# Patient Record
Sex: FEMALE | Race: WHITE | HISPANIC OR LATINO
[De-identification: names, ages, dates, MRNs, and addresses within clinical notes are randomized per-mention and may not be internally consistent; named-entity substitution may affect disease eponyms.]

---

## 2018-03-08 ENCOUNTER — APPOINTMENT (OUTPATIENT)
Dept: PULMONOLOGY | Facility: CLINIC | Age: 63
End: 2018-03-08
Payer: MEDICAID

## 2018-03-08 VITALS
SYSTOLIC BLOOD PRESSURE: 112 MMHG | OXYGEN SATURATION: 99 % | RESPIRATION RATE: 16 BRPM | HEART RATE: 68 BPM | DIASTOLIC BLOOD PRESSURE: 70 MMHG

## 2018-03-08 VITALS — HEIGHT: 63.5 IN | BODY MASS INDEX: 29.05 KG/M2 | WEIGHT: 166 LBS

## 2018-03-08 PROCEDURE — 94727 GAS DIL/WSHOT DETER LNG VOL: CPT

## 2018-03-08 PROCEDURE — 99204 OFFICE O/P NEW MOD 45 MIN: CPT | Mod: 25

## 2018-03-08 PROCEDURE — 94010 BREATHING CAPACITY TEST: CPT

## 2018-03-08 PROCEDURE — 85018 HEMOGLOBIN: CPT | Mod: QW

## 2018-03-08 PROCEDURE — 94729 DIFFUSING CAPACITY: CPT

## 2018-03-08 RX ORDER — HYDROCORTISONE 25 MG/G
2.5 CREAM TOPICAL
Qty: 28 | Refills: 0 | Status: DISCONTINUED | COMMUNITY
Start: 2017-11-28 | End: 2018-03-08

## 2018-03-08 RX ORDER — ASPIRIN 81 MG
600-200 TABLET, DELAYED RELEASE (ENTERIC COATED) ORAL
Qty: 90 | Refills: 0 | Status: DISCONTINUED | COMMUNITY
Start: 2017-10-23 | End: 2018-03-08

## 2018-03-08 RX ORDER — FAMOTIDINE 20 MG/1
20 TABLET, FILM COATED ORAL
Qty: 60 | Refills: 0 | Status: DISCONTINUED | COMMUNITY
Start: 2018-01-11 | End: 2018-03-08

## 2018-04-16 ENCOUNTER — APPOINTMENT (OUTPATIENT)
Dept: THORACIC SURGERY | Facility: CLINIC | Age: 63
End: 2018-04-16

## 2018-05-08 ENCOUNTER — APPOINTMENT (OUTPATIENT)
Dept: PULMONOLOGY | Facility: CLINIC | Age: 63
End: 2018-05-08
Payer: MEDICAID

## 2018-05-08 VITALS
HEART RATE: 61 BPM | BODY MASS INDEX: 28.6 KG/M2 | DIASTOLIC BLOOD PRESSURE: 70 MMHG | WEIGHT: 164 LBS | SYSTOLIC BLOOD PRESSURE: 120 MMHG | OXYGEN SATURATION: 97 %

## 2018-05-08 PROCEDURE — 99214 OFFICE O/P EST MOD 30 MIN: CPT

## 2018-07-31 ENCOUNTER — APPOINTMENT (OUTPATIENT)
Dept: ORTHOPEDIC SURGERY | Facility: CLINIC | Age: 63
End: 2018-07-31
Payer: MEDICAID

## 2018-07-31 VITALS
BODY MASS INDEX: 26.2 KG/M2 | WEIGHT: 163 LBS | HEIGHT: 66 IN | HEART RATE: 80 BPM | SYSTOLIC BLOOD PRESSURE: 132 MMHG | DIASTOLIC BLOOD PRESSURE: 80 MMHG

## 2018-07-31 DIAGNOSIS — Z78.9 OTHER SPECIFIED HEALTH STATUS: ICD-10-CM

## 2018-07-31 PROCEDURE — 73564 X-RAY EXAM KNEE 4 OR MORE: CPT | Mod: RT

## 2018-07-31 PROCEDURE — 99203 OFFICE O/P NEW LOW 30 MIN: CPT

## 2018-08-01 PROBLEM — Z78.9 EXERCISES OCCASIONALLY: Status: ACTIVE | Noted: 2018-07-31

## 2018-08-01 PROBLEM — Z78.9 RARELY CONSUMES ALCOHOL: Status: ACTIVE | Noted: 2018-07-31

## 2018-08-01 PROBLEM — Z78.9 DOES NOT USE ILLICIT DRUGS: Status: ACTIVE | Noted: 2018-07-31

## 2018-08-08 ENCOUNTER — APPOINTMENT (OUTPATIENT)
Dept: ORTHOPEDIC SURGERY | Facility: CLINIC | Age: 63
End: 2018-08-08
Payer: MEDICAID

## 2018-08-08 PROCEDURE — 99205 OFFICE O/P NEW HI 60 MIN: CPT

## 2018-08-22 ENCOUNTER — APPOINTMENT (OUTPATIENT)
Dept: PULMONOLOGY | Facility: CLINIC | Age: 63
End: 2018-08-22

## 2018-08-22 RX ORDER — MELOXICAM 15 MG/1
15 TABLET ORAL
Qty: 30 | Refills: 0 | Status: DISCONTINUED | COMMUNITY
Start: 2018-02-13 | End: 2018-08-22

## 2018-08-22 RX ORDER — NAPROXEN 500 MG/1
500 TABLET ORAL
Qty: 28 | Refills: 0 | Status: DISCONTINUED | COMMUNITY
Start: 2017-11-09 | End: 2018-08-22

## 2018-08-27 ENCOUNTER — OUTPATIENT (OUTPATIENT)
Dept: OUTPATIENT SERVICES | Facility: HOSPITAL | Age: 63
LOS: 1 days | End: 2018-08-27
Payer: MEDICAID

## 2018-08-27 VITALS
RESPIRATION RATE: 18 BRPM | SYSTOLIC BLOOD PRESSURE: 122 MMHG | HEART RATE: 73 BPM | OXYGEN SATURATION: 100 % | DIASTOLIC BLOOD PRESSURE: 85 MMHG | WEIGHT: 164.02 LBS | TEMPERATURE: 99 F | HEIGHT: 66 IN

## 2018-08-27 DIAGNOSIS — Z98.891 HISTORY OF UTERINE SCAR FROM PREVIOUS SURGERY: Chronic | ICD-10-CM

## 2018-08-27 DIAGNOSIS — S83.241A OTHER TEAR OF MEDIAL MENISCUS, CURRENT INJURY, RIGHT KNEE, INITIAL ENCOUNTER: ICD-10-CM

## 2018-08-27 DIAGNOSIS — Z90.49 ACQUIRED ABSENCE OF OTHER SPECIFIED PARTS OF DIGESTIVE TRACT: Chronic | ICD-10-CM

## 2018-08-27 DIAGNOSIS — Z98.890 OTHER SPECIFIED POSTPROCEDURAL STATES: Chronic | ICD-10-CM

## 2018-08-27 DIAGNOSIS — Z78.9 OTHER SPECIFIED HEALTH STATUS: ICD-10-CM

## 2018-08-27 DIAGNOSIS — S83.8X1A SPRAIN OF OTHER SPECIFIED PARTS OF RIGHT KNEE, INITIAL ENCOUNTER: ICD-10-CM

## 2018-08-27 DIAGNOSIS — S83.281A OTHER TEAR OF LATERAL MENISCUS, CURRENT INJURY, RIGHT KNEE, INITIAL ENCOUNTER: ICD-10-CM

## 2018-08-27 DIAGNOSIS — Z01.818 ENCOUNTER FOR OTHER PREPROCEDURAL EXAMINATION: ICD-10-CM

## 2018-08-27 DIAGNOSIS — Z98.82 BREAST IMPLANT STATUS: Chronic | ICD-10-CM

## 2018-08-27 LAB
ANION GAP SERPL CALC-SCNC: 14 MMOL/L — SIGNIFICANT CHANGE UP (ref 5–17)
BUN SERPL-MCNC: 16 MG/DL — SIGNIFICANT CHANGE UP (ref 7–23)
CALCIUM SERPL-MCNC: 9.8 MG/DL — SIGNIFICANT CHANGE UP (ref 8.4–10.5)
CHLORIDE SERPL-SCNC: 100 MMOL/L — SIGNIFICANT CHANGE UP (ref 96–108)
CO2 SERPL-SCNC: 26 MMOL/L — SIGNIFICANT CHANGE UP (ref 22–31)
CREAT SERPL-MCNC: 0.76 MG/DL — SIGNIFICANT CHANGE UP (ref 0.5–1.3)
GLUCOSE SERPL-MCNC: 115 MG/DL — HIGH (ref 70–99)
HCT VFR BLD CALC: 39.8 % — SIGNIFICANT CHANGE UP (ref 34.5–45)
HGB BLD-MCNC: 12.7 G/DL — SIGNIFICANT CHANGE UP (ref 11.5–15.5)
MCHC RBC-ENTMCNC: 29.5 PG — SIGNIFICANT CHANGE UP (ref 27–34)
MCHC RBC-ENTMCNC: 31.9 GM/DL — LOW (ref 32–36)
MCV RBC AUTO: 92.3 FL — SIGNIFICANT CHANGE UP (ref 80–100)
PLATELET # BLD AUTO: 351 K/UL — SIGNIFICANT CHANGE UP (ref 150–400)
POTASSIUM SERPL-MCNC: 3.9 MMOL/L — SIGNIFICANT CHANGE UP (ref 3.5–5.3)
POTASSIUM SERPL-SCNC: 3.9 MMOL/L — SIGNIFICANT CHANGE UP (ref 3.5–5.3)
RBC # BLD: 4.31 M/UL — SIGNIFICANT CHANGE UP (ref 3.8–5.2)
RBC # FLD: 13.8 % — SIGNIFICANT CHANGE UP (ref 10.3–14.5)
SODIUM SERPL-SCNC: 140 MMOL/L — SIGNIFICANT CHANGE UP (ref 135–145)
WBC # BLD: 4.79 K/UL — SIGNIFICANT CHANGE UP (ref 3.8–10.5)
WBC # FLD AUTO: 4.79 K/UL — SIGNIFICANT CHANGE UP (ref 3.8–10.5)

## 2018-08-27 PROCEDURE — 80048 BASIC METABOLIC PNL TOTAL CA: CPT

## 2018-08-27 PROCEDURE — 85027 COMPLETE CBC AUTOMATED: CPT

## 2018-08-27 PROCEDURE — G0463: CPT

## 2018-08-27 RX ORDER — LIDOCAINE HCL 20 MG/ML
0.2 VIAL (ML) INJECTION ONCE
Qty: 0 | Refills: 0 | Status: DISCONTINUED | OUTPATIENT
Start: 2018-09-11 | End: 2018-09-26

## 2018-08-27 RX ORDER — CEFAZOLIN SODIUM 1 G
2000 VIAL (EA) INJECTION ONCE
Qty: 0 | Refills: 0 | Status: DISCONTINUED | OUTPATIENT
Start: 2018-09-11 | End: 2018-09-26

## 2018-08-27 NOTE — H&P PST ADULT - HISTORY OF PRESENT ILLNESS
62 y/o female Bhutanese speaking accompanied by son ( refused  service ) .  Pt has been experiencing right knee pain for few weeks also reports that while up and down stairs she twisted and lead to symptoms . Pt was seen and evaluated by ortho s/p[ MRI revealed  medial and lateral meniscus tear.  Presents to  PST for scheduled Right Knee Arthroscopy ,  Partial Meniscectomy on  9/11/2018.

## 2018-08-27 NOTE — H&P PST ADULT - PMH
Acute lateral meniscal injury of right knee, initial encounter    Acute medial meniscus tear of right knee, initial encounter    Patient is [employer]    PPD positive  2000 -  treated with INH  Pulmonary mass  monitoring -no surgical intervention  Small bowel mass  incidental finding while appendectomy ( Benign s/p colon mass resection - 3/2018)

## 2018-08-27 NOTE — H&P PST ADULT - NSANTHOSAYNRD_GEN_A_CORE
No. DARWIN screening performed.  STOP BANG Legend: 0-2 = LOW Risk; 3-4 = INTERMEDIATE Risk; 5-8 = HIGH Risk

## 2018-08-27 NOTE — H&P PST ADULT - PROBLEM SELECTOR PLAN 1
Right Knee Arthroscopy ,  Partial Meniscectomy on  9/11/2018.    -Pre- Op instructions discussed  -labs sent  - pt will continue current pain  management

## 2018-08-27 NOTE — H&P PST ADULT - PSH
H/O cosmetic plastic surgery  liposuction - 30 years ago  History of breast implant  15 years ago  History of colon surgery  3/2018 ( small colon mass resection - benign )  S/P appendectomy  2018  S/P  section  long time ago

## 2018-08-28 PROBLEM — K63.89 OTHER SPECIFIED DISEASES OF INTESTINE: Chronic | Status: ACTIVE | Noted: 2018-08-27

## 2018-08-30 ENCOUNTER — CHART COPY (OUTPATIENT)
Age: 63
End: 2018-08-30

## 2018-09-06 ENCOUNTER — APPOINTMENT (OUTPATIENT)
Dept: PULMONOLOGY | Facility: CLINIC | Age: 63
End: 2018-09-06

## 2018-09-11 ENCOUNTER — OUTPATIENT (OUTPATIENT)
Dept: OUTPATIENT SERVICES | Facility: HOSPITAL | Age: 63
LOS: 1 days | End: 2018-09-11
Payer: MEDICAID

## 2018-09-11 ENCOUNTER — APPOINTMENT (OUTPATIENT)
Dept: ORTHOPEDIC SURGERY | Facility: HOSPITAL | Age: 63
End: 2018-09-11

## 2018-09-11 VITALS
HEART RATE: 68 BPM | HEIGHT: 66 IN | OXYGEN SATURATION: 100 % | WEIGHT: 164.02 LBS | RESPIRATION RATE: 16 BRPM | DIASTOLIC BLOOD PRESSURE: 81 MMHG | SYSTOLIC BLOOD PRESSURE: 121 MMHG | TEMPERATURE: 98 F

## 2018-09-11 VITALS
OXYGEN SATURATION: 96 % | DIASTOLIC BLOOD PRESSURE: 74 MMHG | SYSTOLIC BLOOD PRESSURE: 115 MMHG | RESPIRATION RATE: 16 BRPM | TEMPERATURE: 98 F | HEART RATE: 68 BPM

## 2018-09-11 DIAGNOSIS — S83.241A OTHER TEAR OF MEDIAL MENISCUS, CURRENT INJURY, RIGHT KNEE, INITIAL ENCOUNTER: ICD-10-CM

## 2018-09-11 DIAGNOSIS — Z98.890 OTHER SPECIFIED POSTPROCEDURAL STATES: Chronic | ICD-10-CM

## 2018-09-11 DIAGNOSIS — Z98.891 HISTORY OF UTERINE SCAR FROM PREVIOUS SURGERY: Chronic | ICD-10-CM

## 2018-09-11 DIAGNOSIS — S83.281A OTHER TEAR OF LATERAL MENISCUS, CURRENT INJURY, RIGHT KNEE, INITIAL ENCOUNTER: ICD-10-CM

## 2018-09-11 DIAGNOSIS — Z90.49 ACQUIRED ABSENCE OF OTHER SPECIFIED PARTS OF DIGESTIVE TRACT: Chronic | ICD-10-CM

## 2018-09-11 DIAGNOSIS — Z98.82 BREAST IMPLANT STATUS: Chronic | ICD-10-CM

## 2018-09-11 PROCEDURE — 29881 ARTHRS KNE SRG MNISECTMY M/L: CPT | Mod: RT

## 2018-09-11 RX ORDER — ASPIRIN/CALCIUM CARB/MAGNESIUM 324 MG
1 TABLET ORAL
Qty: 30 | Refills: 0
Start: 2018-09-11 | End: 2018-10-10

## 2018-09-11 RX ORDER — HYDROMORPHONE HYDROCHLORIDE 2 MG/ML
0.5 INJECTION INTRAMUSCULAR; INTRAVENOUS; SUBCUTANEOUS
Qty: 0 | Refills: 0 | Status: DISCONTINUED | OUTPATIENT
Start: 2018-09-11 | End: 2018-09-11

## 2018-09-11 RX ORDER — OXYCODONE HYDROCHLORIDE 5 MG/1
10 TABLET ORAL EVERY 4 HOURS
Qty: 0 | Refills: 0 | Status: DISCONTINUED | OUTPATIENT
Start: 2018-09-11 | End: 2018-09-11

## 2018-09-11 RX ORDER — SODIUM CHLORIDE 9 MG/ML
3 INJECTION INTRAMUSCULAR; INTRAVENOUS; SUBCUTANEOUS EVERY 8 HOURS
Qty: 0 | Refills: 0 | Status: DISCONTINUED | OUTPATIENT
Start: 2018-09-11 | End: 2018-09-11

## 2018-09-11 RX ORDER — OXYCODONE HYDROCHLORIDE 5 MG/1
5 TABLET ORAL EVERY 4 HOURS
Qty: 0 | Refills: 0 | Status: DISCONTINUED | OUTPATIENT
Start: 2018-09-11 | End: 2018-09-11

## 2018-09-11 RX ORDER — OXYCODONE HYDROCHLORIDE 5 MG/1
1 TABLET ORAL
Qty: 18 | Refills: 0
Start: 2018-09-11 | End: 2018-09-13

## 2018-09-11 RX ADMIN — HYDROMORPHONE HYDROCHLORIDE 0.5 MILLIGRAM(S): 2 INJECTION INTRAMUSCULAR; INTRAVENOUS; SUBCUTANEOUS at 13:15

## 2018-09-11 RX ADMIN — HYDROMORPHONE HYDROCHLORIDE 0.5 MILLIGRAM(S): 2 INJECTION INTRAMUSCULAR; INTRAVENOUS; SUBCUTANEOUS at 13:00

## 2018-09-11 RX ADMIN — HYDROMORPHONE HYDROCHLORIDE 0.5 MILLIGRAM(S): 2 INJECTION INTRAMUSCULAR; INTRAVENOUS; SUBCUTANEOUS at 14:02

## 2018-09-11 RX ADMIN — OXYCODONE HYDROCHLORIDE 5 MILLIGRAM(S): 5 TABLET ORAL at 14:00

## 2018-09-11 RX ADMIN — HYDROMORPHONE HYDROCHLORIDE 0.5 MILLIGRAM(S): 2 INJECTION INTRAMUSCULAR; INTRAVENOUS; SUBCUTANEOUS at 12:30

## 2018-09-11 NOTE — ASU DISCHARGE PLAN (ADULT/PEDIATRIC). - INSTRUCTIONS
You may resume a regular diet and regular activities. Please do not participate in heavy lifting or strenuous exercise. Do not drive while taking pain medication.    Please go to the emergency department if you experience pain not controlled by pain medication, bleeding that will not stop, fevers or chills.

## 2018-09-11 NOTE — ASU DISCHARGE PLAN (ADULT/PEDIATRIC). - FOLLOWUP APPOINTMENT CLINIC/PHYSICIAN
Please follow up with Dr. Mary within 1-2 weeks. You may call [phone] to schedule an appointment.

## 2018-09-11 NOTE — ASU DISCHARGE PLAN (ADULT/PEDIATRIC). - MEDICATION SUMMARY - MEDICATIONS TO TAKE
I will START or STAY ON the medications listed below when I get home from the hospital:    oxyCODONE 5 mg oral tablet  -- 1 tab(s) by mouth every 4 to 6 hours MDD:6 per day  -- Caution federal law prohibits the transfer of this drug to any person other  than the person for whom it was prescribed.  It is very important that you take or use this exactly as directed.  Do not skip doses or discontinue unless directed by your doctor.  May cause drowsiness.  Alcohol may intensify this effect.  Use care when operating dangerous machinery.  This prescription cannot be refilled.  Using more of this medication than prescribed may cause serious breathing problems.    -- Indication: For Pain    aspirin 325 mg oral tablet  -- 1 tab(s) by mouth once a day   -- Take with food or milk.    -- Indication: For DVT prophylaxis    Tylenol Arthritis Caplet 650 mg oral tablet, extended release  -- 2 tab(s) by mouth every 8 hours, As Needed  -- Indication: For Pain    Calcium 600+D oral tablet  -- 1 tab(s) by mouth once a day  -- Indication: For Supplement

## 2018-09-11 NOTE — ASU DISCHARGE PLAN (ADULT/PEDIATRIC). - ITEMS TO FOLLOWUP WITH YOUR PHYSICIAN'S
Please follow up with Dr. Mary within 1-2 weeks. You may call [phone] to schedule an appointment.    You may resume a regular diet and regular activities. Please do not participate in heavy lifting or strenuous exercise. Do not drive while taking pain medication.    Please go to the emergency department if you experience pain not controlled by pain medication, bleeding that will not stop, fevers or chills.

## 2018-09-12 PROBLEM — S83.8X1A SPRAIN OF OTHER SPECIFIED PARTS OF RIGHT KNEE, INITIAL ENCOUNTER: Chronic | Status: ACTIVE | Noted: 2018-08-27

## 2018-09-12 PROBLEM — S83.241A OTHER TEAR OF MEDIAL MENISCUS, CURRENT INJURY, RIGHT KNEE, INITIAL ENCOUNTER: Chronic | Status: ACTIVE | Noted: 2018-08-27

## 2018-09-12 PROBLEM — Z78.9 OTHER SPECIFIED HEALTH STATUS: Chronic | Status: ACTIVE | Noted: 2018-08-27

## 2018-09-12 PROBLEM — R76.11 NONSPECIFIC REACTION TO TUBERCULIN SKIN TEST WITHOUT ACTIVE TUBERCULOSIS: Chronic | Status: ACTIVE | Noted: 2018-08-27

## 2018-09-12 PROBLEM — R91.8 OTHER NONSPECIFIC ABNORMAL FINDING OF LUNG FIELD: Chronic | Status: ACTIVE | Noted: 2018-08-27

## 2018-09-20 ENCOUNTER — APPOINTMENT (OUTPATIENT)
Dept: ORTHOPEDIC SURGERY | Facility: CLINIC | Age: 63
End: 2018-09-20
Payer: MEDICAID

## 2018-09-20 PROCEDURE — 99024 POSTOP FOLLOW-UP VISIT: CPT

## 2018-10-02 ENCOUNTER — APPOINTMENT (OUTPATIENT)
Dept: ORTHOPEDIC SURGERY | Facility: CLINIC | Age: 63
End: 2018-10-02

## 2018-11-08 ENCOUNTER — APPOINTMENT (OUTPATIENT)
Dept: ORTHOPEDIC SURGERY | Facility: CLINIC | Age: 63
End: 2018-11-08
Payer: MEDICAID

## 2018-11-08 VITALS — WEIGHT: 163 LBS | BODY MASS INDEX: 26.2 KG/M2 | HEIGHT: 66 IN

## 2018-11-08 PROCEDURE — 99024 POSTOP FOLLOW-UP VISIT: CPT

## 2018-11-12 ENCOUNTER — OTHER (OUTPATIENT)
Age: 63
End: 2018-11-12

## 2018-12-31 ENCOUNTER — APPOINTMENT (OUTPATIENT)
Dept: ORTHOPEDIC SURGERY | Facility: CLINIC | Age: 63
End: 2018-12-31
Payer: MEDICAID

## 2018-12-31 DIAGNOSIS — S83.241A OTHER TEAR OF MEDIAL MENISCUS, CURRENT INJURY, RIGHT KNEE, INITIAL ENCOUNTER: ICD-10-CM

## 2018-12-31 PROCEDURE — 73562 X-RAY EXAM OF KNEE 3: CPT | Mod: RT

## 2018-12-31 PROCEDURE — 99214 OFFICE O/P EST MOD 30 MIN: CPT

## 2019-01-11 ENCOUNTER — OTHER (OUTPATIENT)
Age: 64
End: 2019-01-11

## 2019-01-25 ENCOUNTER — APPOINTMENT (OUTPATIENT)
Dept: ORTHOPEDIC SURGERY | Facility: CLINIC | Age: 64
End: 2019-01-25
Payer: MEDICAID

## 2019-01-25 VITALS — HEIGHT: 66 IN | BODY MASS INDEX: 26.2 KG/M2 | WEIGHT: 163 LBS

## 2019-01-25 DIAGNOSIS — M17.11 UNILATERAL PRIMARY OSTEOARTHRITIS, RIGHT KNEE: ICD-10-CM

## 2019-01-25 DIAGNOSIS — Z98.890 OTHER SPECIFIED POSTPROCEDURAL STATES: ICD-10-CM

## 2019-01-25 PROCEDURE — 99214 OFFICE O/P EST MOD 30 MIN: CPT

## 2019-01-28 PROBLEM — Z98.890 S/P ARTHROSCOPY OF KNEE: Status: ACTIVE | Noted: 2018-09-20

## 2019-01-28 PROBLEM — M17.11 PRIMARY LOCALIZED OSTEOARTHRITIS OF RIGHT KNEE: Status: ACTIVE | Noted: 2018-08-01

## 2019-02-01 ENCOUNTER — OTHER (OUTPATIENT)
Age: 64
End: 2019-02-01

## 2019-06-07 ENCOUNTER — OTHER (OUTPATIENT)
Age: 64
End: 2019-06-07

## 2019-06-19 ENCOUNTER — OTHER (OUTPATIENT)
Age: 64
End: 2019-06-19

## 2019-08-12 NOTE — H&P PST ADULT - MALLAMPATI CLASS
Spirometry without bronchodilator completed.  
14 inch neck/Class II - visualization of the soft palate, fauces, and uvula

## 2020-09-04 ENCOUNTER — APPOINTMENT (OUTPATIENT)
Dept: PULMONOLOGY | Facility: CLINIC | Age: 65
End: 2020-09-04
Payer: MEDICARE

## 2020-09-04 VITALS
OXYGEN SATURATION: 98 % | BODY MASS INDEX: 27.32 KG/M2 | WEIGHT: 170 LBS | HEART RATE: 76 BPM | HEIGHT: 66 IN | SYSTOLIC BLOOD PRESSURE: 130 MMHG | DIASTOLIC BLOOD PRESSURE: 70 MMHG

## 2020-09-04 PROCEDURE — 99214 OFFICE O/P EST MOD 30 MIN: CPT

## 2020-09-04 NOTE — PHYSICAL EXAM
[General Appearance - Well Developed] : well developed [Normal Conjunctiva] : the conjunctiva exhibited no abnormalities [Normal Oropharynx] : normal oropharynx [Neck Appearance] : the appearance of the neck was normal [Neck Cervical Mass (___cm)] : no neck mass was observed [Thyroid Diffuse Enlargement] : the thyroid was not enlarged [Heart Sounds] : normal S1 and S2 [Arterial Pulses Normal] : the arterial pulses were normal [Edema] : no peripheral edema present [Respiration, Rhythm And Depth] : normal respiratory rhythm and effort [Auscultation Breath Sounds / Voice Sounds] : lungs were clear to auscultation bilaterally [Lungs Percussion] : the lungs were normal to percussion [Abdomen Soft] : soft [Bowel Sounds] : normal bowel sounds [Abdomen Tenderness] : non-tender [Nail Clubbing] : no clubbing of the fingernails [Abnormal Walk] : normal gait [No Focal Deficits] : no focal deficits [Cyanosis, Localized] : no localized cyanosis [Memory Recent] : recent memory was not impaired [Oriented To Time, Place, And Person] : oriented to person, place, and time [Impaired Insight] : insight and judgment were intact [Affect] : the affect was normal [] : no rash [Skin Turgor] : normal skin turgor

## 2020-09-04 NOTE — CONSULT LETTER
[Dear  ___] : Dear  [unfilled], [Please see my note below.] : Please see my note below. [Consult Closing:] : Thank you very much for allowing me to participate in the care of this patient.  If you have any questions, please do not hesitate to contact me. [Consult Letter:] : I had the pleasure of evaluating your patient, [unfilled]. [Daljit Barragan MD] : Daljit Barragan MD [Sincerely,] : Sincerely,

## 2020-09-18 ENCOUNTER — OUTPATIENT (OUTPATIENT)
Dept: OUTPATIENT SERVICES | Facility: HOSPITAL | Age: 65
LOS: 1 days | End: 2020-09-18
Payer: COMMERCIAL

## 2020-09-18 ENCOUNTER — APPOINTMENT (OUTPATIENT)
Dept: CT IMAGING | Facility: CLINIC | Age: 65
End: 2020-09-18
Payer: MEDICARE

## 2020-09-18 DIAGNOSIS — Z90.49 ACQUIRED ABSENCE OF OTHER SPECIFIED PARTS OF DIGESTIVE TRACT: Chronic | ICD-10-CM

## 2020-09-18 DIAGNOSIS — R91.8 OTHER NONSPECIFIC ABNORMAL FINDING OF LUNG FIELD: ICD-10-CM

## 2020-09-18 DIAGNOSIS — Z98.82 BREAST IMPLANT STATUS: Chronic | ICD-10-CM

## 2020-09-18 DIAGNOSIS — Z98.891 HISTORY OF UTERINE SCAR FROM PREVIOUS SURGERY: Chronic | ICD-10-CM

## 2020-09-18 DIAGNOSIS — Z98.890 OTHER SPECIFIED POSTPROCEDURAL STATES: Chronic | ICD-10-CM

## 2020-09-18 PROCEDURE — 71250 CT THORAX DX C-: CPT

## 2020-09-18 PROCEDURE — 71250 CT THORAX DX C-: CPT | Mod: 26

## 2020-09-20 ENCOUNTER — TRANSCRIPTION ENCOUNTER (OUTPATIENT)
Age: 65
End: 2020-09-20

## 2020-09-21 ENCOUNTER — APPOINTMENT (OUTPATIENT)
Dept: THORACIC SURGERY | Facility: CLINIC | Age: 65
End: 2020-09-21
Payer: MEDICARE

## 2020-09-21 VITALS
SYSTOLIC BLOOD PRESSURE: 123 MMHG | BODY MASS INDEX: 26.03 KG/M2 | OXYGEN SATURATION: 98 % | HEART RATE: 83 BPM | DIASTOLIC BLOOD PRESSURE: 78 MMHG | HEIGHT: 66 IN | WEIGHT: 162 LBS | RESPIRATION RATE: 18 BRPM

## 2020-09-21 PROCEDURE — 99203 OFFICE O/P NEW LOW 30 MIN: CPT

## 2020-09-21 RX ORDER — MELOXICAM 7.5 MG/1
7.5 TABLET ORAL
Qty: 14 | Refills: 0 | Status: COMPLETED | COMMUNITY
Start: 2018-08-08 | End: 2020-09-21

## 2020-09-21 NOTE — HISTORY OF PRESENT ILLNESS
[FreeTextEntry1] : \miladys Light was in the office today for an initial visit. She has a history of a pulmonary cyst that by her son's report was biopsied nearly 15 years ago and demonstrated no evidence of malignancy. Recent imaging has shown growth and development of a solid structure. The etiology remains unclear, but malignancy has been considered. She feels well has no complaints. She denies fever, chills, night sweats, weight loss, or weight gain.

## 2020-09-21 NOTE — ASSESSMENT
[FreeTextEntry1] : Nadege is a 65-year-old female with a left upper lobe opacity of unclear etiology. I have requested a PET scan, which will be arranged through my office in the future. I suspect surgical removal be necessary at some point and this was discussed with the patient and her son today.\par \[facility] Thank you for allowing me to participate in the care of your patient.\par \par Steven Choudhury MD\[facility] Department of Cardiovascular and Thoracic Surgery\[facility] \[facility] Jj and Adriana James\[facility] at [facility]

## 2020-09-22 NOTE — HISTORY OF PRESENT ILLNESS
[FreeTextEntry1] : 62 yo female non-smoker seen prior to planned resection of a small bowel mass noted at the time of Appendectomy in January of 2018.  Surgery is scheduled for March 12,2018 at [facility].\par Multiple prior surgeries without adverse consequences. \par \par Followed by Dr Galeana in the past\par PPD+ in 2000 and received 6 months of INH\par PET positive RENAN posterior cavitary density in 2010\par TTNA and Bronch non-diagnostic.  Cultures negative for fungus and TB\par CT at [facility] in 2010 and 2012 stable - felt to be benign.  Lost to FU\par \par 5/8/18\par Afebrile.  No cough, sputum, sweats, change in weight or dyspnea. \par Abdominal mass was resected and benign\par \par 9/4/20\par No cough, sputum, sweats, weight loss or dyspnea\par Mass continues to slowly enlarge radiographically \par

## 2020-09-22 NOTE — ASSESSMENT
[FreeTextEntry1] : Large RENAN mass continuing to steadily enlarge\par A low grade malignancy is likely. \par Odd, low grade inflammatory process is possible but unlikely.\par

## 2020-10-08 ENCOUNTER — APPOINTMENT (OUTPATIENT)
Dept: PULMONOLOGY | Facility: CLINIC | Age: 65
End: 2020-10-08

## 2020-10-12 ENCOUNTER — RESULT REVIEW (OUTPATIENT)
Age: 65
End: 2020-10-12

## 2020-10-12 ENCOUNTER — APPOINTMENT (OUTPATIENT)
Dept: NUCLEAR MEDICINE | Facility: CLINIC | Age: 65
End: 2020-10-12
Payer: MEDICARE

## 2020-10-12 ENCOUNTER — OUTPATIENT (OUTPATIENT)
Dept: OUTPATIENT SERVICES | Facility: HOSPITAL | Age: 65
LOS: 1 days | End: 2020-10-12

## 2020-10-12 DIAGNOSIS — Z98.890 OTHER SPECIFIED POSTPROCEDURAL STATES: Chronic | ICD-10-CM

## 2020-10-12 DIAGNOSIS — Z00.00 ENCOUNTER FOR GENERAL ADULT MEDICAL EXAMINATION WITHOUT ABNORMAL FINDINGS: ICD-10-CM

## 2020-10-12 DIAGNOSIS — Z90.49 ACQUIRED ABSENCE OF OTHER SPECIFIED PARTS OF DIGESTIVE TRACT: Chronic | ICD-10-CM

## 2020-10-12 DIAGNOSIS — Z98.891 HISTORY OF UTERINE SCAR FROM PREVIOUS SURGERY: Chronic | ICD-10-CM

## 2020-10-12 DIAGNOSIS — Z98.82 BREAST IMPLANT STATUS: Chronic | ICD-10-CM

## 2020-10-12 PROCEDURE — 78815 PET IMAGE W/CT SKULL-THIGH: CPT | Mod: 26,PI

## 2020-10-13 ENCOUNTER — APPOINTMENT (OUTPATIENT)
Dept: THORACIC SURGERY | Facility: CLINIC | Age: 65
End: 2020-10-13
Payer: MEDICARE

## 2020-10-13 VITALS
DIASTOLIC BLOOD PRESSURE: 77 MMHG | WEIGHT: 167.06 LBS | HEIGHT: 66 IN | BODY MASS INDEX: 26.85 KG/M2 | OXYGEN SATURATION: 95 % | HEART RATE: 65 BPM | TEMPERATURE: 97.2 F | SYSTOLIC BLOOD PRESSURE: 119 MMHG

## 2020-10-13 PROCEDURE — 99215 OFFICE O/P EST HI 40 MIN: CPT

## 2020-10-15 NOTE — PHYSICAL EXAM
[Jugular Venous Distention Increased] : there was no jugular-venous distention [Neck Appearance] : the appearance of the neck was normal [Neck Cervical Mass (___cm)] : no neck mass was observed [Thyroid Diffuse Enlargement] : the thyroid was not enlarged [Thyroid Nodule] : there were no palpable thyroid nodules [Auscultation Breath Sounds / Voice Sounds] : lungs were clear to auscultation bilaterally [] : no respiratory distress [Heart Sounds Gallop] : no gallops [Heart Sounds] : normal S1 and S2 [Heart Rate And Rhythm] : heart rate was normal and rhythm regular [Murmurs] : no murmurs [Heart Sounds Pericardial Friction Rub] : no pericardial rub [Chest Visual Inspection Thoracic Asymmetry] : no chest asymmetry [Examination Of The Chest] : the chest was normal in appearance [Diminished Respiratory Excursion] : normal chest expansion [No Spinal Tenderness] : no spinal tenderness [No Focal Deficits] : no focal deficits [Oriented To Time, Place, And Person] : oriented to person, place, and time [Affect] : the affect was normal [Impaired Insight] : insight and judgment were intact

## 2020-10-15 NOTE — HISTORY OF PRESENT ILLNESS
[FreeTextEntry1] : \miladys Light was in the office today for a followup visit. She recently underwent a PET scan at my request that demonstrated activity in the left upper lobe nodule and hilar regions suspicious for malignancy. She has no new complaints.

## 2020-10-15 NOTE — ASSESSMENT
[FreeTextEntry1] : Nadege is a 65-year-old female with a left upper lobe nodule that is active by PET scan. In addition to a left hilar lymph node. I do believe the mediastinum needs to be staged have recommended she undergo endobronchial ultrasound. If this is negative surgical removal likely be recommended in the future.\par \par Thank you for allowing me to participate in the care of your patient.\par \par Steven Choudhury MD\[facility] Department of Cardiovascular and Thoracic Surgery\par \[facility] Jj and Adriana James\[facility] at [facility]\par

## 2020-11-09 ENCOUNTER — APPOINTMENT (OUTPATIENT)
Dept: THORACIC SURGERY | Facility: CLINIC | Age: 65
End: 2020-11-09
Payer: MEDICARE

## 2020-11-09 VITALS
BODY MASS INDEX: 26.03 KG/M2 | DIASTOLIC BLOOD PRESSURE: 79 MMHG | HEART RATE: 64 BPM | HEIGHT: 66 IN | TEMPERATURE: 98 F | SYSTOLIC BLOOD PRESSURE: 125 MMHG | OXYGEN SATURATION: 100 % | RESPIRATION RATE: 15 BRPM | WEIGHT: 162 LBS

## 2020-11-09 PROCEDURE — 99072 ADDL SUPL MATRL&STAF TM PHE: CPT

## 2020-11-09 PROCEDURE — 99214 OFFICE O/P EST MOD 30 MIN: CPT

## 2020-11-09 RX ORDER — OMEPRAZOLE 40 MG/1
40 CAPSULE, DELAYED RELEASE ORAL
Qty: 30 | Refills: 0 | Status: COMPLETED | COMMUNITY
Start: 2017-09-12 | End: 2020-11-09

## 2020-11-09 RX ORDER — IBUPROFEN 800 MG/1
800 TABLET, FILM COATED ORAL
Qty: 30 | Refills: 0 | Status: COMPLETED | COMMUNITY
Start: 2017-11-28 | End: 2020-11-09

## 2020-11-09 NOTE — PHYSICAL EXAM
[Neck Appearance] : the appearance of the neck was normal [Neck Cervical Mass (___cm)] : no neck mass was observed [Jugular Venous Distention Increased] : there was no jugular-venous distention [Thyroid Diffuse Enlargement] : the thyroid was not enlarged [Thyroid Nodule] : there were no palpable thyroid nodules [] : no respiratory distress [Auscultation Breath Sounds / Voice Sounds] : lungs were clear to auscultation bilaterally [Heart Rate And Rhythm] : heart rate was normal and rhythm regular [Heart Sounds] : normal S1 and S2 [Heart Sounds Gallop] : no gallops [Murmurs] : no murmurs [Heart Sounds Pericardial Friction Rub] : no pericardial rub [Examination Of The Chest] : the chest was normal in appearance [Chest Visual Inspection Thoracic Asymmetry] : no chest asymmetry [Diminished Respiratory Excursion] : normal chest expansion [No Spinal Tenderness] : no spinal tenderness [No Focal Deficits] : no focal deficits [Oriented To Time, Place, And Person] : oriented to person, place, and time [Impaired Insight] : insight and judgment were intact [Affect] : the affect was normal

## 2020-11-09 NOTE — ASSESSMENT
[FreeTextEntry1] : Nadege is a 65-year-old female with a left upper lobe nodule that is active by PET scan. In addition to a left hilar lymph node.  I will be arranging a transthoracic needle biopsy in the near future to obtain tissue.\par \par Thank you for allowing me to participate in the care of your patient.\par \par Steven Choudhury MD\par Department of Cardiovascular and Thoracic Surgery\par \par Jj and Adriana James\[facility] at [facility]\par

## 2020-11-17 ENCOUNTER — OUTPATIENT (OUTPATIENT)
Dept: OUTPATIENT SERVICES | Facility: HOSPITAL | Age: 65
LOS: 1 days | End: 2020-11-17
Payer: COMMERCIAL

## 2020-11-17 VITALS
RESPIRATION RATE: 16 BRPM | DIASTOLIC BLOOD PRESSURE: 70 MMHG | HEIGHT: 65 IN | SYSTOLIC BLOOD PRESSURE: 110 MMHG | TEMPERATURE: 98 F | OXYGEN SATURATION: 98 % | HEART RATE: 68 BPM | WEIGHT: 167.77 LBS

## 2020-11-17 DIAGNOSIS — R07.89 OTHER CHEST PAIN: ICD-10-CM

## 2020-11-17 DIAGNOSIS — Z29.9 ENCOUNTER FOR PROPHYLACTIC MEASURES, UNSPECIFIED: ICD-10-CM

## 2020-11-17 DIAGNOSIS — Z98.890 OTHER SPECIFIED POSTPROCEDURAL STATES: Chronic | ICD-10-CM

## 2020-11-17 DIAGNOSIS — Z90.49 ACQUIRED ABSENCE OF OTHER SPECIFIED PARTS OF DIGESTIVE TRACT: Chronic | ICD-10-CM

## 2020-11-17 DIAGNOSIS — Z98.891 HISTORY OF UTERINE SCAR FROM PREVIOUS SURGERY: Chronic | ICD-10-CM

## 2020-11-17 DIAGNOSIS — Z01.818 ENCOUNTER FOR OTHER PREPROCEDURAL EXAMINATION: ICD-10-CM

## 2020-11-17 DIAGNOSIS — F32.9 MAJOR DEPRESSIVE DISORDER, SINGLE EPISODE, UNSPECIFIED: ICD-10-CM

## 2020-11-17 DIAGNOSIS — Z98.82 BREAST IMPLANT STATUS: Chronic | ICD-10-CM

## 2020-11-17 DIAGNOSIS — Z13.89 ENCOUNTER FOR SCREENING FOR OTHER DISORDER: ICD-10-CM

## 2020-11-17 LAB
ALBUMIN SERPL ELPH-MCNC: 4.3 G/DL — SIGNIFICANT CHANGE UP (ref 3.3–5.2)
ALP SERPL-CCNC: 84 U/L — SIGNIFICANT CHANGE UP (ref 40–120)
ALT FLD-CCNC: 27 U/L — SIGNIFICANT CHANGE UP
ANION GAP SERPL CALC-SCNC: 9 MMOL/L — SIGNIFICANT CHANGE UP (ref 5–17)
APTT BLD: 34.7 SEC — SIGNIFICANT CHANGE UP (ref 27.5–35.5)
AST SERPL-CCNC: 23 U/L — SIGNIFICANT CHANGE UP
BASOPHILS # BLD AUTO: 0.04 K/UL — SIGNIFICANT CHANGE UP (ref 0–0.2)
BASOPHILS NFR BLD AUTO: 0.9 % — SIGNIFICANT CHANGE UP (ref 0–2)
BILIRUB SERPL-MCNC: 0.3 MG/DL — LOW (ref 0.4–2)
BUN SERPL-MCNC: 14 MG/DL — SIGNIFICANT CHANGE UP (ref 8–20)
CALCIUM SERPL-MCNC: 9.6 MG/DL — SIGNIFICANT CHANGE UP (ref 8.6–10.2)
CHLORIDE SERPL-SCNC: 99 MMOL/L — SIGNIFICANT CHANGE UP (ref 98–107)
CO2 SERPL-SCNC: 30 MMOL/L — HIGH (ref 22–29)
CREAT SERPL-MCNC: 0.69 MG/DL — SIGNIFICANT CHANGE UP (ref 0.5–1.3)
EOSINOPHIL # BLD AUTO: 0.08 K/UL — SIGNIFICANT CHANGE UP (ref 0–0.5)
EOSINOPHIL NFR BLD AUTO: 1.7 % — SIGNIFICANT CHANGE UP (ref 0–6)
GLUCOSE SERPL-MCNC: 118 MG/DL — HIGH (ref 70–99)
HCT VFR BLD CALC: 39.9 % — SIGNIFICANT CHANGE UP (ref 34.5–45)
HGB BLD-MCNC: 12.7 G/DL — SIGNIFICANT CHANGE UP (ref 11.5–15.5)
IMM GRANULOCYTES NFR BLD AUTO: 0.4 % — SIGNIFICANT CHANGE UP (ref 0–1.5)
INR BLD: 1.02 RATIO — SIGNIFICANT CHANGE UP (ref 0.88–1.16)
LYMPHOCYTES # BLD AUTO: 1.4 K/UL — SIGNIFICANT CHANGE UP (ref 1–3.3)
LYMPHOCYTES # BLD AUTO: 29.9 % — SIGNIFICANT CHANGE UP (ref 13–44)
MCHC RBC-ENTMCNC: 30 PG — SIGNIFICANT CHANGE UP (ref 27–34)
MCHC RBC-ENTMCNC: 31.8 GM/DL — LOW (ref 32–36)
MCV RBC AUTO: 94.3 FL — SIGNIFICANT CHANGE UP (ref 80–100)
MONOCYTES # BLD AUTO: 0.27 K/UL — SIGNIFICANT CHANGE UP (ref 0–0.9)
MONOCYTES NFR BLD AUTO: 5.8 % — SIGNIFICANT CHANGE UP (ref 2–14)
NEUTROPHILS # BLD AUTO: 2.88 K/UL — SIGNIFICANT CHANGE UP (ref 1.8–7.4)
NEUTROPHILS NFR BLD AUTO: 61.3 % — SIGNIFICANT CHANGE UP (ref 43–77)
PLATELET # BLD AUTO: 315 K/UL — SIGNIFICANT CHANGE UP (ref 150–400)
POTASSIUM SERPL-MCNC: 4.9 MMOL/L — SIGNIFICANT CHANGE UP (ref 3.5–5.3)
POTASSIUM SERPL-SCNC: 4.9 MMOL/L — SIGNIFICANT CHANGE UP (ref 3.5–5.3)
PROT SERPL-MCNC: 7.4 G/DL — SIGNIFICANT CHANGE UP (ref 6.6–8.7)
PROTHROM AB SERPL-ACNC: 11.8 SEC — SIGNIFICANT CHANGE UP (ref 10.6–13.6)
RBC # BLD: 4.23 M/UL — SIGNIFICANT CHANGE UP (ref 3.8–5.2)
RBC # FLD: 13.2 % — SIGNIFICANT CHANGE UP (ref 10.3–14.5)
SODIUM SERPL-SCNC: 138 MMOL/L — SIGNIFICANT CHANGE UP (ref 135–145)
WBC # BLD: 4.69 K/UL — SIGNIFICANT CHANGE UP (ref 3.8–10.5)
WBC # FLD AUTO: 4.69 K/UL — SIGNIFICANT CHANGE UP (ref 3.8–10.5)

## 2020-11-17 PROCEDURE — 85025 COMPLETE CBC W/AUTO DIFF WBC: CPT

## 2020-11-17 PROCEDURE — 80053 COMPREHEN METABOLIC PANEL: CPT

## 2020-11-17 PROCEDURE — 93005 ELECTROCARDIOGRAM TRACING: CPT

## 2020-11-17 PROCEDURE — 85610 PROTHROMBIN TIME: CPT

## 2020-11-17 PROCEDURE — 93010 ELECTROCARDIOGRAM REPORT: CPT

## 2020-11-17 PROCEDURE — 36415 COLL VENOUS BLD VENIPUNCTURE: CPT

## 2020-11-17 PROCEDURE — 85730 THROMBOPLASTIN TIME PARTIAL: CPT

## 2020-11-17 PROCEDURE — G0463: CPT

## 2020-11-17 NOTE — ASU PATIENT PROFILE, ADULT - LEARNING ASSESSMENT (PATIENT) ADDITIONAL COMMENTS
NP, instructed pt on pre-op instructions/teaching, tips for safer surgery, pain management scale, pre-surgical infection prevention instructions, covid swab appt info (11/20), pt verbalized understanding of all instructions given.

## 2020-11-17 NOTE — H&P PST ADULT - NSICDXPASTMEDICALHX_GEN_ALL_CORE_FT
PAST MEDICAL HISTORY:  Acute lateral meniscal injury of right knee, initial encounter     Acute medial meniscus tear of right knee, initial encounter     Fatty liver     Patient is Zoroastrian     PPD positive 2000 -  treated with INH    Pulmonary mass monitoring -no surgical intervention    Small bowel mass incidental finding while appendectomy ( Benign s/p colon mass resection - 3/2018)     PAST MEDICAL HISTORY:  Acute lateral meniscal injury of right knee, initial encounter     Acute medial meniscus tear of right knee, initial encounter     Depression     Fatty liver     Osteoarthritis right    Patient is Roman Catholic     PPD positive 2000 -  treated with INH    Pulmonary mass monitoring -no surgical intervention    Small bowel mass incidental finding while appendectomy ( Benign s/p colon mass resection - 3/2018)

## 2020-11-17 NOTE — ASU PATIENT PROFILE, ADULT - PAIN RATING AT REST
Spoke with pt . All results given . Yes she is taking her Atorvastatin  80mg 1 po QD as directed. Labs scheduled in  2weeks Oct 8th at 12:30pm .Thx Griselda  
4

## 2020-11-17 NOTE — H&P PST ADULT - NEGATIVE ENMT SYMPTOMS
no ear pain/no nasal congestion/no nose bleeds/no hearing difficulty/no throat pain/no dysphagia/no tinnitus

## 2020-11-17 NOTE — ASU PATIENT PROFILE, ADULT - MUTUALITY COMMENT, PROFILE
Pt given bloodless form and verbalized understanding the importance of reading, discussing with their surgeon (and elder),signing and returning the information. Radiology Dept called, s/w monique, notified of same.

## 2020-11-17 NOTE — H&P PST ADULT - NSICDXPASTSURGICALHX_GEN_ALL_CORE_FT
PAST SURGICAL HISTORY:  H/O cosmetic plastic surgery liposuction - 30 years ago    History of breast implant 15 years ago    History of colon surgery 3/2018 ( small colon mass resection - benign )    S/P appendectomy 2018    S/P  section long time ago

## 2020-11-17 NOTE — H&P PST ADULT - ASSESSMENT
This is a pleasant A&Ox3 65 year old German speaking female in NAD with PMH fatty liver, pulmonary mass, OA, depression present today for PST. Patient states history of pulmonary mass for the last 15 years, which has been monitored yearly. Reports mass previously biopsied and was negative for malignancy.  States recent x-rays demonstrated growth of the mass. Recent PET CT on 10/12/20 IMPRESSION: 1. FDG avid spiculated left upper lobe lung mass, suspicious for malignancy. Subcentimeter left apical opacity, too small to characterize. 2. Discrete FDG avid left hilar/infrahilar focus, probably a small lymph node. 3. Non-FDG avid right upper lobe nodule. She denies SOB, cough, wheezing, sputum production or TOLBERT. She is now scheduled for CT guided left lung mass biopsy with anesthesia on  in IR pending medical clearance from PCP.    PCP- Dr. Christen Evans [phone]    Preadmission  instructions and surgical scrub education provided with use of [place]  Christopher Id# [id number], patient verbalized understanding.  Avoidance of aspirin, aspirin type products, ibuprofen and herbal supplements discussed, using [place]  Christopher Id# [id number], patient verbalized understanding.    CAPRINI SCORE [CLOT]    AGE RELATED RISK FACTORS                                                       MOBILITY RELATED FACTORS  [ ] Age 41-60 years                                            (1 Point)                  [ ] Bed rest                                                        (1 Point)  [X ] Age: 61-74 years                                           (2 Points)                 [ ] Plaster cast                                                   (2 Points)  [ ] Age= 75 years                                              (3 Points)                 [ ] Bed bound for more than 72 hours                 (2 Points)    DISEASE RELATED RISK FACTORS                                               GENDER SPECIFIC FACTORS  [ ] Edema in the lower extremities                       (1 Point)                  [ ] Pregnancy                                                     (1 Point)  [ ] Varicose veins                                               (1 Point)                  [ ] Post-partum < 6 weeks                                   (1 Point)             [X ] BMI > 25 Kg/m2                                            (1 Point)                  [ ] Hormonal therapy  or oral contraception          (1 Point)                 [ ] Sepsis (in the previous month)                        (1 Point)                  [ ] History of pregnancy complications                 (1 point)  [ ] Pneumonia or serious lung disease                                               [ ] Unexplained or recurrent                     (1 Point)           (in the previous month)                               (1 Point)  [ ] Abnormal pulmonary function test                     (1 Point)                 SURGERY RELATED RISK FACTORS  [ ] Acute myocardial infarction                              (1 Point)                 [ ]  Section                                             (1 Point)  [ ] Congestive heart failure (in the previous month)  (1 Point)               [ ] Minor surgery                                                  (1 Point)   [ ] Inflammatory bowel disease                             (1 Point)                 [ ] Arthroscopic surgery                                        (2 Points)  [ ] Central venous access                                      (2 Points)                [X ] General surgery lasting more than 45 minutes   (2 Points)       [ ] Stroke (in the previous month)                          (5 Points)               [ ] Elective arthroplasty                                         (5 Points)                                                                                                                                               HEMATOLOGY RELATED FACTORS                                                 TRAUMA RELATED RISK FACTORS  [ ] Prior episodes of VTE                                     (3 Points)                [ ] Fracture of the hip, pelvis, or leg                       (5 Points)  [ ] Positive family history for VTE                         (3 Points)                 [ ] Acute spinal cord injury (in the previous month)  (5 Points)  [ ] Prothrombin 52642 A                                     (3 Points)                 [ ] Paralysis  (less than 1 month)                             (5 Points)  [ ] Factor V Leiden                                             (3 Points)                  [ ] Multiple Trauma within 1 month                        (5 Points)  [ ] Lupus anticoagulants                                     (3 Points)                                                           [ ] Anticardiolipin antibodies                               (3 Points)                                                       [ ] High homocysteine in the blood                      (3 Points)                                             [ ] Other congenital or acquired thrombophilia      (3 Points)                                                [ ] Heparin induced thrombocytopenia                  (3 Points)                                          Total Score [       5   ]    Caprini Score 0 - 2:  Low Risk, No VTE Prophylaxis required for most patients, encourage ambulation  Caprini Score 3 - 6:  At Risk, pharmacologic VTE prophylaxis is indicated for most patients (in the absence of a contraindication)  Caprini Score Greater than or = 7:  High Risk, pharmacologic VTE prophylaxis is indicated for most patients (in the absence of a contraindication)    OPIOID RISK TOOL    MADHURI EACH BOX THAT APPLIES AND ADD TOTALS AT THE END    FAMILY HISTORY OF SUBSTANCE ABUSE                 FEMALE         MALE                                                Alcohol                             [  ]1 pt          [  ]3pts                                               Illegal Durgs                     [  ]2 pts        [  ]3pts                                               Rx Drugs                           [  ]4 pts        [  ]4 pts    PERSONAL HISTORY OF SUBSTANCE ABUSE                                                                                          Alcohol                             [  ]3 pts       [  ]3 pts                                               Illegal Drugs                     [  ]4 pts        [  ]4 pts                                               Rx Drugs                           [  ]5 pts        [  ]5 pts    AGE BETWEEN 16-45 YEARS                                      [  ]1 pt         [  ]1 pt    HISTORY OF PREADOLESCENT   SEXUAL ABUSE                                                             [  ]3 pts        [  ]0pts    PSYCHOLOGICAL DISEASE                     ADD, OCD, Bipolar, Schizophrenia        [  ]2 pts         [  ]2 pts                      Depression                                               [  X]1 pt           [  ]1 pt           SCORING TOTAL   (add numbers and type here)              (**1*)                                     A score of 3 or lower indicated LOW risk for future opioid abuse  A score of 4 to 7 indicated moderate risk for future opioid abuse  A score of 8 or higher indicates a high risk for opioid abuse

## 2020-11-17 NOTE — H&P PST ADULT - HISTORY OF PRESENT ILLNESS
65 year old Fijian speaking female   biopsy of left lung I have a mass  15 years ago i had an xray mass was discovered it was small, they have been monitoring it yearly  previous biopsy    over the last few years it has grown   denies sob, chest pain, palpitations  s/p FALL in [employer] on  9/15 something slippery was on the floor 65 year old Swedish speaking female with PMH fatty liver, pulmonary mass, OA, depression present today for PST. Patient states history of pulmonary mass for the last 15 years, which has been monitored yearly. Reports mass previously biopsied and was negative for malignancy.  States recent x-rays demonstrated growth of the mass. Recent PET CT on 10/12/20 IMPRESSION: 1. FDG avid spiculated left upper lobe lung mass, suspicious for malignancy. Subcentimeter left apical opacity, too small to characterize. 2. Discrete FDG avid left hilar/infrahilar focus, probably a small lymph node. 3. Non-FDG avid right upper lobe nodule. She denies SOB, cough, wheezing, sputum production or TOLBERT. She is now scheduled for CT guided left lung mass biopsy with anesthesia on 11/23 in IR pending medical clearance.

## 2020-11-17 NOTE — ASU PATIENT PROFILE, ADULT - PMH
Acute lateral meniscal injury of right knee, initial encounter    Acute medial meniscus tear of right knee, initial encounter    Patient is [place]    PPD positive  2000 -  treated with INH  Pulmonary mass  monitoring -no surgical intervention  Small bowel mass  incidental finding while appendectomy ( Benign s/p colon mass resection - 3/2018)

## 2020-11-17 NOTE — H&P PST ADULT - NEGATIVE BREAST SYMPTOMS
no breast lump L/no breast lump R/no nipple discharge R/no breast tenderness L/no breast tenderness R

## 2020-11-17 NOTE — H&P PST ADULT - NSICDXPROBLEM_GEN_ALL_CORE_FT
PROBLEM DIAGNOSES  Problem: Depression  Assessment and Plan: Continue medication as prescribed.  Medical clearance pending with PCP    Problem: Screening for substance abuse  Assessment and Plan: ORT score 1 patient low risk    Problem: Need for prophylactic measure  Assessment and Plan: CAP score 5 patient at moderate risk.  Bilateral pneumatic compression device ordered for day of procedure.  Surgical team to evaluate need for prophylactic measures.    Problem: Other chest pain  Assessment and Plan: Scheduled for CT guided left lung mass biopsy with anesthesia on 11/23 in IR pending medicla clearance.

## 2020-11-20 ENCOUNTER — APPOINTMENT (OUTPATIENT)
Dept: DISASTER EMERGENCY | Facility: CLINIC | Age: 65
End: 2020-11-20

## 2020-11-21 LAB — SARS-COV-2 N GENE NPH QL NAA+PROBE: NOT DETECTED

## 2020-11-23 ENCOUNTER — RESULT REVIEW (OUTPATIENT)
Age: 65
End: 2020-11-23

## 2020-11-23 ENCOUNTER — OUTPATIENT (OUTPATIENT)
Dept: OUTPATIENT SERVICES | Facility: HOSPITAL | Age: 65
LOS: 1 days | End: 2020-11-23
Payer: COMMERCIAL

## 2020-11-23 VITALS
OXYGEN SATURATION: 98 % | SYSTOLIC BLOOD PRESSURE: 138 MMHG | RESPIRATION RATE: 16 BRPM | HEART RATE: 66 BPM | DIASTOLIC BLOOD PRESSURE: 80 MMHG

## 2020-11-23 VITALS
WEIGHT: 162.92 LBS | SYSTOLIC BLOOD PRESSURE: 141 MMHG | HEART RATE: 69 BPM | HEIGHT: 65 IN | TEMPERATURE: 99 F | RESPIRATION RATE: 18 BRPM | OXYGEN SATURATION: 97 % | DIASTOLIC BLOOD PRESSURE: 78 MMHG

## 2020-11-23 DIAGNOSIS — Z98.891 HISTORY OF UTERINE SCAR FROM PREVIOUS SURGERY: Chronic | ICD-10-CM

## 2020-11-23 DIAGNOSIS — Z90.49 ACQUIRED ABSENCE OF OTHER SPECIFIED PARTS OF DIGESTIVE TRACT: Chronic | ICD-10-CM

## 2020-11-23 DIAGNOSIS — Z98.890 OTHER SPECIFIED POSTPROCEDURAL STATES: Chronic | ICD-10-CM

## 2020-11-23 DIAGNOSIS — Z98.82 BREAST IMPLANT STATUS: Chronic | ICD-10-CM

## 2020-11-23 DIAGNOSIS — R07.89 OTHER CHEST PAIN: ICD-10-CM

## 2020-11-23 PROCEDURE — 71045 X-RAY EXAM CHEST 1 VIEW: CPT | Mod: 26

## 2020-11-23 PROCEDURE — 88305 TISSUE EXAM BY PATHOLOGIST: CPT | Mod: 26

## 2020-11-23 PROCEDURE — 71045 X-RAY EXAM CHEST 1 VIEW: CPT | Mod: 26,77,76

## 2020-11-23 PROCEDURE — 77012 CT SCAN FOR NEEDLE BIOPSY: CPT | Mod: 26

## 2020-11-23 PROCEDURE — 32405: CPT | Mod: LT

## 2020-11-23 PROCEDURE — 88341 IMHCHEM/IMCYTCHM EA ADD ANTB: CPT | Mod: 26

## 2020-11-23 PROCEDURE — 88342 IMHCHEM/IMCYTCHM 1ST ANTB: CPT | Mod: 26

## 2020-11-23 RX ORDER — ACETAMINOPHEN 500 MG
650 TABLET ORAL ONCE
Refills: 0 | Status: DISCONTINUED | OUTPATIENT
Start: 2020-11-23 | End: 2020-11-23

## 2020-11-23 RX ORDER — ACETAMINOPHEN 500 MG
650 TABLET ORAL EVERY 6 HOURS
Refills: 0 | Status: DISCONTINUED | OUTPATIENT
Start: 2020-11-23 | End: 2020-12-07

## 2020-11-23 RX ORDER — MORPHINE SULFATE 50 MG/1
4 CAPSULE, EXTENDED RELEASE ORAL EVERY 4 HOURS
Refills: 0 | Status: DISCONTINUED | OUTPATIENT
Start: 2020-11-23 | End: 2020-11-23

## 2020-11-23 RX ORDER — MORPHINE SULFATE 50 MG/1
2 CAPSULE, EXTENDED RELEASE ORAL EVERY 4 HOURS
Refills: 0 | Status: DISCONTINUED | OUTPATIENT
Start: 2020-11-23 | End: 2020-11-23

## 2020-11-23 RX ADMIN — Medication 650 MILLIGRAM(S): at 12:16

## 2020-11-23 NOTE — CHART NOTE - NSCHARTNOTEFT_GEN_A_CORE
Vascular & Interventional Radiology Pre-Procedure Note    Procedure Name: ___CT guided left lung biopsy____    HPI: 65y Female with __left lung mass_____    Allergies:   Medications (Abx/Cardiac/Anticoagulation/Blood Products)      Data:  165.1  73.9  T(C): 37.1  HR: 69  BP: 141/78  RR: 18  SpO2: 97%    Exam  General: ___wnl____  Chest: ____wnl___  Abdomen: __wnl_____  Extremities: ___wnl____      plts: 315  INR: 1.02    Imaging: ___PET+ left lung mass____    Plan:   -65y Female presents for ___CT guided left lung mass biopsy____  -Risks/Benefits/alternatives explained with the patient via a  and witnessed informed consent obtained.

## 2020-11-23 NOTE — ASU DISCHARGE PLAN (ADULT/PEDIATRIC) - CALL YOUR DOCTOR IF YOU HAVE ANY OF THE FOLLOWING:
worsening shortness of breath or severe chest pain/Pain not relieved by Medications/Swelling that gets worse/Fever greater than (need to indicate Fahrenheit or Celsius)/Wound/Surgical Site with redness, or foul smelling discharge or pus/Bleeding that does not stop

## 2020-11-29 PROCEDURE — 77012 CT SCAN FOR NEEDLE BIOPSY: CPT

## 2020-11-29 PROCEDURE — 88341 IMHCHEM/IMCYTCHM EA ADD ANTB: CPT

## 2020-11-29 PROCEDURE — 88305 TISSUE EXAM BY PATHOLOGIST: CPT

## 2020-11-29 PROCEDURE — 71045 X-RAY EXAM CHEST 1 VIEW: CPT

## 2020-11-29 PROCEDURE — 32405: CPT

## 2020-11-29 PROCEDURE — 88342 IMHCHEM/IMCYTCHM 1ST ANTB: CPT

## 2020-11-30 PROBLEM — K76.0 FATTY (CHANGE OF) LIVER, NOT ELSEWHERE CLASSIFIED: Chronic | Status: ACTIVE | Noted: 2020-11-17

## 2020-11-30 PROBLEM — F32.9 MAJOR DEPRESSIVE DISORDER, SINGLE EPISODE, UNSPECIFIED: Chronic | Status: ACTIVE | Noted: 2020-11-17

## 2020-11-30 PROBLEM — M19.90 UNSPECIFIED OSTEOARTHRITIS, UNSPECIFIED SITE: Chronic | Status: ACTIVE | Noted: 2020-11-17

## 2020-12-07 ENCOUNTER — APPOINTMENT (OUTPATIENT)
Dept: THORACIC SURGERY | Facility: CLINIC | Age: 65
End: 2020-12-07
Payer: COMMERCIAL

## 2020-12-07 VITALS
TEMPERATURE: 97.6 F | BODY MASS INDEX: 26.03 KG/M2 | OXYGEN SATURATION: 95 % | WEIGHT: 162 LBS | DIASTOLIC BLOOD PRESSURE: 80 MMHG | RESPIRATION RATE: 15 BRPM | HEART RATE: 67 BPM | HEIGHT: 66 IN | SYSTOLIC BLOOD PRESSURE: 129 MMHG

## 2020-12-07 PROCEDURE — 99214 OFFICE O/P EST MOD 30 MIN: CPT

## 2020-12-07 PROCEDURE — 99072 ADDL SUPL MATRL&STAF TM PHE: CPT

## 2020-12-07 NOTE — PHYSICAL EXAM
[] : no respiratory distress [Auscultation Breath Sounds / Voice Sounds] : lungs were clear to auscultation bilaterally [Heart Rate And Rhythm] : heart rate was normal and rhythm regular [Heart Sounds] : normal S1 and S2 [Heart Sounds Gallop] : no gallops [Murmurs] : no murmurs [Heart Sounds Pericardial Friction Rub] : no pericardial rub [Examination Of The Chest] : the chest was normal in appearance [Chest Visual Inspection Thoracic Asymmetry] : no chest asymmetry [Diminished Respiratory Excursion] : normal chest expansion [No Spinal Tenderness] : no spinal tenderness [No Focal Deficits] : no focal deficits [Oriented To Time, Place, And Person] : oriented to person, place, and time [Impaired Insight] : insight and judgment were intact [Affect] : the affect was normal

## 2020-12-07 NOTE — ASSESSMENT
[FreeTextEntry1] : Nadege is a 65-year-old female with a recent diagnosis of lung cancer in the left upper lobe mass. She does have at least hilar involvement of her lymph nodes based on PET scan. At this point I have asked her to obtain an oncology evaluation for discussion regarding treatment options. She would likely require a pneumonectomy for complete removal of disease at this point.\par \par Thank you for allowing me to participate in the care of your patient.\par \par Steven Choudhury MD\[facility] Department of Cardiovascular and Thoracic Surgery\par \par Jj and Adriana James\[facility] [employer] [facility]\par

## 2020-12-07 NOTE — HISTORY OF PRESENT ILLNESS
[FreeTextEntry1] : \miladys Light was in the office today for a followup visit. She recently underwent a transthoracic needle biopsy that demonstrated adenocarcinoma. The left upper lobe mass. Her prior PET scan also demonstrated a hilar lymph node to be active and suspicious for malignancy. She has no new complaints and otherwise feels well.

## 2020-12-14 ENCOUNTER — OUTPATIENT (OUTPATIENT)
Dept: OUTPATIENT SERVICES | Facility: HOSPITAL | Age: 65
LOS: 1 days | Discharge: HOME | End: 2020-12-14

## 2020-12-14 DIAGNOSIS — Z98.890 OTHER SPECIFIED POSTPROCEDURAL STATES: Chronic | ICD-10-CM

## 2020-12-14 DIAGNOSIS — Z98.891 HISTORY OF UTERINE SCAR FROM PREVIOUS SURGERY: Chronic | ICD-10-CM

## 2020-12-14 DIAGNOSIS — C34.90 MALIGNANT NEOPLASM OF UNSPECIFIED PART OF UNSPECIFIED BRONCHUS OR LUNG: ICD-10-CM

## 2020-12-14 DIAGNOSIS — Z98.82 BREAST IMPLANT STATUS: Chronic | ICD-10-CM

## 2020-12-14 DIAGNOSIS — Z90.49 ACQUIRED ABSENCE OF OTHER SPECIFIED PARTS OF DIGESTIVE TRACT: Chronic | ICD-10-CM

## 2020-12-16 LAB — SURGICAL PATHOLOGY STUDY: SIGNIFICANT CHANGE UP

## 2020-12-17 ENCOUNTER — APPOINTMENT (OUTPATIENT)
Dept: HEMATOLOGY ONCOLOGY | Facility: CLINIC | Age: 65
End: 2020-12-17
Payer: MEDICARE

## 2020-12-17 ENCOUNTER — APPOINTMENT (OUTPATIENT)
Dept: HEMATOLOGY ONCOLOGY | Facility: CLINIC | Age: 65
End: 2020-12-17

## 2020-12-17 PROCEDURE — 99204 OFFICE O/P NEW MOD 45 MIN: CPT | Mod: 95

## 2020-12-21 NOTE — HISTORY OF PRESENT ILLNESS
[de-identified] : This encounter was done via Telehealth at home \par This visit was provided via telehealth using real-time 2-way audio visual technology. \par The patient SHANON JOVELMARIOMARISSA was located at home at the time of the visit. \par Verbal consent for teleservices given on Dec 17, 2020  by  SHANON BEE\par \par Daughter and son were present for appointment \par She feels relatively well \par No cough \par Weight appetite good \par \par  [de-identified] : he has a history of a pulmonary cyst that by her son's report was biopsied nearly 15 years ago and demonstrated no evidence of malignancy. Recent imaging has shown growth and development of a solid structure. The etiology remains unclear, but malignancy has been considered. She feels well has no complaints. She denies fever, chills, night sweats, weight loss, or weight gain. \par

## 2020-12-21 NOTE — RESULTS/DATA
[FreeTextEntry1] : \par 11/23/20\par Left lung mass, core biopsy:\par -Adenocarcinoma c/w pulmonary origin (see comment)\par CK7, TTF-1 : Positive\par CK20, PAX-8 : Negative\par \par PET SCAN\par \par  Discrete FDG avid focus in the left hilar/infrahilar region, SUV 5.2 (image 77), may correspond to a small lymph node not delineated in the noncontrast CT portion of this exam. Physiologic FDG activity in the myocardium and blood pool.\par \par LUNGS: FDG-avid spiculated left upper lobe lung mass, 4.5 x 3.0 cm (image 78), SUV 11.1. Adjacent tree-in-bud opacities, as noted on diagnostic CT, impacted airways versus endobronchial disease. Subcentimeter ill-defined left apical nodular opacity, 0.5 cm, too small to characterize. Linear atelectasis in the lingula.\par \par Right upper lobe nodular opacity, 1.2 x 1.0 cm (image 69), is not FDG avid.\par \par IMP: 1. FDG avid spiculated left upper lobe lung mass, suspicious for malignancy. Subcentimeter left apical opacity, too small to characterize.\par \par 2. Discrete FDG avid left hilar/infrahilar focus, probably a small lymph node.\par \par 3. Non-FDG avid right upper lobe nodule.\par

## 2020-12-29 ENCOUNTER — APPOINTMENT (OUTPATIENT)
Dept: MRI IMAGING | Facility: CLINIC | Age: 65
End: 2020-12-29
Payer: MEDICARE

## 2020-12-29 ENCOUNTER — RESULT REVIEW (OUTPATIENT)
Age: 65
End: 2020-12-29

## 2020-12-29 ENCOUNTER — OUTPATIENT (OUTPATIENT)
Dept: OUTPATIENT SERVICES | Facility: HOSPITAL | Age: 65
LOS: 1 days | End: 2020-12-29

## 2020-12-29 ENCOUNTER — APPOINTMENT (OUTPATIENT)
Dept: CT IMAGING | Facility: CLINIC | Age: 65
End: 2020-12-29
Payer: MEDICARE

## 2020-12-29 DIAGNOSIS — R91.8 OTHER NONSPECIFIC ABNORMAL FINDING OF LUNG FIELD: ICD-10-CM

## 2020-12-29 DIAGNOSIS — Z98.82 BREAST IMPLANT STATUS: Chronic | ICD-10-CM

## 2020-12-29 DIAGNOSIS — Z98.890 OTHER SPECIFIED POSTPROCEDURAL STATES: Chronic | ICD-10-CM

## 2020-12-29 DIAGNOSIS — Z98.891 HISTORY OF UTERINE SCAR FROM PREVIOUS SURGERY: Chronic | ICD-10-CM

## 2020-12-29 DIAGNOSIS — Z90.49 ACQUIRED ABSENCE OF OTHER SPECIFIED PARTS OF DIGESTIVE TRACT: Chronic | ICD-10-CM

## 2020-12-29 PROCEDURE — 71260 CT THORAX DX C+: CPT | Mod: 26

## 2020-12-29 PROCEDURE — 74177 CT ABD & PELVIS W/CONTRAST: CPT | Mod: 26

## 2020-12-29 PROCEDURE — 70553 MRI BRAIN STEM W/O & W/DYE: CPT | Mod: 26

## 2021-01-07 ENCOUNTER — APPOINTMENT (OUTPATIENT)
Dept: HEMATOLOGY ONCOLOGY | Facility: CLINIC | Age: 66
End: 2021-01-07
Payer: MEDICARE

## 2021-01-07 PROCEDURE — 99214 OFFICE O/P EST MOD 30 MIN: CPT | Mod: 95

## 2021-01-07 NOTE — HISTORY OF PRESENT ILLNESS
[de-identified] :  SHANON BEE is a 65 year old F with no significant past medical history. 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 and was treated with 6 months of INH \par She is a never smoker.\par \par Patient had a Pulm lesion in the past and had an EBUS \par \par She has a history of a pulmonary cyst that by her son's report was biopsied nearly 15 years ago and demonstrated no evidence of malignancy. Recent imaging has shown growth and development of a solid structure. The etiology remains unclear, but malignancy has been considered. She feels well has no complaints. She denies fever, chills, night sweats, weight loss, or weight gain. \par \par Followed by Dr Galeana in the past\par PPD+ in 2000 and received 6 months of INH\par PET positive RENAN posterior cavitary density in 2010\par TTNA and Bronch non-diagnostic. Cultures negative for fungus and TB\par CT at [facility] in 2010 and 2012 stable - felt to be benign. Lost to FU\par \par Patient had a PEt scan in Oct 2020 with a Left upper lobe spiculated lung mass 4.5 x 3cm with an SUV 11.1 with adjacent tree in bud opacity \par In addition Rt upper lobe opacity 1.2 x1 cm which is not FDG avid \par Discrete Left hilar/ infrahilar region  lesion with an SUV 5.2 which is possibly corresponding to a LN \par \par \par Patient s/p Left lung mass, core biopsy with pathology c/w Adenocarcinoma c/w pulmonary origin (see comment)\par CK7, TTF-1 : Positive CK20, PAX-8 : Negative\par  [de-identified] : In light of the COVid 19 crisis, this encounter was done via Telehealth at home \par This visit was provided via telehealth using real-time 2-way audio visual technology. \par The patient SHANON BEE was located at home at the time of the visit. \par Verbal consent for teleservices given on Jan 07, 2021  by  SHANON JOVELMARIOMARISSA\par Daughter and son were present for appointment \par \par She presents for Follow up TEB appointment\par She feels relatively well No cough  Weight appetite good \par CT CAP/ done on 12/29/20 Stable \par Brain MRi: no metastatic disease\par \par

## 2021-01-07 NOTE — RESULTS/DATA
[FreeTextEntry1] : 12/29/20: CT Chest/ Abd/ Pelvis with stable Lt UPPER LOBE primary leison, ( 4.7 x 2.3cm), 1.2 cm left lung apex lesion along with Rt upper lobe stable lesion of 1.3 cm  \par 12/29/20:  MRI Brain: no evidence of metastatic lesion: \par \par 11/23/20\par Left lung mass, core biopsy:\par -Adenocarcinoma c/w pulmonary origin (see comment)\par CK7, TTF-1 : Positive\par CK20, PAX-8 : Negative\par \par PET SCAN 10/12/20\par  Discrete FDG avid focus in the left hilar/infrahilar region, SUV 5.2 (image 77), may correspond to a small lymph node not delineated in the noncontrast CT portion of this exam. Physiologic FDG activity in the myocardium and blood pool.\par \par LUNGS: FDG-avid spiculated left upper lobe lung mass, 4.5 x 3.0 cm (image 78), SUV 11.1. Adjacent tree-in-bud opacities, as noted on diagnostic CT, impacted airways versus endobronchial disease. Subcentimeter ill-defined left apical nodular opacity, 0.5 cm, too small to characterize. Linear atelectasis in the lingula.\par \par Right upper lobe nodular opacity, 1.2 x 1.0 cm (image 69), is not FDG avid.\par \par IMP: 1. FDG avid spiculated left upper lobe lung mass, suspicious for malignancy. Subcentimeter left apical opacity, too small to characterize.\par \par 2. Discrete FDG avid left hilar/infrahilar focus, probably a small lymph node.\par \par 3. Non-FDG avid right upper lobe nodule.\par \par 3/7/2018: PET \par Multilobulated mass like lesion in the left upper lobe in the location of the previous 2.3x2.1 cm thick wall cavitary mass noted on  5/29/12 \par

## 2021-01-12 ENCOUNTER — OUTPATIENT (OUTPATIENT)
Dept: OUTPATIENT SERVICES | Facility: HOSPITAL | Age: 66
LOS: 1 days | Discharge: HOME | End: 2021-01-12

## 2021-01-12 DIAGNOSIS — Z98.82 BREAST IMPLANT STATUS: Chronic | ICD-10-CM

## 2021-01-12 DIAGNOSIS — Z98.890 OTHER SPECIFIED POSTPROCEDURAL STATES: Chronic | ICD-10-CM

## 2021-01-12 DIAGNOSIS — Z90.49 ACQUIRED ABSENCE OF OTHER SPECIFIED PARTS OF DIGESTIVE TRACT: Chronic | ICD-10-CM

## 2021-01-12 DIAGNOSIS — Z98.891 HISTORY OF UTERINE SCAR FROM PREVIOUS SURGERY: Chronic | ICD-10-CM

## 2021-01-12 DIAGNOSIS — C34.90 MALIGNANT NEOPLASM OF UNSPECIFIED PART OF UNSPECIFIED BRONCHUS OR LUNG: ICD-10-CM

## 2021-01-14 ENCOUNTER — APPOINTMENT (OUTPATIENT)
Dept: HEMATOLOGY ONCOLOGY | Facility: CLINIC | Age: 66
End: 2021-01-14
Payer: MEDICARE

## 2021-01-14 PROCEDURE — 99212 OFFICE O/P EST SF 10 MIN: CPT | Mod: 95

## 2021-01-14 NOTE — HISTORY OF PRESENT ILLNESS
[de-identified] :  SHANON BEE is a 65 year old F with no significant past medical history. 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 Recent imaging has shown growth and development of a solid structure. The etiology remains unclear, but malignancy has been considered. She feels well has no complaints. She denies fever, chills, night sweats, weight loss, or weight gain. \par \par Followed by Dr Galeana in the past\par PPD+ in 2000 and received 6 months of INH\par PET positive RENAN posterior cavitary density in 2010\par TTNA and Bronch non-diagnostic. Cultures negative for fungus and TB\par CT at [facility] in 2010 and 2012 stable - felt to be benign. Lost to FU\par \par Patient had a PEt scan in Oct 2020 with a Left upper lobe spiculated lung mass 4.5 x 3cm with an SUV 11.1 with adjacent tree in bud opacity \par In addition Rt upper lobe opacity 1.2 x1 cm which is not FDG avid \par Discrete Left hilar/ infrahilar region  lesion with an SUV 5.2 which is possibly corresponding to a LN \par \par \par Patient s/p Left lung mass, core biopsy with pathology c/w Adenocarcinoma c/w pulmonary origin (see comment)\par CK7, TTF-1 : Positive CK20, PAX-8 : Negative\par  [de-identified] : this encounter was done via Telehealth at home \par This visit was provided via telehealth using real-time 2-way audio visual technology. \par The patient SHANON BEE was located at home at the time of the visit. \par Verbal consent for teleservices given on Jan 14, 2021  by  SHANON BEE \par \par She presents for Follow up TEB appointment\par Continues to feel well \par Discussed at Tumor board yesteday given the RT upper lobe lesion with slight growth, pursue IR biopsy of Lesion \par If negative for malignancy possibility of surgical resection ( st \par \par CT CAP/ done on 12/29/20 Stable \par Brain MRi: no metastatic disease\par \par

## 2021-01-15 ENCOUNTER — RESULT REVIEW (OUTPATIENT)
Age: 66
End: 2021-01-15

## 2021-01-15 ENCOUNTER — APPOINTMENT (OUTPATIENT)
Dept: HEMATOLOGY ONCOLOGY | Facility: CLINIC | Age: 66
End: 2021-01-15

## 2021-01-15 LAB
BASOPHILS # BLD AUTO: 0 K/UL — SIGNIFICANT CHANGE UP (ref 0–0.2)
BASOPHILS NFR BLD AUTO: 0.9 % — SIGNIFICANT CHANGE UP (ref 0–2)
EOSINOPHIL # BLD AUTO: 0.1 K/UL — SIGNIFICANT CHANGE UP (ref 0–0.5)
EOSINOPHIL NFR BLD AUTO: 1.5 % — SIGNIFICANT CHANGE UP (ref 0–6)
HCT VFR BLD CALC: 39.2 % — SIGNIFICANT CHANGE UP (ref 34.5–45)
HGB BLD-MCNC: 12.8 G/DL — SIGNIFICANT CHANGE UP (ref 11.5–15.5)
LYMPHOCYTES # BLD AUTO: 1.5 K/UL — SIGNIFICANT CHANGE UP (ref 1–3.3)
LYMPHOCYTES # BLD AUTO: 32.2 % — SIGNIFICANT CHANGE UP (ref 13–44)
MCHC RBC-ENTMCNC: 30.5 PG — SIGNIFICANT CHANGE UP (ref 27–34)
MCHC RBC-ENTMCNC: 32.7 G/DL — SIGNIFICANT CHANGE UP (ref 32–36)
MCV RBC AUTO: 93.1 FL — SIGNIFICANT CHANGE UP (ref 80–100)
MONOCYTES # BLD AUTO: 0.3 K/UL — SIGNIFICANT CHANGE UP (ref 0–0.9)
MONOCYTES NFR BLD AUTO: 7.4 % — SIGNIFICANT CHANGE UP (ref 2–14)
NEUTROPHILS # BLD AUTO: 2.7 K/UL — SIGNIFICANT CHANGE UP (ref 1.8–7.4)
NEUTROPHILS NFR BLD AUTO: 58 % — SIGNIFICANT CHANGE UP (ref 43–77)
PLATELET # BLD AUTO: 253 K/UL — SIGNIFICANT CHANGE UP (ref 150–400)
RBC # BLD: 4.2 M/UL — SIGNIFICANT CHANGE UP (ref 3.8–5.2)
RBC # FLD: 12.7 % — SIGNIFICANT CHANGE UP (ref 10.3–14.5)
WBC # BLD: 4.6 K/UL — SIGNIFICANT CHANGE UP (ref 3.8–10.5)
WBC # FLD AUTO: 4.6 K/UL — SIGNIFICANT CHANGE UP (ref 3.8–10.5)

## 2021-01-20 ENCOUNTER — OUTPATIENT (OUTPATIENT)
Dept: OUTPATIENT SERVICES | Facility: HOSPITAL | Age: 66
LOS: 1 days | End: 2021-01-20
Payer: COMMERCIAL

## 2021-01-20 VITALS
RESPIRATION RATE: 20 BRPM | TEMPERATURE: 97 F | HEART RATE: 62 BPM | DIASTOLIC BLOOD PRESSURE: 74 MMHG | SYSTOLIC BLOOD PRESSURE: 117 MMHG | WEIGHT: 166.45 LBS | HEIGHT: 66 IN

## 2021-01-20 DIAGNOSIS — Z98.890 OTHER SPECIFIED POSTPROCEDURAL STATES: Chronic | ICD-10-CM

## 2021-01-20 DIAGNOSIS — Z98.891 HISTORY OF UTERINE SCAR FROM PREVIOUS SURGERY: Chronic | ICD-10-CM

## 2021-01-20 DIAGNOSIS — Z98.82 BREAST IMPLANT STATUS: Chronic | ICD-10-CM

## 2021-01-20 DIAGNOSIS — Z13.89 ENCOUNTER FOR SCREENING FOR OTHER DISORDER: ICD-10-CM

## 2021-01-20 DIAGNOSIS — Z90.49 ACQUIRED ABSENCE OF OTHER SPECIFIED PARTS OF DIGESTIVE TRACT: Chronic | ICD-10-CM

## 2021-01-20 DIAGNOSIS — Z29.9 ENCOUNTER FOR PROPHYLACTIC MEASURES, UNSPECIFIED: ICD-10-CM

## 2021-01-20 DIAGNOSIS — E78.5 HYPERLIPIDEMIA, UNSPECIFIED: ICD-10-CM

## 2021-01-20 DIAGNOSIS — Z01.818 ENCOUNTER FOR OTHER PREPROCEDURAL EXAMINATION: ICD-10-CM

## 2021-01-20 DIAGNOSIS — C80.1 MALIGNANT (PRIMARY) NEOPLASM, UNSPECIFIED: ICD-10-CM

## 2021-01-20 LAB
ALBUMIN SERPL ELPH-MCNC: 4.3 G/DL — SIGNIFICANT CHANGE UP (ref 3.3–5.2)
ALP SERPL-CCNC: 96 U/L — SIGNIFICANT CHANGE UP (ref 40–120)
ALT FLD-CCNC: 28 U/L — SIGNIFICANT CHANGE UP
ANION GAP SERPL CALC-SCNC: 10 MMOL/L — SIGNIFICANT CHANGE UP (ref 5–17)
AST SERPL-CCNC: 26 U/L — SIGNIFICANT CHANGE UP
BASOPHILS # BLD AUTO: 0.03 K/UL — SIGNIFICANT CHANGE UP (ref 0–0.2)
BASOPHILS NFR BLD AUTO: 0.6 % — SIGNIFICANT CHANGE UP (ref 0–2)
BILIRUB SERPL-MCNC: 0.3 MG/DL — LOW (ref 0.4–2)
BLD GP AB SCN SERPL QL: SIGNIFICANT CHANGE UP
BUN SERPL-MCNC: 15 MG/DL — SIGNIFICANT CHANGE UP (ref 8–20)
CALCIUM SERPL-MCNC: 9.6 MG/DL — SIGNIFICANT CHANGE UP (ref 8.6–10.2)
CHLORIDE SERPL-SCNC: 104 MMOL/L — SIGNIFICANT CHANGE UP (ref 98–107)
CO2 SERPL-SCNC: 27 MMOL/L — SIGNIFICANT CHANGE UP (ref 22–29)
CREAT SERPL-MCNC: 0.68 MG/DL — SIGNIFICANT CHANGE UP (ref 0.5–1.3)
EOSINOPHIL # BLD AUTO: 0.08 K/UL — SIGNIFICANT CHANGE UP (ref 0–0.5)
EOSINOPHIL NFR BLD AUTO: 1.7 % — SIGNIFICANT CHANGE UP (ref 0–6)
GLUCOSE SERPL-MCNC: 86 MG/DL — SIGNIFICANT CHANGE UP (ref 70–99)
HCT VFR BLD CALC: 38.6 % — SIGNIFICANT CHANGE UP (ref 34.5–45)
HGB BLD-MCNC: 12.5 G/DL — SIGNIFICANT CHANGE UP (ref 11.5–15.5)
IMM GRANULOCYTES NFR BLD AUTO: 0.2 % — SIGNIFICANT CHANGE UP (ref 0–1.5)
LYMPHOCYTES # BLD AUTO: 1.35 K/UL — SIGNIFICANT CHANGE UP (ref 1–3.3)
LYMPHOCYTES # BLD AUTO: 29 % — SIGNIFICANT CHANGE UP (ref 13–44)
MCHC RBC-ENTMCNC: 30.7 PG — SIGNIFICANT CHANGE UP (ref 27–34)
MCHC RBC-ENTMCNC: 32.4 GM/DL — SIGNIFICANT CHANGE UP (ref 32–36)
MCV RBC AUTO: 94.8 FL — SIGNIFICANT CHANGE UP (ref 80–100)
MONOCYTES # BLD AUTO: 0.35 K/UL — SIGNIFICANT CHANGE UP (ref 0–0.9)
MONOCYTES NFR BLD AUTO: 7.5 % — SIGNIFICANT CHANGE UP (ref 2–14)
NEUTROPHILS # BLD AUTO: 2.84 K/UL — SIGNIFICANT CHANGE UP (ref 1.8–7.4)
NEUTROPHILS NFR BLD AUTO: 61 % — SIGNIFICANT CHANGE UP (ref 43–77)
PLATELET # BLD AUTO: 279 K/UL — SIGNIFICANT CHANGE UP (ref 150–400)
POTASSIUM SERPL-MCNC: 4.6 MMOL/L — SIGNIFICANT CHANGE UP (ref 3.5–5.3)
POTASSIUM SERPL-SCNC: 4.6 MMOL/L — SIGNIFICANT CHANGE UP (ref 3.5–5.3)
PROT SERPL-MCNC: 7.2 G/DL — SIGNIFICANT CHANGE UP (ref 6.6–8.7)
RBC # BLD: 4.07 M/UL — SIGNIFICANT CHANGE UP (ref 3.8–5.2)
RBC # FLD: 13.1 % — SIGNIFICANT CHANGE UP (ref 10.3–14.5)
SODIUM SERPL-SCNC: 141 MMOL/L — SIGNIFICANT CHANGE UP (ref 135–145)
WBC # BLD: 4.66 K/UL — SIGNIFICANT CHANGE UP (ref 3.8–10.5)
WBC # FLD AUTO: 4.66 K/UL — SIGNIFICANT CHANGE UP (ref 3.8–10.5)

## 2021-01-20 PROCEDURE — 93005 ELECTROCARDIOGRAM TRACING: CPT

## 2021-01-20 PROCEDURE — 93010 ELECTROCARDIOGRAM REPORT: CPT

## 2021-01-20 PROCEDURE — G0463: CPT

## 2021-01-20 RX ORDER — MELOXICAM 15 MG/1
7.5 TABLET ORAL
Qty: 0 | Refills: 0 | DISCHARGE

## 2021-01-20 RX ORDER — ATORVASTATIN CALCIUM 80 MG/1
1 TABLET, FILM COATED ORAL
Qty: 0 | Refills: 0 | DISCHARGE

## 2021-01-20 RX ORDER — ACETAMINOPHEN 500 MG
2 TABLET ORAL
Qty: 0 | Refills: 0 | DISCHARGE

## 2021-01-20 RX ORDER — RAMELTEON 8 MG
1 TABLET ORAL
Qty: 0 | Refills: 0 | DISCHARGE

## 2021-01-20 RX ORDER — SODIUM CHLORIDE 9 MG/ML
3 INJECTION INTRAMUSCULAR; INTRAVENOUS; SUBCUTANEOUS ONCE
Refills: 0 | Status: DISCONTINUED | OUTPATIENT
Start: 2021-01-26 | End: 2021-01-27

## 2021-01-20 NOTE — H&P PST ADULT - TRANSFUSION HX COMMENT, PROFILE
[employer] Witness- No blood. Patient was given the blood and non blood product preferences document and will bring a copy the day of procedure.

## 2021-01-20 NOTE — ASU PATIENT PROFILE, ADULT - LEARNING ASSESSMENT (PATIENT) ADDITIONAL COMMENTS
pre op teaching surgical scrub pain management instructions given to pt Covid swab to be done Jan 23

## 2021-01-20 NOTE — H&P PST ADULT - HISTORY OF PRESENT ILLNESS
66 y/o female presents to PST today. Patient reports a hx of  66 y/o female presents to PST today. Patient reports a hx of lung nodule for about 15 years and was following with pulmonologist . Noduled grew about 2 years ago. patient reports that she was told 2020 that the nodules was cancerous and has an additional 2 nodules in the lung. Denies any chest pain, fever, chills, SOB.  Scheduled for CT guided right upper lobe lung biopsy with anesthesia 1/26/2021 64 y/o female presents to PST today. Patient reports a hx of lung nodule for about 15 years and was following with pulmonologist . Noduled grew about 2 years ago. Patient reports that she was told in 2020 that the nodules was cancerous and has an additional 2 nodules in the right lung. Denies any chest pain, fever, chills, SOB.  Scheduled for CT guided right upper lobe lung biopsy with anesthesia 1/26/2021

## 2021-01-20 NOTE — H&P PST ADULT - NSICDXPROBLEM_GEN_ALL_CORE_FT
PROBLEM DIAGNOSES  Problem: Malignant (primary) neoplasm, unspecified  Assessment and Plan: Scheduled for CT guided right upper lobe lung biopsy with anesthesia 1/26/2021  Medical ckearance     Problem: Need for prophylactic measure  Assessment and Plan: Caprini 4- Moderate risk   Primary team please assess need for DVT prophylaxis    Problem: Screening for substance abuse  Assessment and Plan: ORT 0    Problem: Hyperlipidemia  Assessment and Plan: Follows with PCP

## 2021-01-20 NOTE — ASU PATIENT PROFILE, ADULT - PMH
Acute lateral meniscal injury of right knee, initial encounter    Acute medial meniscus tear of right knee, initial encounter    Patient is [employer]    PPD positive  2000 -  treated with INH  Pulmonary mass  monitoring -no surgical intervention  Small bowel mass  incidental finding while appendectomy ( Benign s/p colon mass resection - 3/2018)

## 2021-01-20 NOTE — H&P PST ADULT - ASSESSMENT
66 y/o female scheduled for CT guided right upper lung biopsy with anesthesia 1/26/2021 64 y/o female scheduled for CT guided right upper lung biopsy with anesthesia 2021    OPIOID RISK TOOL    MADHURI EACH BOX THAT APPLIES AND ADD TOTALS AT THE END    FAMILY HISTORY OF SUBSTANCE ABUSE                 FEMALE         MALE                                                Alcohol                             [  ]1 pt          [  ]3pts                                               Illegal Durgs                     [  ]2 pts        [  ]3pts                                               Rx Drugs                           [  ]4 pts        [  ]4 pts    PERSONAL HISTORY OF SUBSTANCE ABUSE                                                                                          Alcohol                             [  ]3 pts       [  ]3 pts                                               Illegal Drugs                     [  ]4 pts        [  ]4 pts                                               Rx Drugs                           [  ]5 pts        [  ]5 pts    AGE BETWEEN 16-45 YEARS                                      [  ]1 pt         [  ]1 pt    HISTORY OF PREADOLESCENT   SEXUAL ABUSE                                                             [  ]3 pts        [  ]0pts    PSYCHOLOGICAL DISEASE                     ADD, OCD, Bipolar, Schizophrenia        [  ]2 pts         [  ]2 pts                      Depression                                               [ x ]1 pt           [  ]1 pt           SCORING TOTAL   (add numbers and type here)              (1)                                   CAPRINI SCORE [CLOT]    AGE RELATED RISK FACTORS                                                       MOBILITY RELATED FACTORS  [ ] Age 41-60 years                                            (1 Point)                  [ ] Bed rest                                                        (1 Point)  [x ] Age: 61-74 years                                           (2 Points)                 [ ] Plaster cast                                                   (2 Points)  [ ] Age= 75 years                                              (3 Points)                 [ ] Bed bound for more than 72 hours                 (2 Points)    DISEASE RELATED RISK FACTORS                                               GENDER SPECIFIC FACTORS  [ ] Edema in the lower extremities                       (1 Point)                  [ ] Pregnancy                                                     (1 Point)  [ ] Varicose veins                                               (1 Point)                  [ ] Post-partum < 6 weeks                                   (1 Point)             [ ] BMI > 25 Kg/m2                                            (1 Point)                  [ ] Hormonal therapy  or oral contraception          (1 Point)                 [ ] Sepsis (in the previous month)                        (1 Point)                  [ ] History of pregnancy complications                 (1 point)  [ ] Pneumonia or serious lung disease                                               [ ] Unexplained or recurrent                     (1 Point)           (in the previous month)                               (1 Point)  [ ] Abnormal pulmonary function test                     (1 Point)                 SURGERY RELATED RISK FACTORS  [ ] Acute myocardial infarction                              (1 Point)                 [ ]  Section                                             (1 Point)  [ ] Congestive heart failure (in the previous month)  (1 Point)               [ ] Minor surgery                                                  (1 Point)   [ ] Inflammatory bowel disease                             (1 Point)                 [ ] Arthroscopic surgery                                        (2 Points)  [ ] Central venous access                                      (2 Points)                [x ] General surgery lasting more than 45 minutes   (2 Points)       [ ] Stroke (in the previous month)                          (5 Points)               [ ] Elective arthroplasty                                         (5 Points)                                                                                                                                               HEMATOLOGY RELATED FACTORS                                                 TRAUMA RELATED RISK FACTORS  [ ] Prior episodes of VTE                                     (3 Points)                [ ] Fracture of the hip, pelvis, or leg                       (5 Points)  [ ] Positive family history for VTE                         (3 Points)                 [ ] Acute spinal cord injury (in the previous month)  (5 Points)  [ ] Prothrombin 22334 A                                     (3 Points)                 [ ] Paralysis  (less than 1 month)                             (5 Points)  [ ] Factor V Leiden                                             (3 Points)                  [ ] Multiple Trauma within 1 month                        (5 Points)  [ ] Lupus anticoagulants                                     (3 Points)                                                           [ ] Anticardiolipin antibodies                               (3 Points)                                                       [ ] High homocysteine in the blood                      (3 Points)                                             [ ] Other congenital or acquired thrombophilia      (3 Points)                                                [ ] Heparin induced thrombocytopenia                  (3 Points)                                          Total Score [     4     ]    Caprini Score 0 - 2:  Low Risk, No VTE Prophylaxis required for most patients, encourage ambulation  Caprini Score 3 - 6:  At Risk, pharmacologic VTE prophylaxis is indicated for most patients (in the absence of a contraindication)  Caprini Score Greater than or = 7:  High Risk, pharmacologic VTE prophylaxis is indicated for most patients (in the absence of a contraindication)  A score of 3 or lower indicated LOW risk for future opioid abuse  A score of 4 to 7 indicated moderate risk for future opioid abuse  A score of 8 or higher indicates a high risk for opioid abuse

## 2021-01-20 NOTE — ASU PATIENT PROFILE, ADULT - LANGUAGE ASSISTANCE NEEDED
[place]  services Language Line Video/Yes-Patient/Caregiver accepts free interpretation services...

## 2021-01-21 PROBLEM — E78.5 HYPERLIPIDEMIA, UNSPECIFIED: Chronic | Status: ACTIVE | Noted: 2021-01-20

## 2021-01-21 PROBLEM — Z91.89 OTHER SPECIFIED PERSONAL RISK FACTORS, NOT ELSEWHERE CLASSIFIED: Chronic | Status: ACTIVE | Noted: 2021-01-20

## 2021-01-21 LAB
ALBUMIN SERPL ELPH-MCNC: 4.7 G/DL
ALP BLD-CCNC: 93 U/L
ALT SERPL-CCNC: 31 U/L
ANION GAP SERPL CALC-SCNC: 14 MMOL/L
APTT BLD: 36.6 SEC
AST SERPL-CCNC: 23 U/L
BILIRUB SERPL-MCNC: 0.3 MG/DL
BUN SERPL-MCNC: 11 MG/DL
CALCIUM SERPL-MCNC: 9.7 MG/DL
CEA SERPL-MCNC: 10.8 NG/ML
CHLORIDE SERPL-SCNC: 104 MMOL/L
CO2 SERPL-SCNC: 25 MMOL/L
CREAT SERPL-MCNC: 0.89 MG/DL
GLUCOSE SERPL-MCNC: 117 MG/DL
HAV IGM SER QL: NONREACTIVE
HBV CORE IGM SER QL: NONREACTIVE
HBV SURFACE AG SER QL: NONREACTIVE
HCV AB SER QL: NONREACTIVE
HCV S/CO RATIO: 0.05 S/CO
INR PPP: 0.95 RATIO
POTASSIUM SERPL-SCNC: 4.2 MMOL/L
PROT SERPL-MCNC: 7.3 G/DL
PT BLD: 11.2 SEC
SODIUM SERPL-SCNC: 143 MMOL/L

## 2021-01-23 ENCOUNTER — APPOINTMENT (OUTPATIENT)
Dept: DISASTER EMERGENCY | Facility: CLINIC | Age: 66
End: 2021-01-23

## 2021-01-25 LAB — SARS-COV-2 N GENE NPH QL NAA+PROBE: NOT DETECTED

## 2021-01-25 NOTE — H&P PST ADULT - LAB RESULTS AND INTERPRETATION
acute rehabilitation/rehabilitation facility pending Labs reviewed. Lab results faxed to PCP for review, patient is scheduled for medical clearance.

## 2021-01-26 ENCOUNTER — INPATIENT (INPATIENT)
Facility: HOSPITAL | Age: 66
LOS: 0 days | Discharge: HOME | DRG: 206 | End: 2021-01-27
Attending: HOSPITALIST | Admitting: STUDENT IN AN ORGANIZED HEALTH CARE EDUCATION/TRAINING PROGRAM
Payer: COMMERCIAL

## 2021-01-26 ENCOUNTER — RESULT REVIEW (OUTPATIENT)
Age: 66
End: 2021-01-26

## 2021-01-26 VITALS
WEIGHT: 160.06 LBS | HEART RATE: 64 BPM | RESPIRATION RATE: 18 BRPM | OXYGEN SATURATION: 98 % | DIASTOLIC BLOOD PRESSURE: 67 MMHG | SYSTOLIC BLOOD PRESSURE: 137 MMHG | TEMPERATURE: 98 F | HEIGHT: 65 IN

## 2021-01-26 DIAGNOSIS — C80.1 MALIGNANT (PRIMARY) NEOPLASM, UNSPECIFIED: ICD-10-CM

## 2021-01-26 DIAGNOSIS — Z98.891 HISTORY OF UTERINE SCAR FROM PREVIOUS SURGERY: Chronic | ICD-10-CM

## 2021-01-26 DIAGNOSIS — Z90.49 ACQUIRED ABSENCE OF OTHER SPECIFIED PARTS OF DIGESTIVE TRACT: Chronic | ICD-10-CM

## 2021-01-26 DIAGNOSIS — Z98.890 OTHER SPECIFIED POSTPROCEDURAL STATES: Chronic | ICD-10-CM

## 2021-01-26 DIAGNOSIS — Z98.82 BREAST IMPLANT STATUS: Chronic | ICD-10-CM

## 2021-01-26 DIAGNOSIS — J95.811 POSTPROCEDURAL PNEUMOTHORAX: ICD-10-CM

## 2021-01-26 PROCEDURE — 99223 1ST HOSP IP/OBS HIGH 75: CPT

## 2021-01-26 PROCEDURE — 88333 PATH CONSLTJ SURG CYTO XM 1: CPT | Mod: 26

## 2021-01-26 PROCEDURE — 71045 X-RAY EXAM CHEST 1 VIEW: CPT | Mod: 26

## 2021-01-26 PROCEDURE — 88305 TISSUE EXAM BY PATHOLOGIST: CPT | Mod: 26

## 2021-01-26 PROCEDURE — 71045 X-RAY EXAM CHEST 1 VIEW: CPT | Mod: 26,77

## 2021-01-26 RX ORDER — ACETAMINOPHEN 500 MG
650 TABLET ORAL ONCE
Refills: 0 | Status: DISCONTINUED | OUTPATIENT
Start: 2021-01-26 | End: 2021-01-26

## 2021-01-26 RX ORDER — MORPHINE SULFATE 50 MG/1
2 CAPSULE, EXTENDED RELEASE ORAL EVERY 6 HOURS
Refills: 0 | Status: DISCONTINUED | OUTPATIENT
Start: 2021-01-26 | End: 2021-01-27

## 2021-01-26 RX ORDER — MULTIVIT WITH MIN/MFOLATE/K2 340-15/3 G
250 POWDER (GRAM) ORAL
Qty: 0 | Refills: 0 | DISCHARGE

## 2021-01-26 RX ORDER — FLUOXETINE HCL 10 MG
40 CAPSULE ORAL DAILY
Refills: 0 | Status: DISCONTINUED | OUTPATIENT
Start: 2021-01-26 | End: 2021-01-27

## 2021-01-26 RX ORDER — ALPRAZOLAM 0.25 MG
1 TABLET ORAL
Qty: 0 | Refills: 0 | DISCHARGE

## 2021-01-26 RX ORDER — ALPRAZOLAM 0.25 MG
0.25 TABLET ORAL
Refills: 0 | Status: DISCONTINUED | OUTPATIENT
Start: 2021-01-26 | End: 2021-01-27

## 2021-01-26 RX ORDER — TRAMADOL HYDROCHLORIDE 50 MG/1
25 TABLET ORAL
Refills: 0 | Status: DISCONTINUED | OUTPATIENT
Start: 2021-01-26 | End: 2021-01-27

## 2021-01-26 RX ORDER — ACETAMINOPHEN 500 MG
650 TABLET ORAL EVERY 6 HOURS
Refills: 0 | Status: DISCONTINUED | OUTPATIENT
Start: 2021-01-26 | End: 2021-01-27

## 2021-01-26 RX ADMIN — Medication 650 MILLIGRAM(S): at 22:38

## 2021-01-26 RX ADMIN — Medication 40 MILLIGRAM(S): at 23:01

## 2021-01-26 NOTE — H&P ADULT - ASSESSMENT
65y F w/ hx anxiety and depression, lung nodules presented for elective IR biopsy of right lung mass. Procedure complicated by pneumothorax post right chest tube placement by IR.    pneumothorax: maintain chest tube    cxr in am    ct surgery consulted for management    lung nodule post biopsy: outpatient f/u with Dr Simpson for results    anxiety/depression: xanax, fluoxetine    ISTOP reviewed and confirmed xanax dosing.    vte ppx: low risk.    dispo once chest tube removed.

## 2021-01-26 NOTE — CONSULT NOTE ADULT - ASSESSMENT
65y Female with PMH HLD, osteoarthritis, and depression.  Presented today for an elective IR biopsy of right lung mass.  Post procedure she was note to have a tiny right pneumothorax  On repeat xray it was read as a 20-30% right pneumothorax, and an 8 Ethiopian pigtail was placed by IR.  Pneumothorax is resolved on post insertion xray.  Thoracic surgery called to manage tube.

## 2021-01-26 NOTE — CONSULT NOTE ADULT - SUBJECTIVE AND OBJECTIVE BOX
Surgeon:    Consult requesting by:    HISTORY OF PRESENT ILLNESS:  65y Female with PMH HLD, osteoarthritis, and depression.  Presented today for an elective IR biopsy of right lung mass.  Post procedure she was note to have a tiny right pneumothorax  On repeat xray it was read as a 20-30% right pneumothorax, and an 8 [place] pigtail was placed by IR.  Pneumothorax is resolved on post insertion xray.  Thoracic surgery called to manage tube.         PAST MEDICAL & SURGICAL HISTORY:  At risk for sleep apnea  Bang score 3    Hyperlipidemia    Depression    Osteoarthritis  right    Fatty liver    Patient is Jehovah&#x27;s Witness    Acute lateral meniscal injury of right knee, initial encounter    Acute medial meniscus tear of right knee, initial encounter    Small bowel mass  incidental finding while appendectomy ( Benign s/p colon mass resection - 3/2018)    PPD positive   -  treated with INH    Pulmonary mass  monitoring -no surgical intervention    History of colon surgery  3/2018 ( small colon mass resection - benign )    H/O cosmetic plastic surgery  liposuction - 30 years ago    History of breast implant  15 years ago    S/P  section  long time ago    S/P appendectomy  2018        MEDICATIONS  (STANDING):  sodium chloride 0.9% lock flush 3 milliLiter(s) IV Push Once    MEDICATIONS  (PRN):  acetaminophen   Tablet .. 650 milliGRAM(s) Oral every 6 hours PRN Temp greater or equal to 38C (100.4F), Mild Pain (1 - 3), Moderate Pain (4 - 6)  morphine  - Injectable 2 milliGRAM(s) IV Push every 6 hours PRN breakthrough pain  traMADol 25 milliGRAM(s) Oral four times a day PRN Severe Pain (7 - 10)    Antiplatelet therapy:                           Last dose/amt:    Allergies    No Known Allergies    Intolerances        SOCIAL HISTORY:  Smoker: [ ] Yes  [ ] No        PACK YEARS:                         WHEN QUIT?  ETOH use: [ ] Yes  [ ] No              FREQUENCY / QUANTITY:  Ilicit Drug use:  [ ] Yes  [ ] No  Occupation:  Live with:  Assisted device use:    FAMILY HISTORY:  FH: thyroid disease  daughter    Family history of hyperlipidemia  mother        Review of Systems  CONSTITUTIONAL:  Fevers[ ] chills[ ] sweats[ ] fatigue[ ] weight loss[ ] weight gain [ ]                                     NEGATIVE [ ]   NEURO:  parathesias[ ] seizures [ ]  syncope [ ]  confusion [ ]                                                                                NEGATIVE[ ]   EYES: glasses[ ]  blurry vision[ ]  discharge[ ] pain[ ] glaucoma [ ]                                                                          NEGATIVE[ ]   ENMT:  difficulty hearing [ ]  vertigo[ ]  dysphagia[ ] epistaxis[ ] recent dental work [ ]                                    NEGATIVE[ ]   CV:  chest pain[ ] palpitations[ ] TOLBERT [ ] diaphoresis [ ]                                                                                           NEGATIVE[ ]   RESPIRATORY:  wheezing[ ] SOB[ ] cough [ ] sputum[ ] hemoptysis[ ]                                                                  NEGATIVE[ ]   GI:  nausea[ ]  vommiting [ ]  diarrhea[ ] constipation [ ] melena [ ]                                                                      NEGATIVE[ ]   : hematuria[ ]  dysuria[ ] urgency[ ] incontinence[ ]                                                                                            NEGATIVE[ ]   MUSKULOSKELETAL:  arthritis[ ]  joint swelling [ ] muscle weakness [ ]                                                                NEGATIVE[ ]   SKIN/BREAST:  rash[ ] itching [ ]  hair loss[ ] masses[ ]                                                                                              NEGATIVE[ ]   PSYCH:  dementia [ ] depresion [ ] anxiety[ ]                                                                                                               NEGATIVE[ ]   HEME/LYMPH:  bruises easily[ ] enlarged lymph nodes[ ] tender lymph nodes[ ]                                               NEGATIVE[ ]   ENDOCRINE:  cold intolerance[ ] heat intolerance[ ] polydipsia[ ]                                                                          NEGATIVE[ ]     PHYSICAL EXAM  Vital Signs Last 24 Hrs  T(C): 36.6 (2021 13:33), Max: 36.7 (2021 10:49)  T(F): 97.8 (2021 13:33), Max: 98 (2021 10:49)  HR: 67 (2021 15:09) (64 - 67)  BP: 110/62 (2021 15:09) (82/50 - 137/67)  BP(mean): --  RR: 13 (2021 15:09) (13 - 18)  SpO2: 99% (2021 15:09) (97% - 100%)    CONSTITUTIONAL:                                                                          WNL[ ]   Neuro: WNL[ ] Normal exam oriented to person/place & time with no focal motor or sensory  deficits. Other                     Eyes: WNL[ ]   Normal exam of conjunctiva & lids, pupils equally reactive. Other     ENT: WNL[ ]    Normal exam of nasal/oral mucosa with absence of cyanosis. Other  Neck: WNL[ ]  Normal exam of jugular veins, trachea & thyroid. Other  Chest: WNL[ ] Normal lung exam with good air movement absence of wheezes, rales, or rhonchi: Other                                                                                CV:  Auscultation: normal [ ] S3[ ] S4[ ] Irregular [ ] Rub[ ] Clicks[ ]    Murmurs none:[ ]systolic [ ]  diastolic [ ] holosystolic [ ]  Carotids: No Bruits[ ] Other                 Abdominal Aorta: normal [ ] nonpalpable[ ]Other                                                                                      GI:           WNL[ ] Normal exam of abdomen, liver & spleen with no noted masses or tenderness. Other                                                                                                        Extremities: WNL[ ] Normal no evidence of cyanosis or deformity Edema: none[ ]trace[ ]1+[ ]2+[ ]3+[ ]4+[ ]  Lower Extremity Pulses: Right[ ] Left[ ]Varicosities[ ]  SKIN :WNL[ ] Normal exam to inspection & palation. Other:                                                          < from: Xray Chest 1 View- PORTABLE-Routine (Xray Chest 1 View- PORTABLE-Routine .) (21 @ 13:16) >     EXAM:  XR CHEST PORTABLE ROUTINE 1V                          PROCEDURE DATE:  2021      INTERPRETATION:  Portable chest radiograph    CLINICAL INFORMATION: RIGHT lung biopsy.    TECHNIQUE:  Portable  AP view of the chest was obtained.    COMPARISON: 2020 chest radiograph available for review.    FINDINGS: RIGHT lung are clear of following RIGHT lung biopsy.  There is a 5% RIGHT lung apical/upper zone pneumothorax. Pneumothorax documented on postbiopsy CT scan 2021 at 12:41PM..  LEFT lung parenchyma shows a LEFT a perihilar mass measuring 5.3 x 3 cm.    The heart and mediastinum are within normal limits.    Visualized osseous structures are intact.    IMPRESSION:   5% RIGHT lung pneumothorax. LEFT perihilar airspace consolidation/mass..  Referring radiology IR Physician aware of findings.    TONY QUACH MD; Attending Radiologist  This document has been electronically signed. 2021  2:50PM    < end of copied text >            < from: Xray Chest 1 View- PORTABLE-Routine (Xray Chest 1 View- PORTABLE-Routine .) (21 @ 15:04) >     EXAM:  XR CHEST PORTABLE ROUTINE 1V                          PROCEDURE DATE:  2021      INTERPRETATION:  Portable chest radiograph    CLINICAL INFORMATION: Status post RIGHT lung biopsy    TECHNIQUE:  Portable  AP view of the chest was obtained.    COMPARISON: 2021 chest available for review.    FINDINGS: There is a 20-30% of RIGHT apical zone pneumothorax. No shift midline structures.  A LEFT perihilar and lung mass persists.    The heart and mediastinum are within normal limits.    Visualized osseous structures are intact.    IMPRESSION:   20-30% RIGHT apical pneumothorax.    TONY QUACH MD; Attending Radiologist  This document has been electronically signed. 2021  3:36PM    < end of copied text >                                                   Surgeon:    Consult requesting by:    HISTORY OF PRESENT ILLNESS:  65y Female with PMH HLD, osteoarthritis, and depression.  Presented today for an elective IR biopsy of right lung mass.  Post procedure she was note to have a tiny right pneumothorax  On repeat xray it was read as a 20-30% right pneumothorax, and an 8 [place] pigtail was placed by IR.  Pneumothorax is resolved on post insertion xray.  Thoracic surgery called to manage tube.    Pt lying on stretcher in PACU.  Denies CP or SOB.  NAD noted.         PAST MEDICAL & SURGICAL HISTORY:  At risk for sleep apnea  Bang score 3    Hyperlipidemia    Depression    Osteoarthritis  right    Fatty liver    Patient is Drake&#x27;s Witness    Acute lateral meniscal injury of right knee, initial encounter    Acute medial meniscus tear of right knee, initial encounter    Small bowel mass  incidental finding while appendectomy ( Benign s/p colon mass resection - 3/2018)    PPD positive   -  treated with INH    Pulmonary mass  monitoring -no surgical intervention    History of colon surgery  3/2018 ( small colon mass resection - benign )    H/O cosmetic plastic surgery  liposuction - 30 years ago    History of breast implant  15 years ago    S/P  section  long time ago    S/P appendectomy  2018        MEDICATIONS  (STANDING):  sodium chloride 0.9% lock flush 3 milliLiter(s) IV Push Once    MEDICATIONS  (PRN):  acetaminophen   Tablet .. 650 milliGRAM(s) Oral every 6 hours PRN Temp greater or equal to 38C (100.4F), Mild Pain (1 - 3), Moderate Pain (4 - 6)  morphine  - Injectable 2 milliGRAM(s) IV Push every 6 hours PRN breakthrough pain  traMADol 25 milliGRAM(s) Oral four times a day PRN Severe Pain (7 - 10)    Antiplatelet therapy:                           Last dose/amt:    Allergies    No Known Allergies    Intolerances        SOCIAL HISTORY:  Smoker: [ ] Yes  [x ] No        PACK YEARS:                         WHEN QUIT?  ETOH use: [ ] Yes  [ x] No              FREQUENCY / QUANTITY:  Ilicit Drug use:  [ ] Yes  [x ] No  Live with: family   Assisted device use: none    FAMILY HISTORY:  FH: thyroid disease  daughter    Family history of hyperlipidemia  mother    Review of Systems  CONSTITUTIONAL:  Fevers[ ] chills[ ] sweats[ ] fatigue[ ] weight loss[ ] weight gain [ ]                                     NEGATIVE [ x]   NEURO:  parathesias[ ] seizures [ ]  syncope [ ]  confusion [ ]                                                                                NEGATIVE[x ]   EYES: glasses[ ]  blurry vision[ ]  discharge[ ] pain[ ] glaucoma [ ]                                                                          NEGATIVE[x ]   ENMT:  difficulty hearing [ ]  vertigo[ ]  dysphagia[ ] epistaxis[ ] recent dental work [ ]                                    NEGATIVE[x ]   CV:  chest pain[ ] palpitations[ ] TOLBERT [ ] diaphoresis [ ]                                                                                           NEGATIVE[x ]   RESPIRATORY:  wheezing[ ] SOB[ ] cough [ ] sputum[ ] hemoptysis[ ]                                                                  NEGATIVE[x ]   GI:  nausea[ ]  vommiting [ ]  diarrhea[ ] constipation [ ] melena [ ]                                                                      NEGATIVE[x ]   : hematuria[ ]  dysuria[ ] urgency[ ] incontinence[ ]                                                                                            NEGATIVE[x ]   MUSKULOSKELETAL:  arthritis[ ]  joint swelling [ ] muscle weakness [ ]                                                                NEGATIVE[ x]   SKIN/BREAST:  rash[ ] itching [ ]  hair loss[ ] masses[ ]                                                                                              NEGATIVE[x ]   PSYCH:  dementia [ ] depresion [ ] anxiety[ ]                                                                                                               NEGATIVE[x ]   HEME/LYMPH:  bruises easily[ ] enlarged lymph nodes[ ] tender lymph nodes[ ]                                               NEGATIVE[x ]   ENDOCRINE:  cold intolerance[ ] heat intolerance[ ] polydipsia[ ]                                                                          NEGATIVE[x ]     PHYSICAL EXAM  Vital Signs Last 24 Hrs  T(C): 36.6 (2021 13:33), Max: 36.7 (2021 10:49)  T(F): 97.8 (2021 13:33), Max: 98 (2021 10:49)  HR: 67 (2021 15:09) (64 - 67)  BP: 110/62 (2021 15:09) (82/50 - 137/67)  BP(mean): --  RR: 13 (2021 15:09) (13 - 18)  SpO2: 99% (2021 15:09) (97% - 100%)    CONSTITUTIONAL:                                                                           Neuro: WNL[x ] Normal exam oriented to person/place & time with no focal motor or sensory  deficits. Other                     Eyes: WNL[x ]   Normal exam of conjunctiva & lids, pupils equally reactive. Other     ENT: WNL[x ]    Normal exam of nasal/oral mucosa with absence of cyanosis. Other  Neck: WNL[x ]  Normal exam of jugular veins, trachea & thyroid. Other  Chest: WNL[x ] Normal lung exam with good air movement absence of wheezes, rales, or rhonchi: Other  Right IR pigtail catheter to suction.  No airleak and no crepitus.                                                                              CV:  Auscultation: normal [x ] S3[ ] S4[ ] Irregular [ ] Rub[ ] Clicks[ ]    Murmurs none:[x ]systolic [ ]  diastolic [ ] holosystolic [ ]  Carotids: No Bruits[x ] Other                 Abdominal Aorta: normal [ ] nonpalpable[ ]Other                                                                                      GI:           WNL[x] Normal exam of abdomen, liver & spleen with no noted masses or tenderness. Other                                                                                                        Extremities: WNL[x ] Normal no evidence of cyanosis or deformity Edema: none[ ]trace[ ]1+[ ]2+[ ]3+[ ]4+[ ]  Lower Extremity Pulses: Right[pp ] Left[pp ]  SKIN :WNL[ x] Normal exam to inspection & palation. Other:                                                          < from: Xray Chest 1 View- PORTABLE-Routine (Xray Chest 1 View- PORTABLE-Routine .) (21 @ 13:16) >     EXAM:  XR CHEST PORTABLE ROUTINE 1V                          PROCEDURE DATE:  2021      INTERPRETATION:  Portable chest radiograph    CLINICAL INFORMATION: RIGHT lung biopsy.    TECHNIQUE:  Portable  AP view of the chest was obtained.    COMPARISON: 2020 chest radiograph available for review.    FINDINGS: RIGHT lung are clear of following RIGHT lung biopsy.  There is a 5% RIGHT lung apical/upper zone pneumothorax. Pneumothorax documented on postbiopsy CT scan 2021 at 12:41PM..  LEFT lung parenchyma shows a LEFT a perihilar mass measuring 5.3 x 3 cm.    The heart and mediastinum are within normal limits.    Visualized osseous structures are intact.    IMPRESSION:   5% RIGHT lung pneumothorax. LEFT perihilar airspace consolidation/mass..  Referring radiology IR Physician aware of findings.    TONY QUACH MD; Attending Radiologist  This document has been electronically signed. 2021  2:50PM    < end of copied text >            < from: Xray Chest 1 View- PORTABLE-Routine (Xray Chest 1 View- PORTABLE-Routine .) (21 @ 15:04) >     EXAM:  XR CHEST PORTABLE ROUTINE 1V                          PROCEDURE DATE:  2021      INTERPRETATION:  Portable chest radiograph    CLINICAL INFORMATION: Status post RIGHT lung biopsy    TECHNIQUE:  Portable  AP view of the chest was obtained.    COMPARISON: 2021 chest available for review.    FINDINGS: There is a 20-30% of RIGHT apical zone pneumothorax. No shift midline structures.  A LEFT perihilar and lung mass persists.    The heart and mediastinum are within normal limits.    Visualized osseous structures are intact.    IMPRESSION:   20-30% RIGHT apical pneumothorax.    TONY QUACH MD; Attending Radiologist  This document has been electronically signed. 2021  3:36PM    < end of copied text >

## 2021-01-26 NOTE — BRIEF OPERATIVE NOTE - OPERATION/FINDINGS
19 g coaxial needle in right upper lobe. Positioning of the needle was very difficult due to small size of nodule as well as the location of the nodule and the needle needed to be repositioned multiple times.  20 g temno needle used with 5 core bx's taken.  Small post procedure ptx.  Increased in size on delay cxr's, therefore right 8fr chest tube was inserted with CT guidance.

## 2021-01-26 NOTE — H&P ADULT - NSICDXPASTMEDICALHX_GEN_ALL_CORE_FT
PAST MEDICAL HISTORY:  Acute lateral meniscal injury of right knee, initial encounter     Acute medial meniscus tear of right knee, initial encounter     At risk for sleep apnea Bang score 3    Depression     Fatty liver     Hyperlipidemia     Osteoarthritis right    Patient is Judaism     PPD positive 2000 -  treated with INH    Pulmonary mass monitoring -no surgical intervention    Small bowel mass incidental finding while appendectomy ( Benign s/p colon mass resection - 3/2018)

## 2021-01-26 NOTE — H&P ADULT - HISTORY OF PRESENT ILLNESS
65y F w/ hx anxiety and depression, lung nodules presented for elective IR biopsy of right lung mass. Procedure complicated by pneumothorax post right chest tube placement by IR.  bedside --patient denies shortness of breath, chest pain. +chest tube site pain.

## 2021-01-26 NOTE — CONSULT NOTE ADULT - PROBLEM SELECTOR RECOMMENDATION 9
Right 8 German pigtail placed by IR.  Called to manage tube.  pneumothorax resolved on follow up film.  Awaiting official read.  Maintain to suction for now.  AM cxr.

## 2021-01-26 NOTE — ASU DISCHARGE PLAN (ADULT/PEDIATRIC) - FOLLOW UP APPOINTMENTS
call 911 if you develop shortness of breath or worsening severe chest pain/911 or go to the nearest Emergency Room

## 2021-01-27 ENCOUNTER — TRANSCRIPTION ENCOUNTER (OUTPATIENT)
Age: 66
End: 2021-01-27

## 2021-01-27 VITALS
RESPIRATION RATE: 19 BRPM | TEMPERATURE: 99 F | OXYGEN SATURATION: 95 % | HEART RATE: 66 BPM | SYSTOLIC BLOOD PRESSURE: 114 MMHG | DIASTOLIC BLOOD PRESSURE: 71 MMHG

## 2021-01-27 LAB
ANION GAP SERPL CALC-SCNC: 10 MMOL/L — SIGNIFICANT CHANGE UP (ref 5–17)
BUN SERPL-MCNC: 8 MG/DL — SIGNIFICANT CHANGE UP (ref 8–20)
CALCIUM SERPL-MCNC: 8.5 MG/DL — LOW (ref 8.6–10.2)
CHLORIDE SERPL-SCNC: 105 MMOL/L — SIGNIFICANT CHANGE UP (ref 98–107)
CO2 SERPL-SCNC: 26 MMOL/L — SIGNIFICANT CHANGE UP (ref 22–29)
CREAT SERPL-MCNC: 0.62 MG/DL — SIGNIFICANT CHANGE UP (ref 0.5–1.3)
GLUCOSE SERPL-MCNC: 90 MG/DL — SIGNIFICANT CHANGE UP (ref 70–99)
HCT VFR BLD CALC: 36.6 % — SIGNIFICANT CHANGE UP (ref 34.5–45)
HCV AB S/CO SERPL IA: 0.04 S/CO — SIGNIFICANT CHANGE UP (ref 0–0.99)
HCV AB SERPL-IMP: SIGNIFICANT CHANGE UP
HGB BLD-MCNC: 11.7 G/DL — SIGNIFICANT CHANGE UP (ref 11.5–15.5)
MCHC RBC-ENTMCNC: 30.5 PG — SIGNIFICANT CHANGE UP (ref 27–34)
MCHC RBC-ENTMCNC: 32 GM/DL — SIGNIFICANT CHANGE UP (ref 32–36)
MCV RBC AUTO: 95.3 FL — SIGNIFICANT CHANGE UP (ref 80–100)
PLATELET # BLD AUTO: 266 K/UL — SIGNIFICANT CHANGE UP (ref 150–400)
POTASSIUM SERPL-MCNC: 3.8 MMOL/L — SIGNIFICANT CHANGE UP (ref 3.5–5.3)
POTASSIUM SERPL-SCNC: 3.8 MMOL/L — SIGNIFICANT CHANGE UP (ref 3.5–5.3)
RBC # BLD: 3.84 M/UL — SIGNIFICANT CHANGE UP (ref 3.8–5.2)
RBC # FLD: 13 % — SIGNIFICANT CHANGE UP (ref 10.3–14.5)
SARS-COV-2 IGG SERPL QL IA: NEGATIVE — SIGNIFICANT CHANGE UP
SARS-COV-2 IGM SERPL IA-ACNC: 0.07 INDEX — SIGNIFICANT CHANGE UP
SODIUM SERPL-SCNC: 141 MMOL/L — SIGNIFICANT CHANGE UP (ref 135–145)
SURGICAL PATHOLOGY STUDY: SIGNIFICANT CHANGE UP
WBC # BLD: 6.42 K/UL — SIGNIFICANT CHANGE UP (ref 3.8–10.5)
WBC # FLD AUTO: 6.42 K/UL — SIGNIFICANT CHANGE UP (ref 3.8–10.5)

## 2021-01-27 PROCEDURE — 71045 X-RAY EXAM CHEST 1 VIEW: CPT

## 2021-01-27 PROCEDURE — 99231 SBSQ HOSP IP/OBS SF/LOW 25: CPT

## 2021-01-27 PROCEDURE — 88305 TISSUE EXAM BY PATHOLOGIST: CPT

## 2021-01-27 PROCEDURE — C1769: CPT

## 2021-01-27 PROCEDURE — 86769 SARS-COV-2 COVID-19 ANTIBODY: CPT

## 2021-01-27 PROCEDURE — 99239 HOSP IP/OBS DSCHRG MGMT >30: CPT

## 2021-01-27 PROCEDURE — 77012 CT SCAN FOR NEEDLE BIOPSY: CPT

## 2021-01-27 PROCEDURE — 75989 ABSCESS DRAINAGE UNDER X-RAY: CPT

## 2021-01-27 PROCEDURE — 88333 PATH CONSLTJ SURG CYTO XM 1: CPT

## 2021-01-27 PROCEDURE — 85027 COMPLETE CBC AUTOMATED: CPT

## 2021-01-27 PROCEDURE — 80048 BASIC METABOLIC PNL TOTAL CA: CPT

## 2021-01-27 PROCEDURE — C1729: CPT

## 2021-01-27 PROCEDURE — 86803 HEPATITIS C AB TEST: CPT

## 2021-01-27 PROCEDURE — 71045 X-RAY EXAM CHEST 1 VIEW: CPT | Mod: 26

## 2021-01-27 RX ORDER — ALPRAZOLAM 0.25 MG
1 TABLET ORAL
Qty: 0 | Refills: 0 | DISCHARGE

## 2021-01-27 NOTE — PROGRESS NOTE ADULT - ASSESSMENT
65y Female with PMH HLD, osteoarthritis, and depression.  Presented today for an elective IR biopsy of right lung mass.  Post procedure she was note to have a tiny right pneumothorax  On repeat xray it was read as a 20-30% right pneumothorax, and an 8 [place] pigtail was placed by IR.  Pneumothorax is resolved on post insertion xray.  Thoracic surgery called to manage tube.

## 2021-01-27 NOTE — DISCHARGE NOTE PROVIDER - NSDCMRMEDTOKEN_GEN_ALL_CORE_FT
alendronate 10 mg oral tablet: 10 milligram(s) orally once a week  FLUoxetine 40 mg oral capsule: 1 cap(s) orally once a day

## 2021-01-27 NOTE — PROGRESS NOTE ADULT - PROBLEM SELECTOR PLAN 1
Right 8 Bulgarian pigtail placed by IR.  Called to manage tube.  pneumothorax resolved on follow up film.    Chest tube to water seal. Follow-up 12PM repeat CXR.  If 12pm CXR stable, plan to remove chest tube.  Discharge planning per primary team.

## 2021-01-27 NOTE — DISCHARGE NOTE NURSING/CASE MANAGEMENT/SOCIAL WORK - PATIENT PORTAL LINK FT
You can access the FollowMyHealth Patient Portal offered by [facility] by registering at the following website: [email or web address]. By joining [employer]’s FollowMyHealth portal, you will also be able to view your health information using other applications (apps) compatible with our system.

## 2021-01-27 NOTE — PROGRESS NOTE ADULT - WEIGHT IN KG
Procedure: Cystoscopy and transurethral resection of bladder tumor   Dx: bladder tumor bladder cancer   Date of procedure: 11/13/2020   Location: [facility]   Dr. Rucker   Type of Anesthesia: General     MCL, EKG, BMP, CBC w/diff ,  Fax to: [phone]   
72.6

## 2021-01-27 NOTE — PROGRESS NOTE ADULT - SUBJECTIVE AND OBJECTIVE BOX
Pt seen at bedside.  She did well overnight.  Feels good with only mild right chest pain.  CXR this AM shows no PTX. Discussed with CT Surgery PA and agreed to place tube to water seal then check CXR in 3-4 hours and if no PTX, will pull Chest tube.  
Subjective - patient seen and evaluated bedside. Sitting comfortably in bed. Denies CP, SOB, HA, dizziness, n/v/d    Review of Systems: negative x 10 systems except as noted above    Brief summary:  65yFemale POD# 1 RT lung biopsy with iatrogenic pneumothorax s/p pigtail catheter placement.    Overnight events: nonw      PAST MEDICAL & SURGICAL HISTORY:  At risk for sleep apnea  Bang score 3    Hyperlipidemia    Depression    Osteoarthritis  right    Fatty liver    Patient is Jehovah&#x27;s Witness    Acute lateral meniscal injury of right knee, initial encounter    Acute medial meniscus tear of right knee, initial encounter    Small bowel mass  incidental finding while appendectomy ( Benign s/p colon mass resection - 3/2018)    PPD positive   -  treated with INH    Pulmonary mass  monitoring -no surgical intervention    History of colon surgery  3/2018 ( small colon mass resection - benign )    H/O cosmetic plastic surgery  liposuction - 30 years ago    History of breast implant  15 years ago    S/P  section  long time ago    S/P appendectomy  2018          acetaminophen   Tablet .. 650 milliGRAM(s) Oral every 6 hours PRN  ALPRAZolam 0.25 milliGRAM(s) Oral two times a day PRN  FLUoxetine 40 milliGRAM(s) Oral daily  morphine  - Injectable 2 milliGRAM(s) IV Push every 6 hours PRN  sodium chloride 0.9% lock flush 3 milliLiter(s) IV Push Once  traMADol 25 milliGRAM(s) Oral four times a day PRN  MEDICATIONS  (PRN):  acetaminophen   Tablet .. 650 milliGRAM(s) Oral every 6 hours PRN Temp greater or equal to 38C (100.4F), Mild Pain (1 - 3), Moderate Pain (4 - 6)  ALPRAZolam 0.25 milliGRAM(s) Oral two times a day PRN anxiety  morphine  - Injectable 2 milliGRAM(s) IV Push every 6 hours PRN breakthrough pain  traMADol 25 milliGRAM(s) Oral four times a day PRN Severe Pain (7 - 10)    Height (cm): 165.1 ( @ 10:49)  Weight (kg): 72.6 ( @ 10:49)  BMI (kg/m2): 26.6 ( @ 10:49)  BSA (m2): 1.8 ( @ 10:49)  Daily Height in cm: 165.1 (2021 10:49)    Daily Weight in k.2 (2021 06:11)                              11.7   6.42  )-----------( 266      ( 2021 08:01 )             36.6       141  |  105  |  8.0  ----------------------------<  90  3.8   |  26.0  |  0.62    Ca    8.5<L>      2021 08:01              Objective:  T(C): 36.7 (21 @ 07:10), Max: 37 (21 @ 18:34)  HR: 60 (21 @ 07:10) (60 - 83)  BP: 120/75 (21 @ 07:10) (82/50 - 137/67)  RR: 18 (21 @ 07:10) (13 - 19)  SpO2: 98% (21 @ 07:10) (97% - 100%)  Wt(kg): --CAPILLARY BLOOD GLUCOSE      I&O's Summary    2021 07:01  -  2021 07:00  --------------------------------------------------------  IN: 0 mL / OUT: 0 mL / NET: 0 mL        Physical Exam  Neuro: A+O x 3, non-focal, speech clear and intact  Pulm: CTA, equal bilaterally, no wheezes, rales  CV: RRR,+S1S2, no m/r/g  Abd: soft, NT, ND, +BS  Ext: +DP Pulses b/l, no edema  Chest tubes: RT pleural pigtail catheter to WS. No air leak. Dressing c/d/i.     Imaging:  CXR: < from: Xray Chest 1 View- PORTABLE-Routine (Xray Chest 1 View- PORTABLE-Routine in AM.) (21 @ 07:05) >  History: pneumothorax    Comment:  Frontal view of the chest was obtained and compared to the prior study dated 2021 at 4:49 PM. There is once again a right lower chest pigtail chest tube in place. The cardiac silhouette is normal.  There is once again a left midlung mass, unchanged. The lungs are otherwise clear. There are no effusions or pneumothorax. The osseous structures are unremarkable. Incidental note is made of bilateral breast implants.      Impression:  1. Right pigtail chest tube in place  2. No pneumothorax  3. Left mid lung mass    < end of copied text >

## 2021-01-27 NOTE — DISCHARGE NOTE PROVIDER - HOSPITAL COURSE
65y F w/ hx anxiety and depression, lung nodules presented for elective IR biopsy of right lung mass. Procedure complicated by pneumothorax post right chest tube placement by IR.  bedside --patient denies shortness of breath, chest pain. +chest tube site pain.    patient seen by ct surgery and monitored overnight.       time spent on dc 34 minutes    pe  gen: nad,   heent: perrl  cvs: s1s2+  lungs: clear  abd: soft  neuro aao x4

## 2021-01-27 NOTE — CHART NOTE - NSCHARTNOTEFT_GEN_A_CORE
Vascular & Interventional Radiology Pre-Procedure Note    Procedure Name: ___CT guided right lung biopsy____    HPI: 65y Female with ___right lung nodule____    Patient reports a hx of lung nodule for about 15 years and was following with pulmonologist . Noduled grew about 2 years ago. Patient reports that she was told in  that the nodules was cancerous and has an additional 2 nodules in the right lung. Denies any chest pain, fever, chills, SOB.   Pt had bx of left lung mass on 2020 which demonstrated adenocarcinoma.  She is now referred for a right lung nodule bx to determine if it is benign, a metastasis, or a second primary malignancy    Past Medical, Past Surgical, and Family History:  PAST MEDICAL HISTORY:  Acute lateral meniscal injury of right knee, initial encounter     Acute medial meniscus tear of right knee, initial encounter     At risk for sleep apnea Bang score 3    Depression     Fatty liver     Hyperlipidemia     Osteoarthritis right    Patient is Taoism     PPD positive  -  treated with INH    Pulmonary mass monitoring -no surgical intervention    Small bowel mass incidental finding while appendectomy ( Benign s/p colon mass resection - 3/2018).     PAST SURGICAL HISTORY:  H/O cosmetic plastic surgery liposuction - 30 years ago    History of breast implant 15 years ago    History of colon surgery 3/2018 ( small colon mass resection - benign )    S/P appendectomy 2018    S/P  section long time ago.       Allergies: NKDA  Medications (Abx/Cardiac/Anticoagulation/Blood Products)  · 	FLUoxetine 40 mg oral capsule: Last Dose Taken:  , 1 cap(s) orally once a day  · 	alendronate 10 mg oral tablet: Last Dose Taken:  , 10 milligram(s) orally once a week  · 	Xanax 0.25 mg oral tablet: Last Dose Taken:  , 1 tab(s) orally prn  · 	magnesium citrate: Last Dose Taken:  , 250 milligram(s) orally once a day, As Needed      Data:  165.1  72.6  T(C): 36.7  HR: 64  BP: 137/67  RR: 18  SpO2: 98%    Exam  General: ___wnl____  Chest: ____wnl___  Abdomen: ___wnl____  Extremities: __wnl_____    plts: 279  PT: 11.2  INR: 0.92  PTT: 36.6      Imaging: ___LUL mass, RUL nodule___    Plan:   -65y Female presents for _right lung nodule biopsy_____  -Risks/Benefits/alternatives explained with the patient via a  and witnessed informed consent obtained.
S/p removal of RT pigtail catheter. CXR appears stable. OK to discharge home from thoracic surgery perspective. Patient will need follow-up CXR as outpatient in 1-2 weeks.

## 2021-01-29 ENCOUNTER — APPOINTMENT (OUTPATIENT)
Dept: NEUROLOGY | Facility: CLINIC | Age: 66
End: 2021-01-29
Payer: MEDICARE

## 2021-01-29 VITALS
HEIGHT: 66 IN | SYSTOLIC BLOOD PRESSURE: 130 MMHG | TEMPERATURE: 98.3 F | WEIGHT: 162 LBS | BODY MASS INDEX: 26.03 KG/M2 | DIASTOLIC BLOOD PRESSURE: 80 MMHG

## 2021-01-29 PROCEDURE — 99205 OFFICE O/P NEW HI 60 MIN: CPT

## 2021-01-29 PROCEDURE — 99072 ADDL SUPL MATRL&STAF TM PHE: CPT

## 2021-01-30 LAB
FOLATE SERPL-MCNC: >20 NG/ML
T3 SERPL-MCNC: 110 NG/DL
TSH SERPL-ACNC: 1.29 UIU/ML
VIT B12 SERPL-MCNC: 756 PG/ML

## 2021-02-04 ENCOUNTER — APPOINTMENT (OUTPATIENT)
Dept: HEMATOLOGY ONCOLOGY | Facility: CLINIC | Age: 66
End: 2021-02-04
Payer: MEDICARE

## 2021-02-04 VITALS — SYSTOLIC BLOOD PRESSURE: 125 MMHG | HEART RATE: 72 BPM | DIASTOLIC BLOOD PRESSURE: 74 MMHG

## 2021-02-04 PROCEDURE — 99213 OFFICE O/P EST LOW 20 MIN: CPT

## 2021-02-04 PROCEDURE — 99072 ADDL SUPL MATRL&STAF TM PHE: CPT

## 2021-02-04 NOTE — HISTORY OF PRESENT ILLNESS
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 Recent imaging has shown growth and development of a solid structure. The etiology remains unclear, but malignancy has been considered. She feels well has no complaints. She denies fever, chills, night sweats, weight loss, or weight gain. \par \par Followed by Dr Galeana in the past\par PPD+ in 2000 and received 6 months of INH\par PET positive RENAN posterior cavitary density in 2010\par TTNA and Bronch non-diagnostic. Cultures negative for fungus and TB\par CT at [facility] in 2010 and 2012 stable - felt to be benign. Lost to FU\par \par Patient had a PEt scan in Oct 2020 with a Left upper lobe spiculated lung mass 4.5 x 3cm with an SUV 11.1 with adjacent tree in bud opacity \par In addition Rt upper lobe opacity 1.2 x1 cm which is not FDG avid \par Discrete Left hilar/ infrahilar region  lesion with an SUV 5.2 which is possibly corresponding to a LN \par \par \par Patient s/p Left lung mass, core biopsy with pathology c/w Adenocarcinoma c/w pulmonary origin (see comment)\par CK7, TTF-1 : Positive CK20, PAX-8 : Negative\par  [de-identified] : Discussed at Tumor board at [facility], decided to biopsy RT  upper lobe lesion due to slight growth,\par S/p Biopsy of rt upper lobe lung lesion on 1/26/21 c/w reactive bronchial cells and negative for tumor \par \par CT CAP/ done on 12/29/20 Stable \par Brain MRi: no metastatic disease\par \par patient is here for visit with her daughter Son on speaker\par She feels well \par Some pain in left side of chest appears to be Musculoskeletal. Improved today \par NO SOB/ No Cough\par

## 2021-02-05 ENCOUNTER — APPOINTMENT (OUTPATIENT)
Dept: NEUROLOGY | Facility: CLINIC | Age: 66
End: 2021-02-05
Payer: MEDICARE

## 2021-02-05 PROCEDURE — 95819 EEG AWAKE AND ASLEEP: CPT

## 2021-02-05 PROCEDURE — 99072 ADDL SUPL MATRL&STAF TM PHE: CPT

## 2021-02-05 PROCEDURE — 93040 RHYTHM ECG WITH REPORT: CPT

## 2021-02-09 ENCOUNTER — APPOINTMENT (OUTPATIENT)
Dept: RADIATION ONCOLOGY | Facility: CLINIC | Age: 66
End: 2021-02-09
Payer: MEDICARE

## 2021-02-09 ENCOUNTER — NON-APPOINTMENT (OUTPATIENT)
Age: 66
End: 2021-02-09

## 2021-02-09 PROCEDURE — 99213 OFFICE O/P EST LOW 20 MIN: CPT | Mod: 95

## 2021-02-12 ENCOUNTER — APPOINTMENT (OUTPATIENT)
Dept: RADIOLOGY | Facility: CLINIC | Age: 66
End: 2021-02-12

## 2021-02-17 ENCOUNTER — OUTPATIENT (OUTPATIENT)
Dept: OUTPATIENT SERVICES | Facility: HOSPITAL | Age: 66
LOS: 1 days | End: 2021-02-17
Payer: COMMERCIAL

## 2021-02-17 ENCOUNTER — APPOINTMENT (OUTPATIENT)
Dept: RADIOLOGY | Facility: CLINIC | Age: 66
End: 2021-02-17
Payer: MEDICARE

## 2021-02-17 DIAGNOSIS — Z98.82 BREAST IMPLANT STATUS: Chronic | ICD-10-CM

## 2021-02-17 DIAGNOSIS — Z98.890 OTHER SPECIFIED POSTPROCEDURAL STATES: Chronic | ICD-10-CM

## 2021-02-17 DIAGNOSIS — Z98.891 HISTORY OF UTERINE SCAR FROM PREVIOUS SURGERY: Chronic | ICD-10-CM

## 2021-02-17 DIAGNOSIS — Z90.49 ACQUIRED ABSENCE OF OTHER SPECIFIED PARTS OF DIGESTIVE TRACT: Chronic | ICD-10-CM

## 2021-02-17 DIAGNOSIS — R91.8 OTHER NONSPECIFIC ABNORMAL FINDING OF LUNG FIELD: ICD-10-CM

## 2021-02-17 PROCEDURE — 71046 X-RAY EXAM CHEST 2 VIEWS: CPT | Mod: 26

## 2021-02-17 PROCEDURE — 71046 X-RAY EXAM CHEST 2 VIEWS: CPT

## 2021-02-23 ENCOUNTER — OUTPATIENT (OUTPATIENT)
Dept: OUTPATIENT SERVICES | Facility: HOSPITAL | Age: 66
LOS: 1 days | Discharge: HOME | End: 2021-02-23

## 2021-02-23 DIAGNOSIS — Z90.49 ACQUIRED ABSENCE OF OTHER SPECIFIED PARTS OF DIGESTIVE TRACT: Chronic | ICD-10-CM

## 2021-02-23 DIAGNOSIS — Z98.890 OTHER SPECIFIED POSTPROCEDURAL STATES: Chronic | ICD-10-CM

## 2021-02-23 DIAGNOSIS — C34.90 MALIGNANT NEOPLASM OF UNSPECIFIED PART OF UNSPECIFIED BRONCHUS OR LUNG: ICD-10-CM

## 2021-02-23 DIAGNOSIS — Z98.82 BREAST IMPLANT STATUS: Chronic | ICD-10-CM

## 2021-02-23 DIAGNOSIS — Z98.891 HISTORY OF UTERINE SCAR FROM PREVIOUS SURGERY: Chronic | ICD-10-CM

## 2021-03-01 ENCOUNTER — APPOINTMENT (OUTPATIENT)
Dept: THORACIC SURGERY | Facility: CLINIC | Age: 66
End: 2021-03-01
Payer: MEDICARE

## 2021-03-01 VITALS
HEIGHT: 66 IN | RESPIRATION RATE: 18 BRPM | DIASTOLIC BLOOD PRESSURE: 73 MMHG | HEART RATE: 65 BPM | SYSTOLIC BLOOD PRESSURE: 117 MMHG | OXYGEN SATURATION: 98 % | WEIGHT: 160 LBS | BODY MASS INDEX: 25.71 KG/M2

## 2021-03-01 DIAGNOSIS — Z86.39 PERSONAL HISTORY OF OTHER ENDOCRINE, NUTRITIONAL AND METABOLIC DISEASE: ICD-10-CM

## 2021-03-01 PROCEDURE — 99072 ADDL SUPL MATRL&STAF TM PHE: CPT

## 2021-03-01 PROCEDURE — 99214 OFFICE O/P EST MOD 30 MIN: CPT

## 2021-03-01 NOTE — ASSESSMENT
[FreeTextEntry1] : Nadege is a 66-year-old female with a left upper lobe mass that is biopsy proven lung cancer and a hilar lymph node suspected to be metastatic. I'll be arranging an endobronchial ultrasound in the near future and likely a thoracoscopy to follow shortly thereafter.\par \par Thank you for allowing me to participate in the care of your patient.\par \par Steven Choudhury MD\[facility] Department of Cardiovascular and Thoracic Surgery\par \par Jj and Adriana James\[facility] [employer] [facility]

## 2021-03-01 NOTE — HISTORY OF PRESENT ILLNESS
[FreeTextEntry1] :  Nadege is a 65-year-old female with a recent diagnosis of  a left upper lobe mass. She does have at least hilar involvement of her lymph nodes based on prior  PET scan.  Last visited I requested for her to obtain an oncology evaluation for discussion regarding treatment options. She may require a pneumonectomy for complete removal of disease at this point. She denies dizziness, shortness of breath with climbing up/down stairs or with exertion, unintentional weight loss, cough, fever or chills. \par \par \par

## 2021-03-01 NOTE — DATA REVIEWED
[FreeTextEntry1] : 2.24.21 Chest Xray \par -Persistent left lung mass.\par \par \par 2.25.2021 Clarisa Peseri \par -Intensely FDG left upper lobe pulmonary mass which appears malignant in nature\par -There is an FDG avid left hilar lymph nodes suspicious for metastatic involvement  \par -There is a 1.3 cm right upper pulmonary  nodule without significant FDG activity and a subcentimeter pleural based right lower lobed nodule.

## 2021-03-04 ENCOUNTER — NON-APPOINTMENT (OUTPATIENT)
Age: 66
End: 2021-03-04

## 2021-03-08 ENCOUNTER — RX CHANGE (OUTPATIENT)
Age: 66
End: 2021-03-08

## 2021-03-16 NOTE — H&P PST ADULT - AS BP NONINV METHOD
Detail Level: Detailed electronic Quality 111:Pneumonia Vaccination Status For Older Adults: Pneumococcal Vaccination not Administered or Previously Received, Reason not Otherwise Specified Quality 110: Preventive Care And Screening: Influenza Immunization: Influenza Immunization Administered during Influenza season

## 2021-03-19 ENCOUNTER — OUTPATIENT (OUTPATIENT)
Dept: OUTPATIENT SERVICES | Facility: HOSPITAL | Age: 66
LOS: 1 days | End: 2021-03-19
Payer: COMMERCIAL

## 2021-03-19 VITALS
SYSTOLIC BLOOD PRESSURE: 133 MMHG | RESPIRATION RATE: 18 BRPM | TEMPERATURE: 97 F | DIASTOLIC BLOOD PRESSURE: 74 MMHG | WEIGHT: 162.04 LBS | OXYGEN SATURATION: 99 % | HEIGHT: 66 IN | HEART RATE: 66 BPM

## 2021-03-19 DIAGNOSIS — Z98.890 OTHER SPECIFIED POSTPROCEDURAL STATES: Chronic | ICD-10-CM

## 2021-03-19 DIAGNOSIS — Z98.82 BREAST IMPLANT STATUS: Chronic | ICD-10-CM

## 2021-03-19 DIAGNOSIS — Z29.9 ENCOUNTER FOR PROPHYLACTIC MEASURES, UNSPECIFIED: ICD-10-CM

## 2021-03-19 DIAGNOSIS — Z90.49 ACQUIRED ABSENCE OF OTHER SPECIFIED PARTS OF DIGESTIVE TRACT: Chronic | ICD-10-CM

## 2021-03-19 DIAGNOSIS — Z91.89 OTHER SPECIFIED PERSONAL RISK FACTORS, NOT ELSEWHERE CLASSIFIED: ICD-10-CM

## 2021-03-19 DIAGNOSIS — Z87.09 PERSONAL HISTORY OF OTHER DISEASES OF THE RESPIRATORY SYSTEM: ICD-10-CM

## 2021-03-19 DIAGNOSIS — C34.90 MALIGNANT NEOPLASM OF UNSPECIFIED PART OF UNSPECIFIED BRONCHUS OR LUNG: ICD-10-CM

## 2021-03-19 DIAGNOSIS — Z98.891 HISTORY OF UTERINE SCAR FROM PREVIOUS SURGERY: Chronic | ICD-10-CM

## 2021-03-19 DIAGNOSIS — Z01.818 ENCOUNTER FOR OTHER PREPROCEDURAL EXAMINATION: ICD-10-CM

## 2021-03-19 LAB
A1C WITH ESTIMATED AVERAGE GLUCOSE RESULT: 5.7 % — HIGH (ref 4–5.6)
ANION GAP SERPL CALC-SCNC: 9 MMOL/L — SIGNIFICANT CHANGE UP (ref 5–17)
APTT BLD: 38.4 SEC — HIGH (ref 27.5–35.5)
BASOPHILS # BLD AUTO: 0.05 K/UL — SIGNIFICANT CHANGE UP (ref 0–0.2)
BASOPHILS NFR BLD AUTO: 1.1 % — SIGNIFICANT CHANGE UP (ref 0–2)
BLD GP AB SCN SERPL QL: SIGNIFICANT CHANGE UP
BUN SERPL-MCNC: 11 MG/DL — SIGNIFICANT CHANGE UP (ref 8–20)
CALCIUM SERPL-MCNC: 9 MG/DL — SIGNIFICANT CHANGE UP (ref 8.6–10.2)
CHLORIDE SERPL-SCNC: 100 MMOL/L — SIGNIFICANT CHANGE UP (ref 98–107)
CO2 SERPL-SCNC: 31 MMOL/L — HIGH (ref 22–29)
CREAT SERPL-MCNC: 0.57 MG/DL — SIGNIFICANT CHANGE UP (ref 0.5–1.3)
EOSINOPHIL # BLD AUTO: 0.07 K/UL — SIGNIFICANT CHANGE UP (ref 0–0.5)
EOSINOPHIL NFR BLD AUTO: 1.6 % — SIGNIFICANT CHANGE UP (ref 0–6)
ESTIMATED AVERAGE GLUCOSE: 117 MG/DL — HIGH (ref 68–114)
GLUCOSE SERPL-MCNC: 94 MG/DL — SIGNIFICANT CHANGE UP (ref 70–99)
HCT VFR BLD CALC: 41 % — SIGNIFICANT CHANGE UP (ref 34.5–45)
HGB BLD-MCNC: 12.7 G/DL — SIGNIFICANT CHANGE UP (ref 11.5–15.5)
IMM GRANULOCYTES NFR BLD AUTO: 0.5 % — SIGNIFICANT CHANGE UP (ref 0–1.5)
INR BLD: 0.99 RATIO — SIGNIFICANT CHANGE UP (ref 0.88–1.16)
LYMPHOCYTES # BLD AUTO: 1.24 K/UL — SIGNIFICANT CHANGE UP (ref 1–3.3)
LYMPHOCYTES # BLD AUTO: 28.1 % — SIGNIFICANT CHANGE UP (ref 13–44)
MCHC RBC-ENTMCNC: 29.3 PG — SIGNIFICANT CHANGE UP (ref 27–34)
MCHC RBC-ENTMCNC: 31 GM/DL — LOW (ref 32–36)
MCV RBC AUTO: 94.7 FL — SIGNIFICANT CHANGE UP (ref 80–100)
MONOCYTES # BLD AUTO: 0.3 K/UL — SIGNIFICANT CHANGE UP (ref 0–0.9)
MONOCYTES NFR BLD AUTO: 6.8 % — SIGNIFICANT CHANGE UP (ref 2–14)
NEUTROPHILS # BLD AUTO: 2.73 K/UL — SIGNIFICANT CHANGE UP (ref 1.8–7.4)
NEUTROPHILS NFR BLD AUTO: 61.9 % — SIGNIFICANT CHANGE UP (ref 43–77)
PLATELET # BLD AUTO: 304 K/UL — SIGNIFICANT CHANGE UP (ref 150–400)
POTASSIUM SERPL-MCNC: 4.3 MMOL/L — SIGNIFICANT CHANGE UP (ref 3.5–5.3)
POTASSIUM SERPL-SCNC: 4.3 MMOL/L — SIGNIFICANT CHANGE UP (ref 3.5–5.3)
PROTHROM AB SERPL-ACNC: 11.5 SEC — SIGNIFICANT CHANGE UP (ref 10.6–13.6)
RBC # BLD: 4.33 M/UL — SIGNIFICANT CHANGE UP (ref 3.8–5.2)
RBC # FLD: 12.9 % — SIGNIFICANT CHANGE UP (ref 10.3–14.5)
SODIUM SERPL-SCNC: 140 MMOL/L — SIGNIFICANT CHANGE UP (ref 135–145)
WBC # BLD: 4.41 K/UL — SIGNIFICANT CHANGE UP (ref 3.8–10.5)
WBC # FLD AUTO: 4.41 K/UL — SIGNIFICANT CHANGE UP (ref 3.8–10.5)

## 2021-03-19 PROCEDURE — G0463: CPT

## 2021-03-19 NOTE — H&P PST ADULT - NSICDXPROBLEM_GEN_ALL_CORE_FT
PROBLEM DIAGNOSES  Problem: Malignant neoplasm of bronchus or lung  Assessment and Plan: Patient is scheduled for flex bronhoscopy, EBUS with Dr Mark Oshea on 3/24/21. Medical clearance    Problem: At risk for sleep apnea  Assessment and Plan: Stop Bang 3, Intermediate Risk, DARWIN precautions    Problem: History of pneumothorax  Assessment and Plan: Right pneumothorax following right lung biopsy, lungs clear and equal sounds on exam, no respiratory distress, O2 sat 99% on RA. At risk for pneumothorax     Problem: Need for prophylactic measure  Assessment and Plan: Caprini Score 7, High risk,  Surgical team should assess /Strongly recommend pharmacological and mechanical measures for VTE prophylaxis

## 2021-03-19 NOTE — H&P PST ADULT - NEUROLOGICAL COMMENTS
patient reports she was having memory loss, saw neurologist as per patient testing all negative but neurologist prescribed her aricept

## 2021-03-19 NOTE — H&P PST ADULT - NSICDXPASTMEDICALHX_GEN_ALL_CORE_FT
PAST MEDICAL HISTORY:  Acute lateral meniscal injury of right knee, initial encounter     Acute medial meniscus tear of right knee, initial encounter     At risk for sleep apnea Bang score 3    Depression     Fatty liver     Hyperlipidemia     Malignant neoplasm of unspecified part of unspecified bronchus or lung     Osteoarthritis right    Patient is Baptist     PPD positive 2000 -  treated with INH    Pulmonary mass monitoring -no surgical intervention    Small bowel mass incidental finding while appendectomy ( Benign s/p colon mass resection - 3/2018)     PAST MEDICAL HISTORY:  Acute lateral meniscal injury of right knee, initial encounter     Acute medial meniscus tear of right knee, initial encounter     At risk for sleep apnea Bang score 3    Depression     Fatty liver     H/O pneumothorax following lung biopsy right lung    Hyperlipidemia     Malignant neoplasm of unspecified part of unspecified bronchus or lung     Osteoarthritis right    Patient is Holiness     PPD positive 2000 -  treated with INH    Pulmonary mass monitoring -no surgical intervention    Small bowel mass incidental finding while appendectomy ( Benign s/p colon mass resection - 3/2018)     PAST MEDICAL HISTORY:  Acute lateral meniscal injury of right knee, initial encounter     Acute medial meniscus tear of right knee, initial encounter     At risk for sleep apnea Bang score 3    Depression     Fatty liver     H/O pneumothorax following lung biopsy right lung    Hyperlipidemia     Malignant neoplasm of unspecified part of unspecified bronchus or lung     Osteoarthritis right    Osteoporosis     Patient is Gnosticism     PPD positive 2000 -  treated with INH    Pulmonary mass monitoring -no surgical intervention    Small bowel mass incidental finding while appendectomy ( Benign s/p colon mass resection - 3/2018)

## 2021-03-19 NOTE — H&P PST ADULT - HISTORY OF PRESENT ILLNESS
66 y/o female presents to [facility] today. Patient reports a hx of lung nodule for about 15 years and was following with pulmonologist . Noduled grew about 2 years ago. Patient reports that she was told in 2020 that she has 3 nodules, the one on the left side was biopsied and was positive for CA, she reports right lung nodule is CA however is 'not active', she is here at [facility] today for nodule found in trachea. Patient denies fever, chills, weight loss, malaise, cough, shortness of breath, hemoptysis, or wheezing. Patient is scheduled for flex bronhoscopy, EBUS with Dr Mark Oshea on 3/24/21.   Medical clearance  66 year old female with a pmhx of lung nodules, diverticulitis, anxiety, depression, +ppd treated with INH in 2000, surgical history of colon mass resection (benign), appendectomy. Patient reports a history of lung nodule for about 15 years and was following with pulmonologist. She reports that the nodules grew about 2 years ago, PET scan revealed 3 nodules one right, one left and one in the trachea, she is s/p biopsy of the left which revealed adenocarcinoma, right biopsy was negative for malignancy. Right biopsy was complicated by pneumothorax of right lung requiring chest tube.   Patient reports she is here today for a nodule in her trachea. . Patient denies fever, chills, weight loss, malaise, dysphagia cough, shortness of breath, hemoptysis, or wheezing. Patient is scheduled for flex bronhoscopy, EBUS with Dr Mark Oshea on 3/24/21.   Medical clearance pending

## 2021-03-19 NOTE — ASU PATIENT PROFILE, ADULT - LEARNING ASSESSMENT (PATIENT) ADDITIONAL COMMENTS
Np, instructed pt on pre-op instructions/teaching, tips for safer surgery, pain management scale, pt verbalized understanding of all instructions given.

## 2021-03-19 NOTE — H&P PST ADULT - ASSESSMENT
CAPRINI VTE 2.0 SCORE [CLOT updated 2019]    AGE RELATED RISK FACTORS                                                       MOBILITY RELATED FACTORS  [ ] Age 41-60 years                                            (1 Point)                    [ ] Bed rest                                                        (1 Point)  [x ] Age: 61-74 years                                           (2 Points)                  [ ] Plaster cast                                                   (2 Points)  [ ] Age= 75 years                                              (3 Points)                    [ ] Bed bound for more than 72 hours                 (2 Points)    DISEASE RELATED RISK FACTORS                                               GENDER SPECIFIC FACTORS  [ ] Edema in the lower extremities                       (1 Point)              [ ] Pregnancy                                                     (1 Point)  [ ] Varicose veins                                               (1 Point)                     [ ] Post-partum < 6 weeks                                   (1 Point)             [x ] BMI > 25 Kg/m2                                            (1 Point)                     [ ] Hormonal therapy  or oral contraception          (1 Point)                 [ ] Sepsis (in the previous month)                        (1 Point)               [ ] History of pregnancy complications                 (1 point)  [ ] Pneumonia or serious lung disease                                               [ ] Unexplained or recurrent                     (1 Point)           (in the previous month)                               (1 Point)  [ ] Abnormal pulmonary function test                     (1 Point)                 SURGERY RELATED RISK FACTORS  [ ] Acute myocardial infarction                              (1 Point)               [ ]  Section                                             (1 Point)  [ ] Congestive heart failure (in the previous month)  (1 Point)      [ ] Minor surgery                                                  (1 Point)   [ ] Inflammatory bowel disease                             (1 Point)               [ ] Arthroscopic surgery                                        (2 Points)  [ ] Central venous access                                      (2 Points)                [x ] General surgery lasting more than 45 minutes (2 points)  [x ] Malignancy- Present or previous                   (2 Points)                [ ] Elective arthroplasty                                         (5 points)    [ ] Stroke (in the previous month)                          (5 Points)                                                                                                                                                           HEMATOLOGY RELATED FACTORS                                                 TRAUMA RELATED RISK FACTORS  [ ] Prior episodes of VTE                                     (3 Points)                [ ] Fracture of the hip, pelvis, or leg                       (5 Points)  [ ] Positive family history for VTE                         (3 Points)             [ ] Acute spinal cord injury (in the previous month)  (5 Points)  [ ] Prothrombin 39050 A                                     (3 Points)               [ ] Paralysis  (less than 1 month)                             (5 Points)  [ ] Factor V Leiden                                             (3 Points)                  [ ] Multiple Trauma within 1 month                        (5 Points)  [ ] Lupus anticoagulants                                     (3 Points)                                                           [ ] Anticardiolipin antibodies                               (3 Points)                                                       [ ] High homocysteine in the blood                      (3 Points)                                             [ ] Other congenital or acquired thrombophilia      (3 Points)                                                [ ] Heparin induced thrombocytopenia                  (3 Points)                                     Total Score [    7      ]    OPIOID RISK TOOL    MADHURI EACH BOX THAT APPLIES AND ADD TOTALS AT THE END    FAMILY HISTORY OF SUBSTANCE ABUSE                 FEMALE         MALE                                                Alcohol                             [  ]1 pt          [  ]3pts                                               Illegal Durgs                     [  ]2 pts        [  ]3pts                                               Rx Drugs                           [  ]4 pts        [  ]4 pts    PERSONAL HISTORY OF SUBSTANCE ABUSE                                                                                          Alcohol                             [  ]3 pts       [  ]3 pts                                               Illegal Durgs                     [  ]4 pts        [  ]4 pts                                               Rx Drugs                           [  ]5 pts        [  ]5 pts    AGE BETWEEN 16-45 YEARS                                      [  ]1 pt         [  ]1 pt    HISTORY OF PREADOLESCENT   SEXUAL ABUSE                                                             [  ]3 pts        [  ]0pts    PSYCHOLOGICAL DISEASE                     ADD, OCD, Bipolar, Schizophrenia        [  ]2 pts         [  ]2 pts                      Depression                                               [ x ]1 pt           [  ]1 pt           SCORING TOTAL   1  A score of 3 or lower indicated LOW risk for future opiod abuse  A score of 4 to 7 indicated moderate risk for future opiod abuse  A score of 8 or higher indicates a high risk for opiod abuse    66 year old female with a pmhx of lung nodules, diverticulitis, anxiety, depression, +ppd treated with INH in , surgical history of colon mass resection (benign), appendectomy. Patient reports a history of lung nodule for about 15 years and was following with pulmonologist. She reports that the nodules grew about 2 years ago, PET scan revealed 3 nodules one right, one left and one in the trachea, she is s/p biopsy of the left which revealed adenocarcinoma, right biopsy was negative for malignancy. Right biopsy was complicated by pneumothorax of right lung requiring chest tube.   Patient reports she is here today for a nodule in her trachea. . Patient denies fever, chills, weight loss, malaise, dysphagia cough, shortness of breath, hemoptysis, or wheezing. Patient is scheduled for flex bronhoscopy, EBUS with Dr Mark Oshea on 3/24/21. Patient educated on surgical scrub, COVID testing 3/21, preadmission instructions, medical clearance and day of procedure medications, verbalizes understanding. Pt instructed to stop vitamins/supplements/herbal medications/ASA/NSAIDS for one week prior to surgery and discuss with PMD.   Medical clearance pending

## 2021-03-19 NOTE — H&P PST ADULT - BACK COMMENTS
no cva tenderness  left lower flank scar from removal of no cva tenderness  left lower flank scar from removal of benign lesion

## 2021-03-19 NOTE — ASU PATIENT PROFILE, ADULT - VISION (WITH CORRECTIVE LENSES IF THE PATIENT USUALLY WEARS THEM):
glasses progressive lenses/Partially impaired: cannot see medication labels or newsprint, but can see obstacles in path, and the surrounding layout; can count fingers at arm's length

## 2021-03-19 NOTE — ASU PATIENT PROFILE, ADULT - AS SC BRADEN SENSORY
What Type Of Note Output Would You Prefer (Optional)?: Standard Output How Severe Is Your Rash?: moderate Is This A New Presentation, Or A Follow-Up?: Rash (4) no impairment

## 2021-03-19 NOTE — ASU PATIENT PROFILE, ADULT - PMH
Acute lateral meniscal injury of right knee, initial encounter    Acute medial meniscus tear of right knee, initial encounter    At risk for sleep apnea  Bang score 3  Depression    Fatty liver    H/O pneumothorax  following lung biopsy right lung  Hyperlipidemia    Malignant neoplasm of unspecified part of unspecified bronchus or lung    Osteoarthritis  right  Osteoporosis    Patient is Rastafarian    PPD positive  2000 -  treated with INH  Pulmonary mass  monitoring -no surgical intervention  Small bowel mass  incidental finding while appendectomy ( Benign s/p colon mass resection - 3/2018)

## 2021-03-19 NOTE — ASU PATIENT PROFILE, ADULT - MUTUALITY COMMENT, PROFILE
Pt given Blood and Non-Blood Product Preferences pt assessment form. Pt unable to complete at PST. Pt given original and will speak with surgeon and/or Buddhism elder and bring completed form on DOS. Pt verbalized understanding of all instructions. Tamiko ([id number]) @ [facility] Crissy and Meri ([phone]) @ Dr Oshea's office notified of same.

## 2021-03-19 NOTE — H&P PST ADULT - BLOOD AVOIDANCE/RESTRICTIONS, PROFILE
No Blood - Jehovah Witness/Oriental orthodox beliefs No Blood - Jehovah Witness, will accept some blood products/Voodoo beliefs

## 2021-03-21 ENCOUNTER — APPOINTMENT (OUTPATIENT)
Dept: DISASTER EMERGENCY | Facility: CLINIC | Age: 66
End: 2021-03-21

## 2021-03-21 ENCOUNTER — LABORATORY RESULT (OUTPATIENT)
Age: 66
End: 2021-03-21

## 2021-03-21 PROBLEM — M81.0 AGE-RELATED OSTEOPOROSIS WITHOUT CURRENT PATHOLOGICAL FRACTURE: Chronic | Status: ACTIVE | Noted: 2021-03-19

## 2021-03-21 PROBLEM — Z87.09 PERSONAL HISTORY OF OTHER DISEASES OF THE RESPIRATORY SYSTEM: Chronic | Status: ACTIVE | Noted: 2021-03-19

## 2021-03-21 PROBLEM — C34.90 MALIGNANT NEOPLASM OF UNSPECIFIED PART OF UNSPECIFIED BRONCHUS OR LUNG: Chronic | Status: ACTIVE | Noted: 2021-03-19

## 2021-03-23 ENCOUNTER — TRANSCRIPTION ENCOUNTER (OUTPATIENT)
Age: 66
End: 2021-03-23

## 2021-03-24 ENCOUNTER — RESULT REVIEW (OUTPATIENT)
Age: 66
End: 2021-03-24

## 2021-03-24 ENCOUNTER — OUTPATIENT (OUTPATIENT)
Dept: OUTPATIENT SERVICES | Facility: HOSPITAL | Age: 66
LOS: 1 days | End: 2021-03-24
Payer: COMMERCIAL

## 2021-03-24 ENCOUNTER — OUTPATIENT (OUTPATIENT)
Dept: OUTPATIENT SERVICES | Facility: HOSPITAL | Age: 66
LOS: 1 days | Discharge: HOME | End: 2021-03-24

## 2021-03-24 ENCOUNTER — APPOINTMENT (OUTPATIENT)
Dept: THORACIC SURGERY | Facility: HOSPITAL | Age: 66
End: 2021-03-24

## 2021-03-24 DIAGNOSIS — Z98.890 OTHER SPECIFIED POSTPROCEDURAL STATES: Chronic | ICD-10-CM

## 2021-03-24 DIAGNOSIS — C34.90 MALIGNANT NEOPLASM OF UNSPECIFIED PART OF UNSPECIFIED BRONCHUS OR LUNG: ICD-10-CM

## 2021-03-24 DIAGNOSIS — Z90.49 ACQUIRED ABSENCE OF OTHER SPECIFIED PARTS OF DIGESTIVE TRACT: Chronic | ICD-10-CM

## 2021-03-24 DIAGNOSIS — Z98.891 HISTORY OF UTERINE SCAR FROM PREVIOUS SURGERY: Chronic | ICD-10-CM

## 2021-03-24 DIAGNOSIS — Z98.82 BREAST IMPLANT STATUS: Chronic | ICD-10-CM

## 2021-03-24 PROCEDURE — 88173 CYTOPATH EVAL FNA REPORT: CPT

## 2021-03-24 PROCEDURE — 88112 CYTOPATH CELL ENHANCE TECH: CPT

## 2021-03-24 PROCEDURE — 88305 TISSUE EXAM BY PATHOLOGIST: CPT

## 2021-03-24 PROCEDURE — 31624 DX BRONCHOSCOPE/LAVAGE: CPT

## 2021-03-24 PROCEDURE — 31653 BRONCH EBUS SAMPLNG 3/> NODE: CPT

## 2021-03-24 PROCEDURE — 88112 CYTOPATH CELL ENHANCE TECH: CPT | Mod: 26,59

## 2021-03-24 PROCEDURE — 88305 TISSUE EXAM BY PATHOLOGIST: CPT | Mod: 26

## 2021-03-24 PROCEDURE — 88173 CYTOPATH EVAL FNA REPORT: CPT | Mod: 26

## 2021-03-24 NOTE — BRIEF OPERATIVE NOTE - COMMENTS
Invasive Lines: NONE  IV Medication Infusions: NONE  No qualified resident was available to assist in this case. I have personally first assisted the Cardiothoracic Surgeon listed in this brief op note throughout the entirety of this case.   Pt extubated in OR

## 2021-03-24 NOTE — BRIEF OPERATIVE NOTE - NSICDXBRIEFPROCEDURE_GEN_ALL_CORE_FT
PROCEDURES:  Flexible bronchoscopy 24-Mar-2021 11:40:48  Cyril Real  Bronchoscopy, with BAL 24-Mar-2021 11:40:38  Cyril Real  EBUS, with fine needle aspiration 24-Mar-2021 11:40:29  Cyril Real

## 2021-03-25 LAB — NON-GYNECOLOGICAL CYTOLOGY STUDY: SIGNIFICANT CHANGE UP

## 2021-03-29 ENCOUNTER — APPOINTMENT (OUTPATIENT)
Dept: HEMATOLOGY ONCOLOGY | Facility: CLINIC | Age: 66
End: 2021-03-29

## 2021-03-30 ENCOUNTER — APPOINTMENT (OUTPATIENT)
Dept: NEUROLOGY | Facility: CLINIC | Age: 66
End: 2021-03-30
Payer: MEDICARE

## 2021-03-30 PROCEDURE — 99072 ADDL SUPL MATRL&STAF TM PHE: CPT

## 2021-03-30 PROCEDURE — 99214 OFFICE O/P EST MOD 30 MIN: CPT

## 2021-04-07 ENCOUNTER — RX CHANGE (OUTPATIENT)
Age: 66
End: 2021-04-07

## 2021-04-07 PROBLEM — R91.8 PULMONARY MASS: Status: ACTIVE | Noted: 2018-03-08

## 2021-04-07 RX ORDER — DONEPEZIL HYDROCHLORIDE 5 MG/1
5 TABLET ORAL DAILY
Qty: 30 | Refills: 2 | Status: DISCONTINUED | COMMUNITY
Start: 2021-02-09 | End: 2021-04-07

## 2021-04-12 ENCOUNTER — APPOINTMENT (OUTPATIENT)
Dept: THORACIC SURGERY | Facility: CLINIC | Age: 66
End: 2021-04-12
Payer: MEDICARE

## 2021-04-12 VITALS
HEART RATE: 63 BPM | DIASTOLIC BLOOD PRESSURE: 80 MMHG | HEIGHT: 68 IN | RESPIRATION RATE: 18 BRPM | WEIGHT: 158 LBS | OXYGEN SATURATION: 100 % | SYSTOLIC BLOOD PRESSURE: 121 MMHG | BODY MASS INDEX: 23.95 KG/M2

## 2021-04-12 DIAGNOSIS — R91.8 OTHER NONSPECIFIC ABNORMAL FINDING OF LUNG FIELD: ICD-10-CM

## 2021-04-12 PROCEDURE — 99214 OFFICE O/P EST MOD 30 MIN: CPT

## 2021-04-12 PROCEDURE — 99072 ADDL SUPL MATRL&STAF TM PHE: CPT

## 2021-04-12 NOTE — CONSULT LETTER
[Dear  ___] : Dear  [unfilled], [Consult Letter:] : I had the pleasure of evaluating your patient, [unfilled]. [Consult Closing:] : Thank you very much for allowing me to participate in the care of this patient.  If you have any questions, please do not hesitate to contact me. [Sincerely,] : Sincerely, [Please see my note below.] : Please see my note below. [DrDavid  ___] : Dr. CADET [FreeTextEntry2] : Dr. Barragan  [FreeTextEntry3] : Steven Choudhury MD\par Department of Cardiovascular and Thoracic Surgery\par \par Jj and Adriana James\par [employer] [facility] [employer] [facility]

## 2021-04-12 NOTE — DATA REVIEWED
[FreeTextEntry1] : 3.24.21 Final Diagnosis\par 1. LYMPH NODE, LEVEL 7, EBUS-GUIDED FNA\par NEGATIVE FOR MALIGNANT CELLS.\par \par The cytology smears and cell block section shows scattered benign\par bronchial cells and rare macrophages in a background of blood.\par \par 2. LYMPH NODE, L4, EBUS-GUIDED FNA\par SUSPICIOUS FOR MALIGNANT CELLS.\par Suspicious for Metastatic adenocarcinoma.  (Scant material)\par \par The Diff-Quik stained cytology smears show very rare clusters and\par crowded 3-dimensional groups showing enlarged nuclei containing\par prominent nucleoli, anisonucleosis, hyperchromasia, and areas of\par vacuolated cytoplasm. The Pap stained slides show similar cells\par and groups, however these are severely degenerated due to air-\par drying artifact.\par The background shows lymph node elements and scattered\par macrophages.\par \par However, the cell block material is paucicellular and precludes\par immunostains and ancillary studies.\par \par Patient's history of lung adenocarcinoma is noted.\par \par \par 3. LYMPH NODE, L10, EBUS-GUIDED FNA\par NEGATIVE FOR MALIGNANT CELLS.\par \par The cytology smears and cell block section shows scattered benign\par bronchial cells and rare macrophages with scattered lymphocytes\par in a background of blood.\par \par \par 4. LUNG, BRONCHOALVEOLAR LAVAGE\par NEGATIVE FOR MALIGNANT CELLS.\par  \par \par 1.21.21 CT Chest with and without contrast\par LUNG AND LARGE AIRWAYS: The left upper lobe lung mass is stable measuring 4.7 x 2.3 cm on series 601 image 52. A 1.3 cm right upper lobe lung nodule on series 2 image 24 is unchanged. 1.2 cm nodular opacity left lung apex is unchanged as is a 4 mm groundglass nodule at the left lung apex. A 5 mm left upper lobe nodule on series 2 image 38 is stable.\par PLEURA: within normal limits.\par

## 2021-04-12 NOTE — ASSESSMENT
[FreeTextEntry1] :  Nadege is a 66-year-old female with a biopsy-proven lung cancer, who recently underwent endobronchial ultrasound for mediastinal staging. She was found to have a left paratracheal lymph node is positive for metastatic adenocarcinoma. This brings her stage IIIa.  At this point all be referring her back to oncology for likely neoadjuvant therapy prior to consideration for resection.\par \par Thank you for allowing me to participate in the care of your patient.\par \par 30 minutes was spent during this encounter.\par \par Steven Choudhury MD\par Department of Cardiovascular and Thoracic Surgery\par \par [employer] [facility]\par

## 2021-04-12 NOTE — HISTORY OF PRESENT ILLNESS
[FreeTextEntry1] : Patient is a 66-year-old female with a biopsy-proven left upper lobe lung cancer  with evidence of hilar metastatic disease by PET scan.  She was last evaluated by Dr. Renea Simpson  and Dr. Dorman (Oncologist)  to discuss  treatment options on her  left upper lobe lung mass measuring 4.7 x 2.3 cm.  During her  last visit with  oncology,  surgical management  was advised rather than chemoradiation discussed on 3.4.21.  On 3.24.21 a EBUS was performed  for further diagnosis.  This showed N2 minor disease\par \par Today she denies dizziness, shortness of breath with climbing up/down stairs or with exertion, unintentional weight loss, cough, fever or chills.  She is here to discuss her pathology  results and to discuss   interventional management. \par

## 2021-04-16 ENCOUNTER — RESULT REVIEW (OUTPATIENT)
Age: 66
End: 2021-04-16

## 2021-04-16 ENCOUNTER — OUTPATIENT (OUTPATIENT)
Dept: OUTPATIENT SERVICES | Facility: HOSPITAL | Age: 66
LOS: 1 days | Discharge: HOME | End: 2021-04-16
Payer: MEDICARE

## 2021-04-16 ENCOUNTER — APPOINTMENT (OUTPATIENT)
Dept: HEMATOLOGY ONCOLOGY | Facility: CLINIC | Age: 66
End: 2021-04-16
Payer: MEDICARE

## 2021-04-16 VITALS
SYSTOLIC BLOOD PRESSURE: 116 MMHG | HEART RATE: 72 BPM | WEIGHT: 159 LBS | DIASTOLIC BLOOD PRESSURE: 77 MMHG | HEIGHT: 66 IN | OXYGEN SATURATION: 99 % | BODY MASS INDEX: 25.55 KG/M2

## 2021-04-16 DIAGNOSIS — Z98.890 OTHER SPECIFIED POSTPROCEDURAL STATES: Chronic | ICD-10-CM

## 2021-04-16 DIAGNOSIS — Z98.82 BREAST IMPLANT STATUS: Chronic | ICD-10-CM

## 2021-04-16 DIAGNOSIS — Z98.891 HISTORY OF UTERINE SCAR FROM PREVIOUS SURGERY: Chronic | ICD-10-CM

## 2021-04-16 DIAGNOSIS — Z90.49 ACQUIRED ABSENCE OF OTHER SPECIFIED PARTS OF DIGESTIVE TRACT: Chronic | ICD-10-CM

## 2021-04-16 LAB
BASOPHILS # BLD AUTO: 0.1 K/UL — SIGNIFICANT CHANGE UP (ref 0–0.2)
BASOPHILS NFR BLD AUTO: 1 % — SIGNIFICANT CHANGE UP (ref 0–2)
EOSINOPHIL # BLD AUTO: 0.1 K/UL — SIGNIFICANT CHANGE UP (ref 0–0.5)
EOSINOPHIL NFR BLD AUTO: 2.1 % — SIGNIFICANT CHANGE UP (ref 0–6)
HCT VFR BLD CALC: 39.8 % — SIGNIFICANT CHANGE UP (ref 34.5–45)
HGB BLD-MCNC: 12.7 G/DL — SIGNIFICANT CHANGE UP (ref 11.5–15.5)
LYMPHOCYTES # BLD AUTO: 1.8 K/UL — SIGNIFICANT CHANGE UP (ref 1–3.3)
LYMPHOCYTES # BLD AUTO: 28.3 % — SIGNIFICANT CHANGE UP (ref 13–44)
MCHC RBC-ENTMCNC: 30.3 PG — SIGNIFICANT CHANGE UP (ref 27–34)
MCHC RBC-ENTMCNC: 31.8 G/DL — LOW (ref 32–36)
MCV RBC AUTO: 95.3 FL — SIGNIFICANT CHANGE UP (ref 80–100)
MONOCYTES # BLD AUTO: 0.4 K/UL — SIGNIFICANT CHANGE UP (ref 0–0.9)
MONOCYTES NFR BLD AUTO: 6.5 % — SIGNIFICANT CHANGE UP (ref 2–14)
NEUTROPHILS # BLD AUTO: 3.9 K/UL — SIGNIFICANT CHANGE UP (ref 1.8–7.4)
NEUTROPHILS NFR BLD AUTO: 62.1 % — SIGNIFICANT CHANGE UP (ref 43–77)
PLATELET # BLD AUTO: 312 K/UL — SIGNIFICANT CHANGE UP (ref 150–400)
RBC # BLD: 4.17 M/UL — SIGNIFICANT CHANGE UP (ref 3.8–5.2)
RBC # FLD: 12.5 % — SIGNIFICANT CHANGE UP (ref 10.3–14.5)
WBC # BLD: 6.3 K/UL — SIGNIFICANT CHANGE UP (ref 3.8–10.5)
WBC # FLD AUTO: 6.3 K/UL — SIGNIFICANT CHANGE UP (ref 3.8–10.5)

## 2021-04-16 PROCEDURE — 99215 OFFICE O/P EST HI 40 MIN: CPT

## 2021-04-16 PROCEDURE — 77263 THER RADIOLOGY TX PLNG CPLX: CPT

## 2021-04-16 PROCEDURE — 99072 ADDL SUPL MATRL&STAF TM PHE: CPT

## 2021-04-21 LAB
ALBUMIN SERPL ELPH-MCNC: 4.5 G/DL
ALP BLD-CCNC: 86 U/L
ALT SERPL-CCNC: 22 U/L
ANION GAP SERPL CALC-SCNC: 11 MMOL/L
AST SERPL-CCNC: 21 U/L
BILIRUB SERPL-MCNC: 0.2 MG/DL
BUN SERPL-MCNC: 12 MG/DL
CALCIUM SERPL-MCNC: 9.6 MG/DL
CHLORIDE SERPL-SCNC: 102 MMOL/L
CO2 SERPL-SCNC: 27 MMOL/L
CREAT SERPL-MCNC: 0.73 MG/DL
GLUCOSE SERPL-MCNC: 94 MG/DL
HAV IGM SER QL: NONREACTIVE
HBV CORE IGM SER QL: NONREACTIVE
HBV SURFACE AG SER QL: NONREACTIVE
HCV AB SER QL: NONREACTIVE
HCV S/CO RATIO: 0.09 S/CO
POTASSIUM SERPL-SCNC: 5 MMOL/L
PROT SERPL-MCNC: 7.2 G/DL
SODIUM SERPL-SCNC: 140 MMOL/L

## 2021-04-22 ENCOUNTER — RESULT REVIEW (OUTPATIENT)
Age: 66
End: 2021-04-22

## 2021-04-22 ENCOUNTER — APPOINTMENT (OUTPATIENT)
Dept: CT IMAGING | Facility: CLINIC | Age: 66
End: 2021-04-22
Payer: MEDICARE

## 2021-04-22 ENCOUNTER — OUTPATIENT (OUTPATIENT)
Dept: OUTPATIENT SERVICES | Facility: HOSPITAL | Age: 66
LOS: 1 days | End: 2021-04-22
Payer: COMMERCIAL

## 2021-04-22 ENCOUNTER — APPOINTMENT (OUTPATIENT)
Dept: RADIATION ONCOLOGY | Facility: CLINIC | Age: 66
End: 2021-04-22
Payer: MEDICARE

## 2021-04-22 VITALS
RESPIRATION RATE: 16 BRPM | TEMPERATURE: 98 F | SYSTOLIC BLOOD PRESSURE: 118 MMHG | OXYGEN SATURATION: 99 % | DIASTOLIC BLOOD PRESSURE: 77 MMHG | BODY MASS INDEX: 25.71 KG/M2 | HEART RATE: 56 BPM | WEIGHT: 160 LBS | HEIGHT: 66 IN

## 2021-04-22 DIAGNOSIS — Z98.890 OTHER SPECIFIED POSTPROCEDURAL STATES: Chronic | ICD-10-CM

## 2021-04-22 DIAGNOSIS — Z90.49 ACQUIRED ABSENCE OF OTHER SPECIFIED PARTS OF DIGESTIVE TRACT: Chronic | ICD-10-CM

## 2021-04-22 DIAGNOSIS — Z98.891 HISTORY OF UTERINE SCAR FROM PREVIOUS SURGERY: Chronic | ICD-10-CM

## 2021-04-22 DIAGNOSIS — C80.1 MALIGNANT (PRIMARY) NEOPLASM, UNSPECIFIED: ICD-10-CM

## 2021-04-22 DIAGNOSIS — Z98.82 BREAST IMPLANT STATUS: Chronic | ICD-10-CM

## 2021-04-22 PROCEDURE — 99072 ADDL SUPL MATRL&STAF TM PHE: CPT

## 2021-04-22 PROCEDURE — 71260 CT THORAX DX C+: CPT

## 2021-04-22 PROCEDURE — 71260 CT THORAX DX C+: CPT | Mod: 26

## 2021-04-22 PROCEDURE — 99213 OFFICE O/P EST LOW 20 MIN: CPT

## 2021-04-22 NOTE — HISTORY OF PRESENT ILLNESS
[Home] : at home, [unfilled] , at the time of the visit. [Medical Office: ([facility])___] : at the medical office located in  [Verbal consent obtained from patient] : the patient, [unfilled] [FreeTextEntry1] : This 65 year year-old woman is conferencing for radiation medicine consultation.  She is diagnosed with adenocarcinoma of the left upper lung lobe.  \par \par Patient has has followed Dr. Galeana in the past; PPD+ in 2000 and received 6 months of INH.  PET positive RENAN posterior cavitary density in 2010.  CT scanz March 2010 and 2012 were stable - felt to be benign.  Patient was lost to FU\par \par PE/CT 10/12/2020 showed Left upper lobe spiculated lung mass 4.5 x 3cm with an SUV 11.1 with adjacent tree in bud opacity.   In addition showed a Right upper lobe opacity 1.2 x1 cm which is not FDG avid.  Discrete Left hilar/ infrahilar region lesion with an SUV 5.2 possibly corresponding to a LN. \par \par \par 11/20/2020: Left lung mass, core biopsy -- pathology c/w Adenocarcinoma c/w pulmonary origin.\par \par 12/29/2020: MR brain, No evidence of acute hemorrhage, mass effect or abnormal areas of enhancement.\par \par \par

## 2021-04-22 NOTE — LETTER CLOSING
[Sincerely yours,] : Sincerely yours, [FreeTextEntry3] : Riki Duncan MD\par Physician in Chief\par Department of Radiation Medicine\par [facility]\par [facility]\par \par  of Radiation Medicine\par [facility]\par at  [facility]\par \par Radiation \par [facility]/\par [facility] [place]\par [address]\par [place], [place] [address]\par \par Tel: [phone]\par Fax: [phone]\par

## 2021-04-22 NOTE — DISEASE MANAGEMENT
[Clinical] : TNM Stage: c [IIIA] : IIIA [TTNM] : 2b [FreeTextEntry4] : adenocarcinoma [NTNM] : 2 [MTNM] : 0 [de-identified] : 6000cGy [de-identified] : left upper lobe and mediastinum

## 2021-04-22 NOTE — LETTER GREETING
[Dear Doctor] : Dear Doctor, [Follow-Up] : Your patient, [unfilled] was seen in my office today for follow-up [Please see my note below.] : Please see my note below. [FreeTextEntry2] : Steven Choudhury MD\miladys Ohara MD

## 2021-04-22 NOTE — ASSESSMENT
[FreeTextEntry1] : SHANON BEE is a never smoker Female who was 64 yo at the time of diagnosis. Patient has no significant past medical history. 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 Patient was represented at Tumor board on 4/14/21 and a plan for definitive CRT \par \par - Will now however proceed with chemoRT with Carbo AUC 2 and taxol 50 mg/ m2 weekly with RT followed by Immunotherapy witH Durvalumab for 1 year \par - Discussed side effects of carboplatin including but not limited to Nausea/ vomiting/ myelosuppression/ fatigue. \par - Paclitaxel  has side effects including N/v/ myelosuppression/flushing / alopecia/ Neuropathy / diarrhea/ stomatitis/ \par -Durvalumab has immune mediated side effects including but not limited to thyroiditis/ hypopit /myocarditis/ arrythmias/ joint pains/ rash. \par Will monitor counts/ and for immune mediated side effects\par  She had a h/o Diverticulosis monitor with IO \par \par Discussed the Alk fusion mutation and targetted options however this is only approved in the metastatic setting\par \par \par \par SON: JUAN RAMON [phone]\par

## 2021-04-22 NOTE — LETTER CLOSING
[Consult Closing:] : Thank you for allowing me to participate in the care of this patient.  If you have any questions, please do not hesitate to contact me. [Sincerely yours,] : Sincerely yours, [FreeTextEntry3] : Riki Duncan MD\par Physician in Chief\par Department of Radiation Medicine\par [facility] [place]\par [facility] [place]\par \par  of Radiation Medicine\par [facility]\par at  [facility]\par \par Radiation \par [facility]par [facility] [place]\par [address]\par [place], [place] [address]\par \par Tel: [phone]\par Fax: [phone]\par

## 2021-04-22 NOTE — HISTORY OF PRESENT ILLNESS
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 Recent imaging has shown growth and development of a solid structure. The etiology remains unclear, but malignancy has been considered. She feels well has no complaints. She denies fever, chills, night sweats, weight loss, or weight gain. \par \par Followed by Dr Galeana in the past\par PPD+ in 2000 and received 6 months of INH\par PET positive RENAN posterior cavitary density in 2010\par TTNA and Bronch non-diagnostic. Cultures negative for fungus and TB\par CT at [facility] in 2010 and 2012 stable - felt to be benign. Lost to FU\par \par Patient had a PEt scan in Oct 2020 with a Left upper lobe spiculated lung mass 4.5 x 3cm with an SUV 11.1 with adjacent tree in bud opacity \par In addition Rt upper lobe opacity 1.2 x1 cm which is not FDG avid \par Discrete Left hilar/ infrahilar region  lesion with an SUV 5.2 which is possibly corresponding to a LN \par \par \par Patient s/p Left lung mass, core biopsy with pathology c/w Adenocarcinoma c/w pulmonary origin (see comment)\par CK7, TTF-1 : Positive CK20, PAX-8 : Negative\par  [de-identified] : Discussed at Tumor board at [facility], decided to biopsy RT  upper lobe lesion due to slight growth,\par S/p Biopsy of rt upper lobe lung lesion on 1/26/21 c/w reactive bronchial cells and negative for tumor \par \par CT CAP/ done on 12/29/20 Stable \par Brain MRi: no metastatic disease\par \par During her last visit with oncology, surgical management was advised rather than chemoradiation discussed on 3.4.21. \par \par On 3.24.21 a EBUS was performed for further diagnosis. This showed N2 minor disease\par \par

## 2021-04-22 NOTE — DISEASE MANAGEMENT
[IIB] : IIB [Clinical] : TNM Stage: c [FreeTextEntry4] : adenocarcinoma [TTNM] : 2b [NTNM] : 1 [MTNM] : 0

## 2021-04-22 NOTE — REASON FOR VISIT
[Lung Cancer] : lung cancer [Family Member] : family member [Re-evaluation] : re-evaluation for [FreeTextEntry1] : [id number] [FreeTextEntry2] : [place]

## 2021-04-22 NOTE — HISTORY OF PRESENT ILLNESS
[FreeTextEntry1] : The patient is a 64 y/o  woman with a newly diagnosed adenocarcinoma of the lung (RENAN), cT2,N2,M0 (IIIA) Felt initially to be a resectable stage IIB, however a recent EBUS bx of an suspicious paratracheal LN was positive for metastatic adenocarcinoma. She is therefore now considered not a suitable surgery candidate at this time and returns to us for definitive chemoradiation .\par She is a non-smoker. no other lung ailments.  She will be seen for follow-up and to begin definitive radiation treatment planning. \par

## 2021-04-22 NOTE — LETTER GREETING
[Dear Doctor] : Dear Doctor, [Consult Letter:] : Your patient, [unfilled] was seen in my office today for consultation. [Please see my note below.] : Please see my note below. [FreeTextEntry2] : Steven Choudhury MD

## 2021-04-22 NOTE — ASSESSMENT
[FreeTextEntry1] : Recently upstaged adenocarcinoma of the left lung(RENAN) with metastases to the central mediasttinum.

## 2021-04-26 ENCOUNTER — RX CHANGE (OUTPATIENT)
Age: 66
End: 2021-04-26

## 2021-04-26 RX ORDER — DONEPEZIL HYDROCHLORIDE 10 MG/1
10 TABLET ORAL DAILY
Qty: 30 | Refills: 6 | Status: DISCONTINUED | COMMUNITY
Start: 2021-03-30 | End: 2021-04-26

## 2021-04-28 PROCEDURE — 77300 RADIATION THERAPY DOSE PLAN: CPT | Mod: 26

## 2021-04-28 PROCEDURE — 77338 DESIGN MLC DEVICE FOR IMRT: CPT | Mod: 26

## 2021-04-28 PROCEDURE — 77301 RADIOTHERAPY DOSE PLAN IMRT: CPT | Mod: 26

## 2021-04-29 LAB — CEA SERPL-MCNC: 12.6 NG/ML

## 2021-05-06 ENCOUNTER — OUTPATIENT (OUTPATIENT)
Dept: OUTPATIENT SERVICES | Facility: HOSPITAL | Age: 66
LOS: 1 days | Discharge: HOME | End: 2021-05-06

## 2021-05-06 DIAGNOSIS — C34.90 MALIGNANT NEOPLASM OF UNSPECIFIED PART OF UNSPECIFIED BRONCHUS OR LUNG: ICD-10-CM

## 2021-05-06 DIAGNOSIS — Z98.891 HISTORY OF UTERINE SCAR FROM PREVIOUS SURGERY: Chronic | ICD-10-CM

## 2021-05-06 DIAGNOSIS — Z90.49 ACQUIRED ABSENCE OF OTHER SPECIFIED PARTS OF DIGESTIVE TRACT: Chronic | ICD-10-CM

## 2021-05-06 DIAGNOSIS — Z98.82 BREAST IMPLANT STATUS: Chronic | ICD-10-CM

## 2021-05-06 DIAGNOSIS — Z98.890 OTHER SPECIFIED POSTPROCEDURAL STATES: Chronic | ICD-10-CM

## 2021-05-07 ENCOUNTER — APPOINTMENT (OUTPATIENT)
Dept: HEMATOLOGY ONCOLOGY | Facility: CLINIC | Age: 66
End: 2021-05-07
Payer: MEDICARE

## 2021-05-07 VITALS
HEIGHT: 66 IN | SYSTOLIC BLOOD PRESSURE: 119 MMHG | HEART RATE: 68 BPM | WEIGHT: 160 LBS | OXYGEN SATURATION: 98 % | DIASTOLIC BLOOD PRESSURE: 64 MMHG | BODY MASS INDEX: 25.71 KG/M2

## 2021-05-07 PROCEDURE — 99215 OFFICE O/P EST HI 40 MIN: CPT

## 2021-05-07 PROCEDURE — 99072 ADDL SUPL MATRL&STAF TM PHE: CPT

## 2021-05-10 ENCOUNTER — APPOINTMENT (OUTPATIENT)
Age: 66
End: 2021-05-10

## 2021-05-10 ENCOUNTER — RESULT REVIEW (OUTPATIENT)
Age: 66
End: 2021-05-10

## 2021-05-10 ENCOUNTER — LABORATORY RESULT (OUTPATIENT)
Age: 66
End: 2021-05-10

## 2021-05-10 LAB
BASOPHILS # BLD AUTO: 0.1 K/UL — SIGNIFICANT CHANGE UP (ref 0–0.2)
BASOPHILS NFR BLD AUTO: 1.3 % — SIGNIFICANT CHANGE UP (ref 0–2)
EOSINOPHIL # BLD AUTO: 0.1 K/UL — SIGNIFICANT CHANGE UP (ref 0–0.5)
EOSINOPHIL NFR BLD AUTO: 2.3 % — SIGNIFICANT CHANGE UP (ref 0–6)
HCT VFR BLD CALC: 39.4 % — SIGNIFICANT CHANGE UP (ref 34.5–45)
HGB BLD-MCNC: 12.5 G/DL — SIGNIFICANT CHANGE UP (ref 11.5–15.5)
LYMPHOCYTES # BLD AUTO: 1.6 K/UL — SIGNIFICANT CHANGE UP (ref 1–3.3)
LYMPHOCYTES # BLD AUTO: 34.8 % — SIGNIFICANT CHANGE UP (ref 13–44)
MCHC RBC-ENTMCNC: 30.4 PG — SIGNIFICANT CHANGE UP (ref 27–34)
MCHC RBC-ENTMCNC: 31.7 G/DL — LOW (ref 32–36)
MCV RBC AUTO: 95.7 FL — SIGNIFICANT CHANGE UP (ref 80–100)
MONOCYTES # BLD AUTO: 0.3 K/UL — SIGNIFICANT CHANGE UP (ref 0–0.9)
MONOCYTES NFR BLD AUTO: 7.4 % — SIGNIFICANT CHANGE UP (ref 2–14)
NEUTROPHILS # BLD AUTO: 2.4 K/UL — SIGNIFICANT CHANGE UP (ref 1.8–7.4)
NEUTROPHILS NFR BLD AUTO: 54.2 % — SIGNIFICANT CHANGE UP (ref 43–77)
PLATELET # BLD AUTO: 284 K/UL — SIGNIFICANT CHANGE UP (ref 150–400)
RBC # BLD: 4.12 M/UL — SIGNIFICANT CHANGE UP (ref 3.8–5.2)
RBC # FLD: 12.2 % — SIGNIFICANT CHANGE UP (ref 10.3–14.5)
WBC # BLD: 4.5 K/UL — SIGNIFICANT CHANGE UP (ref 3.8–10.5)
WBC # FLD AUTO: 4.5 K/UL — SIGNIFICANT CHANGE UP (ref 3.8–10.5)

## 2021-05-10 PROCEDURE — 77387B: CUSTOM | Mod: 26

## 2021-05-11 DIAGNOSIS — Z51.11 ENCOUNTER FOR ANTINEOPLASTIC CHEMOTHERAPY: ICD-10-CM

## 2021-05-11 DIAGNOSIS — R11.2 NAUSEA WITH VOMITING, UNSPECIFIED: ICD-10-CM

## 2021-05-11 PROCEDURE — 77387B: CUSTOM | Mod: 26

## 2021-05-12 ENCOUNTER — APPOINTMENT (OUTPATIENT)
Dept: HEMATOLOGY ONCOLOGY | Facility: CLINIC | Age: 66
End: 2021-05-12

## 2021-05-12 PROCEDURE — 77387B: CUSTOM | Mod: 26

## 2021-05-13 PROCEDURE — 77387B: CUSTOM | Mod: 26

## 2021-05-14 ENCOUNTER — RESULT REVIEW (OUTPATIENT)
Age: 66
End: 2021-05-14

## 2021-05-14 ENCOUNTER — APPOINTMENT (OUTPATIENT)
Dept: HEMATOLOGY ONCOLOGY | Facility: CLINIC | Age: 66
End: 2021-05-14

## 2021-05-14 LAB
BASOPHILS # BLD AUTO: 0.1 K/UL — SIGNIFICANT CHANGE UP (ref 0–0.2)
BASOPHILS NFR BLD AUTO: 1 % — SIGNIFICANT CHANGE UP (ref 0–2)
EOSINOPHIL # BLD AUTO: 0.1 K/UL — SIGNIFICANT CHANGE UP (ref 0–0.5)
EOSINOPHIL NFR BLD AUTO: 1.6 % — SIGNIFICANT CHANGE UP (ref 0–6)
HCT VFR BLD CALC: 40.8 % — SIGNIFICANT CHANGE UP (ref 34.5–45)
HGB BLD-MCNC: 13.4 G/DL — SIGNIFICANT CHANGE UP (ref 11.5–15.5)
LYMPHOCYTES # BLD AUTO: 1.2 K/UL — SIGNIFICANT CHANGE UP (ref 1–3.3)
LYMPHOCYTES # BLD AUTO: 24 % — SIGNIFICANT CHANGE UP (ref 13–44)
MCHC RBC-ENTMCNC: 31.3 PG — SIGNIFICANT CHANGE UP (ref 27–34)
MCHC RBC-ENTMCNC: 32.7 G/DL — SIGNIFICANT CHANGE UP (ref 32–36)
MCV RBC AUTO: 95.6 FL — SIGNIFICANT CHANGE UP (ref 80–100)
MONOCYTES # BLD AUTO: 0.2 K/UL — SIGNIFICANT CHANGE UP (ref 0–0.9)
MONOCYTES NFR BLD AUTO: 3.3 % — SIGNIFICANT CHANGE UP (ref 2–14)
NEUTROPHILS # BLD AUTO: 3.6 K/UL — SIGNIFICANT CHANGE UP (ref 1.8–7.4)
NEUTROPHILS NFR BLD AUTO: 70 % — SIGNIFICANT CHANGE UP (ref 43–77)
PLATELET # BLD AUTO: 315 K/UL — SIGNIFICANT CHANGE UP (ref 150–400)
RBC # BLD: 4.26 M/UL — SIGNIFICANT CHANGE UP (ref 3.8–5.2)
RBC # FLD: 12.4 % — SIGNIFICANT CHANGE UP (ref 10.3–14.5)
WBC # BLD: 5.2 K/UL — SIGNIFICANT CHANGE UP (ref 3.8–10.5)
WBC # FLD AUTO: 5.2 K/UL — SIGNIFICANT CHANGE UP (ref 3.8–10.5)

## 2021-05-14 PROCEDURE — 77014: CPT | Mod: 26

## 2021-05-17 ENCOUNTER — RESULT REVIEW (OUTPATIENT)
Age: 66
End: 2021-05-17

## 2021-05-17 ENCOUNTER — APPOINTMENT (OUTPATIENT)
Dept: HEMATOLOGY ONCOLOGY | Facility: CLINIC | Age: 66
End: 2021-05-17
Payer: MEDICARE

## 2021-05-17 ENCOUNTER — LABORATORY RESULT (OUTPATIENT)
Age: 66
End: 2021-05-17

## 2021-05-17 ENCOUNTER — APPOINTMENT (OUTPATIENT)
Age: 66
End: 2021-05-17

## 2021-05-17 VITALS
SYSTOLIC BLOOD PRESSURE: 126 MMHG | RESPIRATION RATE: 16 BRPM | TEMPERATURE: 98.4 F | HEART RATE: 73 BPM | HEIGHT: 66 IN | OXYGEN SATURATION: 98 % | DIASTOLIC BLOOD PRESSURE: 78 MMHG | BODY MASS INDEX: 26.2 KG/M2 | WEIGHT: 163 LBS

## 2021-05-17 VITALS
SYSTOLIC BLOOD PRESSURE: 118 MMHG | HEIGHT: 66 IN | WEIGHT: 162 LBS | DIASTOLIC BLOOD PRESSURE: 75 MMHG | BODY MASS INDEX: 26.03 KG/M2 | HEART RATE: 67 BPM | OXYGEN SATURATION: 96 %

## 2021-05-17 LAB
BASOPHILS # BLD AUTO: 0 K/UL — SIGNIFICANT CHANGE UP (ref 0–0.2)
BASOPHILS NFR BLD AUTO: 1.1 % — SIGNIFICANT CHANGE UP (ref 0–2)
EOSINOPHIL # BLD AUTO: 0.1 K/UL — SIGNIFICANT CHANGE UP (ref 0–0.5)
EOSINOPHIL NFR BLD AUTO: 1.9 % — SIGNIFICANT CHANGE UP (ref 0–6)
HCT VFR BLD CALC: 38 % — SIGNIFICANT CHANGE UP (ref 34.5–45)
HGB BLD-MCNC: 12.6 G/DL — SIGNIFICANT CHANGE UP (ref 11.5–15.5)
LYMPHOCYTES # BLD AUTO: 0.9 K/UL — LOW (ref 1–3.3)
LYMPHOCYTES # BLD AUTO: 22.9 % — SIGNIFICANT CHANGE UP (ref 13–44)
MCHC RBC-ENTMCNC: 31.2 PG — SIGNIFICANT CHANGE UP (ref 27–34)
MCHC RBC-ENTMCNC: 33.2 G/DL — SIGNIFICANT CHANGE UP (ref 32–36)
MCV RBC AUTO: 93.9 FL — SIGNIFICANT CHANGE UP (ref 80–100)
MONOCYTES # BLD AUTO: 0.3 K/UL — SIGNIFICANT CHANGE UP (ref 0–0.9)
MONOCYTES NFR BLD AUTO: 8.3 % — SIGNIFICANT CHANGE UP (ref 2–14)
NEUTROPHILS # BLD AUTO: 2.5 K/UL — SIGNIFICANT CHANGE UP (ref 1.8–7.4)
NEUTROPHILS NFR BLD AUTO: 65.9 % — SIGNIFICANT CHANGE UP (ref 43–77)
PLATELET # BLD AUTO: 291 K/UL — SIGNIFICANT CHANGE UP (ref 150–400)
RBC # BLD: 4.04 M/UL — SIGNIFICANT CHANGE UP (ref 3.8–5.2)
RBC # FLD: 12.1 % — SIGNIFICANT CHANGE UP (ref 10.3–14.5)
WBC # BLD: 3.8 K/UL — SIGNIFICANT CHANGE UP (ref 3.8–10.5)
WBC # FLD AUTO: 3.8 K/UL — SIGNIFICANT CHANGE UP (ref 3.8–10.5)

## 2021-05-17 PROCEDURE — 99214 OFFICE O/P EST MOD 30 MIN: CPT

## 2021-05-17 PROCEDURE — 77427 RADIATION TX MANAGEMENT X5: CPT

## 2021-05-17 PROCEDURE — 77387B: CUSTOM | Mod: 26

## 2021-05-17 NOTE — REASON FOR VISIT
[Routine On-Treatment] : a routine on-treatment visit for [Lung Cancer] : lung cancer [Other: ______] : provided by HELIO [FreeTextEntry1] : [id number] [FreeTextEntry2] : Roya [FreeTextEntry3] : S [TWNoteComboBox1] : [place]

## 2021-05-17 NOTE — DISEASE MANAGEMENT
[Clinical] : TNM Stage: c [IIIA] : IIIA [FreeTextEntry4] : adenocarcinoma [TTNM] : 2b [NTNM] : 2 [MTNM] : 0 [de-identified] : 1200 cGy [de-identified] : 6000 cGy [de-identified] : left upper lobe and mediastinum

## 2021-05-18 PROCEDURE — 77387B: CUSTOM | Mod: 26

## 2021-05-19 PROCEDURE — 77387B: CUSTOM | Mod: 26

## 2021-05-20 PROCEDURE — 77387B: CUSTOM | Mod: 26

## 2021-05-20 NOTE — PHYSICAL EXAM
[Normal] : RRR, normal S1S2, no murmurs, rubs, gallops [de-identified] : RUBINA [de-identified] : 2 da silva noted top of buttucks, no surroinding erythema, non tender

## 2021-05-20 NOTE — ASSESSMENT
[FreeTextEntry1] : SHANON BEE is a never smoker Female who was 64 yo at the time of diagnosis. Patient has no significant past medical history. 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 and was treated with 6 months of INH \par \par MRI Brain : negative \par  CT Chest/ Abd/ Pelvis 12/29/20: with stable Lt UPPER LOBE primary lesion, ( 4.7 x 2.3cm), 1.2 cm left lung apex lesion along with Rt upper lobe stable lesion of 1.3 cm  \par \par - Discussed at tumor board on 1/13/21, \par - and s/p IR biopsy of Rt upper lobe lesion  on 1/26/21 c/w reactive cells negative for malignancy \par - PDL1 20% \par - Foundation one : ALK ELM 4 fusion variant ,\par MS stable TMB 4 mut/ Mb, SF 3B1 \par Discussed the Alk fusion mutation and targeted options however this is only approved in the metastatic setting\par \par Plan for Resection per DR jeffery, however EBUS prior to surgery  WITH n2 disease. Patient was represented at Tumor board on 4/14/21 and a plan for definitive CRT \par \par - PLAN for  chemoRT with Carbo AUC 2 and taxol 50 mg/ m2 weekly with RT followed by Immunotherapy with Durvalumab for 1 year \par - Will monitor counts/ and for immune mediated side effects \par - Patient is a Jehovah WItness \par  She had a h/o Diverticulosis monitor with IO \par - Patient to receive week # 2 today of chemoradiation\par - Patient advised to continue antinausea medication\par - MR Head recently approved with ins, will need to be scheduled\par - Patient informed to apply warm compress to gluteal lesions. Keep area clean and dry. Advised to call office if symptoms worsen \par \par \par \par \par SON: JUAN RAMON [phone]\par

## 2021-05-20 NOTE — REASON FOR VISIT
[Follow-Up Visit] : a follow-up [Family Member] : family member [FreeTextEntry2] : Adenocarcinoma lUng

## 2021-05-20 NOTE — HISTORY OF PRESENT ILLNESS
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 Recent imaging has shown growth and development of a solid structure. The etiology remains unclear, but malignancy has been considered. She feels well has no complaints. She denies fever, chills, night sweats, weight loss, or weight gain. \par \par Followed by Dr Galeana in the past\par PPD+ in 2000 and received 6 months of INH\par PET positive RENAN posterior cavitary density in 2010\par TTNA and Bronch non-diagnostic. Cultures negative for fungus and TB\par CT at [facility] in 2010 and 2012 stable - felt to be benign. Lost to FU\par \par Patient had a PEt scan in Oct 2020 with a Left upper lobe spiculated lung mass 4.5 x 3cm with an SUV 11.1 with adjacent tree in bud opacity \par In addition Rt upper lobe opacity 1.2 x1 cm which is not FDG avid \par Discrete Left hilar/ infrahilar region  lesion with an SUV 5.2 which is possibly corresponding to a LN \par \par \par Patient s/p Left lung mass, core biopsy with pathology c/w Adenocarcinoma c/w pulmonary origin (see comment)\par CK7, TTF-1 : Positive CK20, PAX-8 : Negative\par  [de-identified] : S/p Biopsy of rt upper lobe lung lesion on 1/26/21 c/w reactive bronchial cells and negative for tumor \par CT CAP/ done on 12/29/20 Stable \par Brain MRi: no metastatic disease\par On 3.24.21 a EBUS was performed for further diagnosis. This showed N2 minor disease\par \par Patient here for f/u. \par Started CRT on 5/10/21\par She informs me today that she is a Jehovas WItness\par Patient will receive week # 2 of concurrent chemoradiation today\par \par Patient admits nausea and taste change but is able to eat and drink without difficulty\par Patient also c/o "pimple" on gluteus\par Denies fever/chills, rash, vomiting, diarrhea,constipation,  abdominal pain, bleeding, easy bruising or visual changes, chest pain, cough, SOB or TOLBERT,  neuropathy, LE edema.\par \par \par

## 2021-05-21 ENCOUNTER — APPOINTMENT (OUTPATIENT)
Dept: HEMATOLOGY ONCOLOGY | Facility: CLINIC | Age: 66
End: 2021-05-21

## 2021-05-21 PROCEDURE — 77014: CPT | Mod: 26

## 2021-05-24 ENCOUNTER — APPOINTMENT (OUTPATIENT)
Dept: HEMATOLOGY ONCOLOGY | Facility: CLINIC | Age: 66
End: 2021-05-24

## 2021-05-24 ENCOUNTER — NON-APPOINTMENT (OUTPATIENT)
Age: 66
End: 2021-05-24

## 2021-05-24 ENCOUNTER — RESULT REVIEW (OUTPATIENT)
Age: 66
End: 2021-05-24

## 2021-05-24 ENCOUNTER — LABORATORY RESULT (OUTPATIENT)
Age: 66
End: 2021-05-24

## 2021-05-24 ENCOUNTER — APPOINTMENT (OUTPATIENT)
Age: 66
End: 2021-05-24

## 2021-05-24 LAB
BASOPHILS # BLD AUTO: 0.1 K/UL — SIGNIFICANT CHANGE UP (ref 0–0.2)
BASOPHILS NFR BLD AUTO: 1.5 % — SIGNIFICANT CHANGE UP (ref 0–2)
EOSINOPHIL # BLD AUTO: 0.1 K/UL — SIGNIFICANT CHANGE UP (ref 0–0.5)
EOSINOPHIL NFR BLD AUTO: 1.9 % — SIGNIFICANT CHANGE UP (ref 0–6)
HCT VFR BLD CALC: 37.8 % — SIGNIFICANT CHANGE UP (ref 34.5–45)
HGB BLD-MCNC: 12.4 G/DL — SIGNIFICANT CHANGE UP (ref 11.5–15.5)
LYMPHOCYTES # BLD AUTO: 0.8 K/UL — LOW (ref 1–3.3)
LYMPHOCYTES # BLD AUTO: 21 % — SIGNIFICANT CHANGE UP (ref 13–44)
MCHC RBC-ENTMCNC: 30.8 PG — SIGNIFICANT CHANGE UP (ref 27–34)
MCHC RBC-ENTMCNC: 32.7 G/DL — SIGNIFICANT CHANGE UP (ref 32–36)
MCV RBC AUTO: 94.1 FL — SIGNIFICANT CHANGE UP (ref 80–100)
MONOCYTES # BLD AUTO: 0.3 K/UL — SIGNIFICANT CHANGE UP (ref 0–0.9)
MONOCYTES NFR BLD AUTO: 7.5 % — SIGNIFICANT CHANGE UP (ref 2–14)
NEUTROPHILS # BLD AUTO: 2.6 K/UL — SIGNIFICANT CHANGE UP (ref 1.8–7.4)
NEUTROPHILS NFR BLD AUTO: 68.1 % — SIGNIFICANT CHANGE UP (ref 43–77)
PLATELET # BLD AUTO: 210 K/UL — SIGNIFICANT CHANGE UP (ref 150–400)
RBC # BLD: 4.02 M/UL — SIGNIFICANT CHANGE UP (ref 3.8–5.2)
RBC # FLD: 12.2 % — SIGNIFICANT CHANGE UP (ref 10.3–14.5)
WBC # BLD: 3.8 K/UL — SIGNIFICANT CHANGE UP (ref 3.8–10.5)
WBC # FLD AUTO: 3.8 K/UL — SIGNIFICANT CHANGE UP (ref 3.8–10.5)

## 2021-05-24 PROCEDURE — 77427 RADIATION TX MANAGEMENT X5: CPT

## 2021-05-24 PROCEDURE — 77387B: CUSTOM | Mod: 26

## 2021-05-24 NOTE — DISEASE MANAGEMENT
[Clinical] : TNM Stage: c [IIIA] : IIIA [FreeTextEntry4] : adenocarcinoma [TTNM] : 2b [NTNM] : 2 [MTNM] : 0 [de-identified] : 2200 cGy [de-identified] : 6000 cGy [de-identified] : left upper lobe and mediastinum

## 2021-05-25 PROCEDURE — 77387B: CUSTOM | Mod: 26

## 2021-05-26 PROCEDURE — 77387B: CUSTOM | Mod: 26

## 2021-05-27 PROCEDURE — 77387B: CUSTOM | Mod: 26

## 2021-05-28 ENCOUNTER — APPOINTMENT (OUTPATIENT)
Dept: MRI IMAGING | Facility: CLINIC | Age: 66
End: 2021-05-28

## 2021-05-28 PROCEDURE — 77014: CPT | Mod: 26

## 2021-06-01 ENCOUNTER — RESULT REVIEW (OUTPATIENT)
Age: 66
End: 2021-06-01

## 2021-06-01 ENCOUNTER — LABORATORY RESULT (OUTPATIENT)
Age: 66
End: 2021-06-01

## 2021-06-01 ENCOUNTER — APPOINTMENT (OUTPATIENT)
Dept: HEMATOLOGY ONCOLOGY | Facility: CLINIC | Age: 66
End: 2021-06-01

## 2021-06-01 ENCOUNTER — APPOINTMENT (OUTPATIENT)
Age: 66
End: 2021-06-01

## 2021-06-01 LAB
BASOPHILS # BLD AUTO: 0.1 K/UL — SIGNIFICANT CHANGE UP (ref 0–0.2)
BASOPHILS NFR BLD AUTO: 1.3 % — SIGNIFICANT CHANGE UP (ref 0–2)
EOSINOPHIL # BLD AUTO: 0.1 K/UL — SIGNIFICANT CHANGE UP (ref 0–0.5)
EOSINOPHIL NFR BLD AUTO: 1.3 % — SIGNIFICANT CHANGE UP (ref 0–6)
HCT VFR BLD CALC: 35.8 % — SIGNIFICANT CHANGE UP (ref 34.5–45)
HGB BLD-MCNC: 11.9 G/DL — SIGNIFICANT CHANGE UP (ref 11.5–15.5)
LYMPHOCYTES # BLD AUTO: 0.6 K/UL — LOW (ref 1–3.3)
LYMPHOCYTES # BLD AUTO: 15.2 % — SIGNIFICANT CHANGE UP (ref 13–44)
MCHC RBC-ENTMCNC: 31.3 PG — SIGNIFICANT CHANGE UP (ref 27–34)
MCHC RBC-ENTMCNC: 33.2 G/DL — SIGNIFICANT CHANGE UP (ref 32–36)
MCV RBC AUTO: 94.2 FL — SIGNIFICANT CHANGE UP (ref 80–100)
MONOCYTES # BLD AUTO: 0.4 K/UL — SIGNIFICANT CHANGE UP (ref 0–0.9)
MONOCYTES NFR BLD AUTO: 10.4 % — SIGNIFICANT CHANGE UP (ref 2–14)
NEUTROPHILS # BLD AUTO: 2.7 K/UL — SIGNIFICANT CHANGE UP (ref 1.8–7.4)
NEUTROPHILS NFR BLD AUTO: 71.7 % — SIGNIFICANT CHANGE UP (ref 43–77)
PLATELET # BLD AUTO: 240 K/UL — SIGNIFICANT CHANGE UP (ref 150–400)
RBC # BLD: 3.8 M/UL — SIGNIFICANT CHANGE UP (ref 3.8–5.2)
RBC # FLD: 12.7 % — SIGNIFICANT CHANGE UP (ref 10.3–14.5)
WBC # BLD: 3.8 K/UL — SIGNIFICANT CHANGE UP (ref 3.8–10.5)
WBC # FLD AUTO: 3.8 K/UL — SIGNIFICANT CHANGE UP (ref 3.8–10.5)

## 2021-06-01 PROCEDURE — 77387B: CUSTOM | Mod: 26

## 2021-06-02 ENCOUNTER — NON-APPOINTMENT (OUTPATIENT)
Age: 66
End: 2021-06-02

## 2021-06-02 VITALS
HEART RATE: 92 BPM | OXYGEN SATURATION: 99 % | RESPIRATION RATE: 16 BRPM | BODY MASS INDEX: 26.54 KG/M2 | SYSTOLIC BLOOD PRESSURE: 127 MMHG | WEIGHT: 164.4 LBS | DIASTOLIC BLOOD PRESSURE: 75 MMHG

## 2021-06-02 PROCEDURE — 77387B: CUSTOM | Mod: 26

## 2021-06-02 PROCEDURE — 77427 RADIATION TX MANAGEMENT X5: CPT

## 2021-06-02 NOTE — DISEASE MANAGEMENT
[Clinical] : TNM Stage: c [IIIA] : IIIA [FreeTextEntry4] : adenocarcinoma [TTNM] : 2b [NTNM] : 2 [MTNM] : 0 [de-identified] : 6000 cGy [de-identified] : 2200 cGy [de-identified] : left upper lobe and mediastinum

## 2021-06-03 DIAGNOSIS — S83.281A OTHER TEAR OF LATERAL MENISCUS, CURRENT INJURY, RIGHT KNEE, INITIAL ENCOUNTER: ICD-10-CM

## 2021-06-03 PROCEDURE — 77387B: CUSTOM | Mod: 26

## 2021-06-04 ENCOUNTER — APPOINTMENT (OUTPATIENT)
Dept: HEMATOLOGY ONCOLOGY | Facility: CLINIC | Age: 66
End: 2021-06-04

## 2021-06-04 ENCOUNTER — RESULT REVIEW (OUTPATIENT)
Age: 66
End: 2021-06-04

## 2021-06-04 LAB
BASOPHILS # BLD AUTO: 0 K/UL — SIGNIFICANT CHANGE UP (ref 0–0.2)
BASOPHILS NFR BLD AUTO: 1.1 % — SIGNIFICANT CHANGE UP (ref 0–2)
EOSINOPHIL # BLD AUTO: 0 K/UL — SIGNIFICANT CHANGE UP (ref 0–0.5)
EOSINOPHIL NFR BLD AUTO: 0.8 % — SIGNIFICANT CHANGE UP (ref 0–6)
HCT VFR BLD CALC: 36.2 % — SIGNIFICANT CHANGE UP (ref 34.5–45)
HGB BLD-MCNC: 11.7 G/DL — SIGNIFICANT CHANGE UP (ref 11.5–15.5)
LYMPHOCYTES # BLD AUTO: 0.4 K/UL — LOW (ref 1–3.3)
LYMPHOCYTES # BLD AUTO: 11.2 % — LOW (ref 13–44)
MCHC RBC-ENTMCNC: 30.8 PG — SIGNIFICANT CHANGE UP (ref 27–34)
MCHC RBC-ENTMCNC: 32.4 G/DL — SIGNIFICANT CHANGE UP (ref 32–36)
MCV RBC AUTO: 95.1 FL — SIGNIFICANT CHANGE UP (ref 80–100)
MONOCYTES # BLD AUTO: 0.2 K/UL — SIGNIFICANT CHANGE UP (ref 0–0.9)
MONOCYTES NFR BLD AUTO: 4.4 % — SIGNIFICANT CHANGE UP (ref 2–14)
NEUTROPHILS # BLD AUTO: 3.1 K/UL — SIGNIFICANT CHANGE UP (ref 1.8–7.4)
NEUTROPHILS NFR BLD AUTO: 82.5 % — HIGH (ref 43–77)
PLATELET # BLD AUTO: 243 K/UL — SIGNIFICANT CHANGE UP (ref 150–400)
RBC # BLD: 3.81 M/UL — SIGNIFICANT CHANGE UP (ref 3.8–5.2)
RBC # FLD: 13.4 % — SIGNIFICANT CHANGE UP (ref 10.3–14.5)
WBC # BLD: 3.7 K/UL — LOW (ref 3.8–10.5)
WBC # FLD AUTO: 3.7 K/UL — LOW (ref 3.8–10.5)

## 2021-06-04 PROCEDURE — 77014: CPT | Mod: 26

## 2021-06-06 ENCOUNTER — OUTPATIENT (OUTPATIENT)
Dept: OUTPATIENT SERVICES | Facility: HOSPITAL | Age: 66
LOS: 1 days | Discharge: HOME | End: 2021-06-06

## 2021-06-06 DIAGNOSIS — Z90.49 ACQUIRED ABSENCE OF OTHER SPECIFIED PARTS OF DIGESTIVE TRACT: Chronic | ICD-10-CM

## 2021-06-06 DIAGNOSIS — Z98.82 BREAST IMPLANT STATUS: Chronic | ICD-10-CM

## 2021-06-06 DIAGNOSIS — Z98.891 HISTORY OF UTERINE SCAR FROM PREVIOUS SURGERY: Chronic | ICD-10-CM

## 2021-06-06 DIAGNOSIS — C34.90 MALIGNANT NEOPLASM OF UNSPECIFIED PART OF UNSPECIFIED BRONCHUS OR LUNG: ICD-10-CM

## 2021-06-06 DIAGNOSIS — Z98.890 OTHER SPECIFIED POSTPROCEDURAL STATES: Chronic | ICD-10-CM

## 2021-06-07 ENCOUNTER — LABORATORY RESULT (OUTPATIENT)
Age: 66
End: 2021-06-07

## 2021-06-07 ENCOUNTER — APPOINTMENT (OUTPATIENT)
Age: 66
End: 2021-06-07

## 2021-06-07 ENCOUNTER — NON-APPOINTMENT (OUTPATIENT)
Age: 66
End: 2021-06-07

## 2021-06-07 PROCEDURE — 77387B: CUSTOM | Mod: 26

## 2021-06-07 NOTE — DISEASE MANAGEMENT
[Clinical] : TNM Stage: c [FreeTextEntry4] : adenocarcinoma [TTNM] : 2b [NTNM] : 2 [MTNM] : 0 [IIIA] : IIIA [de-identified] : 3800 cGy [de-identified] : left upper lobe and mediastinum [de-identified] : 6000 cGy

## 2021-06-08 DIAGNOSIS — R11.2 NAUSEA WITH VOMITING, UNSPECIFIED: ICD-10-CM

## 2021-06-08 DIAGNOSIS — Z51.11 ENCOUNTER FOR ANTINEOPLASTIC CHEMOTHERAPY: ICD-10-CM

## 2021-06-08 PROCEDURE — 77387B: CUSTOM | Mod: 26

## 2021-06-09 ENCOUNTER — APPOINTMENT (OUTPATIENT)
Dept: HEMATOLOGY ONCOLOGY | Facility: CLINIC | Age: 66
End: 2021-06-09
Payer: MEDICARE

## 2021-06-09 VITALS
SYSTOLIC BLOOD PRESSURE: 98 MMHG | BODY MASS INDEX: 27.01 KG/M2 | HEIGHT: 66 IN | HEART RATE: 70 BPM | WEIGHT: 168.05 LBS | DIASTOLIC BLOOD PRESSURE: 66 MMHG | OXYGEN SATURATION: 96 %

## 2021-06-09 PROCEDURE — 99215 OFFICE O/P EST HI 40 MIN: CPT

## 2021-06-09 PROCEDURE — 77427 RADIATION TX MANAGEMENT X5: CPT

## 2021-06-09 PROCEDURE — 77387B: CUSTOM | Mod: 26

## 2021-06-09 NOTE — HISTORY OF PRESENT ILLNESS
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 Recent imaging has shown growth and development of a solid structure. The etiology remains unclear, but malignancy has been considered. She feels well has no complaints. She denies fever, chills, night sweats, weight loss, or weight gain. \par \par Followed by Dr Galeana in the past\par PPD+ in 2000 and received 6 months of INH\par PET positive RENAN posterior cavitary density in 2010\par TTNA and Bronch non-diagnostic. Cultures negative for fungus and TB\par CT at [facility] in 2010 and 2012 stable - felt to be benign. Lost to FU\par \par Patient had a PEt scan in Oct 2020 with a Left upper lobe spiculated lung mass 4.5 x 3cm with an SUV 11.1 with adjacent tree in bud opacity \par In addition Rt upper lobe opacity 1.2 x1 cm which is not FDG avid \par Discrete Left hilar/ infrahilar region  lesion with an SUV 5.2 which is possibly corresponding to a LN \par \par \par Patient s/p Left lung mass, core biopsy with pathology c/w Adenocarcinoma c/w pulmonary origin (see comment)\par CK7, TTF-1 : Positive CK20, PAX-8 : Negative\par  [de-identified] : [place] INterpretor: \par \par Patient here for follow up with daughter \par She is on concurrent CRT with carbo/ taxol S/p C5 chemo on 6/7/21 \par Tentative end date for RT is on 6/21/21 \par Will schedule last cycle of chemo on 6/21/21 \par \par \par Patient here for f/u. \par Started CRT on 5/10/21\par She informs me today that she is a Jehovas WItness\par Patient will receive week # 2 of concurrent chemoradiation today\par \par Patient admits nausea and taste change but is able to eat and drink without difficulty\par Patient also c/o "pimple" on gluteus\par Denies fever/chills, rash, vomiting, diarrhea,constipation,  abdominal pain, bleeding, easy bruising or visual changes, chest pain, cough, SOB or TOLBERT,  neuropathy, LE edema.\par \par \par

## 2021-06-09 NOTE — RESULTS/DATA
[FreeTextEntry1] : \par S/p Biopsy of rt upper lobe lung lesion on 1/26/21 c/w reactive bronchial cells and negative for tumor \par CT CAP/ done on 12/29/20 Stable \par Brain MRi: no metastatic disease\par On 3.24.21 a EBUS was performed for further diagnosis. This showed N2 minor disease\par \par 12/29/20: CT Chest/ Abd/ Pelvis with stable Lt UPPER LOBE primary leison, ( 4.7 x 2.3cm), 1.2 cm left lung apex lesion along with Rt upper lobe stable lesion of 1.3 cm  \par 12/29/20:  MRI Brain: no evidence of metastatic lesion: \par \par 11/23/20\par Left lung mass, core biopsy:\par -Adenocarcinoma c/w pulmonary origin (see comment)\par CK7, TTF-1 : Positive\par CK20, PAX-8 : Negative\par \par PET SCAN 10/12/20\par  Discrete FDG avid focus in the left hilar/infrahilar region, SUV 5.2 (image 77), may correspond to a small lymph node not delineated in the noncontrast CT portion of this exam. Physiologic FDG activity in the myocardium and blood pool.\par \par LUNGS: FDG-avid spiculated left upper lobe lung mass, 4.5 x 3.0 cm (image 78), SUV 11.1. Adjacent tree-in-bud opacities, as noted on diagnostic CT, impacted airways versus endobronchial disease. Subcentimeter ill-defined left apical nodular opacity, 0.5 cm, too small to characterize. Linear atelectasis in the lingula.\par \par Right upper lobe nodular opacity, 1.2 x 1.0 cm (image 69), is not FDG avid.\par \par IMP: 1. FDG avid spiculated left upper lobe lung mass, suspicious for malignancy. Subcentimeter left apical opacity, too small to characterize.\par \par 2. Discrete FDG avid left hilar/infrahilar focus, probably a small lymph node.\par \par 3. Non-FDG avid right upper lobe nodule.\par \par 3/7/2018: PET \par Multilobulated mass like lesion in the left upper lobe in the location of the previous 2.3x2.1 cm thick wall cavitary mass noted on  5/29/12 \par

## 2021-06-09 NOTE — PHYSICAL EXAM
[Normal] : affect appropriate [de-identified] : RUBINA [de-identified] : 2 da silva noted top of buttucks, no surroinding erythema, non tender

## 2021-06-10 PROCEDURE — 77387B: CUSTOM | Mod: 26

## 2021-06-11 ENCOUNTER — APPOINTMENT (OUTPATIENT)
Dept: HEMATOLOGY ONCOLOGY | Facility: CLINIC | Age: 66
End: 2021-06-11

## 2021-06-11 PROCEDURE — 77014: CPT | Mod: 26

## 2021-06-14 ENCOUNTER — LABORATORY RESULT (OUTPATIENT)
Age: 66
End: 2021-06-14

## 2021-06-14 ENCOUNTER — RESULT REVIEW (OUTPATIENT)
Age: 66
End: 2021-06-14

## 2021-06-14 ENCOUNTER — NON-APPOINTMENT (OUTPATIENT)
Age: 66
End: 2021-06-14

## 2021-06-14 ENCOUNTER — APPOINTMENT (OUTPATIENT)
Age: 66
End: 2021-06-14

## 2021-06-14 VITALS
OXYGEN SATURATION: 96 % | DIASTOLIC BLOOD PRESSURE: 78 MMHG | HEART RATE: 74 BPM | WEIGHT: 166.8 LBS | BODY MASS INDEX: 26.92 KG/M2 | SYSTOLIC BLOOD PRESSURE: 120 MMHG | RESPIRATION RATE: 16 BRPM

## 2021-06-14 LAB
BASOPHILS # BLD AUTO: 0 K/UL — SIGNIFICANT CHANGE UP (ref 0–0.2)
BASOPHILS NFR BLD AUTO: 1.6 % — SIGNIFICANT CHANGE UP (ref 0–2)
EOSINOPHIL # BLD AUTO: 0 K/UL — SIGNIFICANT CHANGE UP (ref 0–0.5)
EOSINOPHIL NFR BLD AUTO: 1.9 % — SIGNIFICANT CHANGE UP (ref 0–6)
HCT VFR BLD CALC: 34.2 % — LOW (ref 34.5–45)
HGB BLD-MCNC: 11.2 G/DL — LOW (ref 11.5–15.5)
LYMPHOCYTES # BLD AUTO: 0.3 K/UL — LOW (ref 1–3.3)
LYMPHOCYTES # BLD AUTO: 12.7 % — LOW (ref 13–44)
MCHC RBC-ENTMCNC: 30.9 PG — SIGNIFICANT CHANGE UP (ref 27–34)
MCHC RBC-ENTMCNC: 32.7 G/DL — SIGNIFICANT CHANGE UP (ref 32–36)
MCV RBC AUTO: 94.4 FL — SIGNIFICANT CHANGE UP (ref 80–100)
MONOCYTES # BLD AUTO: 0.2 K/UL — SIGNIFICANT CHANGE UP (ref 0–0.9)
MONOCYTES NFR BLD AUTO: 8.7 % — SIGNIFICANT CHANGE UP (ref 2–14)
NEUTROPHILS # BLD AUTO: 1.5 K/UL — LOW (ref 1.8–7.4)
NEUTROPHILS NFR BLD AUTO: 75.1 % — SIGNIFICANT CHANGE UP (ref 43–77)
PLATELET # BLD AUTO: 166 K/UL — SIGNIFICANT CHANGE UP (ref 150–400)
RBC # BLD: 3.62 M/UL — LOW (ref 3.8–5.2)
RBC # FLD: 12.9 % — SIGNIFICANT CHANGE UP (ref 10.3–14.5)
WBC # BLD: 2 K/UL — LOW (ref 3.8–10.5)
WBC # FLD AUTO: 2 K/UL — LOW (ref 3.8–10.5)

## 2021-06-14 PROCEDURE — 77387B: CUSTOM | Mod: 26

## 2021-06-14 NOTE — VITALS
[Maximal Pain Intensity: 3/10] : 3/10 [Least Pain Intensity: 0/10] : 0/10 [Pain Description/Quality: ___] : Pain description/quality: [unfilled] [Pain Duration: ___] : Pain duration: [unfilled] [Pain Location: ___] : Pain Location: [unfilled] [Pain Interferes with ADLs] : Pain interferes with activities of daily living. [Adjuvant (neuroleptics)] : adjuvant (neuroleptics) [80: Normal activity with effort; some signs or symptoms of disease.] : 80: Normal activity with effort; some signs or symptoms of disease.  [ECOG Performance Status: 2 - Ambulatory and capable of all self care but unable to carry out any work activities] : Performance Status: 2 - Ambulatory and capable of all self care but unable to carry out any work activities. Up and about more than 50% of waking hours

## 2021-06-14 NOTE — DISEASE MANAGEMENT
[Clinical] : TNM Stage: c [FreeTextEntry4] : adenocarcinoma [TTNM] : 2b [NTNM] : 2 [MTNM] : 0 [IIIA] : IIIA [de-identified] : 5000 cGy [de-identified] : 6000 cGy [de-identified] : left upper lobe and mediastinum

## 2021-06-15 PROCEDURE — 77387B: CUSTOM | Mod: 26

## 2021-06-16 PROCEDURE — 77427 RADIATION TX MANAGEMENT X5: CPT

## 2021-06-16 PROCEDURE — 77387B: CUSTOM | Mod: 26

## 2021-06-17 PROCEDURE — 77387B: CUSTOM | Mod: 26

## 2021-06-18 ENCOUNTER — APPOINTMENT (OUTPATIENT)
Dept: HEMATOLOGY ONCOLOGY | Facility: CLINIC | Age: 66
End: 2021-06-18

## 2021-06-18 ENCOUNTER — APPOINTMENT (OUTPATIENT)
Dept: MRI IMAGING | Facility: CLINIC | Age: 66
End: 2021-06-18

## 2021-06-18 PROCEDURE — 77014: CPT | Mod: 26

## 2021-06-21 ENCOUNTER — RESULT REVIEW (OUTPATIENT)
Age: 66
End: 2021-06-21

## 2021-06-21 ENCOUNTER — APPOINTMENT (OUTPATIENT)
Age: 66
End: 2021-06-21

## 2021-06-21 ENCOUNTER — LABORATORY RESULT (OUTPATIENT)
Age: 66
End: 2021-06-21

## 2021-06-21 ENCOUNTER — NON-APPOINTMENT (OUTPATIENT)
Age: 66
End: 2021-06-21

## 2021-06-21 LAB
BASOPHILS # BLD AUTO: 0 K/UL — SIGNIFICANT CHANGE UP (ref 0–0.2)
BASOPHILS NFR BLD AUTO: 2.3 % — HIGH (ref 0–2)
EOSINOPHIL # BLD AUTO: 0 K/UL — SIGNIFICANT CHANGE UP (ref 0–0.5)
EOSINOPHIL NFR BLD AUTO: 2.8 % — SIGNIFICANT CHANGE UP (ref 0–6)
HCT VFR BLD CALC: 30.8 % — LOW (ref 34.5–45)
HGB BLD-MCNC: 10.8 G/DL — LOW (ref 11.5–15.5)
LYMPHOCYTES # BLD AUTO: 0.3 K/UL — LOW (ref 1–3.3)
LYMPHOCYTES # BLD AUTO: 17.6 % — SIGNIFICANT CHANGE UP (ref 13–44)
MCHC RBC-ENTMCNC: 32.3 PG — SIGNIFICANT CHANGE UP (ref 27–34)
MCHC RBC-ENTMCNC: 35.2 G/DL — SIGNIFICANT CHANGE UP (ref 32–36)
MCV RBC AUTO: 91.8 FL — SIGNIFICANT CHANGE UP (ref 80–100)
MONOCYTES # BLD AUTO: 0.2 K/UL — SIGNIFICANT CHANGE UP (ref 0–0.9)
MONOCYTES NFR BLD AUTO: 12 % — SIGNIFICANT CHANGE UP (ref 2–14)
NEUTROPHILS # BLD AUTO: 1 K/UL — LOW (ref 1.8–7.4)
NEUTROPHILS NFR BLD AUTO: 65.2 % — SIGNIFICANT CHANGE UP (ref 43–77)
PLATELET # BLD AUTO: 204 K/UL — SIGNIFICANT CHANGE UP (ref 150–400)
RBC # BLD: 3.36 M/UL — LOW (ref 3.8–5.2)
RBC # FLD: 13.6 % — SIGNIFICANT CHANGE UP (ref 10.3–14.5)
WBC # BLD: 1.6 K/UL — LOW (ref 3.8–10.5)
WBC # FLD AUTO: 1.6 K/UL — LOW (ref 3.8–10.5)

## 2021-06-21 PROCEDURE — 77387B: CUSTOM | Mod: 26

## 2021-06-21 PROCEDURE — 77427 RADIATION TX MANAGEMENT X5: CPT

## 2021-06-21 NOTE — DISEASE MANAGEMENT
[Clinical] : TNM Stage: c [FreeTextEntry4] : adenocarcinoma [TTNM] : 2b [NTNM] : 2 [MTNM] : 0 [IIIA] : IIIA [de-identified] : 6000 cGy [de-identified] : 6000 cGy [de-identified] : left upper lobe and mediastinum

## 2021-06-29 ENCOUNTER — APPOINTMENT (OUTPATIENT)
Dept: NEUROLOGY | Facility: CLINIC | Age: 66
End: 2021-06-29
Payer: MEDICARE

## 2021-06-29 ENCOUNTER — APPOINTMENT (OUTPATIENT)
Dept: HEMATOLOGY ONCOLOGY | Facility: CLINIC | Age: 66
End: 2021-06-29
Payer: MEDICARE

## 2021-06-29 ENCOUNTER — RESULT REVIEW (OUTPATIENT)
Age: 66
End: 2021-06-29

## 2021-06-29 VITALS
OXYGEN SATURATION: 95 % | HEIGHT: 66 IN | HEART RATE: 84 BPM | BODY MASS INDEX: 27.05 KG/M2 | WEIGHT: 168.31 LBS | DIASTOLIC BLOOD PRESSURE: 73 MMHG | SYSTOLIC BLOOD PRESSURE: 116 MMHG

## 2021-06-29 LAB
BASOPHILS # BLD AUTO: 0 K/UL — SIGNIFICANT CHANGE UP (ref 0–0.2)
BASOPHILS NFR BLD AUTO: 2.5 % — HIGH (ref 0–2)
EOSINOPHIL # BLD AUTO: 0 K/UL — SIGNIFICANT CHANGE UP (ref 0–0.5)
EOSINOPHIL NFR BLD AUTO: 0.6 % — SIGNIFICANT CHANGE UP (ref 0–6)
HCT VFR BLD CALC: 30.1 % — LOW (ref 34.5–45)
HGB BLD-MCNC: 10.6 G/DL — LOW (ref 11.5–15.5)
LYMPHOCYTES # BLD AUTO: 0.5 K/UL — LOW (ref 1–3.3)
LYMPHOCYTES # BLD AUTO: 26.7 % — SIGNIFICANT CHANGE UP (ref 13–44)
MCHC RBC-ENTMCNC: 33 PG — SIGNIFICANT CHANGE UP (ref 27–34)
MCHC RBC-ENTMCNC: 35.1 G/DL — SIGNIFICANT CHANGE UP (ref 32–36)
MCV RBC AUTO: 94.1 FL — SIGNIFICANT CHANGE UP (ref 80–100)
MONOCYTES # BLD AUTO: 0.2 K/UL — SIGNIFICANT CHANGE UP (ref 0–0.9)
MONOCYTES NFR BLD AUTO: 11.3 % — SIGNIFICANT CHANGE UP (ref 2–14)
NEUTROPHILS # BLD AUTO: 1 K/UL — LOW (ref 1.8–7.4)
NEUTROPHILS NFR BLD AUTO: 58.9 % — SIGNIFICANT CHANGE UP (ref 43–77)
PLATELET # BLD AUTO: 270 K/UL — SIGNIFICANT CHANGE UP (ref 150–400)
RBC # BLD: 3.2 M/UL — LOW (ref 3.8–5.2)
RBC # FLD: 15 % — HIGH (ref 10.3–14.5)
WBC # BLD: 1.8 K/UL — LOW (ref 3.8–10.5)
WBC # FLD AUTO: 1.8 K/UL — LOW (ref 3.8–10.5)

## 2021-06-29 PROCEDURE — 99214 OFFICE O/P EST MOD 30 MIN: CPT

## 2021-06-29 RX ORDER — DONEPEZIL HYDROCHLORIDE 5 MG/1
5 TABLET ORAL
Qty: 90 | Refills: 1 | Status: DISCONTINUED | COMMUNITY
Start: 2021-04-07 | End: 2021-06-29

## 2021-06-30 NOTE — ASSESSMENT
[FreeTextEntry1] : SHANON BEE is a never smoker Female who was 66 yo at the time of diagnosis. Patient has no significant past medical history. 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 and was treated with 6 months of INH \par \par MRI Brain : negative \par  CT Chest/ Abd/ Pelvis 12/29/20: with stable Lt UPPER LOBE primary lesion, ( 4.7 x 2.3cm), 1.2 cm left lung apex lesion along with Rt upper lobe stable lesion of 1.3 cm  \par \par - Discussed at tumor board on 1/13/21, \par - and s/p IR biopsy of Rt upper lobe lesion  on 1/26/21 c/w reactive cells negative for malignancy \par - PDL1 20% \par - Foundation one : ALK ELM 4 fusion variant ,\par MS stable TMB 4 mut/ Mb, SF 3B1 \par Discussed the Alk fusion mutation and targeted options however this is only approved in the metastatic setting\par \par Plan for Resection per DR jeffery, however EBUS prior to surgery  WITH n2 disease. Patient was represented at Tumor board on 4/14/21 and a plan for definitive CRT \par \par - PLAN for  chemoRT with Carbo AUC 2 and taxol 50 mg/ m2 weekly with RT followed by Immunotherapy with Durvalumab for 1 year \par \par - She completed concurrent CRT with carbo/ taxol on 6/21/21\par - Ordered Tessalon perles to help with cough, call office if worsens\par - Patient is a Jehovah WItness \par - She had a h/o Diverticulosis monitor with IO \par - MRI  ordered prior to starting treatment however not done yet \par - She will require F/u scans CT Chest abd pelvis 4 weeks after completion of CRT ( July 3rd week 2021) \par - f/u  post scans\par \par \par \par \par \par SON: JUAN RAMON [phone]\par

## 2021-06-30 NOTE — HISTORY OF PRESENT ILLNESS
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 Recent imaging has shown growth and development of a solid structure. The etiology remains unclear, but malignancy has been considered. She feels well has no complaints. She denies fever, chills, night sweats, weight loss, or weight gain. \par \par Followed by Dr Galeana in the past\par PPD+ in 2000 and received 6 months of INH\par PET positive RENAN posterior cavitary density in 2010\par TTNA and Bronch non-diagnostic. Cultures negative for fungus and TB\par CT at [facility] in 2010 and 2012 stable - felt to be benign. Lost to FU\par \par Patient had a PEt scan in Oct 2020 with a Left upper lobe spiculated lung mass 4.5 x 3cm with an SUV 11.1 with adjacent tree in bud opacity \par In addition Rt upper lobe opacity 1.2 x1 cm which is not FDG avid \par Discrete Left hilar/ infrahilar region  lesion with an SUV 5.2 which is possibly corresponding to a LN \par \par \par Patient s/p Left lung mass, core biopsy with pathology c/w Adenocarcinoma c/w pulmonary origin (see comment)\par CK7, TTF-1 : Positive CK20, PAX-8 : Negative\par  [de-identified] : [place] INterpretor: \par \par Patient here for follow up with son. Patient  is a Jehov[employer] W[employer]ness\par She completed concurrent CRT with carbo/ taxol on 6/21/21\par \par Patient admits symptoms similar to esophagitis, currently taking Carafate\par Patient admits dry cough since starting RT\par Admits fatigue as well\par Denies fever/chills, rash, vomiting, diarrhea,constipation,  abdominal pain, bleeding, easy bruising or visual changes, chest pain, SOB or TOLBERT,  neuropathy, LE edema.\par \par \par

## 2021-07-01 LAB
ALBUMIN SERPL ELPH-MCNC: 4 G/DL
ALP BLD-CCNC: 100 U/L
ALT SERPL-CCNC: 29 U/L
ANION GAP SERPL CALC-SCNC: 10 MMOL/L
AST SERPL-CCNC: 24 U/L
BILIRUB SERPL-MCNC: 0.2 MG/DL
BUN SERPL-MCNC: 9 MG/DL
CALCIUM SERPL-MCNC: 8.9 MG/DL
CHLORIDE SERPL-SCNC: 105 MMOL/L
CO2 SERPL-SCNC: 25 MMOL/L
CREAT SERPL-MCNC: 0.69 MG/DL
GLUCOSE SERPL-MCNC: 151 MG/DL
POTASSIUM SERPL-SCNC: 4.4 MMOL/L
PROT SERPL-MCNC: 6.2 G/DL
SODIUM SERPL-SCNC: 140 MMOL/L

## 2021-07-01 NOTE — HISTORY OF PRESENT ILLNESS
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 Recent imaging has shown growth and development of a solid structure. The etiology remains unclear, but malignancy has been considered. She feels well has no complaints. She denies fever, chills, night sweats, weight loss, or weight gain. \par \par Followed by Dr Galeana in the past\par PPD+ in 2000 and received 6 months of INH\par PET positive RENAN posterior cavitary density in 2010\par TTNA and Bronch non-diagnostic. Cultures negative for fungus and TB\par CT at [facility] in 2010 and 2012 stable - felt to be benign. Lost to FU\par \par Patient had a PEt scan in Oct 2020 with a Left upper lobe spiculated lung mass 4.5 x 3cm with an SUV 11.1 with adjacent tree in bud opacity \par In addition Rt upper lobe opacity 1.2 x1 cm which is not FDG avid \par Discrete Left hilar/ infrahilar region  lesion with an SUV 5.2 which is possibly corresponding to a LN \par \par \par Patient s/p Left lung mass, core biopsy with pathology c/w Adenocarcinoma c/w pulmonary origin (see comment)\par CK7, TTF-1 : Positive CK20, PAX-8 : Negative\par  [de-identified] : Patient here for f/u. \par Plan to start CRT on 5/10/21\par She informs me today that she is a Jehovas WItness, Hb at 12.7 MOnitor counts \par \par Ct Chest: 4/22/21: Left Upper lobe mass demonstrating minimal interval increase in size since Dec 29 2020\par Plan to Start CRT on 5/10/21\par \par S/p Biopsy of rt upper lobe lung lesion on 1/26/21 c/w reactive bronchial cells and negative for tumor \par CT CAP/ done on 12/29/20 Stable \par Brain MRi: no metastatic disease\par On 3.24.21 a EBUS was performed for further diagnosis. This showed N2 minor disease\par \par

## 2021-07-01 NOTE — ASSESSMENT
[FreeTextEntry1] : SHANON BEE is a never smoker Female who was 64 yo at the time of diagnosis. Patient has no significant past medical history. 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 Patient was represented at Tumor board on 4/14/21 and a plan for definitive CRT \par \par - PLAN for  chemoRT with Carbo AUC 2 and taxol 50 mg/ m2 weekly with RT followed by Immunotherapy witH Durvalumab for 1 year \par - Will monitor counts/ and for immune mediated side effects \par - Patient is a Jehovas WItness \par  She had a h/o Diverticulosis monitor with IO \par - Ordered MRi Brain \par \par Discussed the Alk fusion mutation and targetted options however this is only approved in the metastatic setting\par \par \par SON: JUAN RAMON [phone]\par

## 2021-07-20 ENCOUNTER — RESULT REVIEW (OUTPATIENT)
Age: 66
End: 2021-07-20

## 2021-07-21 ENCOUNTER — OUTPATIENT (OUTPATIENT)
Dept: OUTPATIENT SERVICES | Facility: HOSPITAL | Age: 66
LOS: 1 days | End: 2021-07-21
Payer: COMMERCIAL

## 2021-07-21 ENCOUNTER — RESULT REVIEW (OUTPATIENT)
Age: 66
End: 2021-07-21

## 2021-07-21 ENCOUNTER — APPOINTMENT (OUTPATIENT)
Dept: CT IMAGING | Facility: CLINIC | Age: 66
End: 2021-07-21
Payer: MEDICARE

## 2021-07-21 DIAGNOSIS — Z00.8 ENCOUNTER FOR OTHER GENERAL EXAMINATION: ICD-10-CM

## 2021-07-21 DIAGNOSIS — Z98.891 HISTORY OF UTERINE SCAR FROM PREVIOUS SURGERY: Chronic | ICD-10-CM

## 2021-07-21 DIAGNOSIS — Z90.49 ACQUIRED ABSENCE OF OTHER SPECIFIED PARTS OF DIGESTIVE TRACT: Chronic | ICD-10-CM

## 2021-07-21 DIAGNOSIS — Z98.82 BREAST IMPLANT STATUS: Chronic | ICD-10-CM

## 2021-07-21 DIAGNOSIS — C34.12 MALIGNANT NEOPLASM OF UPPER LOBE, LEFT BRONCHUS OR LUNG: ICD-10-CM

## 2021-07-21 DIAGNOSIS — Z98.890 OTHER SPECIFIED POSTPROCEDURAL STATES: Chronic | ICD-10-CM

## 2021-07-21 PROCEDURE — 74160 CT ABDOMEN W/CONTRAST: CPT | Mod: 26

## 2021-07-21 PROCEDURE — 71260 CT THORAX DX C+: CPT

## 2021-07-21 PROCEDURE — 71260 CT THORAX DX C+: CPT | Mod: 26

## 2021-07-21 PROCEDURE — 74160 CT ABDOMEN W/CONTRAST: CPT

## 2021-07-23 RX ORDER — HYDROCODONE BITARTRATE AND HOMATROPINE METHYLBROMIDE 5; 1.5 MG/5ML; MG/5ML
5-1.5 SYRUP ORAL EVERY 4 HOURS
Qty: 600 | Refills: 0 | Status: DISCONTINUED | COMMUNITY
Start: 2021-07-21 | End: 2021-07-23

## 2021-07-27 ENCOUNTER — OUTPATIENT (OUTPATIENT)
Dept: OUTPATIENT SERVICES | Facility: HOSPITAL | Age: 66
LOS: 1 days | Discharge: HOME | End: 2021-07-27

## 2021-07-27 DIAGNOSIS — Z98.890 OTHER SPECIFIED POSTPROCEDURAL STATES: Chronic | ICD-10-CM

## 2021-07-27 DIAGNOSIS — C34.90 MALIGNANT NEOPLASM OF UNSPECIFIED PART OF UNSPECIFIED BRONCHUS OR LUNG: ICD-10-CM

## 2021-07-27 DIAGNOSIS — Z90.49 ACQUIRED ABSENCE OF OTHER SPECIFIED PARTS OF DIGESTIVE TRACT: Chronic | ICD-10-CM

## 2021-07-27 DIAGNOSIS — Z98.891 HISTORY OF UTERINE SCAR FROM PREVIOUS SURGERY: Chronic | ICD-10-CM

## 2021-07-27 DIAGNOSIS — Z98.82 BREAST IMPLANT STATUS: Chronic | ICD-10-CM

## 2021-07-28 ENCOUNTER — RESULT REVIEW (OUTPATIENT)
Age: 66
End: 2021-07-28

## 2021-07-28 ENCOUNTER — APPOINTMENT (OUTPATIENT)
Dept: HEMATOLOGY ONCOLOGY | Facility: CLINIC | Age: 66
End: 2021-07-28
Payer: MEDICARE

## 2021-07-28 VITALS
HEART RATE: 94 BPM | SYSTOLIC BLOOD PRESSURE: 111 MMHG | TEMPERATURE: 97.3 F | RESPIRATION RATE: 16 BRPM | WEIGHT: 167.5 LBS | BODY MASS INDEX: 26.92 KG/M2 | OXYGEN SATURATION: 94 % | HEIGHT: 66 IN | DIASTOLIC BLOOD PRESSURE: 71 MMHG

## 2021-07-28 VITALS — OXYGEN SATURATION: 86 %

## 2021-07-28 LAB
BASOPHILS # BLD AUTO: 0.1 K/UL — SIGNIFICANT CHANGE UP (ref 0–0.2)
BASOPHILS NFR BLD AUTO: 1 % — SIGNIFICANT CHANGE UP (ref 0–2)
EOSINOPHIL # BLD AUTO: 0.2 K/UL — SIGNIFICANT CHANGE UP (ref 0–0.5)
EOSINOPHIL NFR BLD AUTO: 2.8 % — SIGNIFICANT CHANGE UP (ref 0–6)
HCT VFR BLD CALC: 33.3 % — LOW (ref 34.5–45)
HGB BLD-MCNC: 10.8 G/DL — LOW (ref 11.5–15.5)
LYMPHOCYTES # BLD AUTO: 0.7 K/UL — LOW (ref 1–3.3)
LYMPHOCYTES # BLD AUTO: 11.1 % — LOW (ref 13–44)
MCHC RBC-ENTMCNC: 31.3 PG — SIGNIFICANT CHANGE UP (ref 27–34)
MCHC RBC-ENTMCNC: 32.4 G/DL — SIGNIFICANT CHANGE UP (ref 32–36)
MCV RBC AUTO: 96.5 FL — SIGNIFICANT CHANGE UP (ref 80–100)
MONOCYTES # BLD AUTO: 0.5 K/UL — SIGNIFICANT CHANGE UP (ref 0–0.9)
MONOCYTES NFR BLD AUTO: 7.7 % — SIGNIFICANT CHANGE UP (ref 2–14)
NEUTROPHILS # BLD AUTO: 4.9 K/UL — SIGNIFICANT CHANGE UP (ref 1.8–7.4)
NEUTROPHILS NFR BLD AUTO: 77.4 % — HIGH (ref 43–77)
PLATELET # BLD AUTO: 397 K/UL — SIGNIFICANT CHANGE UP (ref 150–400)
RBC # BLD: 3.45 M/UL — LOW (ref 3.8–5.2)
RBC # FLD: 14 % — SIGNIFICANT CHANGE UP (ref 10.3–14.5)
WBC # BLD: 6.3 K/UL — SIGNIFICANT CHANGE UP (ref 3.8–10.5)
WBC # FLD AUTO: 6.3 K/UL — SIGNIFICANT CHANGE UP (ref 3.8–10.5)

## 2021-07-28 PROCEDURE — 99215 OFFICE O/P EST HI 40 MIN: CPT

## 2021-07-28 NOTE — HISTORY OF PRESENT ILLNESS
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 Recent imaging has shown growth and development of a solid structure. The etiology remains unclear, but malignancy has been considered. She feels well has no complaints. She denies fever, chills, night sweats, weight loss, or weight gain. \par \par Followed by Dr Galeana in the past\par PPD+ in 2000 and received 6 months of INH\par PET positive RENAN posterior cavitary density in 2010\par TTNA and Bronch non-diagnostic. Cultures negative for fungus and TB\par CT at [facility] in 2010 and 2012 stable - felt to be benign. Lost to FU\par \par Patient had a PEt scan in Oct 2020 with a Left upper lobe spiculated lung mass 4.5 x 3cm with an SUV 11.1 with adjacent tree in bud opacity \par In addition Rt upper lobe opacity 1.2 x1 cm which is not FDG avid \par Discrete Left hilar/ infrahilar region  lesion with an SUV 5.2 which is possibly corresponding to a LN \par \par \par Patient s/p Left lung mass, core biopsy with pathology c/w Adenocarcinoma c/w pulmonary origin (see comment)\par CK7, TTF-1 : Positive CK20, PAX-8 : Negative\par  [de-identified] : \par Patient here for follow up with daughter \par Reports fatigue\par ++ cough, dry cough, No sputum production \par O2 sat at 94 today Will walk patient  O2 sat decreased to 82% on ambulation \par Will start  azithromycin and predisone\par advsied cough drops\par \par . Patient  is a Jehovas WItness\par She completed concurrent CRT with carbo/ taxol on 6/21/21\par \par Denies fever/chills, rash, vomiting, diarrhea,constipation,  abdominal pain, bleeding, easy bruising or visual changes, chest pain, SOB or TOLBERT,  neuropathy, LE edema.\par \par \par 7/21/21 \par CT CAP post treatment on 7/21/21 with stability in LEft upper lobe mass at 4.3 cmx2.8cm ( previously 4.2 x 3.2 cm)  1.3 cm rt upper lobe nodule is unchanged> new patchy groungglass opacity surrounding both upper lobe lesions which maybe 2/2 treatment effect vs infetcion \par \par

## 2021-07-28 NOTE — ASSESSMENT
[FreeTextEntry1] : SHANON BEE is a never smoker Female who was 66 yo at the time of diagnosis. Patient has no significant past medical history. 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 and was treated with 6 months of INH \par \par MRI Brain : negative \par  CT Chest/ Abd/ Pelvis 12/29/20: with stable Lt UPPER LOBE primary lesion, ( 4.7 x 2.3cm), 1.2 cm left lung apex lesion along with Rt upper lobe stable lesion of 1.3 cm  \par \par - Discussed at tumor board on 1/13/21, \par - and s/p IR biopsy of Rt upper lobe lesion  on 1/26/21 c/w reactive cells negative for malignancy \par - PDL1 20% \par - Foundation one : ALK ELM 4 fusion variant ,\par MS stable TMB 4 mut/ Mb, SF 3B1 \par Discussed the Alk fusion mutation and targeted options however this is only approved in the metastatic setting\par \par Plan for Resection per DR jeffery, however EBUS prior to surgery  WITH n2 disease. Patient was represented at Tumor board on 4/14/21 and a plan for definitive CRT \par \par - s/p chemoRT with Carbo AUC 2 and taxol 50 mg/ m2 weekly with RT followed by Immunotherapy with Durvalumab for 1 year \par \par - She completed concurrent CRT with carbo/ taxol on 6/21/21\par - Restaging CT CAP post treatment on 7/21/21 with stability in LEft upper lobe mass at 4.3 cmx2.8cm ( previously 4.2 x 3.2 cm)  1.3 cm rt upper lobe nodule is unchanged> new patchy groungglass opacity surrounding both upper lobe lesions which maybe 2/2 treatment effect vs infetcion \par - Patient is a Jehovah WItness \par - She had a h/o Diverticulosis monitor with IO \par - given stability of scans plan for Durvalumab \par \par \par Patient with significant SOb with decrease in O2 sat to 82 on ambulation \par Likely related to RT Pneumonitis, will call in a z pack and prednisone taper for her ( over 2weeks) \par f/u  in 2 weeks if sob improved, will plan to start Durvalumab in 3 weeks \par \par \par \par SON: JUAN RAMON [phone]\par

## 2021-07-28 NOTE — PHYSICAL EXAM
[Normal] : normal spine exam without palpable tenderness, no kyphosis or scoliosis [de-identified] : RUBINA [de-identified] : + wheeze

## 2021-08-01 ENCOUNTER — INPATIENT (INPATIENT)
Facility: HOSPITAL | Age: 66
LOS: 4 days | Discharge: HOME | DRG: 205 | End: 2021-08-06
Attending: FAMILY MEDICINE | Admitting: HOSPITALIST
Payer: COMMERCIAL

## 2021-08-01 ENCOUNTER — EMERGENCY (EMERGENCY)
Facility: HOSPITAL | Age: 66
LOS: 1 days | Discharge: LEFT BEFORE DISCHARGE | End: 2021-08-01
Attending: EMERGENCY MEDICINE
Payer: COMMERCIAL

## 2021-08-01 VITALS
DIASTOLIC BLOOD PRESSURE: 54 MMHG | HEIGHT: 66 IN | SYSTOLIC BLOOD PRESSURE: 118 MMHG | TEMPERATURE: 98 F | HEART RATE: 77 BPM | RESPIRATION RATE: 20 BRPM | WEIGHT: 162.92 LBS | OXYGEN SATURATION: 93 %

## 2021-08-01 VITALS
HEART RATE: 77 BPM | HEIGHT: 66 IN | TEMPERATURE: 98 F | OXYGEN SATURATION: 93 % | DIASTOLIC BLOOD PRESSURE: 54 MMHG | SYSTOLIC BLOOD PRESSURE: 118 MMHG | WEIGHT: 162.92 LBS | RESPIRATION RATE: 20 BRPM

## 2021-08-01 DIAGNOSIS — Z90.49 ACQUIRED ABSENCE OF OTHER SPECIFIED PARTS OF DIGESTIVE TRACT: Chronic | ICD-10-CM

## 2021-08-01 DIAGNOSIS — Z98.891 HISTORY OF UTERINE SCAR FROM PREVIOUS SURGERY: Chronic | ICD-10-CM

## 2021-08-01 DIAGNOSIS — Z98.82 BREAST IMPLANT STATUS: Chronic | ICD-10-CM

## 2021-08-01 DIAGNOSIS — Z98.890 OTHER SPECIFIED POSTPROCEDURAL STATES: Chronic | ICD-10-CM

## 2021-08-01 LAB
ALBUMIN SERPL ELPH-MCNC: 3.7 G/DL — SIGNIFICANT CHANGE UP (ref 3.3–5.2)
ALP SERPL-CCNC: 121 U/L — HIGH (ref 40–120)
ALT FLD-CCNC: 19 U/L — SIGNIFICANT CHANGE UP
ANION GAP SERPL CALC-SCNC: 14 MMOL/L — SIGNIFICANT CHANGE UP (ref 5–17)
AST SERPL-CCNC: 20 U/L — SIGNIFICANT CHANGE UP
BASOPHILS # BLD AUTO: 0.02 K/UL — SIGNIFICANT CHANGE UP (ref 0–0.2)
BASOPHILS NFR BLD AUTO: 0.3 % — SIGNIFICANT CHANGE UP (ref 0–2)
BILIRUB SERPL-MCNC: <0.2 MG/DL — LOW (ref 0.4–2)
BUN SERPL-MCNC: 18.1 MG/DL — SIGNIFICANT CHANGE UP (ref 8–20)
CALCIUM SERPL-MCNC: 8.9 MG/DL — SIGNIFICANT CHANGE UP (ref 8.6–10.2)
CHLORIDE SERPL-SCNC: 101 MMOL/L — SIGNIFICANT CHANGE UP (ref 98–107)
CO2 SERPL-SCNC: 24 MMOL/L — SIGNIFICANT CHANGE UP (ref 22–29)
CREAT SERPL-MCNC: 0.73 MG/DL — SIGNIFICANT CHANGE UP (ref 0.5–1.3)
EOSINOPHIL # BLD AUTO: 0 K/UL — SIGNIFICANT CHANGE UP (ref 0–0.5)
EOSINOPHIL NFR BLD AUTO: 0 % — SIGNIFICANT CHANGE UP (ref 0–6)
GLUCOSE SERPL-MCNC: 146 MG/DL — HIGH (ref 70–99)
HCT VFR BLD CALC: 31.4 % — LOW (ref 34.5–45)
HGB BLD-MCNC: 10.1 G/DL — LOW (ref 11.5–15.5)
IMM GRANULOCYTES NFR BLD AUTO: 1.4 % — SIGNIFICANT CHANGE UP (ref 0–1.5)
LACTATE BLDV-MCNC: 2.1 MMOL/L — HIGH (ref 0.5–2)
LYMPHOCYTES # BLD AUTO: 0.62 K/UL — LOW (ref 1–3.3)
LYMPHOCYTES # BLD AUTO: 7.8 % — LOW (ref 13–44)
MCHC RBC-ENTMCNC: 30.9 PG — SIGNIFICANT CHANGE UP (ref 27–34)
MCHC RBC-ENTMCNC: 32.2 GM/DL — SIGNIFICANT CHANGE UP (ref 32–36)
MCV RBC AUTO: 96 FL — SIGNIFICANT CHANGE UP (ref 80–100)
MONOCYTES # BLD AUTO: 0.39 K/UL — SIGNIFICANT CHANGE UP (ref 0–0.9)
MONOCYTES NFR BLD AUTO: 4.9 % — SIGNIFICANT CHANGE UP (ref 2–14)
NEUTROPHILS # BLD AUTO: 6.82 K/UL — SIGNIFICANT CHANGE UP (ref 1.8–7.4)
NEUTROPHILS NFR BLD AUTO: 85.6 % — HIGH (ref 43–77)
PLATELET # BLD AUTO: 477 K/UL — HIGH (ref 150–400)
POTASSIUM SERPL-MCNC: 4.5 MMOL/L — SIGNIFICANT CHANGE UP (ref 3.5–5.3)
POTASSIUM SERPL-SCNC: 4.5 MMOL/L — SIGNIFICANT CHANGE UP (ref 3.5–5.3)
PROT SERPL-MCNC: 7.1 G/DL — SIGNIFICANT CHANGE UP (ref 6.6–8.7)
RBC # BLD: 3.27 M/UL — LOW (ref 3.8–5.2)
RBC # FLD: 14.6 % — HIGH (ref 10.3–14.5)
SODIUM SERPL-SCNC: 138 MMOL/L — SIGNIFICANT CHANGE UP (ref 135–145)
WBC # BLD: 7.96 K/UL — SIGNIFICANT CHANGE UP (ref 3.8–10.5)
WBC # FLD AUTO: 7.96 K/UL — SIGNIFICANT CHANGE UP (ref 3.8–10.5)

## 2021-08-01 PROCEDURE — 99282 EMERGENCY DEPT VISIT SF MDM: CPT

## 2021-08-01 PROCEDURE — 71046 X-RAY EXAM CHEST 2 VIEWS: CPT | Mod: 26

## 2021-08-01 PROCEDURE — 99285 EMERGENCY DEPT VISIT HI MDM: CPT

## 2021-08-01 PROCEDURE — 99283 EMERGENCY DEPT VISIT LOW MDM: CPT

## 2021-08-01 RX ORDER — IPRATROPIUM/ALBUTEROL SULFATE 18-103MCG
3 AEROSOL WITH ADAPTER (GRAM) INHALATION ONCE
Refills: 0 | Status: COMPLETED | OUTPATIENT
Start: 2021-08-01 | End: 2021-08-01

## 2021-08-01 RX ORDER — CEFEPIME 1 G/1
2000 INJECTION, POWDER, FOR SOLUTION INTRAMUSCULAR; INTRAVENOUS ONCE
Refills: 0 | Status: DISCONTINUED | OUTPATIENT
Start: 2021-08-01 | End: 2021-08-01

## 2021-08-01 RX ORDER — CEFEPIME 1 G/1
INJECTION, POWDER, FOR SOLUTION INTRAMUSCULAR; INTRAVENOUS
Refills: 0 | Status: DISCONTINUED | OUTPATIENT
Start: 2021-08-01 | End: 2021-08-01

## 2021-08-01 RX ORDER — CEFEPIME 1 G/1
2000 INJECTION, POWDER, FOR SOLUTION INTRAMUSCULAR; INTRAVENOUS EVERY 8 HOURS
Refills: 0 | Status: DISCONTINUED | OUTPATIENT
Start: 2021-08-02 | End: 2021-08-06

## 2021-08-01 RX ORDER — CEFEPIME 1 G/1
INJECTION, POWDER, FOR SOLUTION INTRAMUSCULAR; INTRAVENOUS
Refills: 0 | Status: DISCONTINUED | OUTPATIENT
Start: 2021-08-01 | End: 2021-08-06

## 2021-08-01 RX ORDER — CEFEPIME 1 G/1
2000 INJECTION, POWDER, FOR SOLUTION INTRAMUSCULAR; INTRAVENOUS ONCE
Refills: 0 | Status: COMPLETED | OUTPATIENT
Start: 2021-08-01 | End: 2021-08-01

## 2021-08-01 RX ADMIN — CEFEPIME 100 MILLIGRAM(S): 1 INJECTION, POWDER, FOR SOLUTION INTRAMUSCULAR; INTRAVENOUS at 23:22

## 2021-08-01 RX ADMIN — Medication 110 MILLIGRAM(S): at 23:22

## 2021-08-01 RX ADMIN — Medication 3 MILLILITER(S): at 21:35

## 2021-08-01 RX ADMIN — Medication 100 MILLIGRAM(S): at 21:35

## 2021-08-01 NOTE — ED PROVIDER NOTE - PMH
Acute lateral meniscal injury of right knee, initial encounter    Acute medial meniscus tear of right knee, initial encounter    At risk for sleep apnea  Bang score 3  Depression    Fatty liver    H/O pneumothorax  following lung biopsy right lung  Hyperlipidemia    Malignant neoplasm of unspecified part of unspecified bronchus or lung    Osteoarthritis  right  Osteoporosis    Patient is Advent    PPD positive  2000 -  treated with INH  Pulmonary mass  monitoring -no surgical intervention  Small bowel mass  incidental finding while appendectomy ( Benign s/p colon mass resection - 3/2018)

## 2021-08-01 NOTE — ED PROVIDER NOTE - PROGRESS NOTE DETAILS
CXR shows L sided consolidation not present on previous CXR done in February. Suspicious for pneumonia. Started on cefepime 2g q8hrs and doxycycline 100 mg q12hrs.

## 2021-08-01 NOTE — ED ADULT NURSE NOTE - PMH
Acute lateral meniscal injury of right knee, initial encounter    Acute medial meniscus tear of right knee, initial encounter    At risk for sleep apnea  Bang score 3  Depression    Fatty liver    H/O pneumothorax  following lung biopsy right lung  Hyperlipidemia    Malignant neoplasm of unspecified part of unspecified bronchus or lung    Osteoarthritis  right  Osteoporosis    Patient is Oriental orthodox    PPD positive  2000 -  treated with INH  Pulmonary mass  monitoring -no surgical intervention  Small bowel mass  incidental finding while appendectomy ( Benign s/p colon mass resection - 3/2018)

## 2021-08-01 NOTE — ED PROVIDER NOTE - ATTENDING CONTRIBUTION TO CARE
67 yo F with hx of  lung cancer since 11/20 s/p chemotx and radiotx presents to ED c/o increased non-prod cough, SOB and TOLBERT since most recent chemotx 3 weeks ago. +chills, + reported pulse ox as low as 85% at home. Pt received Azithromycin and po steroids as outpt without improvement.  No reported fever, CP, abd pain, N/V or syncope. On exam pt awake and alert appears uncomfortable, HEENT mm moist, Neck supple, Cor Reg, Lungs coarse BS, Neuro non-focal.  Will check labs, CXR, CTA to R/O PE with hx of lung Ca

## 2021-08-01 NOTE — ED PROVIDER NOTE - NS ED ROS FT
Constitutional: no fever, + chills  Head: NC, AT   Eyes: no redness   ENMT: no nasal congestion/drainage, no sore throat   CV: + chest pain, no edema  Resp: + cough, + dyspnea  GI: no abdominal pain, no nausea, no vomiting, no diarrhea  : no dysuria, no hematuria   Skin: no lesions, no rashes   Neuro: no LOC, no headache, no sensory deficits, no weakness

## 2021-08-01 NOTE — ED PROVIDER NOTE - PHYSICAL EXAMINATION
General: moderate distress  Head: NC, AT  EENT: EOMI, no scleral icterus  Cardiac: RRR, no apparent murmurs, no lower extremity edema  Respiratory: CTABL, mild respiratory distress   Abdomen: soft, ND, NT, nonperitonitic  MSK/Vascular: full ROM, soft compartments, warm extremities  Neuro: AAOx3, sensation to light touch intact  Psych: calm, cooperative

## 2021-08-01 NOTE — ED PROVIDER NOTE - OBJECTIVE STATEMENT
· HPI Objective Statement: 65 y/o F with PMHx lung cancer diagnosed November 2020 on radiotherapy with carbol toxol, appendectomy, diverticulitis, who presents with worsening cough and shortness of breath since she received her latest round of radiotherapy three weeks ago. Patient states that she was well for a week after the therapy, but then began to feel short of breath and developed a severe non-productive cough. She states that the cough has gotten worse to the point where minimal exertion is difficult and that when she measures her O2 sat at home it goes as low as 85%, but denies home O2 use. She states that she also has some lower chest pain, but states that she thinks its due to her constant cough. Denies cardiac history. She states that her oncologist is Dr. Belkys Simpson of [facility]. She states that she got a CT scan of her chest a couple weeks ago and that the cancer hasn't gotten worse. She states that he gave her a course of Azithromycin and prednisone for suspected pneumonitis caused by radiotherapy. She denies any sick contacts at home or recent illnesses. States that she got COVID tested earlier this week and it was negative. Denies any cardiac history or history of hormone use, recent surgery, leg swelling, or blood clots. States that she has had some chills at home, but denies fever, headache, N/V, abdominal pain, syncope, lightheadedness, palpitations, or dizziness.

## 2021-08-01 NOTE — ED ADULT TRIAGE NOTE - CHIEF COMPLAINT QUOTE
Patient ambulated into ED with steady gait, Pt c/o after receiving chemo 4weeks ago for lung CA, developed dry cough has been progressively getting worse and shortness of breath. was diagnosed with pneumonitis taking antibiotic and steroids with no improvement.

## 2021-08-01 NOTE — ED PROVIDER NOTE - PROGRESS NOTE DETAILS
Registration error. Duplicate accounts. Correct MRN: [id number]. Please look there for further care information.

## 2021-08-01 NOTE — ED PROVIDER NOTE - OBJECTIVE STATEMENT
67 y/o F with PMHx lung cancer diagnosed November 2020 on radiotherapy with carbol toxol, appendectomy, diverticulitis, who presents with worsening cough and shortness of breath since she received her latest round of radiotherapy three weeks ago. Patient states that she was well for a week after the therapy, but then began to feel short of breath and developed a severe non-productive cough. She states that the cough has gotten worse to the point where minimal exertion is difficult and that when she measures her O2 sat at home it goes as low as 85%, but denies home O2 use. She states that she also has some lower chest pain, but states that she thinks its due to her constant cough. Denies cardiac history. She states that her oncologist is Dr. Belkys Simpson of [facility]. She states that she got a CT scan of her chest a couple weeks ago and that the cancer hasn't gotten worse. She states that he gave her a course of Azithromycin and prednisone for suspected pneumonitis caused by radiotherapy. She denies any sick contacts at home or recent illnesses. States that she got COVID tested earlier this week and it was negative. Denies any cardiac history or history of hormone use, recent surgery, leg swelling, or blood clots. States that she has had some chills at home, but denies fever, headache, N/V, abdominal pain, syncope, lightheadedness, palpitations, or dizziness.

## 2021-08-01 NOTE — ED ADULT NURSE NOTE - OBJECTIVE STATEMENT
Pt. A&Ox3. Pt. on cm and continuous pulse ox. Pt. in NSR in lead II. Pt. complaining of cough and SOB after radiation for lung cancer. Pt. states she is on steroids and antibiotics that are not helping. Pt. complaining of chest pain from non stop coughing.

## 2021-08-01 NOTE — ED PROVIDER NOTE - CLINICAL SUMMARY MEDICAL DECISION MAKING FREE TEXT BOX
65 y/o F with PMHx lung cancer diagnosed November 2020 on radiotherapy with carbol toxol, appendectomy, diverticulitis, who presents with worsening cough and shortness of breath since she received her latest round of radiotherapy three weeks ago. Basic labs, EKG, CXR, CTPE, Duonebs, Robitussin ordered. 67 y/o F with PMHx lung cancer diagnosed November 2020 on radiotherapy with carbol toxol, appendectomy, diverticulitis, who presents with worsening cough and shortness of breath since she received her latest round of radiotherapy three weeks ago. Basic labs, EKG, CXR, CTA, Duonebs, Robitussin ordered. CXR/CT suspicious for pneumonia. UCx, BCx, lactate ordered. Started on cefepime and doxy and admitted to medicine for further management.

## 2021-08-01 NOTE — ED PROVIDER NOTE - ATTENDING CONTRIBUTION TO CARE
67 yo F with hx of lung Ca being treated by Oncology/Dr. Simpson with chemotx and radiotx c/o increased non-prod cough with SOB and reported hypoxia with O2 sat as low as 85% on home pulse-ox.  Pt Rx'd with Z-louie and cough medication for pneumonitis  without relief.  No assoc fever or chills.  On exam pt awake and alert in no acute resp distress, PERRL, throat clear mm moist, Neck supple, Cor Reg, Lungs ? few crackles L, otherwise coarse BS b/l, Abd soft, NT Ext no cyanosis or edema, Neuro non-focal.  Will check labs, CXR, CTA, medicate for cough and re-eval

## 2021-08-01 NOTE — ED PROVIDER NOTE - CLINICAL SUMMARY MEDICAL DECISION MAKING FREE TEXT BOX
65 y/o F with PMHx lung cancer diagnosed November 2020 on radiotherapy with carbol toxol, appendectomy, diverticulitis, who presents with worsening cough and shortness of breath since she received her latest round of radiotherapy three weeks ago. 67 y/o F with PMHx lung cancer diagnosed November 2020 on radiotherapy with carbol toxol, appendectomy, diverticulitis, who presents with worsening cough and shortness of breath since she received her latest round of radiotherapy three weeks ago. CXR and CT c/f pneumonia in L lung. Admission for further management.

## 2021-08-01 NOTE — ED PROVIDER NOTE - NS ED ROS FT
Constitutional: no fever, + chills  Head: NC, AT   Eyes: no redness   ENMT: no nasal congestion/drainage, no sore throat   CV: + chest pain, no edema  Resp: + cough, + dyspnea  GI: no abdominal pain, no nausea, no vomiting, + diarrhea  : no dysuria, no hematuria   Skin: no lesions, no rashes   Neuro: no LOC, no headache, no sensory deficits, no weakness

## 2021-08-02 DIAGNOSIS — J18.9 PNEUMONIA, UNSPECIFIED ORGANISM: ICD-10-CM

## 2021-08-02 LAB
ANION GAP SERPL CALC-SCNC: 12 MMOL/L — SIGNIFICANT CHANGE UP (ref 5–17)
APPEARANCE UR: CLEAR — SIGNIFICANT CHANGE UP
BASOPHILS # BLD AUTO: 0.04 K/UL — SIGNIFICANT CHANGE UP (ref 0–0.2)
BASOPHILS NFR BLD AUTO: 0.4 % — SIGNIFICANT CHANGE UP (ref 0–2)
BILIRUB UR-MCNC: NEGATIVE — SIGNIFICANT CHANGE UP
BUN SERPL-MCNC: 15.6 MG/DL — SIGNIFICANT CHANGE UP (ref 8–20)
CALCIUM SERPL-MCNC: 8.9 MG/DL — SIGNIFICANT CHANGE UP (ref 8.6–10.2)
CHLORIDE SERPL-SCNC: 101 MMOL/L — SIGNIFICANT CHANGE UP (ref 98–107)
CO2 SERPL-SCNC: 23 MMOL/L — SIGNIFICANT CHANGE UP (ref 22–29)
COLOR SPEC: YELLOW — SIGNIFICANT CHANGE UP
CREAT SERPL-MCNC: 0.63 MG/DL — SIGNIFICANT CHANGE UP (ref 0.5–1.3)
DIFF PNL FLD: NEGATIVE — SIGNIFICANT CHANGE UP
EOSINOPHIL # BLD AUTO: 0.03 K/UL — SIGNIFICANT CHANGE UP (ref 0–0.5)
EOSINOPHIL NFR BLD AUTO: 0.3 % — SIGNIFICANT CHANGE UP (ref 0–6)
EPI CELLS # UR: SIGNIFICANT CHANGE UP
FERRITIN SERPL-MCNC: 176 NG/ML — HIGH (ref 15–150)
FOLATE SERPL-MCNC: >20 NG/ML — SIGNIFICANT CHANGE UP
GLUCOSE SERPL-MCNC: 99 MG/DL — SIGNIFICANT CHANGE UP (ref 70–99)
GLUCOSE UR QL: NEGATIVE MG/DL — SIGNIFICANT CHANGE UP
HCT VFR BLD CALC: 31.5 % — LOW (ref 34.5–45)
HGB BLD-MCNC: 9.9 G/DL — LOW (ref 11.5–15.5)
IMM GRANULOCYTES NFR BLD AUTO: 1 % — SIGNIFICANT CHANGE UP (ref 0–1.5)
IRON SATN MFR SERPL: 11 % — LOW (ref 14–50)
IRON SATN MFR SERPL: 34 UG/DL — LOW (ref 37–145)
KETONES UR-MCNC: NEGATIVE — SIGNIFICANT CHANGE UP
LACTATE BLDV-MCNC: 1.3 MMOL/L — SIGNIFICANT CHANGE UP (ref 0.5–2)
LEUKOCYTE ESTERASE UR-ACNC: ABNORMAL
LYMPHOCYTES # BLD AUTO: 1.03 K/UL — SIGNIFICANT CHANGE UP (ref 1–3.3)
LYMPHOCYTES # BLD AUTO: 11.2 % — LOW (ref 13–44)
MAGNESIUM SERPL-MCNC: 2.1 MG/DL — SIGNIFICANT CHANGE UP (ref 1.6–2.6)
MCHC RBC-ENTMCNC: 30.5 PG — SIGNIFICANT CHANGE UP (ref 27–34)
MCHC RBC-ENTMCNC: 31.4 GM/DL — LOW (ref 32–36)
MCV RBC AUTO: 96.9 FL — SIGNIFICANT CHANGE UP (ref 80–100)
MONOCYTES # BLD AUTO: 0.73 K/UL — SIGNIFICANT CHANGE UP (ref 0–0.9)
MONOCYTES NFR BLD AUTO: 7.9 % — SIGNIFICANT CHANGE UP (ref 2–14)
NEUTROPHILS # BLD AUTO: 7.29 K/UL — SIGNIFICANT CHANGE UP (ref 1.8–7.4)
NEUTROPHILS NFR BLD AUTO: 79.2 % — HIGH (ref 43–77)
NITRITE UR-MCNC: NEGATIVE — SIGNIFICANT CHANGE UP
PH UR: 7 — SIGNIFICANT CHANGE UP (ref 5–8)
PHOSPHATE SERPL-MCNC: 3.7 MG/DL — SIGNIFICANT CHANGE UP (ref 2.4–4.7)
PLATELET # BLD AUTO: 449 K/UL — HIGH (ref 150–400)
POTASSIUM SERPL-MCNC: 4 MMOL/L — SIGNIFICANT CHANGE UP (ref 3.5–5.3)
POTASSIUM SERPL-SCNC: 4 MMOL/L — SIGNIFICANT CHANGE UP (ref 3.5–5.3)
PROCALCITONIN SERPL-MCNC: 0.06 NG/ML — SIGNIFICANT CHANGE UP (ref 0.02–0.1)
PROT UR-MCNC: NEGATIVE MG/DL — SIGNIFICANT CHANGE UP
RAPID RVP RESULT: SIGNIFICANT CHANGE UP
RBC # BLD: 3.25 M/UL — LOW (ref 3.8–5.2)
RBC # FLD: 14.7 % — HIGH (ref 10.3–14.5)
RBC CASTS # UR COMP ASSIST: SIGNIFICANT CHANGE UP /HPF (ref 0–4)
SARS-COV-2 RNA SPEC QL NAA+PROBE: SIGNIFICANT CHANGE UP
SODIUM SERPL-SCNC: 136 MMOL/L — SIGNIFICANT CHANGE UP (ref 135–145)
SP GR SPEC: 1 — LOW (ref 1.01–1.02)
TIBC SERPL-MCNC: 297 UG/DL — SIGNIFICANT CHANGE UP (ref 220–430)
TRANSFERRIN SERPL-MCNC: 208 MG/DL — SIGNIFICANT CHANGE UP (ref 192–382)
TSH SERPL-MCNC: 1.45 UIU/ML — SIGNIFICANT CHANGE UP (ref 0.27–4.2)
UROBILINOGEN FLD QL: NEGATIVE MG/DL — SIGNIFICANT CHANGE UP
VIT B12 SERPL-MCNC: 490 PG/ML — SIGNIFICANT CHANGE UP (ref 232–1245)
WBC # BLD: 9.21 K/UL — SIGNIFICANT CHANGE UP (ref 3.8–10.5)
WBC # FLD AUTO: 9.21 K/UL — SIGNIFICANT CHANGE UP (ref 3.8–10.5)
WBC UR QL: SIGNIFICANT CHANGE UP

## 2021-08-02 PROCEDURE — G1004: CPT

## 2021-08-02 PROCEDURE — 99223 1ST HOSP IP/OBS HIGH 75: CPT

## 2021-08-02 PROCEDURE — 71275 CT ANGIOGRAPHY CHEST: CPT | Mod: 26,ME

## 2021-08-02 PROCEDURE — 12345: CPT | Mod: NC

## 2021-08-02 PROCEDURE — 99222 1ST HOSP IP/OBS MODERATE 55: CPT

## 2021-08-02 RX ORDER — DONEPEZIL HYDROCHLORIDE 10 MG/1
10 TABLET, FILM COATED ORAL AT BEDTIME
Refills: 0 | Status: DISCONTINUED | OUTPATIENT
Start: 2021-08-02 | End: 2021-08-06

## 2021-08-02 RX ORDER — ACETAMINOPHEN 500 MG
650 TABLET ORAL EVERY 6 HOURS
Refills: 0 | Status: DISCONTINUED | OUTPATIENT
Start: 2021-08-02 | End: 2021-08-06

## 2021-08-02 RX ORDER — ALENDRONATE SODIUM 70 MG/1
10 TABLET ORAL
Qty: 0 | Refills: 0 | DISCHARGE

## 2021-08-02 RX ORDER — ENOXAPARIN SODIUM 100 MG/ML
40 INJECTION SUBCUTANEOUS DAILY
Refills: 0 | Status: DISCONTINUED | OUTPATIENT
Start: 2021-08-02 | End: 2021-08-06

## 2021-08-02 RX ORDER — IPRATROPIUM/ALBUTEROL SULFATE 18-103MCG
3 AEROSOL WITH ADAPTER (GRAM) INHALATION EVERY 6 HOURS
Refills: 0 | Status: DISCONTINUED | OUTPATIENT
Start: 2021-08-02 | End: 2021-08-06

## 2021-08-02 RX ORDER — IRON SUCROSE 20 MG/ML
200 INJECTION, SOLUTION INTRAVENOUS EVERY 24 HOURS
Refills: 0 | Status: COMPLETED | OUTPATIENT
Start: 2021-08-02 | End: 2021-08-04

## 2021-08-02 RX ORDER — ALBUTEROL 90 UG/1
2 AEROSOL, METERED ORAL EVERY 6 HOURS
Refills: 0 | Status: DISCONTINUED | OUTPATIENT
Start: 2021-08-02 | End: 2021-08-06

## 2021-08-02 RX ORDER — DONEPEZIL HYDROCHLORIDE 10 MG/1
1 TABLET, FILM COATED ORAL
Qty: 0 | Refills: 0 | DISCHARGE

## 2021-08-02 RX ORDER — SODIUM CHLORIDE 9 MG/ML
1000 INJECTION INTRAMUSCULAR; INTRAVENOUS; SUBCUTANEOUS
Refills: 0 | Status: COMPLETED | OUTPATIENT
Start: 2021-08-02 | End: 2021-08-02

## 2021-08-02 RX ORDER — PANTOPRAZOLE SODIUM 20 MG/1
40 TABLET, DELAYED RELEASE ORAL DAILY
Refills: 0 | Status: DISCONTINUED | OUTPATIENT
Start: 2021-08-02 | End: 2021-08-06

## 2021-08-02 RX ORDER — FLUOXETINE HCL 10 MG
40 CAPSULE ORAL DAILY
Refills: 0 | Status: DISCONTINUED | OUTPATIENT
Start: 2021-08-02 | End: 2021-08-06

## 2021-08-02 RX ADMIN — CEFEPIME 100 MILLIGRAM(S): 1 INJECTION, POWDER, FOR SOLUTION INTRAMUSCULAR; INTRAVENOUS at 13:21

## 2021-08-02 RX ADMIN — CEFEPIME 2000 MILLIGRAM(S): 1 INJECTION, POWDER, FOR SOLUTION INTRAMUSCULAR; INTRAVENOUS at 01:18

## 2021-08-02 RX ADMIN — CEFEPIME 100 MILLIGRAM(S): 1 INJECTION, POWDER, FOR SOLUTION INTRAMUSCULAR; INTRAVENOUS at 22:24

## 2021-08-02 RX ADMIN — PANTOPRAZOLE SODIUM 40 MILLIGRAM(S): 20 TABLET, DELAYED RELEASE ORAL at 11:38

## 2021-08-02 RX ADMIN — Medication 110 MILLIGRAM(S): at 12:13

## 2021-08-02 RX ADMIN — Medication 100 MILLIGRAM(S): at 02:13

## 2021-08-02 RX ADMIN — DONEPEZIL HYDROCHLORIDE 10 MILLIGRAM(S): 10 TABLET, FILM COATED ORAL at 22:27

## 2021-08-02 RX ADMIN — Medication 1 TABLET(S): at 11:38

## 2021-08-02 RX ADMIN — Medication 30 MILLIGRAM(S): at 06:26

## 2021-08-02 RX ADMIN — IRON SUCROSE 110 MILLIGRAM(S): 20 INJECTION, SOLUTION INTRAVENOUS at 15:02

## 2021-08-02 RX ADMIN — ALBUTEROL 2 PUFF(S): 90 AEROSOL, METERED ORAL at 04:04

## 2021-08-02 RX ADMIN — ENOXAPARIN SODIUM 40 MILLIGRAM(S): 100 INJECTION SUBCUTANEOUS at 11:38

## 2021-08-02 RX ADMIN — Medication 110 MILLIGRAM(S): at 23:09

## 2021-08-02 RX ADMIN — Medication 40 MILLIGRAM(S): at 11:38

## 2021-08-02 RX ADMIN — CEFEPIME 100 MILLIGRAM(S): 1 INJECTION, POWDER, FOR SOLUTION INTRAMUSCULAR; INTRAVENOUS at 06:18

## 2021-08-02 RX ADMIN — SODIUM CHLORIDE 100 MILLILITER(S): 9 INJECTION INTRAMUSCULAR; INTRAVENOUS; SUBCUTANEOUS at 04:04

## 2021-08-02 RX ADMIN — Medication 3 MILLILITER(S): at 04:04

## 2021-08-02 NOTE — CONSULT NOTE ADULT - ASSESSMENT
-Lung cancer; adenoCA; s/p chemo, RT  -Cavitary lesion; treatment response vs infection; no cavity there 7/21  -Pneumonia vs +/- radiation changes. Clinical picture consistent with at least in part infection  -Hypoxic resp failure; see above  -Anemia    RECC:  Abx per ID. Follow H/H. Nebs. O2. Steroid taper. Will need outpt f/u imaging.     D/W patient and daughter at bedside. 
66y  Female with h/o Adenocarcinoma of the Lung s/p chemo (carbo-taxol and RT - completed June 2021), History of PNX, Anxiety, Depression, Forgetfulness, Diverticulitis, Positive PPD (treated with INH in 2000), Pulmonary Nodules, Benign Colon Mass s/p resection, s/p Appendectomy, and s/p breast implants. Patient presented to the ED with complaints of worsening SOB and dry cough. Patient states she completed chemo/RT at the end of June (follows with Dr. Simpson) and was scheduled to start immunotherapy. However, she developed a dry cough 2 weeks ago. States the cough was then associated with SOB and worsened for which he followed up with Dr. Simpson on 7/28 and was prescribed Azithromycin and a prednisone taper. States she was also told her pulse ox was 86% at that time and home O2 was arranged but never delivered. Over 4 days prior to presentation her SOB and cough continued to worsen which prompted her to report to the ED. In the ED, patient had a normal WBC count, afebrile, CXR was suspicious of left middle lobe pneumonia for which the patient received Cefepime and Doxycycline. CT chest angio was negative for PE but revealed a new left middle lobe cavitation since last month which was thought to be due to a treatment response.       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   Will Follow

## 2021-08-02 NOTE — H&P ADULT - ASSESSMENT
Patient is a 66 year old female with PMH of Adenocarcinoma of the Lung s/p chemo (carbo-taxol and RT - completed June 2021), Anxiety, Depression, Forgetfulness, Diverticulitis, Positive PPD (treated with INH in 2000), Benign Colon Mass s/p resection, s/p Appendectomy, and Pulmonary Nodules who presented to the ED with complaints of worsening SOB and dry cough.     DVT ppx - Lovenox    Dispo - Continue IV abx; f/u blood cultures. Pulm and ID consult. Patient is a 66 year old female with PMH of Adenocarcinoma of the Lung s/p chemo (carbo-taxol and RT - completed June 2021), Anxiety, Depression, Forgetfulness, Diverticulitis, Positive PPD (treated with INH in 2000), Benign Colon Mass s/p resection, s/p Appendectomy, and Pulmonary Nodules who presented to the ED with complaints of worsening SOB and dry cough; admitted with post-radiation pneumonitis vs pneumonia.     1. Acute Hypoxic Respiratory Failure due to Adenocarcinoma of the lung with progression of GGO and new left cavitation consolidation likely due due to post-radiation pneumonitis vs pneumonia  -admit to monitored +  bed  -completed course of carbo-taxol and RT at the end of June with plans for eventual immunotherapy  -s/p cefepime/doxy and duoneb in the ED  -CXR with large left middle lobe cavitary consolidation; f/u official read  -CT angio negative for PE, but with progression of GGO and new left lung central cavitation since July (treatment response), in addition to post tx pneumonitis vs infection/edema/hemorrhage  -given fevers, chills, cough and immunocompromised state will continue broad spectrum abx  -f/u blood cultures  -RVP negative; COVID 19 negative  -check urine legionella (on Azithromycin since 7/28)  -check procalcitonin  -repeat lactate s/p volume resuscitation  -continue prednisone taper started on 7/28  -history of positive PPD; treated with INH in 2000  -continue supplemental O2 and bronchodilators  -add PRN mucinex  -pulmonary and ID consults requested  -outpatient follow up with Dr. Simpson     2. Anxiety/Depression  -stable mood  -continue fluoxetine    3. Forgetfulness  -continue donepezil   -check B12, TSH  -outpatient follow up with neurology    4. History of Diverticulitis  -one isolated episode of diarrhea on 8/1 likely due to antibiotics  -benign abdominal exam  -defer probiotic due to immunocompromised state  -add PPI while on steroids    5. Anemia  -appears chronic, likely bone marrow suppression s/p chemo  -check iron studies, B12, folate  -no overt signs/symptoms of bleeding  -monitor CBC    DVT ppx - Lovenox    Dispo - Continue IV abx; f/u blood cultures. Pulm and ID consult.

## 2021-08-02 NOTE — CHART NOTE - NSCHARTNOTEFT_GEN_A_CORE
c/o sob,cough. no fever,chill,cp. cta chest neg PE.? PNA/PNEUMONITIS  continue cefepime/doxy. f/u c/s. f/u id,pulm  rec. continue current meds and care. communicate via  # [id number]    c/o sob,cough. no fever,chill,cp. cta chest neg PE.? PNA/PNEUMONITIS. on 4l nc 98%. sob on exertion. s/p chem/RAT for lung cancer.  continue cefepime/doxy. f/u c/s. f/u id,pulm  rec. continue current meds and care.    T(C): 36.7 (08-02-21 @ 07:27), Max: 36.9 (08-02-21 @ 00:07)  HR: 77 (08-02-21 @ 07:27) (70 - 79)  BP: 124/78 (08-02-21 @ 07:27) (118/54 - 136/68)  RR: 22 (08-02-21 @ 07:27) (20 - 25)  SpO2: 98% (08-02-21 @ 07:27) (93% - 98%)    GEN - NAD  HEENT - NCAT, EOMI, ELIDA,   RESP - CTA BL, no wheeze//rhonchi/crackles.   CARDIO - NS1S2, RRR. No murmurs/rubs/gallops.  ABD - Soft/Non tender/Non distended. Normal BS x4 quadrants.   Ext - No MARISA. no signs of venous/arterial stasis ulcers  MSK - full ROM of BL upper and lower extremities without pain or restriction. BL 5/5 strength on upper and lower extremities.   Neuro - cn 2-12 grossly intact. no tremor. gait not observed.   Skin - clean, dry, intact.   Psych- AAOx3.  attentive. normal affect.  care of plan dw pt  zachary rn

## 2021-08-02 NOTE — H&P ADULT - HISTORY OF PRESENT ILLNESS
Patient is a  Patient is a 66 year old female with PMH of Adenocarcinoma of the Lung s/p chemo (carbo-taxol and RT - completed June 2021), Anxiety, Depression, Forgetfulness, Diverticulitis, Positive PPD (treated with INH in 2000), Benign Colon Mass s/p resection, s/p Appendectomy, and Pulmonary Nodules who presented to the ED with complaints of worsening SOB and dry cough. Patient states she completed chemo/RT at the end of June (follows with Dr. Simpson) and was scheduled to start immunotherapy. However, she developed a dry cough 2 weeks ago. States the cough was then associated with SOB and worsened for which he followed up with Dr. Simpson on 7/28 and was prescribed Azithromycin and a prednisone taper. States she was also told her pulse ox was 86% at that time and home O2 was arranged but never delivered. Over the past 4 days, patient reports her SOB and cough continued to worsen which prompted her to report to the ED. In the ED, patient had labs which revealed a normal WBC count, but with a left shift. CXR was suspicious of left middle lobe pneumonia for which the patient received Cefepime and Doxycycline given her immunocompromised state. A stat CT angio was done which was negative for PE but revealed a new left middle lobe cavitation since last month which was thought to be due to a treatment response. Patient was seen and examined, reports symptoms have improved with treatment. Reports one isolated temp of 100.6 at home. Denies dizziness, lightheadedness, chest pain, nausea/vomiting. However, does report pleuritic pain when coughing, in addition to one episode of watery diarrhea Sunday morning which has since resolved. Patient is a 66 year old female with PMH of Adenocarcinoma of the Lung s/p chemo (carbo-taxol and RT - completed June 2021), History of PNX, Anxiety, Depression, Forgetfulness, Diverticulitis, Positive PPD (treated with INH in 2000), Pulmonary Nodules, Benign Colon Mass s/p resection, s/p Appendectomy, and s/p breast implants who presented to the ED with complaints of worsening SOB and dry cough. Patient states she completed chemo/RT at the end of June (follows with Dr. Simpson) and was scheduled to start immunotherapy. However, she developed a dry cough 2 weeks ago. States the cough was then associated with SOB and worsened for which he followed up with Dr. Simpson on 7/28 and was prescribed Azithromycin and a prednisone taper. States she was also told her pulse ox was 86% at that time and home O2 was arranged but never delivered. Over the past 4 days, patient reports her SOB and cough continued to worsen which prompted her to report to the ED. In the ED, patient had labs which revealed a normal WBC count, but with a left shift. CXR was suspicious of left middle lobe pneumonia for which the patient received Cefepime and Doxycycline given her immunocompromised state. A stat CT angio was done which was negative for PE but revealed a new left middle lobe cavitation since last month which was thought to be due to a treatment response. Patient was seen and examined, reports symptoms have improved with treatment. Reports one isolated temp of 100.6 at home. Denies dizziness, lightheadedness, chest pain, nausea/vomiting. However, does report pleuritic pain when coughing, in addition to one episode of watery diarrhea Sunday morning which has since resolved.

## 2021-08-02 NOTE — H&P ADULT - NSICDXPASTMEDICALHX_GEN_ALL_CORE_FT
PAST MEDICAL HISTORY:  Acute lateral meniscal injury of right knee, initial encounter     Acute medial meniscus tear of right knee, initial encounter     At risk for sleep apnea Bang score 3    Depression     Fatty liver     H/O pneumothorax following lung biopsy right lung    Hyperlipidemia     Malignant neoplasm of unspecified part of unspecified bronchus or lung     Osteoarthritis right    Osteoporosis     Patient is [employer]     PPD positive 2000 -  treated with INH    Pulmonary mass monitoring -no surgical intervention    Small bowel mass incidental finding while appendectomy ( Benign s/p colon mass resection - 3/2018)

## 2021-08-02 NOTE — CONSULT NOTE ADULT - SUBJECTIVE AND OBJECTIVE BOX
[facility]  INFECTIOUS DISEASES AND INTERNAL MEDICINE at [place]  =======================================================  Christopher Long MD  Diplomates American Board of Internal Medicine and Infectious Diseases  Tel: [phone]      Fax: [phone]  =======================================================      N-[id number]  SHANON BEE    CC: Patient is a 66y old  Female who presents with a chief complaint of SOB (02 Aug 2021 01:40)      66y  Female with h/o Adenocarcinoma of the Lung s/p chemo (carbo-taxol and RT - completed 2021), History of PNX, Anxiety, Depression, Forgetfulness, Diverticulitis, Positive PPD (treated with INH in ), Pulmonary Nodules, Benign Colon Mass s/p resection, s/p Appendectomy, and s/p breast implants. Patient presented to the ED with complaints of worsening SOB and dry cough. Patient states she completed chemo/RT at the end of  (follows with Dr. Simpson) and was scheduled to start immunotherapy. However, she developed a dry cough 2 weeks ago. States the cough was then associated with SOB and worsened for which he followed up with Dr. Simpson on  and was prescribed Azithromycin and a prednisone taper. States she was also told her pulse ox was 86% at that time and home O2 was arranged but never delivered. Over 4 days prior to presentation her SOB and cough continued to worsen which prompted her to report to the ED. In the ED, patient had a normal WBC count, afebrile, CXR was suspicious of left middle lobe pneumonia for which the patient received Cefepime and Doxycycline. CT chest angio was negative for PE but revealed a new left middle lobe cavitation since last month which was thought to be due to a treatment response. ID input requested.       Past Medical & Surgical Hx:  Pulmonary mass  PPD positive  -  treated with INH  Small bowel mass  incidental finding while appendectomy ( Benign s/p colon mass resection - 3/2018)  Acute medial meniscus tear of right knee, initial encounter  Acute lateral meniscal injury of right knee, initial encounter  Patient is Drake&#x27;s Witness  Fatty liver  Osteoarthritis right  Depression  Hyperlipidemia  At risk for sleep apnea  Bang score 3  Malignant neoplasm of unspecified part of unspecified bronchus or lung  H/O pneumothorax following lung biopsy right lung  Osteoporosis  S/P appendectomy 2018  S/P  section long time ago  History of breast implant 15 years ago  H/O cosmetic plastic surgery  liposuction - 30 years ago  History of colon surgery 3/2018 ( small colon mass resection - benign )      Social Hx:  Denies smoking      FAMILY HISTORY:  Depression - Mother and daughter      Allergies  No Known Allergies       REVIEW OF SYSTEMS:  CONSTITUTIONAL:  No Fever or chills  HEENT:  No diplopia or blurred vision.  No earache, sore throat or runny nose.  CARDIOVASCULAR:  No pressure, squeezing, strangling, tightness, heaviness or aching about the chest, neck, axilla or epigastrium.  RESPIRATORY:  + cough,  +shortness of breath  GASTROINTESTINAL:  No nausea, vomiting or diarrhea.  GENITOURINARY:  No dysuria, frequency or urgency. No Blood in urine  MUSCULOSKELETAL:  no joint aches, no muscle pain  SKIN:  No change in skin, hair or nails.  NEUROLOGIC:  No Headaches, seizures or weakness.  PSYCHIATRIC:  No disorder of thought or mood.  ENDOCRINE:  No heat or cold intolerance  HEMATOLOGICAL:  No easy bruising or bleeding.       Physical Exam:  GEN: NAD, pleasant  HEENT: normocephalic and atraumatic. EOMI. PERRL.  Anicteric  NECK: Supple.   LUNGS: Coarse BS B/L   HEART: Regular rate and rhythm   ABDOMEN: Soft, nontender, and nondistended.  Positive bowel sounds.    : No CVA tenderness  EXTREMITIES: Without any edema.  MSK: No joint swelling  NEUROLOGIC: No Focal Deficits  PSYCHIATRIC: Appropriate affect .  SKIN: No Rash    Height (cm): 167.6 ( @ 20:44)  Weight (kg): 73.9 ( @ 20:44)  BMI (kg/m2): 26.3 ( @ 20:44)  BSA (m2): 1.83 ( @ 20:44)      Vitals:  T(F): 98.1 (02 Aug 2021 11:27), Max: 98.5 (02 Aug 2021 00:07)  HR: 73 (02 Aug 2021 11:27)  BP: 112/67 (02 Aug 2021 11:27)  RR: 20 (02 Aug 2021 11:27)  SpO2: 98% (02 Aug 2021 11:27) (93% - 98%)  temp max in last 48H T(F): , Max: 98.5 (21 @ 00:07)    Current Antibiotics:  cefepime   IVPB      cefepime   IVPB 2000 milliGRAM(s) IV Intermittent every 8 hours  doxycycline IVPB      doxycycline IVPB 100 milliGRAM(s) IV Intermittent every 12 hours    Other medications:  donepezil 10 milliGRAM(s) Oral at bedtime  enoxaparin Injectable 40 milliGRAM(s) SubCutaneous daily  FLUoxetine 40 milliGRAM(s) Oral daily  multivitamin 1 Tablet(s) Oral daily  pantoprazole   Suspension 40 milliGRAM(s) Oral daily  predniSONE   Tablet 30 milliGRAM(s) Oral daily                            9.9    9.21  )-----------( 449      ( 02 Aug 2021 03:59 )             31.5         136  |  101  |  15.6  ----------------------------<  99  4.0   |  23.0  |  0.63    Ca    8.9      02 Aug 2021 03:59  Phos  3.7       Mg     2.1         TPro  7.1  /  Alb  3.7  /  TBili  <0.2<L>  /  DBili  x   /  AST  20  /  ALT  19  /  AlkPhos  121<H>          WBC Count: 9.21 K/uL (21 @ 03:59)  WBC Count: 7.96 K/uL (21 @ 21:18)  WBC Count: 6.3 K/uL (21 @ 14:19)    Creatinine, Serum: 0.63 mg/dL (21 @ 03:59)  Creatinine, Serum: 0.73 mg/dL (21 @ 21:18)    Ferritin, Serum: 176 ng/mL (21 @ 03:59)    Procalcitonin, Serum: 0.06 ng/mL (21 @ 03:59)     SARS-CoV-2: NotDetec (21 @ 23:35)  Rapid RVP Result: NotDetec (21 @ 23:35)        < from: CT Angio Chest PE Protocol w/ IV Cont (21 @ 00:03) >     EXAM:  CT ANGIO CHEST PULM [facility]                          PROCEDURE DATE:  2021          INTERPRETATION:  CLINICAL INFORMATION: Lung cancer.  Worsening shortness of breath since latest round of radiotherapy three weeks ago.  Rule out pulmonary embolism.    COMPARISON: 2021.    CONTRAST/COMPLICATIONS:  IV Contrast: Omnipaque 350  53 cc administered   47 cc discarded  Oral Contrast: NONE  Complications: None reported at time of study completion    PROCEDURE:  CT Angiography of the Chest.  Sagittal and coronal reformats were performed as well as 3D (MIP) reconstructions.    FINDINGS:    LUNGS AND AIRWAYS: Patent central airways.  Approximately 4 x 2.5 cm left upper lobe mass (5:57) is stable in size; central cavitation within this mass, new since 2021, likely reflects treatment response.  A 1.3 x 1 cm right upper lobe pulmonary nodule (5:48) is grossly stable in size since 2021.    Extensive confluent groundglass opacity in the left upper lobe and superior segment of the left lower lobe is significantly progressed from 2021. Moderate patchy groundglass opacity in the right upper lobe and mild groundglass opacity in the right lower lobe are also progressed from 2021; this may represent posttreatment pneumonitis versus infection, edema or hemorrhage.      PLEURA: No pleural effusion.  MEDIASTINUM AND STEVEN: No lymphadenopathy.  VESSELS: No pulmonary embolism.  Mild atherosclerotic calcification of the aortic arch and arch vessels.  No thoracic aortic aneurysm or gross dissection.  HEART: Heart size is normal. No pericardial effusion.  CHEST WALL AND LOWER NECK: Within normal limits.  Bilateral breast implants in place.  VISUALIZED UPPER ABDOMEN: Within normal limits.  BONES: No acute fracture or suspicious osseous lesion.  Small anterior vertebral marginal osteophytes in the thoracic spine.    IMPRESSION:  No pulmonary embolism.    Left upper lobe mass is stable in size but demonstrates central cavitation that likely reflects treatment response.    A 1.3 x 1 cm right upper lobe pulmonary nodule is stable in size.    There is progression of groundglass opacity in both lungs which may represent posttreatment pneumonitis versus infection, edema or hemorrhage.      < end of copied text >  
PULMONARY CONSULT NOTE      SHANON BEEONI-[id number]    Patient is a 66y old  Female who presents with a chief complaint of SOB (02 Aug 2021 13:09)      HISTORY OF PRESENT ILLNESS:  66 year old female with PMH of Adenocarcinoma of the Lung s/p chemo (carbo-taxol and RT - completed 2021), History of PNX, Anxiety, Depression, Forgetfulness, Diverticulitis, Positive PPD (treated with INH in ), Pulmonary Nodules, Benign Colon Mass s/p resection, s/p Appendectomy, and s/p breast implants who presented to the ED with complaints of worsening SOB and dry cough. Patient states she completed chemo/RT at the end of  (follows with Dr. Simpson) and was scheduled to start immunotherapy. However, she developed a dry cough 2 weeks ago. States the cough was then associated with SOB and worsened for which he followed up with Dr. Simpson on  and was prescribed Azithromycin and a prednisone taper. States she was also told her pulse ox was 86% at that time and home O2 was arranged but never delivered. Over the past 4 days, patient reports her SOB and cough continued to worsen which prompted her to report to the ED. In the ED, patient had labs which revealed a normal WBC count, but with a left shift. CXR was suspicious of left middle lobe pneumonia for which the patient received Cefepime and Doxycycline given her immunocompromised state. A stat CT angio was done which was negative for PE but revealed a new left middle lobe cavitation since last month which was thought to be due to a treatment response. Patient was seen and examined, reports symptoms have improved with treatment. Reports one isolated temp of 100.6 at home. Denies dizziness, lightheadedness, chest pain, nausea/vomiting. However, does report pleuritic pain when coughing, in addition to one episode of watery diarrhea  morning which has since resolved.    She reports she is feeling better since admission.     On NCO2.     MEDICATIONS  (STANDING):  cefepime   IVPB      cefepime   IVPB 2000 milliGRAM(s) IV Intermittent every 8 hours  donepezil 10 milliGRAM(s) Oral at bedtime  doxycycline IVPB      doxycycline IVPB 100 milliGRAM(s) IV Intermittent every 12 hours  enoxaparin Injectable 40 milliGRAM(s) SubCutaneous daily  FLUoxetine 40 milliGRAM(s) Oral daily  iron sucrose IVPB 200 milliGRAM(s) IV Intermittent every 24 hours  multivitamin 1 Tablet(s) Oral daily  pantoprazole   Suspension 40 milliGRAM(s) Oral daily  predniSONE   Tablet 30 milliGRAM(s) Oral daily      MEDICATIONS  (PRN):  acetaminophen   Tablet .. 650 milliGRAM(s) Oral every 6 hours PRN Temp greater or equal to 38C (100.4F), Mild Pain (1 - 3)  ALBUTerol    90 MICROgram(s) HFA Inhaler 2 Puff(s) Inhalation every 6 hours PRN Shortness of Breath and/or Wheezing  albuterol/ipratropium for Nebulization 3 milliLiter(s) Nebulizer every 6 hours PRN Shortness of Breath and/or Wheezing  guaiFENesin  milliGRAM(s) Oral every 12 hours PRN Cough      Allergies    No Known Allergies    Intolerances        PAST MEDICAL & SURGICAL HISTORY:  Pulmonary mass  monitoring -no surgical intervention    PPD positive   -  treated with INH    Small bowel mass  incidental finding while appendectomy ( Benign s/p colon mass resection - 3/2018)    Acute medial meniscus tear of right knee, initial encounter    Acute lateral meniscal injury of right knee, initial encounter    Patient is Jeelvah&#x27;s Witness    Fatty liver    Osteoarthritis  right    Depression    Hyperlipidemia    At risk for sleep apnea  Bang score 3    Malignant neoplasm of unspecified part of unspecified bronchus or lung    H/O pneumothorax  following lung biopsy right lung    Osteoporosis    S/P appendectomy  2018    S/P  section  long time ago    History of breast implant  15 years ago    H/O cosmetic plastic surgery  liposuction - 30 years ago    History of colon surgery  3/2018 ( small colon mass resection - benign )        FAMILY HISTORY:      SOCIAL HISTORY  Smoking History:     REVIEW OF SYSTEMS:    CONSTITUTIONAL:  per HPI    HEENT:  Eyes:  No diplopia or blurred vision. ENT:  No earache, sore throat or runny nose.    CARDIOVASCULAR:  No pressure, squeezing, tightness, or heaviness about the chest; no palpitations.    RESPIRATORY: per HPI      GASTROINTESTINAL:  No abdominal pain, nausea, vomiting or diarrhea.    GENITOURINARY:  No dysuria, frequency or urgency.    NEUROLOGIC:  No paresthesias, fasciculations, seizures or weakness.    PSYCHIATRIC:  No disorder of thought or mood.    Vital Signs Last 24 Hrs  T(C): 36.7 (02 Aug 2021 11:27), Max: 36.9 (02 Aug 2021 00:07)  T(F): 98.1 (02 Aug 2021 11:27), Max: 98.5 (02 Aug 2021 00:07)  HR: 73 (02 Aug 2021 11:27) (70 - 79)  BP: 112/67 (02 Aug 2021 11:27) (112/67 - 136/68)  BP(mean): 88 (02 Aug 2021 06:21) (88 - 91)  RR: 20 (02 Aug 2021 11:27) (20 - 25)  SpO2: 98% (02 Aug 2021 11:27) (93% - 98%)    PHYSICAL EXAMINATION:    GENERAL: The patient is  in no apparent distress.     HEENT: Head is normocephalic and atraumatic.  Mucous membranes are moist.     NECK: Supple.     LUNGS: rales L>R,  Respirations unlabored    HEART: Regular rate and rhythm without murmur.    ABDOMEN: Soft, nontender, and nondistended.  No hepatosplenomegaly is noted.    EXTREMITIES: Without any cyanosis, clubbing, rash, lesions or edema.         LABS:                        9.9    9.21  )-----------( 449      ( 02 Aug 2021 03:59 )             31.5     08-    136  |  101  |  15.6  ----------------------------<  99  4.0   |  23.0  |  0.63    Ca    8.9      02 Aug 2021 03:59  Phos  3.7     08-  Mg     2.1         TPro  7.1  /  Alb  3.7  /  TBili  <0.2<L>  /  DBili  x   /  AST  20  /  ALT  19  /  AlkPhos  121<H>                 Procalcitonin, Serum: 0.06 ng/mL (21 @ 03:59)      MICROBIOLOGY:   Respiratory Viral Panel with COVID-19 by BILL (21 @ 23:35)    Rapid RVP Result: [facility]    SARS-CoV-2: [facility]: This Respiratory Panel uses polymerase chain reaction (PCR) to detect for  adenovirus; coronavirus (HKU1, NL63, 229E, OC43); human metapneumovirus  (hMPV); human enterovirus/rhinovirus (Entero/RV); influenza A; influenza  A/H1; influenza A/H3; influenza A/H1-2009; influenza B; parainfluenza  viruses 1, 2, 3, 4; respiratory syncytial virus; Mycoplasma pneumoniae;  Chlamydophila pneumoniae; and SARS-CoV-2.        RADIOLOGY & ADDITIONAL STUDIES:  < from: CT Angio Chest PE Protocol w/ IV Cont (21 @ 00:03) >     EXAM:  CT ANGIO CHEST PULM [facility]                          PROCEDURE DATE:  2021          INTERPRETATION:  CLINICAL INFORMATION: Lung cancer.  Worsening shortness of breath since latest round of radiotherapy three weeks ago.  Rule out pulmonary embolism.    COMPARISON: 2021.    CONTRAST/COMPLICATIONS:  IV Contrast: Omnipaque 350  53 cc administered   47 cc discarded  Oral Contrast: NONE  Complications: None reported at time of study completion    PROCEDURE:  CT Angiography of the Chest.  Sagittal and coronal reformats were performed as well as 3D (MIP) reconstructions.    FINDINGS:    LUNGS AND AIRWAYS: Patent central airways.  Approximately 4 x 2.5 cm left upper lobe mass (5:57) is stable in size; central cavitation within this mass, new since 2021, likely reflects treatment response.  A 1.3 x 1 cm right upper lobe pulmonary nodule (5:48) is grossly stable in size since 2021.    Extensive confluent groundglass opacity in the left upper lobe and superior segment of the left lower lobe is significantly progressed from 2021. Moderate patchy groundglass opacity in the right upper lobe and mild groundglass opacity in the right lower lobe are also progressed from 2021; this may represent posttreatment pneumonitis versus infection, edema or hemorrhage.      PLEURA: No pleural effusion.  MEDIASTINUM AND STEVEN: No lymphadenopathy.  VESSELS: No pulmonary embolism.  Mild atherosclerotic calcification of the aortic arch and arch vessels.  No thoracic aortic aneurysm or gross dissection.  HEART: Heart size is normal. No pericardial effusion.  CHEST WALL AND LOWER NECK: Within normal limits.  Bilateral breast implants in place.  VISUALIZED UPPER ABDOMEN: Within normal limits.  BONES: No acute fracture or suspicious osseous lesion.  Small anterior vertebral marginal osteophytes in the thoracic spine.    IMPRESSION:  No pulmonary embolism.    Left upper lobe mass is stable in size but demonstrates central cavitation that likely reflects treatment response.    A 1.3 x 1 cm right upper lobe pulmonary nodule is stable in size.    There is progression of groundglass opacity in both lungs which may represent posttreatment pneumonitis versus infection, edema or hemorrhage.        --- End of Report ---            ALINA ROSSI MD; Attending Radiologist  This document has been electronically signed. Aug  2 2021  1:59AM    < end of copied text >    < from: CT Chest w/ IV Cont (21 @ 15:50) >  EXAM:  CT CHEST IC        PROCEDURE DATE:  2021           INTERPRETATION:  CLINICAL INFORMATION: Malignant neoplasm of left upper lobe lung status post chemoradiation    COMPARISON: Chest CT 2021, CT chest/abdomen/pelvis 2020    CONTRAST/COMPLICATIONS:  IV Contrast: Omnipaque 350 (accession [id number]), IV contrast documented in associated exam (accession [id number])  90 cc administered   10 cc discarded  Oral Contrast: Omnipaque 300 (accession [id number]), NONE (accession [id number])  Complications: None reported at time of study completion    PROCEDURE:  CT of the Chest and Abdomen was performed.  Sagittal and coronal reformats were performed.    FINDINGS:  CHEST:  LUNGS AND LARGE AIRWAYS: The central airways are patent.  *  Overall stability of left upper lobe mass measuring 4.3 x 2.8 cm (4-38), previously 4.2 x 3.2 cm on 2021, previously 4.0 x 2.6 cm on 2020.  *  Additional 1.3 cm right upper lobe nodule is unchanged (4-32).  *  There is new patchy groundglass opacities surrounding both upper lobe lesions, which may represent treatment effect versus superimposed infection.  PLEURA: No pleural abnormality.  VESSELS: Normal caliber aorta. Main pulmonary arteries are patent.  HEART: Normal heart size. No pericardial effusion.  MEDIASTINUM AND STEVEN: No adenopathy.  CHEST WALL AND LOWER NECK: No masses. Bilateral breast implants are intact.    < end of copied text >

## 2021-08-03 ENCOUNTER — NON-APPOINTMENT (OUTPATIENT)
Age: 66
End: 2021-08-03

## 2021-08-03 LAB
ALBUMIN SERPL ELPH-MCNC: 4 G/DL
ALP BLD-CCNC: 117 U/L
ALT SERPL-CCNC: 12 U/L
ANION GAP SERPL CALC-SCNC: 11 MMOL/L — SIGNIFICANT CHANGE UP (ref 5–17)
ANION GAP SERPL CALC-SCNC: 14 MMOL/L
AST SERPL-CCNC: 22 U/L
BASOPHILS # BLD AUTO: 0 K/UL — SIGNIFICANT CHANGE UP (ref 0–0.2)
BASOPHILS NFR BLD AUTO: 0 % — SIGNIFICANT CHANGE UP (ref 0–2)
BILIRUB SERPL-MCNC: 0.2 MG/DL
BUN SERPL-MCNC: 10 MG/DL
BUN SERPL-MCNC: 12.8 MG/DL — SIGNIFICANT CHANGE UP (ref 8–20)
CALCIUM SERPL-MCNC: 8.7 MG/DL — SIGNIFICANT CHANGE UP (ref 8.6–10.2)
CALCIUM SERPL-MCNC: 9.2 MG/DL
CEA SERPL-MCNC: 14.6 NG/ML
CHLORIDE SERPL-SCNC: 101 MMOL/L
CHLORIDE SERPL-SCNC: 101 MMOL/L — SIGNIFICANT CHANGE UP (ref 98–107)
CO2 SERPL-SCNC: 24 MMOL/L
CO2 SERPL-SCNC: 26 MMOL/L — SIGNIFICANT CHANGE UP (ref 22–29)
CREAT SERPL-MCNC: 0.68 MG/DL — SIGNIFICANT CHANGE UP (ref 0.5–1.3)
CREAT SERPL-MCNC: 0.7 MG/DL
EOSINOPHIL # BLD AUTO: 0.16 K/UL — SIGNIFICANT CHANGE UP (ref 0–0.5)
EOSINOPHIL NFR BLD AUTO: 2.6 % — SIGNIFICANT CHANGE UP (ref 0–6)
GIANT PLATELETS BLD QL SMEAR: PRESENT — SIGNIFICANT CHANGE UP
GLUCOSE SERPL-MCNC: 134 MG/DL
GLUCOSE SERPL-MCNC: 86 MG/DL — SIGNIFICANT CHANGE UP (ref 70–99)
HCT VFR BLD CALC: 32.1 % — LOW (ref 34.5–45)
HGB BLD-MCNC: 10.1 G/DL — LOW (ref 11.5–15.5)
LEGIONELLA AG UR QL: NEGATIVE — SIGNIFICANT CHANGE UP
LEGIONELLA AG UR QL: NEGATIVE — SIGNIFICANT CHANGE UP
LYMPHOCYTES # BLD AUTO: 0.75 K/UL — LOW (ref 1–3.3)
LYMPHOCYTES # BLD AUTO: 12.2 % — LOW (ref 13–44)
MAGNESIUM SERPL-MCNC: 2.2 MG/DL
MANUAL SMEAR VERIFICATION: SIGNIFICANT CHANGE UP
MCHC RBC-ENTMCNC: 30.6 PG — SIGNIFICANT CHANGE UP (ref 27–34)
MCHC RBC-ENTMCNC: 31.5 GM/DL — LOW (ref 32–36)
MCV RBC AUTO: 97.3 FL — SIGNIFICANT CHANGE UP (ref 80–100)
MONOCYTES # BLD AUTO: 0.48 K/UL — SIGNIFICANT CHANGE UP (ref 0–0.9)
MONOCYTES NFR BLD AUTO: 7.8 % — SIGNIFICANT CHANGE UP (ref 2–14)
MYELOCYTES NFR BLD: 0.9 % — HIGH (ref 0–0)
NEUTROPHILS # BLD AUTO: 4.67 K/UL — SIGNIFICANT CHANGE UP (ref 1.8–7.4)
NEUTROPHILS NFR BLD AUTO: 75.6 % — SIGNIFICANT CHANGE UP (ref 43–77)
PLAT MORPH BLD: NORMAL — SIGNIFICANT CHANGE UP
PLATELET # BLD AUTO: 413 K/UL — HIGH (ref 150–400)
POTASSIUM SERPL-MCNC: 4.2 MMOL/L — SIGNIFICANT CHANGE UP (ref 3.5–5.3)
POTASSIUM SERPL-SCNC: 4.2 MMOL/L — SIGNIFICANT CHANGE UP (ref 3.5–5.3)
POTASSIUM SERPL-SCNC: 4.5 MMOL/L
PROT SERPL-MCNC: 6.9 G/DL
RBC # BLD: 3.3 M/UL — LOW (ref 3.8–5.2)
RBC # FLD: 15 % — HIGH (ref 10.3–14.5)
RBC BLD AUTO: NORMAL — SIGNIFICANT CHANGE UP
SODIUM SERPL-SCNC: 138 MMOL/L — SIGNIFICANT CHANGE UP (ref 135–145)
SODIUM SERPL-SCNC: 139 MMOL/L
VARIANT LYMPHS # BLD: 0.9 % — SIGNIFICANT CHANGE UP (ref 0–6)
WBC # BLD: 6.18 K/UL — SIGNIFICANT CHANGE UP (ref 3.8–10.5)
WBC # FLD AUTO: 6.18 K/UL — SIGNIFICANT CHANGE UP (ref 3.8–10.5)

## 2021-08-03 PROCEDURE — 99232 SBSQ HOSP IP/OBS MODERATE 35: CPT

## 2021-08-03 PROCEDURE — 99233 SBSQ HOSP IP/OBS HIGH 50: CPT

## 2021-08-03 RX ADMIN — Medication 30 MILLIGRAM(S): at 05:32

## 2021-08-03 RX ADMIN — Medication 110 MILLIGRAM(S): at 12:05

## 2021-08-03 RX ADMIN — IRON SUCROSE 110 MILLIGRAM(S): 20 INJECTION, SOLUTION INTRAVENOUS at 13:11

## 2021-08-03 RX ADMIN — DONEPEZIL HYDROCHLORIDE 10 MILLIGRAM(S): 10 TABLET, FILM COATED ORAL at 21:15

## 2021-08-03 RX ADMIN — PANTOPRAZOLE SODIUM 40 MILLIGRAM(S): 20 TABLET, DELAYED RELEASE ORAL at 12:08

## 2021-08-03 RX ADMIN — ENOXAPARIN SODIUM 40 MILLIGRAM(S): 100 INJECTION SUBCUTANEOUS at 12:07

## 2021-08-03 RX ADMIN — Medication 110 MILLIGRAM(S): at 23:43

## 2021-08-03 RX ADMIN — Medication 40 MILLIGRAM(S): at 12:07

## 2021-08-03 RX ADMIN — CEFEPIME 100 MILLIGRAM(S): 1 INJECTION, POWDER, FOR SOLUTION INTRAMUSCULAR; INTRAVENOUS at 13:11

## 2021-08-03 RX ADMIN — Medication 1 TABLET(S): at 12:06

## 2021-08-03 RX ADMIN — CEFEPIME 100 MILLIGRAM(S): 1 INJECTION, POWDER, FOR SOLUTION INTRAMUSCULAR; INTRAVENOUS at 05:32

## 2021-08-03 RX ADMIN — CEFEPIME 100 MILLIGRAM(S): 1 INJECTION, POWDER, FOR SOLUTION INTRAMUSCULAR; INTRAVENOUS at 21:14

## 2021-08-03 NOTE — PROGRESS NOTE ADULT - ASSESSMENT
66y  Female with h/o Adenocarcinoma of the Lung s/p chemo (carbo-taxol and RT - completed June 2021), History of PNX, Anxiety, Depression, Forgetfulness, Diverticulitis, Positive PPD (treated with INH in 2000), Pulmonary Nodules, Benign Colon Mass s/p resection, s/p Appendectomy, and s/p breast implants. Patient presented to the ED with complaints of worsening SOB and dry cough. Patient states she completed chemo/RT at the end of June (follows with Dr. Simpson) and was scheduled to start immunotherapy. However, she developed a dry cough 2 weeks ago. States the cough was then associated with SOB and worsened for which he followed up with Dr. Simpson on 7/28 and was prescribed Azithromycin and a prednisone taper. States she was also told her pulse ox was 86% at that time and home O2 was arranged but never delivered. Over 4 days prior to presentation her SOB and cough continued to worsen which prompted her to report to the ED. In the ED, patient had a normal WBC count, afebrile, CXR was suspicious of left middle lobe pneumonia for which the patient received Cefepime and Doxycycline. CT chest angio was negative for PE but revealed a new left middle lobe cavitation since last month which was thought to be due to a treatment response.       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   Will Follow    d/w Dr Holguin

## 2021-08-03 NOTE — PROGRESS NOTE ADULT - ASSESSMENT
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     1. Acute Hypoxic Respiratory Failure due to Adenocarcinoma of the lung with progression of GGO and new left cavitation consolidation likely due due to post-radiation pneumonitis vs pneumonia  -completed course of carbo-taxol and RT at the end of June with plans for eventual immunotherapy  -s/p cefepime/doxy and duoneb in the ED. continue abx, started tapering dose of po steroid . f/u pulm rec  -CXR with large left middle lobe cavitary consolidation; f/u official read  -CT angio negative for PE, but with progression of GGO and new left lung central cavitation since July (treatment response), in addition to post tx pneumonitis vs infection/edema/hemorrhage  -given fevers, chills, cough and immunocompromised state will continue broad spectrum abx  -f/u blood cultures  -RVP negative; COVID 19 negative  -check urine legionella (on Azithromycin since 7/28)  - procalcitonin  -repeat lactate s/p volume resuscitation stable  -continue prednisone taper started on 7/28  -history of positive PPD; treated with INH in 2000  -continue supplemental O2 and bronchodilators  -add PRN mucinex  -outpatient follow up with Dr. Simpson     2. Anxiety/Depression  -stable mood  -continue fluoxetine    3. Forgetfulness  -continue donepezil   -check B12, TSH  -outpatient follow up with neurology    4. History of Diverticulitis  -one isolated episode of diarrhea on 8/1 likely due to antibiotics  -benign abdominal exam  -defer probiotic due to immunocompromised state  -add PPI while on steroids    5. Anemia  -appears chronic, likely bone marrow suppression s/p chemo  -hb stable am  -no overt signs/symptoms of bleeding  -monitor CBC    DVT ppx - Lovenox    Dispo - Continue IV abx; f/u blood cultures  care of plan dw pt

## 2021-08-03 NOTE — PROGRESS NOTE ADULT - ASSESSMENT
-Lung cancer; adenoCA; s/p chemo, RT  -Cavitary lesion; treatment response vs infection; no cavity there 7/21  -Pneumonia vs +/- radiation changes. Clinical picture consistent with at least in part infection  -Hypoxic resp failure; see above  -Anemia    RECC:  Abx per ID. Follow H/H. Nebs. O2. Steroid taper. Will need outpt f/u imaging.

## 2021-08-04 PROCEDURE — 99232 SBSQ HOSP IP/OBS MODERATE 35: CPT

## 2021-08-04 PROCEDURE — 99233 SBSQ HOSP IP/OBS HIGH 50: CPT

## 2021-08-04 RX ADMIN — Medication 600 MILLIGRAM(S): at 14:17

## 2021-08-04 RX ADMIN — Medication 1 TABLET(S): at 11:25

## 2021-08-04 RX ADMIN — Medication 110 MILLIGRAM(S): at 11:22

## 2021-08-04 RX ADMIN — ENOXAPARIN SODIUM 40 MILLIGRAM(S): 100 INJECTION SUBCUTANEOUS at 11:25

## 2021-08-04 RX ADMIN — Medication 40 MILLIGRAM(S): at 11:26

## 2021-08-04 RX ADMIN — IRON SUCROSE 110 MILLIGRAM(S): 20 INJECTION, SOLUTION INTRAVENOUS at 14:16

## 2021-08-04 RX ADMIN — CEFEPIME 100 MILLIGRAM(S): 1 INJECTION, POWDER, FOR SOLUTION INTRAMUSCULAR; INTRAVENOUS at 21:38

## 2021-08-04 RX ADMIN — Medication 110 MILLIGRAM(S): at 21:38

## 2021-08-04 RX ADMIN — Medication 100 MILLIGRAM(S): at 23:47

## 2021-08-04 RX ADMIN — DONEPEZIL HYDROCHLORIDE 10 MILLIGRAM(S): 10 TABLET, FILM COATED ORAL at 21:39

## 2021-08-04 RX ADMIN — PANTOPRAZOLE SODIUM 40 MILLIGRAM(S): 20 TABLET, DELAYED RELEASE ORAL at 11:25

## 2021-08-04 RX ADMIN — CEFEPIME 100 MILLIGRAM(S): 1 INJECTION, POWDER, FOR SOLUTION INTRAMUSCULAR; INTRAVENOUS at 05:48

## 2021-08-04 RX ADMIN — CEFEPIME 100 MILLIGRAM(S): 1 INJECTION, POWDER, FOR SOLUTION INTRAMUSCULAR; INTRAVENOUS at 14:51

## 2021-08-04 RX ADMIN — Medication 20 MILLIGRAM(S): at 05:49

## 2021-08-04 NOTE — PROGRESS NOTE ADULT - ASSESSMENT
-Lung cancer; adenoCA; s/p chemo, RT  -Cavitary lesion; treatment response vs infection; no cavity there 7/21  -Pneumonia vs +/- radiation changes. Clinical picture consistent with at least in part infection  -Hypoxia resolving  -Anemia    REC:  ABx per ID  continue steroids  pulmonary toilet  f/u outpt CCT

## 2021-08-04 NOTE — PROGRESS NOTE ADULT - ASSESSMENT
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     1. Acute Hypoxic Respiratory Failure due to Adenocarcinoma of the lung with progression of GGO and new left cavitation consolidation likely due due to post-radiation pneumonitis vs pneumonia  -completed course of carbo-taxol and RT at the end of June with plans for eventual immunotherapy  -s/p cefepime/doxy and duoneb in the ED. continue abx, started tapering dose of po steroid . f/u pulm rec  -CXR with large left middle lobe cavitary consolidation; f/u official read  -CT angio negative for PE, but with progression of GGO and new left lung central cavitation since July (treatment response), in addition to post tx pneumonitis vs infection/edema/hemorrhage    -blood cultures NGD,urine legionella Neg ,  -RVP negative; COVID 19 negative  -cont cefepime,doxy  (was on Azithromycin since 7/28)  - procalcitonin  -repeat lactate s/p volume resuscitation stable  -continue prednisone taper started on 7/28  -history of positive PPD; treated with INH in 2000  -continue supplemental O2 and bronchodilators  -add PRN mucinex  -outpatient follow up with Dr. Simpson     2. Anxiety/Depression  -stable mood  -continue fluoxetine    3. Forgetfulness  -continue donepezil   -check B12, TSH  -outpatient follow up with neurology    4. History of Diverticulitis  -one isolated episode of diarrhea on 8/1 likely due to antibiotics  -benign abdominal exam  -defer probiotic due to immunocompromised state  -add PPI while on steroids    5. 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     1. Acute Hypoxic Respiratory Failure due to Adenocarcinoma of the lung with progression of GGO and new left cavitation consolidation likely due due to post-radiation pneumonitis vs pneumonia  -completed course of carbo-taxol and RT at the end of June with plans for eventual immunotherapy  -s/p cefepime/doxy and duoneb in the ED. continue abx, started tapering dose of po steroid . f/u pulm rec  -CXR with large left middle lobe cavitary consolidation; f/u official read  -CT angio negative for PE, but with progression of GGO and new left lung central cavitation since July (treatment response), in addition to post tx pneumonitis vs infection/edema/hemorrhage    -blood cultures NGD,urine legionella Neg ,  -RVP negative; COVID 19 negative  -cont cefepime TILL 08/06,, DC doxy per ID  (was on Azithromycin since 7/28)  - procalcitonin  -repeat lactate s/p volume resuscitation stable  -continue prednisone taper started on 7/28  -history of positive PPD; treated with INH in 2000  -continue supplemental O2 and bronchodilators  -add PRN mucinex  -outpatient follow up with Dr. Simpson     2. Anxiety/Depression  -stable mood  -continue fluoxetine    3. Forgetfulness  -continue donepezil   -check B12, TSH  -outpatient follow up with neurology    4. History of Diverticulitis  -one isolated episode of diarrhea on 8/1 likely due to antibiotics  -benign abdominal exam  -defer probiotic due to immunocompromised state  -add PPI while on steroids    5. Anemia  -appears chronic, likely bone marrow suppression s/p chemo  -hb stable am  -no overt signs/symptoms of bleeding  -monitor CBC    DVT ppx - Lovenox  WILL CHECK AMBULATORY SO2   care of plan dw pt

## 2021-08-05 ENCOUNTER — APPOINTMENT (OUTPATIENT)
Dept: RADIATION ONCOLOGY | Facility: CLINIC | Age: 66
End: 2021-08-05

## 2021-08-05 ENCOUNTER — TRANSCRIPTION ENCOUNTER (OUTPATIENT)
Age: 66
End: 2021-08-05

## 2021-08-05 LAB
ANION GAP SERPL CALC-SCNC: 11 MMOL/L — SIGNIFICANT CHANGE UP (ref 5–17)
BASOPHILS # BLD AUTO: 0.08 K/UL — SIGNIFICANT CHANGE UP (ref 0–0.2)
BASOPHILS NFR BLD AUTO: 1 % — SIGNIFICANT CHANGE UP (ref 0–2)
BUN SERPL-MCNC: 13.9 MG/DL — SIGNIFICANT CHANGE UP (ref 8–20)
CALCIUM SERPL-MCNC: 8.7 MG/DL — SIGNIFICANT CHANGE UP (ref 8.6–10.2)
CHLORIDE SERPL-SCNC: 102 MMOL/L — SIGNIFICANT CHANGE UP (ref 98–107)
CO2 SERPL-SCNC: 25 MMOL/L — SIGNIFICANT CHANGE UP (ref 22–29)
CREAT SERPL-MCNC: 0.6 MG/DL — SIGNIFICANT CHANGE UP (ref 0.5–1.3)
EOSINOPHIL # BLD AUTO: 0.36 K/UL — SIGNIFICANT CHANGE UP (ref 0–0.5)
EOSINOPHIL NFR BLD AUTO: 4.5 % — SIGNIFICANT CHANGE UP (ref 0–6)
GLUCOSE SERPL-MCNC: 88 MG/DL — SIGNIFICANT CHANGE UP (ref 70–99)
HCT VFR BLD CALC: 32.3 % — LOW (ref 34.5–45)
HGB BLD-MCNC: 10.2 G/DL — LOW (ref 11.5–15.5)
IMM GRANULOCYTES NFR BLD AUTO: 4.5 % — HIGH (ref 0–1.5)
LYMPHOCYTES # BLD AUTO: 1.11 K/UL — SIGNIFICANT CHANGE UP (ref 1–3.3)
LYMPHOCYTES # BLD AUTO: 13.9 % — SIGNIFICANT CHANGE UP (ref 13–44)
MCHC RBC-ENTMCNC: 30.4 PG — SIGNIFICANT CHANGE UP (ref 27–34)
MCHC RBC-ENTMCNC: 31.6 GM/DL — LOW (ref 32–36)
MCV RBC AUTO: 96.4 FL — SIGNIFICANT CHANGE UP (ref 80–100)
MONOCYTES # BLD AUTO: 0.65 K/UL — SIGNIFICANT CHANGE UP (ref 0–0.9)
MONOCYTES NFR BLD AUTO: 8.2 % — SIGNIFICANT CHANGE UP (ref 2–14)
NEUTROPHILS # BLD AUTO: 5.41 K/UL — SIGNIFICANT CHANGE UP (ref 1.8–7.4)
NEUTROPHILS NFR BLD AUTO: 67.9 % — SIGNIFICANT CHANGE UP (ref 43–77)
PLATELET # BLD AUTO: 432 K/UL — HIGH (ref 150–400)
POTASSIUM SERPL-MCNC: 4 MMOL/L — SIGNIFICANT CHANGE UP (ref 3.5–5.3)
POTASSIUM SERPL-SCNC: 4 MMOL/L — SIGNIFICANT CHANGE UP (ref 3.5–5.3)
RBC # BLD: 3.35 M/UL — LOW (ref 3.8–5.2)
RBC # FLD: 15.1 % — HIGH (ref 10.3–14.5)
SODIUM SERPL-SCNC: 137 MMOL/L — SIGNIFICANT CHANGE UP (ref 135–145)
WBC # BLD: 7.97 K/UL — SIGNIFICANT CHANGE UP (ref 3.8–10.5)
WBC # FLD AUTO: 7.97 K/UL — SIGNIFICANT CHANGE UP (ref 3.8–10.5)

## 2021-08-05 PROCEDURE — 99233 SBSQ HOSP IP/OBS HIGH 50: CPT

## 2021-08-05 PROCEDURE — 99232 SBSQ HOSP IP/OBS MODERATE 35: CPT

## 2021-08-05 RX ORDER — AZITHROMYCIN 500 MG/1
0 TABLET, FILM COATED ORAL
Qty: 0 | Refills: 0 | DISCHARGE

## 2021-08-05 RX ORDER — ALBUTEROL 90 UG/1
2 AEROSOL, METERED ORAL
Qty: 1 | Refills: 0
Start: 2021-08-05

## 2021-08-05 RX ORDER — ALBUTEROL 90 UG/1
2 AEROSOL, METERED ORAL
Qty: 0 | Refills: 0 | DISCHARGE
Start: 2021-08-05

## 2021-08-05 RX ORDER — PANTOPRAZOLE SODIUM 20 MG/1
0 TABLET, DELAYED RELEASE ORAL
Qty: 0 | Refills: 0 | DISCHARGE
Start: 2021-08-05

## 2021-08-05 RX ORDER — PANTOPRAZOLE SODIUM 20 MG/1
1 TABLET, DELAYED RELEASE ORAL
Qty: 30 | Refills: 0
Start: 2021-08-05 | End: 2021-09-03

## 2021-08-05 RX ORDER — AZTREONAM 2 G
1 VIAL (EA) INJECTION
Qty: 15 | Refills: 0
Start: 2021-08-05

## 2021-08-05 RX ADMIN — CEFEPIME 100 MILLIGRAM(S): 1 INJECTION, POWDER, FOR SOLUTION INTRAMUSCULAR; INTRAVENOUS at 05:11

## 2021-08-05 RX ADMIN — DONEPEZIL HYDROCHLORIDE 10 MILLIGRAM(S): 10 TABLET, FILM COATED ORAL at 21:33

## 2021-08-05 RX ADMIN — CEFEPIME 100 MILLIGRAM(S): 1 INJECTION, POWDER, FOR SOLUTION INTRAMUSCULAR; INTRAVENOUS at 21:32

## 2021-08-05 RX ADMIN — Medication 20 MILLIGRAM(S): at 05:11

## 2021-08-05 RX ADMIN — ENOXAPARIN SODIUM 40 MILLIGRAM(S): 100 INJECTION SUBCUTANEOUS at 11:15

## 2021-08-05 RX ADMIN — CEFEPIME 100 MILLIGRAM(S): 1 INJECTION, POWDER, FOR SOLUTION INTRAMUSCULAR; INTRAVENOUS at 13:05

## 2021-08-05 RX ADMIN — Medication 600 MILLIGRAM(S): at 11:12

## 2021-08-05 RX ADMIN — Medication 40 MILLIGRAM(S): at 11:13

## 2021-08-05 RX ADMIN — PANTOPRAZOLE SODIUM 40 MILLIGRAM(S): 20 TABLET, DELAYED RELEASE ORAL at 11:14

## 2021-08-05 RX ADMIN — Medication 1 TABLET(S): at 11:14

## 2021-08-05 NOTE — PROGRESS NOTE ADULT - ASSESSMENT
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     1. Acute Hypoxic Respiratory Failure due to Adenocarcinoma of the lung with progression of GGO and new left cavitation consolidation likely due due to post-radiation pneumonitis vs pneumonia  -completed course of carbo-taxol and RT at the end of June with plans for eventual immunotherapy  -s/p cefepime/doxy and duoneb in the ED. continue abx, started tapering dose of po steroid . f/u pulm rec  -CXR with large left middle lobe cavitary consolidation; f/u official read  -CT angio negative for PE, but with progression of GGO and new left lung central cavitation since July (treatment response), in addition to post tx pneumonitis vs infection/edema/hemorrhage    -blood cultures NGD,urine legionella Neg ,  -RVP negative; COVID 19 negative  -cont cefepime TILL 08/06,, DC doxy per ID  (was on Azithromycin since 7/28)  - procalcitonin  -repeat lactate s/p volume resuscitation stable  -continue prednisone taper started on 7/28  -history of positive PPD; treated with INH in 2000  -continue supplemental O2 and bronchodilators  -add PRN mucinex  -outpatient follow up with Dr. Simpson     2. Anxiety/Depression  -stable mood  -continue fluoxetine    3. Forgetfulness  -continue donepezil   -check B12, TSH  -outpatient follow up with neurology    4. History of Diverticulitis  -one isolated episode of diarrhea on 8/1 likely due to antibiotics  -benign abdominal exam  -defer probiotic due to immunocompromised state  -add PPI while on steroids    5. Anemia  -appears chronic, likely bone marrow suppression s/p chemo  -hb stable am  -no overt signs/symptoms of bleeding  -monitor CBC    DVT ppx - Lovenox   AMBULATORY SO2 92% ra  pt received home oxygen yesterday.  dc plan 24 hr  care of plan dw pt

## 2021-08-05 NOTE — DISCHARGE NOTE PROVIDER - HOSPITAL COURSE
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, ok with epogen/iron transfution) with PMH of Adenocarcinoma of the Lung s/p chemo (carbo-taxol and RT - completed June 2021), Anxiety, Depression, Forgetfulness, Diverticulitis, Positive PPD (treated with INH in 2000), Benign Colon Mass s/p resection, s/p Appendectomy, and Pulmonary Nodules who presented to the ED with complaints of worsening SOB and dry cough; admitted  for Acute Hypoxic Respiratory Failure due to Adenocarcinoma of the lung with progression of GGO and new left cavitation consolidation likely due due to post-radiation pneumonitis vs pneumonia  completed course of carbo-taxol and RT at the end of June with plans for eventual immunotherapy. s/p Cefepime,doxycycline. tapering dose of steroid. so2 92 % ambulation ra. pt received home oxygen. f/u pulmonary,oncology/radiation oncology out pt.    Disposition home   time spent>35 mins   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, ok with epogen/iron transfution) with PMH of Adenocarcinoma of the Lung s/p chemo (carbo-taxol and RT - completed June 2021), Anxiety, Depression, Forgetfulness, Diverticulitis, Positive PPD (treated with INH in 2000), Benign Colon Mass s/p resection, s/p Appendectomy, and Pulmonary Nodules who presented to the ED with complaints of worsening SOB and dry cough; admitted  for Acute Hypoxic Respiratory Failure due to Adenocarcinoma of the lung with progression of GGO and new left cavitation consolidation likely due due to post-radiation pneumonitis vs pneumonia  completed course of carbo-taxol and RT at the end of June with plans for eventual immunotherapy. s/p Cefepime,doxycycline. tapering dose of steroid. so2 92 % ambulation ra. pt received home oxygen. f/u pulmonary,oncology/radiation oncology out pt. per pulmonary continue prednisone 20 po qd with bactrim ppx m-w-f, will f/u pulm office for further recommendations.    Disposition home   time spent>35 mins   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, ok with epogen/iron transfution) with PMH of Adenocarcinoma of the Lung s/p chemo (carbo-taxol and RT - completed June 2021), Anxiety, Depression, Forgetfulness, Diverticulitis, Positive PPD (treated with INH in 2000), Benign Colon Mass s/p resection, s/p Appendectomy, and Pulmonary Nodules who presented to the ED with complaints of worsening SOB and dry cough; admitted  for Acute Hypoxic Respiratory Failure due to Adenocarcinoma of the lung with progression of GGO and new left cavitation consolidation likely due due to post-radiation pneumonitis vs pneumonia  completed course of carbo-taxol and RT at the end of June with plans for eventual immunotherapy. s/p Cefepime,doxycycline. tapering dose of steroid. so2 92 % ambulation ra. pt received home oxygen. f/u pulmonary,oncology/radiation oncology out pt. per pulmonary continue prednisone 20 po qd with bactrim ppx m-w-f, will f/u pulm office for further recommendations.      T(C): 36.8 (08-06-21 @ 04:18), Max: 36.8 (08-05-21 @ 21:23)  HR: 81 (08-06-21 @ 04:18) (68 - 81)  BP: 108/69 (08-06-21 @ 04:18) (108/64 - 114/65)  RR: 18 (08-06-21 @ 04:18) (18 - 18)  SpO2: 95% (08-06-21 @ 04:18) (92% - 95%)    GEN - NAD  HEENT - NCAT, EOMI, ELIDA, no JVD/bruit.  RESP - CTA BL, no wheeze/rhonchi/crackles. not on supplemental O2.  CARDIO - NS1S2, RRR. No murmurs/rubs/gallops.  ABD - Soft/Non tender/Non distended. Normal BS x4 quadrants.   Ext - No MARISA. no signs of venous/arterial stasis ulcers  MSK - full ROM of BL upper and lower extremities without pain or restriction. BL 5/5 strength on upper and lower extremities.   Neuro - cn 2-12 grossly intact. . no visible seizure activity appreciated. no tremor. gait not observed.   Skin - clean, dry, intact. no rashes or lesions.    Psych- AAOx3.appropriate behaviour. attentive. normal affect.    Disposition home   time spent>35 mins

## 2021-08-05 NOTE — DISCHARGE NOTE PROVIDER - NSDCMRMEDTOKEN_GEN_ALL_CORE_FT
albuterol 90 mcg/inh inhalation aerosol: 2 puff(s) inhaled every 6 hours, As needed, Shortness of Breath and/or Wheezing  donepezil 5 mg oral tablet: 2 tab(s) orally once a day (at bedtime)  FLUoxetine 40 mg oral capsule: 1 cap(s) orally once a day  Multiple Vitamins oral tablet, chewable: 1 tab(s) orally once a day  pantoprazole 40 mg oral granule, delayed release:  orally   predniSONE 20 mg oral tablet: 1 tab(s) orally once a day   albuterol 90 mcg/inh inhalation aerosol: 2 puff(s) inhaled every 6 hours, As needed, Shortness of Breath and/or Wheezing  Bactrim  mg-160 mg oral tablet: 1 tab(s) orally Monday, Wednesday, and Friday   donepezil 5 mg oral tablet: 2 tab(s) orally once a day (at bedtime)  FLUoxetine 40 mg oral capsule: 1 cap(s) orally once a day  Multiple Vitamins oral tablet, chewable: 1 tab(s) orally once a day  predniSONE 20 mg oral tablet: 1 tab(s) orally once a day  Protonix 20 mg oral delayed release tablet: 1 tab(s) orally once a day

## 2021-08-05 NOTE — DISCHARGE NOTE PROVIDER - CARE PROVIDER_API CALL
Tho Zayas)  Critical Care Medicine; Internal Medicine; Pulmonary Disease  [address] [place] [address]  Phone: [phone]  Fax: [phone]  Follow Up Time: 1 week

## 2021-08-05 NOTE — DISCHARGE NOTE PROVIDER - NSDCCPCAREPLAN_GEN_ALL_CORE_FT
PRINCIPAL DISCHARGE DIAGNOSIS  Diagnosis: Pneumonia of left upper lobe due to infectious organism  Assessment and Plan of Treatment:       SECONDARY DISCHARGE DIAGNOSES  Diagnosis: Acute respiratory failure with hypoxia  Assessment and Plan of Treatment:     Diagnosis: Lung cancer  Assessment and Plan of Treatment:     Diagnosis: Depression  Assessment and Plan of Treatment:

## 2021-08-05 NOTE — PROGRESS NOTE ADULT - SUBJECTIVE AND OBJECTIVE BOX
[facility]  INFECTIOUS DISEASES AND INTERNAL MEDICINE at [place]  =======================================================  Christopher Long MD  Diplomates American Board of Internal Medicine and Infectious Diseases  Tel: [phone]      Fax: [phone]  =======================================================    SHANON BEE [id number]    Follow up: Radiation Pneumonitis vs PNA    No fever or chills       Allergies:  No Known Allergies       REVIEW OF SYSTEMS:  CONSTITUTIONAL:  No Fever or chills  HEENT:  No diplopia or blurred vision.  No earache, sore throat or runny nose.  CARDIOVASCULAR:  No pressure, squeezing, strangling, tightness, heaviness or aching about the chest, neck, axilla or epigastrium.  RESPIRATORY:  + cough,  +shortness of breath  GASTROINTESTINAL:  No nausea, vomiting or diarrhea.  GENITOURINARY:  No dysuria, frequency or urgency. No Blood in urine  MUSCULOSKELETAL:  no joint aches, no muscle pain  SKIN:  No change in skin, hair or nails.  NEUROLOGIC:  No Headaches, seizures or weakness.  PSYCHIATRIC:  No disorder of thought or mood.  ENDOCRINE:  No heat or cold intolerance  HEMATOLOGICAL:  No easy bruising or bleeding.       Physical Exam:  GEN: NAD, pleasant  HEENT: normocephalic and atraumatic. EOMI. PERRL.  Anicteric  NECK: Supple.   LUNGS: Coarse BS B/L   HEART: Regular rate and rhythm   ABDOMEN: Soft, nontender, and nondistended.  Positive bowel sounds.    : No CVA tenderness  EXTREMITIES: Without any edema.  MSK: No joint swelling  NEUROLOGIC: No Focal Deficits  PSYCHIATRIC: Appropriate affect .  SKIN: No Rash      Vitals:  T(F): 98.2 (04 Aug 2021 04:43), Max: 98.3 (03 Aug 2021 11:52)  HR: 76 (04 Aug 2021 04:43)  BP: 112/69 (04 Aug 2021 04:43)  RR: 18 (04 Aug 2021 04:43)  SpO2: 96% (04 Aug 2021 04:43) (96% - 99%)  temp max in last 48H T(F): , Max: 98.3 (08-02-21 @ 21:50)      Current Antibiotics:  cefepime   IVPB      cefepime   IVPB 2000 milliGRAM(s) IV Intermittent every 8 hours  doxycycline IVPB      doxycycline IVPB 100 milliGRAM(s) IV Intermittent every 12 hours    Other medications:  donepezil 10 milliGRAM(s) Oral at bedtime  enoxaparin Injectable 40 milliGRAM(s) SubCutaneous daily  FLUoxetine 40 milliGRAM(s) Oral daily  iron sucrose IVPB 200 milliGRAM(s) IV Intermittent every 24 hours  multivitamin 1 Tablet(s) Oral daily  pantoprazole   Suspension 40 milliGRAM(s) Oral daily  predniSONE   Tablet 20 milliGRAM(s) Oral daily                 10.1   6.18  )-----------( 413      ( 03 Aug 2021 06:17 )             32.1     08-03    138  |  101  |  12.8  ----------------------------<  86  4.2   |  26.0  |  0.68    Ca    8.7      03 Aug 2021 06:17      RECENT CULTURES:  08-01 @ 23:35 .Blood Blood-Peripheral     No growth at 48 hours    08-01 @ 23:33 .Blood Blood-Peripheral     No growth at 48 hours      WBC Count: 6.18 K/uL (08-03-21 @ 06:17)  WBC Count: 9.21 K/uL (08-02-21 @ 03:59)  WBC Count: 7.96 K/uL (08-01-21 @ 21:18)    Creatinine, Serum: 0.68 mg/dL (08-03-21 @ 06:17)  Creatinine, Serum: 0.63 mg/dL (08-02-21 @ 03:59)  Creatinine, Serum: 0.73 mg/dL (08-01-21 @ 21:18)    Ferritin, Serum: 176 ng/mL (08-02-21 @ 03:59)    Procalcitonin, Serum: 0.06 ng/mL (08-02-21 @ 03:59)     SARS-CoV-2: NotDetec (08-01-21 @ 23:35)  Rapid RVP Result: NotDetec (08-01-21 @ 23:35)    Legionella pneumophila Antigen, Urine (08.03.21 @ 11:35)    Legionella Antigen, Urine: Negative: Positive Testing method: Immunochromatographic Assay.  Presumptive detection of L. pneumophila serogroup 1 antigen in urine,  suggesting recent or current infection. Order “Culture –Legionella” as  recommended to confirm infection.  Negative Testing method: Immunochromatographic Assay.  L. pneumophila serogroup 1 antigen in urine NOT detected, suggesting NO  recent or current infection. Infection due to Legionella cannot be ruled  out: other serogroups and species may cause disease, antigen may not be  present in urine in early infection, or the level of antigens in urine  may be below the detection limit of the test.  Order “Culture  –Legionella” is recommended for uncommon cases of suspected Legionella  pneumonia due to organisms other thanL. pneumophila serogroup 1.          < from: CT Angio Chest PE Protocol w/ IV Cont (08.02.21 @ 00:03) >  EXAM:  CT ANGIO CHEST PULM [facility]                          PROCEDURE DATE:  08/02/2021      INTERPRETATION:  CLINICAL INFORMATION: Lung cancer.  Worsening shortness of breath since latest round of radiotherapy three weeks ago.  Rule out pulmonary embolism.    COMPARISON: 07/21/2021.    CONTRAST/COMPLICATIONS:  IV Contrast: Omnipaque 350  53 cc administered   47 cc discarded  Oral Contrast: NONE  Complications: None reported at time of study completion    PROCEDURE:  CT Angiography of the Chest.  Sagittal and coronal reformats were performed as well as 3D (MIP) reconstructions.    FINDINGS:    LUNGS AND AIRWAYS: Patent central airways.  Approximately 4 x 2.5 cm left upper lobe mass (5:57) is stable in size; central cavitation within this mass, new since 07/21/2021, likely reflects treatment response.  A 1.3 x 1 cm right upper lobe pulmonary nodule (5:48) is grossly stable in size since 07/21/2021.    Extensive confluent groundglass opacity in the left upper lobe and superior segment of the left lower lobe is significantly progressed from 07/21/2021. Moderate patchy groundglass opacity in the right upper lobe and mild groundglass opacity in the right lower lobe are also progressed from 07/21/2021; this may represent posttreatment pneumonitis versus infection, edema or hemorrhage.    PLEURA: No pleural effusion.  MEDIASTINUM AND STEVEN: No lymphadenopathy.  VESSELS: No pulmonary embolism.  Mild atherosclerotic calcification of the aortic arch and arch vessels.  No thoracic aortic aneurysm or gross dissection.  HEART: Heart size is normal. No pericardial effusion.  CHEST WALL AND LOWER NECK: Within normal limits.  Bilateral breast implants in place.  VISUALIZED UPPER ABDOMEN: Within normal limits.  BONES: No acute fracture or suspicious osseous lesion.  Small anterior vertebral marginal osteophytes in the thoracic spine.    IMPRESSION:  No pulmonary embolism.    Left upper lobe mass is stable in size but demonstrates central cavitation that likely reflects treatment response.    A 1.3 x 1 cm right upper lobe pulmonary nodule is stable in size.    There is progression of groundglass opacity in both lungs which may represent posttreatment pneumonitis versus infection, edema or hemorrhage.    < end of copied text >      
PULMONARY PROGRESS NOTE      SHANON BEEONI-[id number]    Patient is a 66y old  Female who presents with a chief complaint of SOB (04 Aug 2021 07:56)      INTERVAL HPI/OVERNIGHT EVENTS:  Less cough this am  comfortable on RZA  no hemoptysis    MEDICATIONS  (STANDING):  cefepime   IVPB      cefepime   IVPB 2000 milliGRAM(s) IV Intermittent every 8 hours  donepezil 10 milliGRAM(s) Oral at bedtime  doxycycline IVPB      doxycycline IVPB 100 milliGRAM(s) IV Intermittent every 12 hours  enoxaparin Injectable 40 milliGRAM(s) SubCutaneous daily  FLUoxetine 40 milliGRAM(s) Oral daily  iron sucrose IVPB 200 milliGRAM(s) IV Intermittent every 24 hours  multivitamin 1 Tablet(s) Oral daily  pantoprazole   Suspension 40 milliGRAM(s) Oral daily  predniSONE   Tablet 20 milliGRAM(s) Oral daily      MEDICATIONS  (PRN):  acetaminophen   Tablet .. 650 milliGRAM(s) Oral every 6 hours PRN Temp greater or equal to 38C (100.4F), Mild Pain (1 - 3)  ALBUTerol    90 MICROgram(s) HFA Inhaler 2 Puff(s) Inhalation every 6 hours PRN Shortness of Breath and/or Wheezing  albuterol/ipratropium for Nebulization 3 milliLiter(s) Nebulizer every 6 hours PRN Shortness of Breath and/or Wheezing  guaiFENesin  milliGRAM(s) Oral every 12 hours PRN Cough      Allergies    No Known Allergies    Intolerances        PAST MEDICAL & SURGICAL HISTORY:  Pulmonary mass  monitoring -no surgical intervention    PPD positive   -  treated with INH    Small bowel mass  incidental finding while appendectomy ( Benign s/p colon mass resection - 3/2018)    Acute medial meniscus tear of right knee, initial encounter    Acute lateral meniscal injury of right knee, initial encounter    Patient is Jehov[employer]&#x27[employer]s [employer]    Fatty liver    Osteoarthritis  right    Depression    Hyperlipidemia    At risk for sleep apnea  Bang score 3    Malignant neoplasm of unspecified part of unspecified bronchus or lung    H/O pneumothorax  following lung biopsy right lung    Osteoporosis    S/P appendectomy  2018    S/P  section  long time ago    History of breast implant  15 years ago    H/O cosmetic plastic surgery  liposuction - 30 years ago    History of colon surgery  3/2018 ( small colon mass resection - benign )        SOCIAL HISTORY  Smoking History:       REVIEW OF SYSTEMS:    CONSTITUTIONAL:  No distress    HEENT:  Eyes:  No diplopia or blurred vision. ENT:  No earache, sore throat or runny nose.    CARDIOVASCULAR:  No pressure, squeezing, tightness, heaviness or aching about the chest; no palpitations.    RESPIRATORY:  No cough, shortness of breath, PND or orthopnea. Mild SOBOE    GASTROINTESTINAL:  No nausea, vomiting or diarrhea.    GENITOURINARY:  No dysuria, frequency or urgency.    NEUROLOGIC:  No paresthesias, fasciculations, seizures or weakness.    Extremities: No cyanosis, clubbing or edema    PSYCHIATRIC:  No disorder of thought or mood.    Vital Signs Last 24 Hrs  T(C): 36.8 (04 Aug 2021 04:43), Max: 36.8 (03 Aug 2021 11:52)  T(F): 98.2 (04 Aug 2021 04:43), Max: 98.3 (03 Aug 2021 11:52)  HR: 76 (04 Aug 2021 04:43) (69 - 80)  BP: 112/69 (04 Aug 2021 04:43) (96/55 - 112/69)  BP(mean): --  RR: 18 (04 Aug 2021 04:43) (18 - 18)  SpO2: 96% (04 Aug 2021 04:43) (96% - 99%)    PHYSICAL EXAMINATION:    GENERAL: The patient is awake and alert in no apparent distress.     HEENT: Head is normocephalic and atraumatic. Extraocular muscles are intact. Mucous membranes are moist.    NECK: Supple.    LUNGS: bronchial bs of left mid and lower lung fields    HEART: Regular rate and rhythm without murmur.    ABDOMEN: Soft, nontender, and nondistended.      EXTREMITIES: Without any cyanosis, clubbing, rash, lesions or edema.    NEUROLOGIC: Grossly intact.    LABS:                        10.1   6.18  )-----------( 413      ( 03 Aug 2021 06:17 )             32.1     08-03    138  |  101  |  12.8  ----------------------------<  86  4.2   |  26.0  |  0.68    Ca    8.7      03 Aug 2021 06:17        Urinalysis Basic - ( 02 Aug 2021 16:24 )    Color: Yellow / Appearance: Clear / S.005 / pH: x  Gluc: x / Ketone: Negative  / Bili: Negative / Urobili: Negative mg/dL   Blood: x / Protein: Negative mg/dL / Nitrite: Negative   Leuk Esterase: Trace / RBC: 0-2 /HPF / WBC 0-2   Sq Epi: x / Non Sq Epi: Occasional / Bacteria: x                  Procalcitonin, Serum: 0.06 ng/mL (21 @ 03:59)      MICROBIOLOGY:    RADIOLOGY & ADDITIONAL STUDIES:  < from: CT Angio Chest PE Protocol w/ IV Cont (21 @ 00:03) >     EXAM:  CT ANGIO CHEST PULM [facility]                          PROCEDURE DATE:  2021          INTERPRETATION:  CLINICAL INFORMATION: Lung cancer.  Worsening shortness of breath since latest round of radiotherapy three weeks ago.  Rule out pulmonary embolism.    COMPARISON: 2021.    CONTRAST/COMPLICATIONS:  IV Contrast: Omnipaque 350  53 cc administered   47 cc discarded  Oral Contrast: NONE  Complications: None reported at time of study completion    PROCEDURE:  CT Angiography of the Chest.  Sagittal and coronal reformats were performed as well as 3D (MIP) reconstructions.    FINDINGS:    LUNGS AND AIRWAYS: Patent central airways.  Approximately 4 x 2.5 cm left upper lobe mass (5:57) is stable in size; central cavitation within this mass, new since 2021, likely reflects treatment response.  A 1.3 x 1 cm right upper lobe pulmonary nodule (5:48) is grossly stable in size since 2021.    Extensive confluent groundglass opacity in the left upper lobe and superior segment of the left lower lobe is significantly progressed from 2021. Moderate patchy groundglass opacity in the right upper lobe and mild groundglass opacity in the right lower lobe are also progressed from 2021; this may represent posttreatment pneumonitis versus infection, edema or hemorrhage.      PLEURA: No pleural effusion.  MEDIASTINUM AND STEVEN: No lymphadenopathy.  VESSELS: No pulmonary embolism.  Mild atherosclerotic calcification of the aortic arch and arch vessels.  No thoracic aortic aneurysm or gross dissection.  HEART: Heart size is normal. No pericardial effusion.  CHEST WALL AND LOWER NECK: Within normal limits.  Bilateral breast implants in place.  VISUALIZED UPPER ABDOMEN: Within normal limits.  BONES: No acute fracture or suspicious osseous lesion.  Small anterior vertebral marginal osteophytes in the thoracic spine.    IMPRESSION:  No pulmonary embolism.    Left upper lobe mass is stable in size but demonstrates central cavitation that likely reflects treatment response.    A 1.3 x 1 cm right upper lobe pulmonary nodule is stable in size.    There is progression of groundglass opacity in both lungs which may represent posttreatment pneumonitis versus infection, edema or hemorrhage.        --- End of Report ---            ALINA ROSSI MD; Attending Radiologist  This document has been electronically signed. Aug  2 2021  1:59AM    < end of copied text >  
[facility]  INFECTIOUS DISEASES AND INTERNAL MEDICINE at [place]  =======================================================  Christopher Long MD  Diplomates American Board of Internal Medicine and Infectious Diseases  Tel: [phone]      Fax: [phone]  =======================================================    SHANON BEE [id number]    Follow up: Radiation Pneumonitis vs PNA    No fever or chills       Allergies:  No Known Allergies       REVIEW OF SYSTEMS:  CONSTITUTIONAL:  No Fever or chills  HEENT:  No diplopia or blurred vision.  No earache, sore throat or runny nose.  CARDIOVASCULAR:  No pressure, squeezing, strangling, tightness, heaviness or aching about the chest, neck, axilla or epigastrium.  RESPIRATORY:  improved cough and shortness of breath  GASTROINTESTINAL:  No nausea, vomiting or diarrhea.  GENITOURINARY:  No dysuria, frequency or urgency.   MUSCULOSKELETAL:  no joint aches, no muscle pain  SKIN:  No change in skin, hair or nails.  NEUROLOGIC:  No Headaches, seizures   PSYCHIATRIC:  No disorder of thought or mood.  ENDOCRINE:  No heat or cold intolerance  HEMATOLOGICAL:  No easy bruising or bleeding.       Physical Exam:  GEN: NAD, pleasant  HEENT: normocephalic and atraumatic. EOMI. PERRL.  Anicteric  NECK: Supple.   LUNGS: Coarse BS B/L   HEART: Regular rate and rhythm   ABDOMEN: Soft, nontender, and nondistended.  Positive bowel sounds.    : No CVA tenderness  EXTREMITIES: Without any edema.  MSK: No joint swelling  NEUROLOGIC: No Focal Deficits  PSYCHIATRIC: Appropriate affect .  SKIN: No Rash      Vitals:  T(F): 98.2 (05 Aug 2021 04:39), Max: 98.2 (04 Aug 2021 15:39)  HR: 94 (05 Aug 2021 04:39)  BP: 115/74 (05 Aug 2021 04:39)  RR: 18 (05 Aug 2021 04:39)  SpO2: 96% (05 Aug 2021 04:39) (93% - 96%)  temp max in last 48H T(F): , Max: 98.3 (08-03-21 @ 11:52)      Current Antibiotics:  cefepime   IVPB      cefepime   IVPB 2000 milliGRAM(s) IV Intermittent every 8 hours    Other medications:  donepezil 10 milliGRAM(s) Oral at bedtime  enoxaparin Injectable 40 milliGRAM(s) SubCutaneous daily  FLUoxetine 40 milliGRAM(s) Oral daily  multivitamin 1 Tablet(s) Oral daily  pantoprazole   Suspension 40 milliGRAM(s) Oral daily  predniSONE   Tablet 20 milliGRAM(s) Oral daily                 10.2   7.97  )-----------( 432      ( 05 Aug 2021 06:57 )             32.3     08-05    137  |  102  |  13.9  ----------------------------<  88  4.0   |  25.0  |  0.60    Ca    8.7      05 Aug 2021 06:57      RECENT CULTURES:  08-01 @ 23:35 .Blood Blood-Peripheral     No growth at 48 hours    08-01 @ 23:33 .Blood Blood-Peripheral     No growth at 48 hours      WBC Count: 7.97 K/uL (08-05-21 @ 06:57)  WBC Count: 6.18 K/uL (08-03-21 @ 06:17)  WBC Count: 9.21 K/uL (08-02-21 @ 03:59)  WBC Count: 7.96 K/uL (08-01-21 @ 21:18)    Creatinine, Serum: 0.60 mg/dL (08-05-21 @ 06:57)  Creatinine, Serum: 0.68 mg/dL (08-03-21 @ 06:17)  Creatinine, Serum: 0.63 mg/dL (08-02-21 @ 03:59)  Creatinine, Serum: 0.73 mg/dL (08-01-21 @ 21:18)    Ferritin, Serum: 176 ng/mL (08-02-21 @ 03:59)    Procalcitonin, Serum: 0.06 ng/mL (08-02-21 @ 03:59)     SARS-CoV-2: NotDetec (08-01-21 @ 23:35)  Rapid RVP Result: NotDetec (08-01-21 @ 23:35)    Legionella pneumophila Antigen, Urine (08.03.21 @ 11:35)    Legionella Antigen, Urine: Negative: Positive Testing method: Immunochromatographic Assay.  Presumptive detection of L. pneumophila serogroup 1 antigen in urine,  suggesting recent or current infection. Order “Culture –Legionella” as  recommended to confirm infection.  Negative Testing method: Immunochromatographic Assay.  L. pneumophila serogroup 1 antigen in urine NOT detected, suggesting NO  recent or current infection. Infection due to Legionella cannot be ruled  out: other serogroups and species may cause disease, antigen may not be  present in urine in early infection, or the level of antigens in urine  may be below the detection limit of the test.  Order “Culture  –Legionella” is recommended for uncommon cases of suspected Legionella  pneumonia due to organisms other thanL. pneumophila serogroup 1.          < from: CT Angio Chest PE Protocol w/ IV Cont (08.02.21 @ 00:03) >  EXAM:  CT ANGIO CHEST PULM [facility]                          PROCEDURE DATE:  08/02/2021      INTERPRETATION:  CLINICAL INFORMATION: Lung cancer.  Worsening shortness of breath since latest round of radiotherapy three weeks ago.  Rule out pulmonary embolism.    COMPARISON: 07/21/2021.    CONTRAST/COMPLICATIONS:  IV Contrast: Omnipaque 350  53 cc administered   47 cc discarded  Oral Contrast: NONE  Complications: None reported at time of study completion    PROCEDURE:  CT Angiography of the Chest.  Sagittal and coronal reformats were performed as well as 3D (MIP) reconstructions.    FINDINGS:    LUNGS AND AIRWAYS: Patent central airways.  Approximately 4 x 2.5 cm left upper lobe mass (5:57) is stable in size; central cavitation within this mass, new since 07/21/2021, likely reflects treatment response.  A 1.3 x 1 cm right upper lobe pulmonary nodule (5:48) is grossly stable in size since 07/21/2021.    Extensive confluent groundglass opacity in the left upper lobe and superior segment of the left lower lobe is significantly progressed from 07/21/2021. Moderate patchy groundglass opacity in the right upper lobe and mild groundglass opacity in the right lower lobe are also progressed from 07/21/2021; this may represent posttreatment pneumonitis versus infection, edema or hemorrhage.    PLEURA: No pleural effusion.  MEDIASTINUM AND STEVEN: No lymphadenopathy.  VESSELS: No pulmonary embolism.  Mild atherosclerotic calcification of the aortic arch and arch vessels.  No thoracic aortic aneurysm or gross dissection.  HEART: Heart size is normal. No pericardial effusion.  CHEST WALL AND LOWER NECK: Within normal limits.  Bilateral breast implants in place.  VISUALIZED UPPER ABDOMEN: Within normal limits.  BONES: No acute fracture or suspicious osseous lesion.  Small anterior vertebral marginal osteophytes in the thoracic spine.    IMPRESSION:  No pulmonary embolism.    Left upper lobe mass is stable in size but demonstrates central cavitation that likely reflects treatment response.    A 1.3 x 1 cm right upper lobe pulmonary nodule is stable in size.    There is progression of groundglass opacity in both lungs which may represent posttreatment pneumonitis versus infection, edema or hemorrhage.    < end of copied text >      
PULMONARY PROGRESS NOTE      [id number]    Patient is a 66y old  Female who presents with a chief complaint of SOB (05 Aug 2021 13:57)      INTERVAL HPI/OVERNIGHT EVENTS:  Cough less   No sputum  On room air  no chest pain    MEDICATIONS  (STANDING):  cefepime   IVPB      cefepime   IVPB 2000 milliGRAM(s) IV Intermittent every 8 hours  donepezil 10 milliGRAM(s) Oral at bedtime  enoxaparin Injectable 40 milliGRAM(s) SubCutaneous daily  FLUoxetine 40 milliGRAM(s) Oral daily  multivitamin 1 Tablet(s) Oral daily  pantoprazole   Suspension 40 milliGRAM(s) Oral daily  predniSONE   Tablet 20 milliGRAM(s) Oral daily      MEDICATIONS  (PRN):  acetaminophen   Tablet .. 650 milliGRAM(s) Oral every 6 hours PRN Temp greater or equal to 38C (100.4F), Mild Pain (1 - 3)  ALBUTerol    90 MICROgram(s) HFA Inhaler 2 Puff(s) Inhalation every 6 hours PRN Shortness of Breath and/or Wheezing  albuterol/ipratropium for Nebulization 3 milliLiter(s) Nebulizer every 6 hours PRN Shortness of Breath and/or Wheezing  guaiFENesin  milliGRAM(s) Oral every 12 hours PRN Cough      Allergies    No Known Allergies    Intolerances        PAST MEDICAL & SURGICAL HISTORY:  Pulmonary mass  monitoring -no surgical intervention    PPD positive   -  treated with INH    Small bowel mass  incidental finding while appendectomy ( Benign s/p colon mass resection - 3/2018)    Acute medial meniscus tear of right knee, initial encounter    Acute lateral meniscal injury of right knee, initial encounter    Patient is Jehov[employer]&#x27[employer]s Witness    Fatty liver    Osteoarthritis  right    Depression    Hyperlipidemia    At risk for sleep apnea  Bang score 3    Malignant neoplasm of unspecified part of unspecified bronchus or lung    H/O pneumothorax  following lung biopsy right lung    Osteoporosis    S/P appendectomy  2018    S/P  section  long time ago    History of breast implant  15 years ago    H/O cosmetic plastic surgery  liposuction - 30 years ago    History of colon surgery  3/2018 ( small colon mass resection - benign )        SOCIAL HISTORY  Smoking History:       REVIEW OF SYSTEMS:    CONSTITUTIONAL:  No distress    HEENT:  Eyes:  No diplopia or blurred vision. ENT:  No earache, sore throat or runny nose.    CARDIOVASCULAR:  No pressure, squeezing, tightness, heaviness or aching about the chest; no palpitations.    RESPIRATORY:  above    GASTROINTESTINAL:  No nausea, vomiting or diarrhea.    GENITOURINARY:  No dysuria, frequency or urgency.    NEUROLOGIC:  No paresthesias, fasciculations, seizures or weakness.    Extremities: No cyanosis, clubbing or edema    PSYCHIATRIC:  No disorder of thought or mood.    Vital Signs Last 24 Hrs  T(C): 36.6 (05 Aug 2021 11:01), Max: 36.8 (04 Aug 2021 15:39)  T(F): 97.9 (05 Aug 2021 11:01), Max: 98.2 (04 Aug 2021 15:39)  HR: 75 (05 Aug 2021 11:) (75 - 94)  BP: 109/63 (05 Aug 2021 11:) (104/64 - 115/74)  BP(mean): --  RR: 18 (05 Aug 2021 11:) (18 - 20)  SpO2: 92% (05 Aug 2021 11:) (92% - 96%)    PHYSICAL EXAMINATION:    GENERAL: The patient is awake and alert in no apparent distress.     HEENT: Head is normocephalic and atraumatic. Extraocular muscles are intact. Mucous membranes are moist.    NECK: Supple.    LUNGS: Clear to auscultation without wheezing, rales or rhonchi; respirations unlabored    HEART: Regular rate and rhythm without murmur.    ABDOMEN: Soft, nontender, and nondistended.      EXTREMITIES: Without any cyanosis, clubbing, rash, lesions or edema.    NEUROLOGIC: Grossly intact.    LABS:                        10.2   7.97  )-----------( 432      ( 05 Aug 2021 06:57 )             32.3         137  |  102  |  13.9  ----------------------------<  88  4.0   |  25.0  |  0.60    Ca    8.7      05 Aug 2021 06:57                          MICROBIOLOGY:    RADIOLOGY & ADDITIONAL STUDIES:  < from: CT Angio Chest PE Protocol w/ IV Cont (21 @ 00:03) >     EXAM:  CT ANGIO CHEST PULM [facility]                          PROCEDURE DATE:  2021          INTERPRETATION:  CLINICAL INFORMATION: Lung cancer.  Worsening shortness of breath since latest round of radiotherapy three weeks ago.  Rule out pulmonary embolism.    COMPARISON: 2021.    CONTRAST/COMPLICATIONS:  IV Contrast: Omnipaque 350  53 cc administered   47 cc discarded  Oral Contrast: NONE  Complications: None reported at time of study completion    PROCEDURE:  CT Angiography of the Chest.  Sagittal and coronal reformats were performed as well as 3D (MIP) reconstructions.    FINDINGS:    LUNGS AND AIRWAYS: Patent central airways.  Approximately 4 x 2.5 cm left upper lobe mass (5:57) is stable in size; central cavitation within this mass, new since 2021, likely reflects treatment response.  A 1.3 x 1 cm right upper lobe pulmonary nodule (5:48) is grossly stable in size since 2021.    Extensive confluent groundglass opacity in the left upper lobe and superior segment of the left lower lobe is significantly progressed from 2021. Moderate patchy groundglass opacity in the right upper lobe and mild groundglass opacity in the right lower lobe are also progressed from 2021; this may represent posttreatment pneumonitis versus infection, edema or hemorrhage.      PLEURA: No pleural effusion.  MEDIASTINUM AND STEVEN: No lymphadenopathy.  VESSELS: No pulmonary embolism.  Mild atherosclerotic calcification of the aortic arch and arch vessels.  No thoracic aortic aneurysm or gross dissection.  HEART: Heart size is normal. No pericardial effusion.  CHEST WALL AND LOWER NECK: Within normal limits.  Bilateral breast implants in place.  VISUALIZED UPPER ABDOMEN: Within normal limits.  BONES: No acute fracture or suspicious osseous lesion.  Small anterior vertebral marginal osteophytes in the thoracic spine.    IMPRESSION:  No pulmonary embolism.    Left upper lobe mass is stable in size but demonstrates central cavitation that likely reflects treatment response.    A 1.3 x 1 cm right upper lobe pulmonary nodule is stable in size.    There is progression of groundglass opacity in both lungs which may represent posttreatment pneumonitis versus infection, edema or hemorrhage.        --- End of Report ---            ALINA ROSSI MD; Attending Radiologist  This document has been electronically signed. Aug  2 2021  1:59AM  
PULMONARY PROGRESS NOTE      SHANON BEEONI-[id number]    Patient is a 66y old  Female who presents with a chief complaint of SOB (03 Aug 2021 08:16)      INTERVAL HPI/OVERNIGHT EVENTS: Cough now dry. Less sob. No hemoptysis.     MEDICATIONS  (STANDING):  cefepime   IVPB      cefepime   IVPB 2000 milliGRAM(s) IV Intermittent every 8 hours  donepezil 10 milliGRAM(s) Oral at bedtime  doxycycline IVPB      doxycycline IVPB 100 milliGRAM(s) IV Intermittent every 12 hours  enoxaparin Injectable 40 milliGRAM(s) SubCutaneous daily  FLUoxetine 40 milliGRAM(s) Oral daily  iron sucrose IVPB 200 milliGRAM(s) IV Intermittent every 24 hours  multivitamin 1 Tablet(s) Oral daily  pantoprazole   Suspension 40 milliGRAM(s) Oral daily      MEDICATIONS  (PRN):  acetaminophen   Tablet .. 650 milliGRAM(s) Oral every 6 hours PRN Temp greater or equal to 38C (100.4F), Mild Pain (1 - 3)  ALBUTerol    90 MICROgram(s) HFA Inhaler 2 Puff(s) Inhalation every 6 hours PRN Shortness of Breath and/or Wheezing  albuterol/ipratropium for Nebulization 3 milliLiter(s) Nebulizer every 6 hours PRN Shortness of Breath and/or Wheezing  guaiFENesin  milliGRAM(s) Oral every 12 hours PRN Cough      Allergies    No Known Allergies    Intolerances        PAST MEDICAL & SURGICAL HISTORY:  Pulmonary mass  monitoring -no surgical intervention    PPD positive   -  treated with INH    Small bowel mass  incidental finding while appendectomy ( Benign s/p colon mass resection - 3/2018)    Acute medial meniscus tear of right knee, initial encounter    Acute lateral meniscal injury of right knee, initial encounter    Patient is [employer]    Fatty liver    Osteoarthritis  right    Depression    Hyperlipidemia    At risk for sleep apnea  Bang score 3    Malignant neoplasm of unspecified part of unspecified bronchus or lung    H/O pneumothorax  following lung biopsy right lung    Osteoporosis    S/P appendectomy  2018    S/P  section  long time ago    History of breast implant  15 years ago    H/O cosmetic plastic surgery  liposuction - 30 years ago    History of colon surgery  3/2018 ( small colon mass resection - benign )           REVIEW OF SYSTEMS:    CONSTITUTIONAL:  No distress    HEENT:  Eyes:  No diplopia or blurred vision. ENT:  No earache, sore throat or runny nose.    CARDIOVASCULAR:  No pressure, squeezing, tightness, heaviness or aching about the chest; no palpitations.    RESPIRATORY:  per HPI     GASTROINTESTINAL:  No nausea, vomiting or diarrhea.    GENITOURINARY:  No dysuria, frequency or urgency.    NEUROLOGIC:  No paresthesias, fasciculations, seizures or weakness.    PSYCHIATRIC:  No disorder of thought or mood.    Vital Signs Last 24 Hrs  T(C): 36.8 (03 Aug 2021 04:44), Max: 36.8 (02 Aug 2021 16:15)  T(F): 98.2 (03 Aug 2021 04:44), Max: 98.3 (02 Aug 2021 21:50)  HR: 70 (03 Aug 2021 04:44) (70 - 78)  BP: 124/75 (03 Aug 2021 04:44) (105/67 - 124/75)  BP(mean): --  RR: 18 (03 Aug 2021 04:44) (18 - 20)  SpO2: 95% (03 Aug 2021 04:44) (95% - 98%)    PHYSICAL EXAMINATION:    GENERAL: The patient is awake and alert in no apparent distress.     HEENT: Head is normocephalic and atraumatic.  Mucous membranes are moist.    NECK: Supple.    LUNGS: rhonchi, respirations unlabored    HEART: Regular rate and rhythm without murmur.    ABDOMEN: Soft, nontender, and nondistended.      EXTREMITIES: Without any cyanosis, clubbing, rash, lesions or edema.    NEUROLOGIC: Grossly intact.    LABS:                        10.1   6.18  )-----------( 413      ( 03 Aug 2021 06:17 )             32.1     08-03    138  |  101  |  12.8  ----------------------------<  86  4.2   |  26.0  |  0.68    Ca    8.7      03 Aug 2021 06:17  Phos  3.7     08-  Mg     2.1     08-    TPro  7.1  /  Alb  3.7  /  TBili  <0.2<L>  /  DBili  x   /  AST  20  /  ALT  19  /  AlkPhos  121<H>  08-           Procalcitonin, Serum: 0.06 ng/mL (21 @ 03:59)      MICROBIOLOGY:  Respiratory Viral Panel with COVID-19 by BILL (21 @ 23:35)    Rapid RVP Result: [facility]    SARS-CoV-2: NotDete: This Respiratory Panel uses polymerase chain reaction (PCR) to detect for  adenovirus; coronavirus (HKU1, NL63, 229E, OC43); human metapneumovirus  (hMPV); human enterovirus/rhinovirus (Entero/RV); influenza A; influenza  A/H1; influenza A/H3; influenza A/H1-2009; influenza B; parainfluenza  viruses 1, 2, 3, 4; respiratory syncytial virus; Mycoplasma pneumoniae;  Chlamydophila pneumoniae; and SARS-CoV-2.           
[facility]  INFECTIOUS DISEASES AND INTERNAL MEDICINE at [place]  =======================================================  Christopher Long MD  Diplomates American Board of Internal Medicine and Infectious Diseases  Tel: [phone]      Fax: [phone]  =======================================================    SHANON BEE [id number]    Follow up: Radiation Pneumonitis vs PNA    No fever or chills       Allergies:  No Known Allergies       REVIEW OF SYSTEMS:  CONSTITUTIONAL:  No Fever or chills  HEENT:  No diplopia or blurred vision.  No earache, sore throat or runny nose.  CARDIOVASCULAR:  No pressure, squeezing, strangling, tightness, heaviness or aching about the chest, neck, axilla or epigastrium.  RESPIRATORY:  + cough,  +shortness of breath  GASTROINTESTINAL:  No nausea, vomiting or diarrhea.  GENITOURINARY:  No dysuria, frequency or urgency. No Blood in urine  MUSCULOSKELETAL:  no joint aches, no muscle pain  SKIN:  No change in skin, hair or nails.  NEUROLOGIC:  No Headaches, seizures or weakness.  PSYCHIATRIC:  No disorder of thought or mood.  ENDOCRINE:  No heat or cold intolerance  HEMATOLOGICAL:  No easy bruising or bleeding.       Physical Exam:  GEN: NAD, pleasant  HEENT: normocephalic and atraumatic. EOMI. PERRL.  Anicteric  NECK: Supple.   LUNGS: Coarse BS B/L   HEART: Regular rate and rhythm   ABDOMEN: Soft, nontender, and nondistended.  Positive bowel sounds.    : No CVA tenderness  EXTREMITIES: Without any edema.  MSK: No joint swelling  NEUROLOGIC: No Focal Deficits  PSYCHIATRIC: Appropriate affect .  SKIN: No Rash      Vitals:  T(F): 98.2 (03 Aug 2021 04:44), Max: 98.3 (02 Aug 2021 21:50)  HR: 70 (03 Aug 2021 04:44)  BP: 124/75 (03 Aug 2021 04:44)  RR: 18 (03 Aug 2021 04:44)  SpO2: 95% (03 Aug 2021 04:44) (95% - 98%)  temp max in last 48H T(F): , Max: 98.5 (08-02-21 @ 00:07)      Current Antibiotics:  cefepime   IVPB      cefepime   IVPB 2000 milliGRAM(s) IV Intermittent every 8 hours  doxycycline IVPB      doxycycline IVPB 100 milliGRAM(s) IV Intermittent every 12 hours    Other medications:  donepezil 10 milliGRAM(s) Oral at bedtime  enoxaparin Injectable 40 milliGRAM(s) SubCutaneous daily  FLUoxetine 40 milliGRAM(s) Oral daily  iron sucrose IVPB 200 milliGRAM(s) IV Intermittent every 24 hours  multivitamin 1 Tablet(s) Oral daily  pantoprazole   Suspension 40 milliGRAM(s) Oral daily                            10.1   6.18  )-----------( 413      ( 03 Aug 2021 06:17 )             32.1     08-03    138  |  101  |  12.8  ----------------------------<  86  4.2   |  26.0  |  0.68    Ca    8.7      03 Aug 2021 06:17  Phos  3.7     08-02  Mg     2.1     08-02    TPro  7.1  /  Alb  3.7  /  TBili  <0.2<L>  /  DBili  x   /  AST  20  /  ALT  19  /  AlkPhos  121<H>  08-01      WBC Count: 6.18 K/uL (08-03-21 @ 06:17)  WBC Count: 9.21 K/uL (08-02-21 @ 03:59)  WBC Count: 7.96 K/uL (08-01-21 @ 21:18)    Creatinine, Serum: 0.68 mg/dL (08-03-21 @ 06:17)  Creatinine, Serum: 0.63 mg/dL (08-02-21 @ 03:59)  Creatinine, Serum: 0.73 mg/dL (08-01-21 @ 21:18)    Ferritin, Serum: 176 ng/mL (08-02-21 @ 03:59)    Procalcitonin, Serum: 0.06 ng/mL (08-02-21 @ 03:59)     SARS-CoV-2: NotDetec (08-01-21 @ 23:35)  Rapid RVP Result: NotDetec (08-01-21 @ 23:35)        < from: CT Angio Chest PE Protocol w/ IV Cont (08.02.21 @ 00:03) >  EXAM:  CT ANGIO CHEST PULM ART [facility]                          PROCEDURE DATE:  08/02/2021      INTERPRETATION:  CLINICAL INFORMATION: Lung cancer.  Worsening shortness of breath since latest round of radiotherapy three weeks ago.  Rule out pulmonary embolism.    COMPARISON: 07/21/2021.    CONTRAST/COMPLICATIONS:  IV Contrast: Omnipaque 350  53 cc administered   47 cc discarded  Oral Contrast: NONE  Complications: None reported at time of study completion    PROCEDURE:  CT Angiography of the Chest.  Sagittal and coronal reformats were performed as well as 3D (MIP) reconstructions.    FINDINGS:    LUNGS AND AIRWAYS: Patent central airways.  Approximately 4 x 2.5 cm left upper lobe mass (5:57) is stable in size; central cavitation within this mass, new since 07/21/2021, likely reflects treatment response.  A 1.3 x 1 cm right upper lobe pulmonary nodule (5:48) is grossly stable in size since 07/21/2021.    Extensive confluent groundglass opacity in the left upper lobe and superior segment of the left lower lobe is significantly progressed from 07/21/2021. Moderate patchy groundglass opacity in the right upper lobe and mild groundglass opacity in the right lower lobe are also progressed from 07/21/2021; this may represent posttreatment pneumonitis versus infection, edema or hemorrhage.    PLEURA: No pleural effusion.  MEDIASTINUM AND STEVEN: No lymphadenopathy.  VESSELS: No pulmonary embolism.  Mild atherosclerotic calcification of the aortic arch and arch vessels.  No thoracic aortic aneurysm or gross dissection.  HEART: Heart size is normal. No pericardial effusion.  CHEST WALL AND LOWER NECK: Within normal limits.  Bilateral breast implants in place.  VISUALIZED UPPER ABDOMEN: Within normal limits.  BONES: No acute fracture or suspicious osseous lesion.  Small anterior vertebral marginal osteophytes in the thoracic spine.    IMPRESSION:  No pulmonary embolism.    Left upper lobe mass is stable in size but demonstrates central cavitation that likely reflects treatment response.    A 1.3 x 1 cm right upper lobe pulmonary nodule is stable in size.    There is progression of groundglass opacity in both lungs which may represent posttreatment pneumonitis versus infection, edema or hemorrhage.    < end of copied text >      
Patient is a 66y old  Female who presents with a chief complaint of SOB (04 Aug 2021 10:15)  c/o dry cough. Denies cp,sob,fever,chill.  so2 92% ambulation RA      SUBJECTIVE / OVERNIGHT EVENTS:  REVIEW OF SYSTEMS: All systems are reviewed and found to be negative except above    MEDICATIONS  (STANDING):  cefepime   IVPB      cefepime   IVPB 2000 milliGRAM(s) IV Intermittent every 8 hours  donepezil 10 milliGRAM(s) Oral at bedtime  enoxaparin Injectable 40 milliGRAM(s) SubCutaneous daily  FLUoxetine 40 milliGRAM(s) Oral daily  multivitamin 1 Tablet(s) Oral daily  pantoprazole   Suspension 40 milliGRAM(s) Oral daily  predniSONE   Tablet 20 milliGRAM(s) Oral daily    MEDICATIONS  (PRN):  acetaminophen   Tablet .. 650 milliGRAM(s) Oral every 6 hours PRN Temp greater or equal to 38C (100.4F), Mild Pain (1 - 3)  ALBUTerol    90 MICROgram(s) HFA Inhaler 2 Puff(s) Inhalation every 6 hours PRN Shortness of Breath and/or Wheezing  albuterol/ipratropium for Nebulization 3 milliLiter(s) Nebulizer every 6 hours PRN Shortness of Breath and/or Wheezing  guaiFENesin  milliGRAM(s) Oral every 12 hours PRN Cough      CAPILLARY BLOOD GLUCOSE        I&O's Summary    04 Aug 2021 07:01  -  05 Aug 2021 07:00  --------------------------------------------------------  IN: 920 mL / OUT: 400 mL / NET: 520 mL        PHYSICAL EXAM:  Vital Signs Last 24 Hrs  T(C): 36.6 (05 Aug 2021 11:01), Max: 36.8 (04 Aug 2021 15:39)  T(F): 97.9 (05 Aug 2021 11:01), Max: 98.2 (04 Aug 2021 15:39)  HR: 75 (05 Aug 2021 11:01) (75 - 94)  BP: 109/63 (05 Aug 2021 11:01) (104/64 - 115/74)  BP(mean): --  RR: 18 (05 Aug 2021 11:01) (18 - 20)  SpO2: 92% (05 Aug 2021 11:01) (92% - 96%)    CONSTITUTIONAL: NAD,  EYES: PERRLA; conjunctiva and sclera clear  ENMT: Moist oral mucosa,   RESPIRATORY: Normal respiratory effort; OCCASIONAL CRACKLE  to auscultation bilaterally. No w/r  CARDIOVASCULAR: Regular rate and rhythm, normal S1 and S2, no murmur   EXTS: No lower extremity edema; Peripheral pulses are 2+ bilaterally  ABDOMEN: Nontender to palpation, normoactive bowel sounds, no rebound/guarding;   MUSCLOSKELETAL:   no joint swelling or tenderness to palpation  PSYCH: affect appropriate  NEUROLOGY: A+O to person, place, and time; no gross  deficits;       LABS:                        10.2   7.97  )-----------( 432      ( 05 Aug 2021 06:57 )             32.3     08-05    137  |  102  |  13.9  ----------------------------<  88  4.0   |  25.0  |  0.60    Ca    8.7      05 Aug 2021 06:57                  RADIOLOGY & ADDITIONAL TESTS:  Results Reviewed:   Imaging Personally Reviewed:  Electrocardiogram Personally Reviewed:    COORDINATION OF CARE:  Care Discussed with Consultants/Other Providers [Y/N]:  Prior or Outpatient Records Reviewed [Y/N]:  
Patient is a 66y old  Female who presents with a chief complaint of SOB (03 Aug 2021 10:15)    Pt is cough,breathing stable with 3l nc. denies fever,chill,cp,n/v/d.  REVIEW OF SYSTEMS: All systems are reviewed and found to be negative except above    MEDICATIONS  (STANDING):  cefepime   IVPB      cefepime   IVPB 2000 milliGRAM(s) IV Intermittent every 8 hours  donepezil 10 milliGRAM(s) Oral at bedtime  doxycycline IVPB      doxycycline IVPB 100 milliGRAM(s) IV Intermittent every 12 hours  enoxaparin Injectable 40 milliGRAM(s) SubCutaneous daily  FLUoxetine 40 milliGRAM(s) Oral daily  iron sucrose IVPB 200 milliGRAM(s) IV Intermittent every 24 hours  multivitamin 1 Tablet(s) Oral daily  pantoprazole   Suspension 40 milliGRAM(s) Oral daily    MEDICATIONS  (PRN):  acetaminophen   Tablet .. 650 milliGRAM(s) Oral every 6 hours PRN Temp greater or equal to 38C (100.4F), Mild Pain (1 - 3)  ALBUTerol    90 MICROgram(s) HFA Inhaler 2 Puff(s) Inhalation every 6 hours PRN Shortness of Breath and/or Wheezing  albuterol/ipratropium for Nebulization 3 milliLiter(s) Nebulizer every 6 hours PRN Shortness of Breath and/or Wheezing  guaiFENesin  milliGRAM(s) Oral every 12 hours PRN Cough      CAPILLARY BLOOD GLUCOSE        I&O's Summary      PHYSICAL EXAM:  Vital Signs Last 24 Hrs  T(C): 36.8 (03 Aug 2021 04:44), Max: 36.8 (02 Aug 2021 16:15)  T(F): 98.2 (03 Aug 2021 04:44), Max: 98.3 (02 Aug 2021 21:50)  HR: 70 (03 Aug 2021 04:44) (70 - 78)  BP: 124/75 (03 Aug 2021 04:44) (105/67 - 124/75)  BP(mean): --  RR: 18 (03 Aug 2021 04:44) (18 - 20)  SpO2: 95% (03 Aug 2021 04:44) (95% - 98%)    CONSTITUTIONAL: NAD,  EYES: PERRLA; conjunctiva and sclera clear  ENMT: Moist oral mucosa,   RESPIRATORY: Normal respiratory effort; mild crackle  to auscultation bilaterally  CARDIOVASCULAR: Regular rate and rhythm, normal S1 and S2, no murmur   EXTS: No lower extremity edema; Peripheral pulses are 2+ bilaterally  ABDOMEN: Nontender to palpation, normoactive bowel sounds, no rebound/guarding;   MUSCLOSKELETAL:  no joint swelling or tenderness to palpation  PSYCH: affect appropriate  NEUROLOGY: A+O to person, place, and time;  no gross deficits;       LABS:                        10.1   6.18  )-----------( 413      ( 03 Aug 2021 06:17 )             32.1     08-03    138  |  101  |  12.8  ----------------------------<  86  4.2   |  26.0  |  0.68    Ca    8.7      03 Aug 2021 06:17  Phos  3.7     08-02  Mg     2.1     08-02    TPro  7.1  /  Alb  3.7  /  TBili  <0.2<L>  /  DBili  x   /  AST  20  /  ALT  19  /  AlkPhos  121<H>  08-01          Urinalysis Basic - ( 02 Aug 2021 16:24 )    Color: Yellow / Appearance: Clear / S.005 / pH: x  Gluc: x / Ketone: Negative  / Bili: Negative / Urobili: Negative mg/dL   Blood: x / Protein: Negative mg/dL / Nitrite: Negative   Leuk Esterase: Trace / RBC: 0-2 /HPF / WBC 0-2   Sq Epi: x / Non Sq Epi: Occasional / Bacteria: x          RADIOLOGY & ADDITIONAL TESTS:  Results Reviewed:   Imaging Personally Reviewed:  Electrocardiogram Personally Reviewed:    COORDINATION OF CARE:  Care Discussed with Consultants/Other Providers [Y/N]:  Prior or Outpatient Records Reviewed [Y/N]:  
Patient is a 66y old  Female who presents with a chief complaint of SOB (04 Aug 2021 10:15)    pt is c/o cough, sob improving,breathing stable RA, SOB DURING EXERTION. DENIES FEVER,CHILL,CP,PALPITATION,DIZZINESS    REVIEW OF SYSTEMS: All systems are reviewed and found to be negative except above    MEDICATIONS  (STANDING):  cefepime   IVPB      cefepime   IVPB 2000 milliGRAM(s) IV Intermittent every 8 hours  donepezil 10 milliGRAM(s) Oral at bedtime  doxycycline IVPB      doxycycline IVPB 100 milliGRAM(s) IV Intermittent every 12 hours  enoxaparin Injectable 40 milliGRAM(s) SubCutaneous daily  FLUoxetine 40 milliGRAM(s) Oral daily  iron sucrose IVPB 200 milliGRAM(s) IV Intermittent every 24 hours  multivitamin 1 Tablet(s) Oral daily  pantoprazole   Suspension 40 milliGRAM(s) Oral daily  predniSONE   Tablet 20 milliGRAM(s) Oral daily    MEDICATIONS  (PRN):  acetaminophen   Tablet .. 650 milliGRAM(s) Oral every 6 hours PRN Temp greater or equal to 38C (100.4F), Mild Pain (1 - 3)  ALBUTerol    90 MICROgram(s) HFA Inhaler 2 Puff(s) Inhalation every 6 hours PRN Shortness of Breath and/or Wheezing  albuterol/ipratropium for Nebulization 3 milliLiter(s) Nebulizer every 6 hours PRN Shortness of Breath and/or Wheezing  guaiFENesin  milliGRAM(s) Oral every 12 hours PRN Cough      CAPILLARY BLOOD GLUCOSE        I&O's Summary    03 Aug 2021 07:01  -  04 Aug 2021 07:00  --------------------------------------------------------  IN: 520 mL / OUT: 0 mL / NET: 520 mL        PHYSICAL EXAM:  Vital Signs Last 24 Hrs  T(C): 36.3 (04 Aug 2021 11:40), Max: 36.8 (04 Aug 2021 04:43)  T(F): 97.4 (04 Aug 2021 11:40), Max: 98.2 (04 Aug 2021 04:43)  HR: 67 (04 Aug 2021 11:40) (67 - 76)  BP: 103/65 (04 Aug 2021 11:40) (103/65 - 112/69)  BP(mean): --  RR: 20 (04 Aug 2021 11:40) (18 - 20)  SpO2: 95% (04 Aug 2021 11:40) (95% - 99%)    CONSTITUTIONAL: NAD,  EYES: PERRLA; conjunctiva and sclera clear  ENMT: Moist oral mucosa,   RESPIRATORY: Normal respiratory effort,+ CRACKLE  auscultation bilaterally. NO W/R  CARDIOVASCULAR: Regular rate and rhythm, normal S1 and S2, no murmur   EXTS: No lower extremity edema; Peripheral pulses are 2+ bilaterally  ABDOMEN: Nontender to palpation, normoactive bowel sounds, no rebound/guarding;   MUSCLOSKELETAL:    no joint swelling or tenderness to palpation  PSYCH: affect appropriate  NEUROLOGY: A+O to person, place, and time;  no gross deficits;       LABS:                        10.1   6.18  )-----------( 413      ( 03 Aug 2021 06:17 )             32.1     08-03    138  |  101  |  12.8  ----------------------------<  86  4.2   |  26.0  |  0.68    Ca    8.7      03 Aug 2021 06:17            Urinalysis Basic - ( 02 Aug 2021 16:24 )    Color: Yellow / Appearance: Clear / S.005 / pH: x  Gluc: x / Ketone: Negative  / Bili: Negative / Urobili: Negative mg/dL   Blood: x / Protein: Negative mg/dL / Nitrite: Negative   Leuk Esterase: Trace / RBC: 0-2 /HPF / WBC 0-2   Sq Epi: x / Non Sq Epi: Occasional / Bacteria: x        Culture - Blood (collected 01 Aug 2021 23:35)  Source: .Blood Blood-Peripheral  Preliminary Report (04 Aug 2021 01:00):    No growth at 48 hours    Culture - Blood (collected 01 Aug 2021 23:33)  Source: .Blood Blood-Peripheral  Preliminary Report (04 Aug 2021 01:00):    No growth at 48 hours        RADIOLOGY & ADDITIONAL TESTS:  Results Reviewed:   Imaging Personally Reviewed:  Electrocardiogram Personally Reviewed:    COORDINATION OF CARE:  Care Discussed with Consultants/Other Providers [Y/N]:  Prior or Outpatient Records Reviewed [Y/N]:

## 2021-08-05 NOTE — PROGRESS NOTE ADULT - ASSESSMENT
Adeno CA lung s/p chemo and RT  Pneumonia vs radiation pneumonitis responding to RX  Hypoxia resolved    Plan:  continue prednisone 20mg/d x 1 month  BactrimPCP prophylaxis 1DS M,W,F  O2 as needed  per oncology and ID  f/u with us in one month  will sign off  recall prn

## 2021-08-05 NOTE — PROGRESS NOTE ADULT - PROVIDER SPECIALTY LIST ADULT
Infectious Disease
Hospitalist
Hospitalist
Infectious Disease
Hospitalist
Infectious Disease
Pulmonology

## 2021-08-05 NOTE — PROGRESS NOTE ADULT - ASSESSMENT
66y  Female with h/o Adenocarcinoma of the Lung s/p chemo (carbo-taxol and RT - completed June 2021), History of PNX, Anxiety, Depression, Forgetfulness, Diverticulitis, Positive PPD (treated with INH in 2000), Pulmonary Nodules, Benign Colon Mass s/p resection, s/p Appendectomy, and s/p breast implants. Patient presented to the ED with complaints of worsening SOB and dry cough. Patient states she completed chemo/RT at the end of June (follows with Dr. Simpson) and was scheduled to start immunotherapy. However, she developed a dry cough 2 weeks ago. States the cough was then associated with SOB and worsened for which he followed up with Dr. Simpson on 7/28 and was prescribed Azithromycin and a prednisone taper. States she was also told her pulse ox was 86% at that time and home O2 was arranged but never delivered. Over 4 days prior to presentation her SOB and cough continued to worsen which prompted her to report to the ED. In the ED, patient had a normal WBC count, afebrile, CXR was suspicious of left middle lobe pneumonia for which the patient received Cefepime and Doxycycline. CT chest angio was negative for PE but revealed a new left middle lobe cavitation since last month which was thought to be due to a treatment response.       Radiation Pneumonitis vs PNA  Adenocarcinoma of the Lung s/p chemo (carbo-taxol and RT - completed June 2021)  Immunosuppressed due to chemotherapy      - Blood cultures no growth   - RVP/COVID 19 PCR negative  - Urine for legionella negative   - CT Chest angio, reporting pneumonitis vs PNA  - Procalcitonin level 0.06  - Continue Cefepime  - Follow up cultures  - Last day of antibiotics 8/6/21  - Likely pneumonitis  - Recommend steroids  - pulmonary following  - Trend Fever  - Trend WBC      Thank you for allowing me to participate in the care of your patient.   Will sign off. Please call PRN.

## 2021-08-06 ENCOUNTER — TRANSCRIPTION ENCOUNTER (OUTPATIENT)
Age: 66
End: 2021-08-06

## 2021-08-06 VITALS
HEART RATE: 71 BPM | SYSTOLIC BLOOD PRESSURE: 101 MMHG | DIASTOLIC BLOOD PRESSURE: 62 MMHG | RESPIRATION RATE: 18 BRPM | OXYGEN SATURATION: 95 % | TEMPERATURE: 98 F

## 2021-08-06 PROCEDURE — 84100 ASSAY OF PHOSPHORUS: CPT

## 2021-08-06 PROCEDURE — 85025 COMPLETE CBC W/AUTO DIFF WBC: CPT

## 2021-08-06 PROCEDURE — 96372 THER/PROPH/DIAG INJ SC/IM: CPT

## 2021-08-06 PROCEDURE — 84443 ASSAY THYROID STIM HORMONE: CPT

## 2021-08-06 PROCEDURE — 99285 EMERGENCY DEPT VISIT HI MDM: CPT | Mod: 25

## 2021-08-06 PROCEDURE — 81001 URINALYSIS AUTO W/SCOPE: CPT

## 2021-08-06 PROCEDURE — 94640 AIRWAY INHALATION TREATMENT: CPT

## 2021-08-06 PROCEDURE — 87040 BLOOD CULTURE FOR BACTERIA: CPT

## 2021-08-06 PROCEDURE — 87449 NOS EACH ORGANISM AG IA: CPT

## 2021-08-06 PROCEDURE — G1004: CPT

## 2021-08-06 PROCEDURE — 83605 ASSAY OF LACTIC ACID: CPT

## 2021-08-06 PROCEDURE — 82746 ASSAY OF FOLIC ACID SERUM: CPT

## 2021-08-06 PROCEDURE — 83735 ASSAY OF MAGNESIUM: CPT

## 2021-08-06 PROCEDURE — 82728 ASSAY OF FERRITIN: CPT

## 2021-08-06 PROCEDURE — 84145 PROCALCITONIN (PCT): CPT

## 2021-08-06 PROCEDURE — 83550 IRON BINDING TEST: CPT

## 2021-08-06 PROCEDURE — 0225U NFCT DS DNA&RNA 21 SARSCOV2: CPT

## 2021-08-06 PROCEDURE — 84466 ASSAY OF TRANSFERRIN: CPT

## 2021-08-06 PROCEDURE — 83540 ASSAY OF IRON: CPT

## 2021-08-06 PROCEDURE — 99239 HOSP IP/OBS DSCHRG MGMT >30: CPT

## 2021-08-06 PROCEDURE — 36415 COLL VENOUS BLD VENIPUNCTURE: CPT

## 2021-08-06 PROCEDURE — 82607 VITAMIN B-12: CPT

## 2021-08-06 PROCEDURE — 93005 ELECTROCARDIOGRAM TRACING: CPT

## 2021-08-06 PROCEDURE — 71275 CT ANGIOGRAPHY CHEST: CPT | Mod: ME

## 2021-08-06 PROCEDURE — 71046 X-RAY EXAM CHEST 2 VIEWS: CPT

## 2021-08-06 PROCEDURE — 80048 BASIC METABOLIC PNL TOTAL CA: CPT

## 2021-08-06 PROCEDURE — 80053 COMPREHEN METABOLIC PANEL: CPT

## 2021-08-06 RX ADMIN — Medication 20 MILLIGRAM(S): at 04:38

## 2021-08-06 RX ADMIN — CEFEPIME 100 MILLIGRAM(S): 1 INJECTION, POWDER, FOR SOLUTION INTRAMUSCULAR; INTRAVENOUS at 04:37

## 2021-08-06 RX ADMIN — ENOXAPARIN SODIUM 40 MILLIGRAM(S): 100 INJECTION SUBCUTANEOUS at 11:31

## 2021-08-06 RX ADMIN — Medication 1 TABLET(S): at 11:31

## 2021-08-06 RX ADMIN — Medication 40 MILLIGRAM(S): at 11:31

## 2021-08-06 RX ADMIN — PANTOPRAZOLE SODIUM 40 MILLIGRAM(S): 20 TABLET, DELAYED RELEASE ORAL at 11:31

## 2021-08-06 RX ADMIN — CEFEPIME 100 MILLIGRAM(S): 1 INJECTION, POWDER, FOR SOLUTION INTRAMUSCULAR; INTRAVENOUS at 13:25

## 2021-08-06 NOTE — DISCHARGE NOTE NURSING/CASE MANAGEMENT/SOCIAL WORK - NSDCFUADDAPPT_GEN_ALL_CORE_FT
f/u own oncology, hemooncology one week. VSS. Medically stable for DC. Offered [facility] and pulmonary follow up appointment. Pt declined both states her daughter's have everything set up for her. Declined meds to bed.

## 2021-08-07 LAB
CULTURE RESULTS: SIGNIFICANT CHANGE UP
CULTURE RESULTS: SIGNIFICANT CHANGE UP
SPECIMEN SOURCE: SIGNIFICANT CHANGE UP
SPECIMEN SOURCE: SIGNIFICANT CHANGE UP

## 2021-08-16 ENCOUNTER — APPOINTMENT (OUTPATIENT)
Dept: HEMATOLOGY ONCOLOGY | Facility: CLINIC | Age: 66
End: 2021-08-16
Payer: MEDICARE

## 2021-08-16 VITALS
BODY MASS INDEX: 27.16 KG/M2 | OXYGEN SATURATION: 97 % | HEART RATE: 71 BPM | TEMPERATURE: 98 F | SYSTOLIC BLOOD PRESSURE: 107 MMHG | HEIGHT: 66 IN | DIASTOLIC BLOOD PRESSURE: 65 MMHG | WEIGHT: 169 LBS | RESPIRATION RATE: 18 BRPM

## 2021-08-16 PROCEDURE — 99214 OFFICE O/P EST MOD 30 MIN: CPT

## 2021-08-18 ENCOUNTER — APPOINTMENT (OUTPATIENT)
Dept: PULMONOLOGY | Facility: CLINIC | Age: 66
End: 2021-08-18
Payer: MEDICARE

## 2021-08-18 VITALS
OXYGEN SATURATION: 96 % | SYSTOLIC BLOOD PRESSURE: 115 MMHG | DIASTOLIC BLOOD PRESSURE: 60 MMHG | BODY MASS INDEX: 27.12 KG/M2 | WEIGHT: 168 LBS | RESPIRATION RATE: 16 BRPM | HEART RATE: 74 BPM

## 2021-08-18 PROCEDURE — 99495 TRANSJ CARE MGMT MOD F2F 14D: CPT

## 2021-08-18 NOTE — HISTORY OF PRESENT ILLNESS
[TextBox_4] : 66-year-old female, Cheondoism with a history of adenocarcinoma of the lung treated with chemotherapy and radiation and completed in June of 2021. Patient presented to the emergency room at [facility] on 8/2/21 and discharged on 8/6/21. Findings on CAT scan demonstrated a left-sided cavitary consolidation with surrounding infiltrate felt to be on the basis of radiation pneumonitis. She was placed on high-dose steroids eventually tapered to 20 mg a day with Bactrim prophylaxis. She is seen today in followup\par \par Pt feeling much better with improved exercise tolerance marked decrease cough

## 2021-08-18 NOTE — DISCUSSION/SUMMARY
[FreeTextEntry1] : 66-year-old female with advanced adenocarcinoma of the lung, seen today status post recent hospitalization for radiation pneumonitis. Patient is responding well clinically to the addition of steroids. Will complete 30 days and begin to taper her steroids over the course of the next month.

## 2021-08-18 NOTE — REASON FOR VISIT
[Follow-Up - From Hospitalization] : a follow-up visit after a recent hospitalization [Lung Cancer] : lung cancer [Shortness of Breath] : shortness of breath [TextBox_44] : radiation pneumonitis, hypoxic respiratory failure

## 2021-08-19 NOTE — RESULTS/DATA
[FreeTextEntry1] : 7/21/21 \par CT CAP post treatment on 7/21/21 with stability in LEft upper lobe mass at 4.3 cm x 2.8 cm ( previously 4.2 x 3.2 cm)  1.3 cm rt upper lobe nodule is unchanged> new patchy groungglass opacity surrounding both upper lobe lesions which maybe 2/2 treatment effect vs infection

## 2021-08-19 NOTE — ASSESSMENT
[FreeTextEntry1] : SHANON BEE is a never smoker Female who was 66 yo at the time of diagnosis. Patient has no significant past medical history. 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 and was treated with 6 months of INH \par \par MRI Brain : negative \par  CT Chest/ Abd/ Pelvis 12/29/20: with stable Lt UPPER LOBE primary lesion, ( 4.7 x 2.3cm), 1.2 cm left lung apex lesion along with Rt upper lobe stable lesion of 1.3 cm  \par \par - Discussed at tumor board on 1/13/21, \par - and s/p IR biopsy of Rt upper lobe lesion  on 1/26/21 c/w reactive cells negative for malignancy \par - PDL1 20% \par - Foundation one : ALK ELM 4 fusion variant ,\par MS stable TMB 4 mut/ Mb, SF 3B1 \par Discussed the Alk fusion mutation and targeted options however this is only approved in the metastatic setting\par \par Plan for Resection per DR jeffery, however EBUS prior to surgery  WITH n2 disease. Patient was represented at Tumor board on 4/14/21 and a plan for definitive CRT \par \par - s/p chemoRT with Carbo AUC 2 and taxol 50 mg/ m2 weekly with RT followed by Immunotherapy with Durvalumab for 1 year \par \par - She completed concurrent CRT with carbo/ taxol on 6/21/21\par - Restaging CT CAP post treatment on 7/21/21 with stability in LEft upper lobe mass at 4.3 cmx2.8cm ( previously 4.2 x 3.2 cm)  1.3 cm rt upper lobe nodule is unchanged> new patchy groungglass opacity surrounding both upper lobe lesions which maybe 2/2 treatment effect vs infetcion \par - Patient is a Jehovah W[employer]ness \par - She had a h/o Diverticulosis monitor with IO \par - given stability of scans plan for Durvalumab \par \par \par Patient with significant SOb with decrease in O2 sat to 82 on ambulation \par Likely related to RT Pneumonitis, will call in a z pack and prednisone taper for her ( over 2weeks) \par f/u  in 2 weeks if sob improved, will plan to start Durvalumab in 3 weeks \par \par \par \par SON: JUAN RAMON [phone]\par

## 2021-08-19 NOTE — HISTORY OF PRESENT ILLNESS
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 Recent imaging has shown growth and development of a solid structure. The etiology remains unclear, but malignancy has been considered. She feels well has no complaints. She denies fever, chills, night sweats, weight loss, or weight gain. \par \par Followed by Dr Galeana in the past\par PPD+ in 2000 and received 6 months of INH\par PET positive RENAN posterior cavitary density in 2010\par TTNA and Bronch non-diagnostic. Cultures negative for fungus and TB\par CT at [facility] in 2010 and 2012 stable - felt to be benign. Lost to FU\par \par Patient had a PEt scan in Oct 2020 with a Left upper lobe spiculated lung mass 4.5 x 3cm with an SUV 11.1 with adjacent tree in bud opacity \par In addition Rt upper lobe opacity 1.2 x1 cm which is not FDG avid \par Discrete Left hilar/ infrahilar region  lesion with an SUV 5.2 which is possibly corresponding to a LN \par \par \par Patient s/p Left lung mass, core biopsy with pathology c/w Adenocarcinoma c/w pulmonary origin (see comment)\par CK7, TTF-1 : Positive CK20, PAX-8 : Negative\par  [de-identified] : Patient presents for follow up.  She completed concurrent CRT with carbo/ taxol on 6/21/21\par Patient had significant SOB at previous visit, her O2 sat was 94 on 7/28, decreased to 82% on ambulation, likely related to RT pneumonitis, now s/p z pack and prednisone taper. But she was admitted to [facility] from 8/1 - 8/6 due to low O2 saturation and SOB despite O2. She was put on IV abx and continued on prednisone taper.  \par Still with cough, slowly improving. SOB at rest is much improved, still with TOLBERT but also improved. No other complaints at this time\par \par Seen with  [id number]

## 2021-08-28 ENCOUNTER — OUTPATIENT (OUTPATIENT)
Dept: OUTPATIENT SERVICES | Facility: HOSPITAL | Age: 66
LOS: 1 days | Discharge: HOME | End: 2021-08-28

## 2021-08-28 DIAGNOSIS — Z90.49 ACQUIRED ABSENCE OF OTHER SPECIFIED PARTS OF DIGESTIVE TRACT: Chronic | ICD-10-CM

## 2021-08-28 DIAGNOSIS — Z98.82 BREAST IMPLANT STATUS: Chronic | ICD-10-CM

## 2021-08-28 DIAGNOSIS — Z98.890 OTHER SPECIFIED POSTPROCEDURAL STATES: Chronic | ICD-10-CM

## 2021-08-28 DIAGNOSIS — Z98.891 HISTORY OF UTERINE SCAR FROM PREVIOUS SURGERY: Chronic | ICD-10-CM

## 2021-08-28 DIAGNOSIS — C34.90 MALIGNANT NEOPLASM OF UNSPECIFIED PART OF UNSPECIFIED BRONCHUS OR LUNG: ICD-10-CM

## 2021-08-31 ENCOUNTER — LABORATORY RESULT (OUTPATIENT)
Age: 66
End: 2021-08-31

## 2021-08-31 ENCOUNTER — APPOINTMENT (OUTPATIENT)
Age: 66
End: 2021-08-31

## 2021-08-31 ENCOUNTER — APPOINTMENT (OUTPATIENT)
Dept: HEMATOLOGY ONCOLOGY | Facility: CLINIC | Age: 66
End: 2021-08-31
Payer: MEDICARE

## 2021-08-31 PROCEDURE — 99214 OFFICE O/P EST MOD 30 MIN: CPT

## 2021-09-02 NOTE — HISTORY OF PRESENT ILLNESS
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 Recent imaging has shown growth and development of a solid structure. The etiology remains unclear, but malignancy has been considered. She feels well has no complaints. She denies fever, chills, night sweats, weight loss, or weight gain. \par \par Followed by Dr Galeana in the past\par PPD+ in 2000 and received 6 months of INH\par PET positive RENAN posterior cavitary density in 2010\par TTNA and Bronch non-diagnostic. Cultures negative for fungus and TB\par CT at [facility] in 2010 and 2012 stable - felt to be benign. Lost to FU\par \par Patient had a PEt scan in Oct 2020 with a Left upper lobe spiculated lung mass 4.5 x 3cm with an SUV 11.1 with adjacent tree in bud opacity \par In addition Rt upper lobe opacity 1.2 x1 cm which is not FDG avid \par Discrete Left hilar/ infrahilar region  lesion with an SUV 5.2 which is possibly corresponding to a LN \par \par \par Patient s/p Left lung mass, core biopsy with pathology c/w Adenocarcinoma c/w pulmonary origin (see comment)\par CK7, TTF-1 : Positive CK20, PAX-8 : Negative\par  [de-identified] : SON: JUAN RAMON [phone] [de-identified] : Patient presents for follow up.  She completed concurrent CRT with carbo/ taxol on 6/21/21. Start of Durvalumab delayed by RT pneumonitis and subsequent need for prednisone. Currently on 15mg/day with plan to taper to 10mg on Sunday 9/5, then to 5 mg on Sunday 9/13.\par Patient had significant SOB when seen for follow up visit on 7/28/21, her O2 sat was 94%, decreased to 82% on ambulation, likely related to RT pneumonitis, now s/p z pack and prednisone taper. She was later admitted to [facility] from 8/1 - 8/6 due to low O2 saturation and SOB despite home O2. She was put on IV abx and continued on prednisone taper.  \par Today cough is much improved. SOB at rest is resolved and TOLBERT much improved. Fatigue much improved, nearly resolved  No other complaints at this time. "finally feeling like herself again."\par \par Patient refused  service, daughter acted as .

## 2021-09-07 ENCOUNTER — LABORATORY RESULT (OUTPATIENT)
Age: 66
End: 2021-09-07

## 2021-09-07 ENCOUNTER — APPOINTMENT (OUTPATIENT)
Age: 66
End: 2021-09-07

## 2021-09-08 DIAGNOSIS — Z51.11 ENCOUNTER FOR ANTINEOPLASTIC CHEMOTHERAPY: ICD-10-CM

## 2021-09-09 ENCOUNTER — OUTPATIENT (OUTPATIENT)
Dept: OUTPATIENT SERVICES | Facility: HOSPITAL | Age: 66
LOS: 1 days | End: 2021-09-09
Payer: COMMERCIAL

## 2021-09-09 ENCOUNTER — APPOINTMENT (OUTPATIENT)
Dept: DISASTER EMERGENCY | Facility: CLINIC | Age: 66
End: 2021-09-09

## 2021-09-09 ENCOUNTER — APPOINTMENT (OUTPATIENT)
Dept: RADIOLOGY | Facility: CLINIC | Age: 66
End: 2021-09-09
Payer: MEDICARE

## 2021-09-09 ENCOUNTER — NON-APPOINTMENT (OUTPATIENT)
Age: 66
End: 2021-09-09

## 2021-09-09 DIAGNOSIS — Z98.890 OTHER SPECIFIED POSTPROCEDURAL STATES: Chronic | ICD-10-CM

## 2021-09-09 DIAGNOSIS — Z00.8 ENCOUNTER FOR OTHER GENERAL EXAMINATION: ICD-10-CM

## 2021-09-09 DIAGNOSIS — Z98.82 BREAST IMPLANT STATUS: Chronic | ICD-10-CM

## 2021-09-09 DIAGNOSIS — Z90.49 ACQUIRED ABSENCE OF OTHER SPECIFIED PARTS OF DIGESTIVE TRACT: Chronic | ICD-10-CM

## 2021-09-09 DIAGNOSIS — Z98.891 HISTORY OF UTERINE SCAR FROM PREVIOUS SURGERY: Chronic | ICD-10-CM

## 2021-09-09 PROCEDURE — 71046 X-RAY EXAM CHEST 2 VIEWS: CPT | Mod: 26

## 2021-09-09 PROCEDURE — 71046 X-RAY EXAM CHEST 2 VIEWS: CPT

## 2021-09-10 LAB — SARS-COV-2 N GENE NPH QL NAA+PROBE: NOT DETECTED

## 2021-09-13 ENCOUNTER — APPOINTMENT (OUTPATIENT)
Dept: PULMONOLOGY | Facility: CLINIC | Age: 66
End: 2021-09-13
Payer: MEDICARE

## 2021-09-13 VITALS — DIASTOLIC BLOOD PRESSURE: 68 MMHG | HEART RATE: 92 BPM | SYSTOLIC BLOOD PRESSURE: 120 MMHG | OXYGEN SATURATION: 97 %

## 2021-09-13 VITALS — HEIGHT: 64 IN | WEIGHT: 164 LBS | BODY MASS INDEX: 28 KG/M2

## 2021-09-13 DIAGNOSIS — T66.XXXA RADIATION SICKNESS, UNSPECIFIED, INITIAL ENCOUNTER: ICD-10-CM

## 2021-09-13 PROCEDURE — 99215 OFFICE O/P EST HI 40 MIN: CPT | Mod: 25

## 2021-09-13 PROCEDURE — 85018 HEMOGLOBIN: CPT | Mod: QW

## 2021-09-13 PROCEDURE — 94010 BREATHING CAPACITY TEST: CPT

## 2021-09-13 PROCEDURE — 94727 GAS DIL/WSHOT DETER LNG VOL: CPT

## 2021-09-13 PROCEDURE — 94729 DIFFUSING CAPACITY: CPT

## 2021-09-13 NOTE — CONSULT LETTER
[Dear  ___] : Dear  [unfilled], [Consult Letter:] : I had the pleasure of evaluating your patient, [unfilled]. [Please see my note below.] : Please see my note below. [Consult Closing:] : Thank you very much for allowing me to participate in the care of this patient.  If you have any questions, please do not hesitate to contact me. [Sincerely,] : Sincerely, [FreeTextEntry3] : Tho Zayas MD FCCP\par Pulmonary/Critical Care/Sleep Medicine\par Department of Internal Medicine\par \par [facility] [employer]\par

## 2021-09-13 NOTE — DISCUSSION/SUMMARY
[FreeTextEntry1] : Status post radiation pneumonitis with significant improvement in infiltrate.  Residual most likely due to tumor.  Plan is to complete her prednisone taper this week with a follow-up CAT scan in 8 weeks.  Further treatment as per oncology

## 2021-09-13 NOTE — HISTORY OF PRESENT ILLNESS
[TextBox_4] : 66-year-old female with a history of adenocarcinoma status post radiation therapy and initial chemotherapy.  Course complicated by radiation pneumonitis and was admitted and treated at [facility] from 8/2-8/6/2021.  She subsequently has had a tapering course of steroids and presently on 5 mg of prednisone per day\par Intermittent cough\par Restarted Durvalumab

## 2021-09-21 ENCOUNTER — LABORATORY RESULT (OUTPATIENT)
Age: 66
End: 2021-09-21

## 2021-09-21 ENCOUNTER — APPOINTMENT (OUTPATIENT)
Age: 66
End: 2021-09-21

## 2021-09-21 ENCOUNTER — APPOINTMENT (OUTPATIENT)
Dept: HEMATOLOGY ONCOLOGY | Facility: CLINIC | Age: 66
End: 2021-09-21
Payer: MEDICARE

## 2021-09-21 VITALS
WEIGHT: 168.19 LBS | SYSTOLIC BLOOD PRESSURE: 113 MMHG | BODY MASS INDEX: 28.71 KG/M2 | DIASTOLIC BLOOD PRESSURE: 78 MMHG | OXYGEN SATURATION: 97 % | HEIGHT: 64 IN | HEART RATE: 89 BPM

## 2021-09-21 PROCEDURE — 99214 OFFICE O/P EST MOD 30 MIN: CPT

## 2021-09-23 NOTE — HISTORY OF PRESENT ILLNESS
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 Recent imaging has shown growth and development of a solid structure. The etiology remains unclear, but malignancy has been considered. She feels well has no complaints. She denies fever, chills, night sweats, weight loss, or weight gain. \par \par Followed by Dr Galeana in the past\par PPD+ in 2000 and received 6 months of INH\par PET positive RENAN posterior cavitary density in 2010\par TTNA and Bronch non-diagnostic. Cultures negative for fungus and TB\par CT at [facility] in 2010 and 2012 stable - felt to be benign. Lost to FU\par \par Patient had a PEt scan in Oct 2020 with a Left upper lobe spiculated lung mass 4.5 x 3cm with an SUV 11.1 with adjacent tree in bud opacity \par In addition Rt upper lobe opacity 1.2 x1 cm which is not FDG avid \par Discrete Left hilar/ infrahilar region  lesion with an SUV 5.2 which is possibly corresponding to a LN \par \par \par Patient s/p Left lung mass, core biopsy with pathology c/w Adenocarcinoma c/w pulmonary origin (see comment)\par CK7, TTF-1 : Positive CK20, PAX-8 : Negative\par  [de-identified] : SON: JUAN RAMON [phone] [de-identified] : Patient presents for follow up on C2D1 Durvalumab for adenocarcinoma of the RENAN.  She completed concurrent CRT with carbo/ taxol on 6/21/21. Start of Durvalumab delayed by RT pneumonitis and subsequent need for prednisone now s/p taper, started on 15mg/day decreased to 10mg on Sunday 9/5, then to 5 mg on Sunday 9/13 and finished 9/20.\par Patient had significant SOB when seen for follow up visit on 7/28/21, her O2 sat was 94%, decreased to 82% on ambulation, likely related to RT pneumonitis. She was later admitted to [facility] from 8/1 - 8/6 due to low O2 saturation and SOB despite home O2. She was put on IV abx and continued on prednisone taper. Followed by Pulmonary, Dr. Tho Zayas.  \par + Cough, much improved from early August but slightly worse since most recent evaluation on 8/31/21. SOB at rest remains resolved and today denies TLOBERT.  + Fatigue, intermittent and intermittently feels very well with energy to perform all ADLs. + GERD like symptoms with intermittent burning discomfort in LUQ, no other abdominal pain. No rash/pruritus, edema, diarrhea, or fevers. \par \par Patient refused  service, daughter acted as .

## 2021-09-23 NOTE — ASSESSMENT
[FreeTextEntry1] : PET in 10/20 with a RENAN spiculated lung mass 4.5 x 3cm with an SUV 11.1 with adjacent tree in bud opacity. Left lung, biopsy on 11/23/20: Adenocarcinoma\par -MRI Brain 12/29/20: negative \par -CT C/A/P 12/29/20: stable RENAN primary lesion, (4.7 x 2.3cm), 1.2 cm left lung apex lesion along with RUL stable lesion of 1.3 cm \par -PDL1 20% \par -Foundation one : ALK ELM 4 fusion variant, MS stable TMB 4 mut/ Mb, SF 3B1 \par -Plan for resection per Dr Choudhury, however EBUS prior to surgery with N2 disease. Patient was presented at Tumor board on 4/14/21 and a plan for definitive CRT \par -s/p chemoRT with Carbo and taxol weekly with RT, completed 6/21/21\par -will now be followed by Immunotherapy with Durvalumab for 1 year \par -restaging CT CAP post treatment on 7/21/21 with stability in RENAN mass at 4.3 cm x 2.8cm (previously 4.2 x 3.2 cm) 1.3 cm rt upper lobe nodule is unchanged> new patchy groungglass opacity surrounding both upper lobe lesions which maybe 2/2 treatment effect vs infetcion.\par -given stability of scans plan remains for Durvalumab\par -Patient is a Jehovah W[employer]ness \par -She had a h/o diverticulosis, need to monitor with IO \par -patient seen on 7/28/21, had significant SOB with decrease in O2 sat to 82 on ambulation, likely related to RT pneumonitis\par -s/p z pack and prednisone taper and s/p hospitalization with IV abx and extented prednisone taper\par -symptoms improved when seen on 8/16/21, but still with cough and TOLBERT but no further SOB at rest\par -d/w Dr Simpson, as patient is still on prednisone 20mg delayed start of Durvalumab until patient was on 10mg or less of prednisone.  C1D1 Durvalumab was on 9/7/21 \par -C2D1 today \par -s/p Pulmonary follow up, Dr Zayas, on 8/18/21, prednisone tapered to 15mg x 7 days, 10mg x 7 days and 5 mg x 7 days then D/C\par -started 15mg on 8/30, decrease to 10mg on 9/5, then to 5 mg on Sunday 9/13 and finished 9/20.\par -MD/PA follow up Q4 weeks while on Durvalumab

## 2021-09-30 ENCOUNTER — OUTPATIENT (OUTPATIENT)
Dept: OUTPATIENT SERVICES | Facility: HOSPITAL | Age: 66
LOS: 1 days | Discharge: HOME | End: 2021-09-30

## 2021-09-30 DIAGNOSIS — Z98.891 HISTORY OF UTERINE SCAR FROM PREVIOUS SURGERY: Chronic | ICD-10-CM

## 2021-09-30 DIAGNOSIS — Z98.890 OTHER SPECIFIED POSTPROCEDURAL STATES: Chronic | ICD-10-CM

## 2021-09-30 DIAGNOSIS — Z90.49 ACQUIRED ABSENCE OF OTHER SPECIFIED PARTS OF DIGESTIVE TRACT: Chronic | ICD-10-CM

## 2021-09-30 DIAGNOSIS — C34.90 MALIGNANT NEOPLASM OF UNSPECIFIED PART OF UNSPECIFIED BRONCHUS OR LUNG: ICD-10-CM

## 2021-09-30 DIAGNOSIS — Z98.82 BREAST IMPLANT STATUS: Chronic | ICD-10-CM

## 2021-10-05 ENCOUNTER — LABORATORY RESULT (OUTPATIENT)
Age: 66
End: 2021-10-05

## 2021-10-05 ENCOUNTER — APPOINTMENT (OUTPATIENT)
Age: 66
End: 2021-10-05

## 2021-10-11 ENCOUNTER — NON-APPOINTMENT (OUTPATIENT)
Age: 66
End: 2021-10-11

## 2021-10-17 NOTE — H&P PST ADULT - NSICDXPASTMEDICALHX_GEN_ALL_CORE_FT
(2) good, crying PAST MEDICAL HISTORY:  Acute lateral meniscal injury of right knee, initial encounter     Acute medial meniscus tear of right knee, initial encounter     At risk for sleep apnea Bang score 3    Depression     Fatty liver     Hyperlipidemia     Osteoarthritis right    Patient is Worship     PPD positive 2000 -  treated with INH    Pulmonary mass monitoring -no surgical intervention    Small bowel mass incidental finding while appendectomy ( Benign s/p colon mass resection - 3/2018)

## 2021-10-18 ENCOUNTER — APPOINTMENT (OUTPATIENT)
Dept: PULMONOLOGY | Facility: CLINIC | Age: 66
End: 2021-10-18
Payer: MEDICARE

## 2021-10-18 VITALS
WEIGHT: 164 LBS | DIASTOLIC BLOOD PRESSURE: 68 MMHG | HEART RATE: 74 BPM | HEIGHT: 64 IN | OXYGEN SATURATION: 98 % | RESPIRATION RATE: 16 BRPM | SYSTOLIC BLOOD PRESSURE: 132 MMHG | BODY MASS INDEX: 28 KG/M2

## 2021-10-18 PROCEDURE — 99214 OFFICE O/P EST MOD 30 MIN: CPT

## 2021-10-18 RX ORDER — SUCRALFATE 1 G/1
1 TABLET ORAL 4 TIMES DAILY
Qty: 120 | Refills: 0 | Status: DISCONTINUED | COMMUNITY
Start: 2021-05-24 | End: 2021-10-18

## 2021-10-18 RX ORDER — AZITHROMYCIN 250 MG/1
250 TABLET, FILM COATED ORAL
Qty: 6 | Refills: 0 | Status: DISCONTINUED | COMMUNITY
Start: 2021-07-28 | End: 2021-10-18

## 2021-10-18 RX ORDER — PREDNISONE 5 MG/1
5 TABLET ORAL
Qty: 42 | Refills: 0 | Status: DISCONTINUED | COMMUNITY
Start: 2021-08-18 | End: 2021-10-18

## 2021-10-18 RX ORDER — PREDNISONE 10 MG/1
10 TABLET ORAL
Qty: 40 | Refills: 0 | Status: DISCONTINUED | COMMUNITY
Start: 2021-07-28 | End: 2021-10-18

## 2021-10-18 RX ORDER — BENZONATATE 100 MG/1
100 CAPSULE ORAL 3 TIMES DAILY
Qty: 30 | Refills: 0 | Status: DISCONTINUED | COMMUNITY
Start: 2021-06-29 | End: 2021-10-18

## 2021-10-18 RX ORDER — PREDNISONE 20 MG/1
20 TABLET ORAL DAILY
Qty: 7 | Refills: 0 | Status: DISCONTINUED | COMMUNITY
Start: 2021-08-18 | End: 2021-10-18

## 2021-10-18 RX ORDER — ONDANSETRON 8 MG/1
8 TABLET ORAL EVERY 8 HOURS
Qty: 90 | Refills: 0 | Status: DISCONTINUED | COMMUNITY
Start: 2021-05-07 | End: 2021-10-18

## 2021-10-18 RX ORDER — PROMETHAZINE HYDROCHLORIDE AND CODEINE PHOSPHATE 6.25; 1 MG/5ML; MG/5ML
6.25-1 SOLUTION ORAL EVERY 4 HOURS
Qty: 600 | Refills: 0 | Status: DISCONTINUED | COMMUNITY
Start: 2021-07-23 | End: 2021-10-18

## 2021-10-18 NOTE — HISTORY OF PRESENT ILLNESS
[TextBox_4] : 66-year-old female with a history of adenocarcinoma status post radiation therapy and initial chemotherapy.  Course of appears to have been complicated by radiation pneumonitis and she was successfully treated at [facility] from 8/2-8/6/2021.  She completed a tapering course of steroids but still notes intermittent cough.  Durvalumab has been reinstituted and the patient continues to note intermittent cough which is improved with the use of a short acting bronchodilator.  She has had several episodes of mild hypoxemia with complete reversal.  There is no history of leg edema, paroxysmal nocturnal dyspnea or orthopnea.

## 2021-10-18 NOTE — CONSULT LETTER
[Dear  ___] : Dear  [unfilled], [Consult Letter:] : I had the pleasure of evaluating your patient, [unfilled]. [Please see my note below.] : Please see my note below. [Consult Closing:] : Thank you very much for allowing me to participate in the care of this patient.  If you have any questions, please do not hesitate to contact me. [Sincerely,] : Sincerely, [FreeTextEntry3] : Tho Zayas MD FCCP\par Pulmonary/Critical Care/Sleep Medicine\par Department of Internal Medicine\par \par [facility] [employer]\par

## 2021-10-18 NOTE — DISCUSSION/SUMMARY
[FreeTextEntry1] : 66-year-old female seen today for the above.  Patient with complaints of cough may be on the basis of drug-induced pneumonitis versus radiation pneumonitis which is the most likely cause.  There may be a component of reactive airways disease as seen by her response to short acting bronchodilator.  I therefore instituted Breo as a maintenance agent and am requesting a follow-up CT.  The patient may require additional steroids.

## 2021-10-18 NOTE — HISTORY OF PRESENT ILLNESS
[TextBox_4] : 66-year-old female with a history of adenocarcinoma status post radiation therapy and initial chemotherapy.  Course of appears to have been complicated by radiation pneumonitis and she was successfully treated at [facility] from 8/2-8/6/2021.  She completed a tapering course of steroids but still notes intermittent cough.  Durvalumab has been reinstituted and the patient continues to note intermittent cough which is improved with the use of a short acting bronchodilator.  She has had several episodes of mild hypoxemia with complete reversal.  There is no history of leg edema, paroxysmal nocturnal dyspnea or orthopnea.

## 2021-10-18 NOTE — CONSULT LETTER
[Dear  ___] : Dear  [unfilled], [Consult Letter:] : I had the pleasure of evaluating your patient, [unfilled]. [Please see my note below.] : Please see my note below. [Consult Closing:] : Thank you very much for allowing me to participate in the care of this patient.  If you have any questions, please do not hesitate to contact me. [Sincerely,] : Sincerely, [FreeTextEntry3] : Tho Zayas MD FCCP\par Pulmonary/Critical Care/Sleep Medicine\par Department of Internal Medicine\par \par [facility] [employer]\par

## 2021-10-19 ENCOUNTER — RESULT REVIEW (OUTPATIENT)
Age: 66
End: 2021-10-19

## 2021-10-19 ENCOUNTER — LABORATORY RESULT (OUTPATIENT)
Age: 66
End: 2021-10-19

## 2021-10-19 ENCOUNTER — APPOINTMENT (OUTPATIENT)
Age: 66
End: 2021-10-19

## 2021-10-19 ENCOUNTER — APPOINTMENT (OUTPATIENT)
Dept: HEMATOLOGY ONCOLOGY | Facility: CLINIC | Age: 66
End: 2021-10-19
Payer: MEDICARE

## 2021-10-19 DIAGNOSIS — Z51.11 ENCOUNTER FOR ANTINEOPLASTIC CHEMOTHERAPY: ICD-10-CM

## 2021-10-19 LAB
BASOPHILS # BLD AUTO: 0.1 K/UL — SIGNIFICANT CHANGE UP (ref 0–0.2)
BASOPHILS NFR BLD AUTO: 0.9 % — SIGNIFICANT CHANGE UP (ref 0–2)
EOSINOPHIL # BLD AUTO: 0.1 K/UL — SIGNIFICANT CHANGE UP (ref 0–0.5)
EOSINOPHIL NFR BLD AUTO: 2 % — SIGNIFICANT CHANGE UP (ref 0–6)
HCT VFR BLD CALC: 37.6 % — SIGNIFICANT CHANGE UP (ref 34.5–45)
HGB BLD-MCNC: 12.2 G/DL — SIGNIFICANT CHANGE UP (ref 11.5–15.5)
LYMPHOCYTES # BLD AUTO: 0.9 K/UL — LOW (ref 1–3.3)
LYMPHOCYTES # BLD AUTO: 16.1 % — SIGNIFICANT CHANGE UP (ref 13–44)
MCHC RBC-ENTMCNC: 29.8 PG — SIGNIFICANT CHANGE UP (ref 27–34)
MCHC RBC-ENTMCNC: 32.3 G/DL — SIGNIFICANT CHANGE UP (ref 32–36)
MCV RBC AUTO: 92.1 FL — SIGNIFICANT CHANGE UP (ref 80–100)
MONOCYTES # BLD AUTO: 0.4 K/UL — SIGNIFICANT CHANGE UP (ref 0–0.9)
MONOCYTES NFR BLD AUTO: 6.4 % — SIGNIFICANT CHANGE UP (ref 2–14)
NEUTROPHILS # BLD AUTO: 4.3 K/UL — SIGNIFICANT CHANGE UP (ref 1.8–7.4)
NEUTROPHILS NFR BLD AUTO: 74.7 % — SIGNIFICANT CHANGE UP (ref 43–77)
PLATELET # BLD AUTO: 322 K/UL — SIGNIFICANT CHANGE UP (ref 150–400)
RBC # BLD: 4.08 M/UL — SIGNIFICANT CHANGE UP (ref 3.8–5.2)
RBC # FLD: 13.8 % — SIGNIFICANT CHANGE UP (ref 10.3–14.5)
WBC # BLD: 5.8 K/UL — SIGNIFICANT CHANGE UP (ref 3.8–10.5)
WBC # FLD AUTO: 5.8 K/UL — SIGNIFICANT CHANGE UP (ref 3.8–10.5)

## 2021-10-19 PROCEDURE — 99215 OFFICE O/P EST HI 40 MIN: CPT

## 2021-10-19 NOTE — ASSESSMENT
[FreeTextEntry1] : PET in 10/2020 with a RENAN spiculated lung mass 4.5 x 3cm with an SUV 11.1 with adjacent tree in bud opacity. Left lung, biopsy on 11/23/20: Adenocarcinoma\par \par -MRI Brain 12/29/20: negative \par -CT C/A/P 12/29/20: stable RENAN primary lesion, (4.7 x 2.3cm), 1.2 cm left lung apex lesion along with RUL stable lesion of 1.3 cm \par -PDL1 20% \par -Foundation one : ALK ELM 4 fusion variant, MS stable TMB 4 mut/ Mb, SF 3B1 \par \par -Initial plan for resection per Dr Choudhury, however EBUS prior to surgery with N2 disease. Patient was presented at Tumor board on 4/14/21 and a plan for definitive CRT \par -s/p chemoRT with Carbo and taxol weekly with RT, completed 6/21/21\par -Currently on Immunotherapy with Durvalumab for 1 year \par -restaging CT CAP post treatment on 7/21/21 with stability in RENAN mass at 4.3 cm x 2.8cm (previously 4.2 x 3.2 cm) 1.3 cm rt upper lobe nodule is unchanged> new patchy groungglass opacity surrounding both upper lobe lesions which maybe 2/2 treatment effect vs infection.\par -Patient is a Jehovah WItness \par -She had a h/o diverticulosis, need to monitor with IO \par - patient seen on 7/28/21, had significant SOB with decrease in O2 sat to 82 on ambulation, likely related to RT pneumonitis\par -s/p z pack and prednisone taper and s/p hospitalization with IV abx and extented prednisone taper\par -symptoms improved when seen on 8/16/21, but still with cough and TOLBERT but no further SOB at rest\par -d/w Dr Simpson, as patient is still on prednisone 20mg delayed start of Durvalumab until patient was on 10mg or less of prednisone.  C1D1 Durvalumab was on 9/7/21 \par -C4D1 durvalumab  today \par -s/p Pulmonary follow up, Dr Zayas, long prednosone taper ( 1 month completed on 9/20/21.\par - LFT WNL Precortisol slightly low on 10/05/21 However on 09/21/21, patient c/o muscle cramps last week if persists will send CPK.   \par - Follow up w/ me to discuss restaging scans, 10/28/21 on next treatment.

## 2021-10-19 NOTE — ADDENDUM
[FreeTextEntry1] : Documented by Harry Limon acting as scribe for Dr. Simpson on 10/19/2021. \par \par All Medical record entries made by the Scribe were at my, Dr. Simpson's, direction and personally dictated by me on 10/19/2021. I have reviewed the chart and agree that the record accurately reflects my personal performance of the history, physical exam, assessment and plan. I have also personally directed, reviewed, and agreed with the discharge instructions.

## 2021-10-20 DIAGNOSIS — Z51.11 ENCOUNTER FOR ANTINEOPLASTIC CHEMOTHERAPY: ICD-10-CM

## 2021-10-27 ENCOUNTER — APPOINTMENT (OUTPATIENT)
Dept: CT IMAGING | Facility: CLINIC | Age: 66
End: 2021-10-27
Payer: MEDICARE

## 2021-10-27 ENCOUNTER — OUTPATIENT (OUTPATIENT)
Dept: OUTPATIENT SERVICES | Facility: HOSPITAL | Age: 66
LOS: 1 days | End: 2021-10-27
Payer: COMMERCIAL

## 2021-10-27 DIAGNOSIS — Z90.49 ACQUIRED ABSENCE OF OTHER SPECIFIED PARTS OF DIGESTIVE TRACT: Chronic | ICD-10-CM

## 2021-10-27 DIAGNOSIS — Z98.82 BREAST IMPLANT STATUS: Chronic | ICD-10-CM

## 2021-10-27 DIAGNOSIS — Z98.891 HISTORY OF UTERINE SCAR FROM PREVIOUS SURGERY: Chronic | ICD-10-CM

## 2021-10-27 DIAGNOSIS — Z00.8 ENCOUNTER FOR OTHER GENERAL EXAMINATION: ICD-10-CM

## 2021-10-27 DIAGNOSIS — Z98.890 OTHER SPECIFIED POSTPROCEDURAL STATES: Chronic | ICD-10-CM

## 2021-10-27 PROCEDURE — 71250 CT THORAX DX C-: CPT | Mod: 26

## 2021-10-27 PROCEDURE — 71250 CT THORAX DX C-: CPT

## 2021-10-29 ENCOUNTER — OUTPATIENT (OUTPATIENT)
Dept: OUTPATIENT SERVICES | Facility: HOSPITAL | Age: 66
LOS: 1 days | Discharge: HOME | End: 2021-10-29

## 2021-10-29 DIAGNOSIS — Z98.891 HISTORY OF UTERINE SCAR FROM PREVIOUS SURGERY: Chronic | ICD-10-CM

## 2021-10-29 DIAGNOSIS — Z98.890 OTHER SPECIFIED POSTPROCEDURAL STATES: Chronic | ICD-10-CM

## 2021-10-29 DIAGNOSIS — C34.90 MALIGNANT NEOPLASM OF UNSPECIFIED PART OF UNSPECIFIED BRONCHUS OR LUNG: ICD-10-CM

## 2021-10-29 DIAGNOSIS — Z90.49 ACQUIRED ABSENCE OF OTHER SPECIFIED PARTS OF DIGESTIVE TRACT: Chronic | ICD-10-CM

## 2021-10-29 DIAGNOSIS — Z98.82 BREAST IMPLANT STATUS: Chronic | ICD-10-CM

## 2021-11-02 ENCOUNTER — LABORATORY RESULT (OUTPATIENT)
Age: 66
End: 2021-11-02

## 2021-11-02 ENCOUNTER — RESULT REVIEW (OUTPATIENT)
Age: 66
End: 2021-11-02

## 2021-11-02 ENCOUNTER — APPOINTMENT (OUTPATIENT)
Dept: HEMATOLOGY ONCOLOGY | Facility: CLINIC | Age: 66
End: 2021-11-02
Payer: MEDICARE

## 2021-11-02 ENCOUNTER — APPOINTMENT (OUTPATIENT)
Age: 66
End: 2021-11-02

## 2021-11-02 VITALS
HEART RATE: 90 BPM | DIASTOLIC BLOOD PRESSURE: 70 MMHG | WEIGHT: 165 LBS | BODY MASS INDEX: 28.17 KG/M2 | OXYGEN SATURATION: 91 % | SYSTOLIC BLOOD PRESSURE: 100 MMHG | HEIGHT: 64 IN

## 2021-11-02 DIAGNOSIS — Z78.9 OTHER SPECIFIED HEALTH STATUS: ICD-10-CM

## 2021-11-02 LAB
BASOPHILS # BLD AUTO: 0.1 K/UL — SIGNIFICANT CHANGE UP (ref 0–0.2)
BASOPHILS NFR BLD AUTO: 0.8 % — SIGNIFICANT CHANGE UP (ref 0–2)
EOSINOPHIL # BLD AUTO: 0.2 K/UL — SIGNIFICANT CHANGE UP (ref 0–0.5)
EOSINOPHIL NFR BLD AUTO: 2 % — SIGNIFICANT CHANGE UP (ref 0–6)
HCT VFR BLD CALC: 35.5 % — SIGNIFICANT CHANGE UP (ref 34.5–45)
HGB BLD-MCNC: 11.2 G/DL — LOW (ref 11.5–15.5)
LYMPHOCYTES # BLD AUTO: 0.7 K/UL — LOW (ref 1–3.3)
LYMPHOCYTES # BLD AUTO: 9.4 % — LOW (ref 13–44)
MCHC RBC-ENTMCNC: 28.7 PG — SIGNIFICANT CHANGE UP (ref 27–34)
MCHC RBC-ENTMCNC: 31.7 G/DL — LOW (ref 32–36)
MCV RBC AUTO: 90.6 FL — SIGNIFICANT CHANGE UP (ref 80–100)
MONOCYTES # BLD AUTO: 0.5 K/UL — SIGNIFICANT CHANGE UP (ref 0–0.9)
MONOCYTES NFR BLD AUTO: 6.4 % — SIGNIFICANT CHANGE UP (ref 2–14)
NEUTROPHILS # BLD AUTO: 6.3 K/UL — SIGNIFICANT CHANGE UP (ref 1.8–7.4)
NEUTROPHILS NFR BLD AUTO: 81.4 % — HIGH (ref 43–77)
PLATELET # BLD AUTO: 393 K/UL — SIGNIFICANT CHANGE UP (ref 150–400)
RBC # BLD: 3.92 M/UL — SIGNIFICANT CHANGE UP (ref 3.8–5.2)
RBC # FLD: 13.4 % — SIGNIFICANT CHANGE UP (ref 10.3–14.5)
WBC # BLD: 7.7 K/UL — SIGNIFICANT CHANGE UP (ref 3.8–10.5)
WBC # FLD AUTO: 7.7 K/UL — SIGNIFICANT CHANGE UP (ref 3.8–10.5)

## 2021-11-02 PROCEDURE — 99215 OFFICE O/P EST HI 40 MIN: CPT

## 2021-11-03 DIAGNOSIS — Z51.11 ENCOUNTER FOR ANTINEOPLASTIC CHEMOTHERAPY: ICD-10-CM

## 2021-11-16 ENCOUNTER — LABORATORY RESULT (OUTPATIENT)
Age: 66
End: 2021-11-16

## 2021-11-16 ENCOUNTER — APPOINTMENT (OUTPATIENT)
Dept: HEMATOLOGY ONCOLOGY | Facility: CLINIC | Age: 66
End: 2021-11-16
Payer: MEDICARE

## 2021-11-16 ENCOUNTER — RESULT REVIEW (OUTPATIENT)
Age: 66
End: 2021-11-16

## 2021-11-16 ENCOUNTER — APPOINTMENT (OUTPATIENT)
Age: 66
End: 2021-11-16

## 2021-11-16 VITALS
OXYGEN SATURATION: 96 % | HEART RATE: 78 BPM | BODY MASS INDEX: 27.83 KG/M2 | SYSTOLIC BLOOD PRESSURE: 115 MMHG | WEIGHT: 163.04 LBS | DIASTOLIC BLOOD PRESSURE: 75 MMHG | HEIGHT: 64 IN

## 2021-11-16 LAB
BASOPHILS # BLD AUTO: 0.1 K/UL — SIGNIFICANT CHANGE UP (ref 0–0.2)
BASOPHILS NFR BLD AUTO: 1.2 % — SIGNIFICANT CHANGE UP (ref 0–2)
EOSINOPHIL # BLD AUTO: 0.2 K/UL — SIGNIFICANT CHANGE UP (ref 0–0.5)
EOSINOPHIL NFR BLD AUTO: 2.7 % — SIGNIFICANT CHANGE UP (ref 0–6)
HCT VFR BLD CALC: 34.2 % — LOW (ref 34.5–45)
HGB BLD-MCNC: 10.9 G/DL — LOW (ref 11.5–15.5)
LYMPHOCYTES # BLD AUTO: 0.9 K/UL — LOW (ref 1–3.3)
LYMPHOCYTES # BLD AUTO: 14.7 % — SIGNIFICANT CHANGE UP (ref 13–44)
MCHC RBC-ENTMCNC: 29.2 PG — SIGNIFICANT CHANGE UP (ref 27–34)
MCHC RBC-ENTMCNC: 31.8 G/DL — LOW (ref 32–36)
MCV RBC AUTO: 92.1 FL — SIGNIFICANT CHANGE UP (ref 80–100)
MONOCYTES # BLD AUTO: 0.4 K/UL — SIGNIFICANT CHANGE UP (ref 0–0.9)
MONOCYTES NFR BLD AUTO: 7.1 % — SIGNIFICANT CHANGE UP (ref 2–14)
NEUTROPHILS # BLD AUTO: 4.4 K/UL — SIGNIFICANT CHANGE UP (ref 1.8–7.4)
NEUTROPHILS NFR BLD AUTO: 74.3 % — SIGNIFICANT CHANGE UP (ref 43–77)
PLATELET # BLD AUTO: 419 K/UL — HIGH (ref 150–400)
RBC # BLD: 3.72 M/UL — LOW (ref 3.8–5.2)
RBC # FLD: 13.9 % — SIGNIFICANT CHANGE UP (ref 10.3–14.5)
WBC # BLD: 5.9 K/UL — SIGNIFICANT CHANGE UP (ref 3.8–10.5)
WBC # FLD AUTO: 5.9 K/UL — SIGNIFICANT CHANGE UP (ref 3.8–10.5)

## 2021-11-16 PROCEDURE — 99214 OFFICE O/P EST MOD 30 MIN: CPT

## 2021-11-20 ENCOUNTER — APPOINTMENT (OUTPATIENT)
Dept: DISASTER EMERGENCY | Facility: CLINIC | Age: 66
End: 2021-11-20

## 2021-11-21 LAB — SARS-COV-2 N GENE NPH QL NAA+PROBE: NOT DETECTED

## 2021-11-23 ENCOUNTER — APPOINTMENT (OUTPATIENT)
Dept: PULMONOLOGY | Facility: CLINIC | Age: 66
End: 2021-11-23
Payer: MEDICARE

## 2021-11-23 ENCOUNTER — APPOINTMENT (OUTPATIENT)
Dept: PULMONOLOGY | Facility: CLINIC | Age: 66
End: 2021-11-23

## 2021-11-23 VITALS
BODY MASS INDEX: 27.46 KG/M2 | DIASTOLIC BLOOD PRESSURE: 64 MMHG | OXYGEN SATURATION: 97 % | WEIGHT: 160 LBS | HEART RATE: 77 BPM | SYSTOLIC BLOOD PRESSURE: 120 MMHG

## 2021-11-23 VITALS — BODY MASS INDEX: 27.31 KG/M2 | HEIGHT: 64 IN | WEIGHT: 160 LBS

## 2021-11-23 PROCEDURE — 99214 OFFICE O/P EST MOD 30 MIN: CPT | Mod: 25

## 2021-11-23 PROCEDURE — 94010 BREATHING CAPACITY TEST: CPT

## 2021-11-23 RX ORDER — LEVOFLOXACIN 500 MG/1
500 TABLET, FILM COATED ORAL DAILY
Qty: 10 | Refills: 0 | Status: DISCONTINUED | COMMUNITY
Start: 2021-11-02 | End: 2021-11-23

## 2021-11-23 RX ORDER — BENZONATATE 200 MG/1
200 CAPSULE ORAL 3 TIMES DAILY
Qty: 90 | Refills: 1 | Status: DISCONTINUED | COMMUNITY
Start: 2021-10-18 | End: 2021-11-23

## 2021-11-23 NOTE — DISCUSSION/SUMMARY
[FreeTextEntry1] : 66-year-old female seen today for follow-up of probable radiation pneumonitis.  History was suggestive of a significant bronchospastic component which has responding well to long-acting bronchodilators and inhaled corticosteroids.  Patient has been instructed to continue the same and to wean her oxygen as tolerated.  Follow-up CAT scan will be performed in 3 months from her last CT.

## 2021-11-23 NOTE — PROCEDURE
[___%] : last visit [unfilled]U% [Spirometry] : stable [FreeTextEntry1] : Exercise oximetry demonstrated normal saturation at rest.  Patient desaturated to 87% on room air with exercise.  She recovered to 96% on 3 L/min nasal cannula with exercise

## 2021-11-23 NOTE — CONSULT LETTER
[Dear  ___] : Dear  [unfilled], [Consult Letter:] : I had the pleasure of evaluating your patient, [unfilled]. [Please see my note below.] : Please see my note below. [Sincerely,] : Sincerely, [FreeTextEntry3] : Tho Zayas MD FCCP\par Pulmonary/Critical Care/Sleep Medicine\par Department of Internal Medicine\par \par [facility] [employer]\par

## 2021-11-23 NOTE — HISTORY OF PRESENT ILLNESS
[Former] : former [>= 30 pack years] : >= 30 pack years [TextBox_4] : 66-year-old female with a history of adenocarcinoma of the lung status post radiation therapy and initial chemotherapy.  Course was complicated by radiation pneumonitis versus drug-induced pneumonia secondary to Durvalumab.  On her last visit she was felt to have a bronchospastic component and was placed on long-acting bronchodilators.  She is seen today in follow-up with pulmonary functions. \par \par Patient has had complete resolution of cough with marked improvement in oxygen saturations.  She only notes significant desaturation with moderate exercise.  She is able to carry out her activities of daily living without issue [YearQuit] : 2021

## 2021-11-23 NOTE — REASON FOR VISIT
[Follow-Up] : a follow-up visit [Lung Cancer] : lung cancer [Ad Hoc ] : provided by an ad hoc  [TextBox_44] : Drug induced ILD vs Radiation pneumonitis [Interpreters_Relationshiptopatient] : daughter

## 2021-11-28 ENCOUNTER — OUTPATIENT (OUTPATIENT)
Dept: OUTPATIENT SERVICES | Facility: HOSPITAL | Age: 66
LOS: 1 days | Discharge: HOME | End: 2021-11-28

## 2021-11-28 DIAGNOSIS — Z98.891 HISTORY OF UTERINE SCAR FROM PREVIOUS SURGERY: Chronic | ICD-10-CM

## 2021-11-28 DIAGNOSIS — Z98.890 OTHER SPECIFIED POSTPROCEDURAL STATES: Chronic | ICD-10-CM

## 2021-11-28 DIAGNOSIS — Z98.82 BREAST IMPLANT STATUS: Chronic | ICD-10-CM

## 2021-11-28 DIAGNOSIS — Z90.49 ACQUIRED ABSENCE OF OTHER SPECIFIED PARTS OF DIGESTIVE TRACT: Chronic | ICD-10-CM

## 2021-11-28 DIAGNOSIS — C34.90 MALIGNANT NEOPLASM OF UNSPECIFIED PART OF UNSPECIFIED BRONCHUS OR LUNG: ICD-10-CM

## 2021-11-30 ENCOUNTER — LABORATORY RESULT (OUTPATIENT)
Age: 66
End: 2021-11-30

## 2021-11-30 ENCOUNTER — RESULT REVIEW (OUTPATIENT)
Age: 66
End: 2021-11-30

## 2021-11-30 ENCOUNTER — APPOINTMENT (OUTPATIENT)
Age: 66
End: 2021-11-30

## 2021-11-30 LAB
BASOPHILS # BLD AUTO: 0 K/UL — SIGNIFICANT CHANGE UP (ref 0–0.2)
BASOPHILS NFR BLD AUTO: 0.7 % — SIGNIFICANT CHANGE UP (ref 0–2)
EOSINOPHIL # BLD AUTO: 0.2 K/UL — SIGNIFICANT CHANGE UP (ref 0–0.5)
EOSINOPHIL NFR BLD AUTO: 2.6 % — SIGNIFICANT CHANGE UP (ref 0–6)
HCT VFR BLD CALC: 35.4 % — SIGNIFICANT CHANGE UP (ref 34.5–45)
HGB BLD-MCNC: 11.5 G/DL — SIGNIFICANT CHANGE UP (ref 11.5–15.5)
LYMPHOCYTES # BLD AUTO: 0.7 K/UL — LOW (ref 1–3.3)
LYMPHOCYTES # BLD AUTO: 11.9 % — LOW (ref 13–44)
MCHC RBC-ENTMCNC: 29.6 PG — SIGNIFICANT CHANGE UP (ref 27–34)
MCHC RBC-ENTMCNC: 32.4 G/DL — SIGNIFICANT CHANGE UP (ref 32–36)
MCV RBC AUTO: 91.4 FL — SIGNIFICANT CHANGE UP (ref 80–100)
MONOCYTES # BLD AUTO: 0.4 K/UL — SIGNIFICANT CHANGE UP (ref 0–0.9)
MONOCYTES NFR BLD AUTO: 6.5 % — SIGNIFICANT CHANGE UP (ref 2–14)
NEUTROPHILS # BLD AUTO: 4.6 K/UL — SIGNIFICANT CHANGE UP (ref 1.8–7.4)
NEUTROPHILS NFR BLD AUTO: 78.2 % — HIGH (ref 43–77)
PLATELET # BLD AUTO: 354 K/UL — SIGNIFICANT CHANGE UP (ref 150–400)
RBC # BLD: 3.88 M/UL — SIGNIFICANT CHANGE UP (ref 3.8–5.2)
RBC # FLD: 14.5 % — SIGNIFICANT CHANGE UP (ref 10.3–14.5)
WBC # BLD: 5.8 K/UL — SIGNIFICANT CHANGE UP (ref 3.8–10.5)
WBC # FLD AUTO: 5.8 K/UL — SIGNIFICANT CHANGE UP (ref 3.8–10.5)

## 2021-12-01 DIAGNOSIS — Z51.11 ENCOUNTER FOR ANTINEOPLASTIC CHEMOTHERAPY: ICD-10-CM

## 2021-12-03 NOTE — HISTORY OF PRESENT ILLNESS
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 Recent imaging has shown growth and development of a solid structure. The etiology remains unclear, but malignancy has been considered. She feels well has no complaints. She denies fever, chills, night sweats, weight loss, or weight gain. \par \par Followed by Dr Galeana in the past\par PPD+ in 2000 and received 6 months of INH\par PET positive RENAN posterior cavitary density in 2010\par TTNA and Bronch non-diagnostic. Cultures negative for fungus and TB\par CT at [facility] in 2010 and 2012 stable - felt to be benign. Lost to FU\par \par Patient had a PEt scan in Oct 2020 with a Left upper lobe spiculated lung mass 4.5 x 3cm with an SUV 11.1 with adjacent tree in bud opacity \par In addition Rt upper lobe opacity 1.2 x1 cm which is not FDG avid \par Discrete Left hilar/ infrahilar region  lesion with an SUV 5.2 which is possibly corresponding to a LN \par \par \par Patient s/p Left lung mass, core biopsy with pathology c/w Adenocarcinoma c/w pulmonary origin (see comment)\par CK7, TTF-1 : Positive CK20, PAX-8 : Negative\par  [de-identified] : SON: JUAN RAMON [phone] [de-identified] : Patient presents for follow up 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  She completed concurrent CRT with carbo/ taxol on 6/21/21. Start of Durvalumab delayed by RT pneumonitis and subsequent need for prednisone now s/p taper, started on 15mg/day decreased to 10mg on Sunday 9/5, then to 5 mg on Sunday 9/13 and completed on 9/20. Patient had significant SOB when seen for follow up visit on 7/28/21, her O2 sat was 94%, decreased to 82% on ambulation, likely related to RT pneumonitis. She was later admitted to [facility] from 8/1 - 8/6 due to low O2 saturation and SOB despite home O2. She was put on IV abx and continued on prednisone taper. Followed by Pulmonary, Dr. Tho Zayas.  Predisone taper completed on 09/20/21\par \par Patient returns today for follow-up. Reports fatigue  poor appetite \par Reports vision problems which started a few weeks ago, advised to see ophthalmologist \par Reports strange sensation on left ear \par Reports fever 102 last week , subsequently 101, however has not had a fever since last week \par + muscle pain \par Stopped atorvastatin. \par \par CT CHEST 10/25/21 \par 1.  Evolution of post radiation changes in the left greater than right lungs. New findings in the right lower lobe which may reflect delayed radiation effect or radiation pneumonitis as well as infection\par 2.  Stable right upper lobe nodule, A 1.4 x 0.9 cm lobulated right upper lobe nodule is stable compared to prior study although measured 1 x 0.8 cm on 3/7/2018; on the 10/12/2020 PET/CT, this nodule was not FDG avid.previously PET negative, increased in size on since 2018. Reassessment can be made on surveillance imaging. No new lung nodule.\par \par \par

## 2021-12-03 NOTE — HISTORY OF PRESENT ILLNESS
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 Recent imaging has shown growth and development of a solid structure. The etiology remains unclear, but malignancy has been considered. She feels well has no complaints. She denies fever, chills, night sweats, weight loss, or weight gain. \par \par Followed by Dr Galeana in the past\par PPD+ in 2000 and received 6 months of INH\par PET positive RENAN posterior cavitary density in 2010\par TTNA and Bronch non-diagnostic. Cultures negative for fungus and TB\par CT at [facility] in 2010 and 2012 stable - felt to be benign. Lost to FU\par \par Patient had a PEt scan in Oct 2020 with a Left upper lobe spiculated lung mass 4.5 x 3cm with an SUV 11.1 with adjacent tree in bud opacity \par In addition Rt upper lobe opacity 1.2 x1 cm which is not FDG avid \par Discrete Left hilar/ infrahilar region  lesion with an SUV 5.2 which is possibly corresponding to a LN \par \par \par Patient s/p Left lung mass, core biopsy with pathology c/w Adenocarcinoma c/w pulmonary origin (see comment)\par CK7, TTF-1 : Positive CK20, PAX-8 : Negative\par  [de-identified] : SON: JUAN RAMON [phone] [de-identified] : Patient presents for follow up on C6D1 Durvalumab for adenocarcinoma of the RENAN.  She completed concurrent CRT with carbo/ taxol on 6/21/21. Start of Durvalumab delayed by RT pneumonitis and subsequent need for prednisone taper, completed on 9/20. \par She was prescribed levaquin at previous visit for fever and cough but stopped on her own after one day due to dizziness, nausea and myalgia. Despite not taking the antibiotics she feels much better today.  + Cough, much improved. No SOB at rest and only mild TOLBERT. + Fatigue, much improved. + Right eye with blurred vision, unchanged. + Discomfort in B/L LEs QHS, but not pain and able to fall asleep and resolves by the morning. Sensation in right ear resolved. GERD like symptoms with intermittent burning discomfort in LUQ resolved. No abdominal pain. No rash/pruritus, edema, diarrhea, or recent fevers.

## 2021-12-03 NOTE — ASSESSMENT
[FreeTextEntry1] : \par  SHANON BEE is a female never  smoker who was 65 yo at diagnosis,  of adenocarcinoma lung \par Patient with no significant past medical history\par PET in 10/2020 with a RENAN spiculated lung mass 4.5 x 3cm with an SUV 11.1 with adjacent tree in bud opacity. Left lung, biopsy on 11/23/20: Adenocarcinoma\par \par \par -MRI Brain 12/29/20: negative \par -CT C/A/P 12/29/20: stable RENAN primary lesion, (4.7 x 2.3cm), 1.2 cm left lung apex lesion along with RUL stable lesion of 1.3 cm \par -PDL1 20% \par -Foundation one : ALK ELM 4 fusion variant, MS stable TMB 4 mut/ Mb, SF 3B1 \par \par Biopsy done of RUL: not malignant/ reactive \par -Initial plan for resection per Dr Choudhury, however EBUS prior to surgery with N2 disease. Patient was presented at Tumor board on 4/14/21 and a plan for definitive CRT \par -s/p chemoRT with Carbo and taxol weekly with RT, completed 6/21/21\par - She had a rough course with Rt mediated Pneumonitis/ superimposed PNA  in July/ aug 2021 requiring hospitalization\par -Currently on Immunotherapy with Durvalumab for 1 year Started on 9/7/21 Start delayed due to requiring Prednisone for RT Pneumonitis \par -Patient is a [employer]  -She had a h/o diverticulosis, need to monitor with IO \par    \par CT CHEST 10/25/21 \par 1.  Evolution of post radiation changes in the left greater than right lungs. New findings in the right lower lobe which may reflect delayed radiation effect or radiation pneumonitis as well as infection\par 2.  Stable right upper lobe nodule, A 1.4 x 0.9 cm lobulated right upper lobe nodule is stable compared to prior study although measured 1 x 0.8 cm on 3/7/2018; on the 10/12/2020 PET/CT, this nodule was not FDG avid.previously PET negative, increased in size on since 2018. Reassessment can be made on surveillance imaging. No new lung nodule.\par \par Scans stable, Rt upper lobe nodule s/p biopsy c/w Reactive \par - continue to monitor \par - f/u scans in Feb 2021 \par - continue Io with durvalumab \par

## 2021-12-03 NOTE — RESULTS/DATA
[FreeTextEntry1] : CT CHEST 10/25/21 \par 1.  Evolution of post radiation changes in the left greater than right lungs. New findings in the right lower lobe which may reflect delayed radiation effect or radiation pneumonitis as well as infection\par 2.  Stable right upper lobe nodule, A 1.4 x 0.9 cm lobulated right upper lobe nodule is stable compared to prior study although measured 1 x 0.8 cm on 3/7/2018; on the 10/12/2020 PET/CT, this nodule was not FDG avid.previously PET negative, increased in size on since 2018. Reassessment can be made on surveillance imaging. No new lung nodule.\par \par 7/21/21 \par CT CAP post treatment on 7/21/21 with stability in LEft upper lobe mass at 4.3 cm x 2.8 cm ( previously 4.2 x 3.2 cm)  1.3 cm rt upper lobe nodule is unchanged> new patchy groungglass opacity surrounding both upper lobe lesions which maybe 2/2 treatment effect vs infection

## 2021-12-10 ENCOUNTER — NON-APPOINTMENT (OUTPATIENT)
Age: 66
End: 2021-12-10

## 2021-12-13 ENCOUNTER — APPOINTMENT (OUTPATIENT)
Dept: HEMATOLOGY ONCOLOGY | Facility: CLINIC | Age: 66
End: 2021-12-13
Payer: MEDICARE

## 2021-12-13 NOTE — ADDENDUM
[FreeTextEntry1] : Documented by Harry Limon acting as scribe for Dr. Simpson on 12/13/2021. \par \par All Medical record entries made by the Scribe were at my, Dr. Simpson's, direction and personally dictated by me on 12/13/2021. I have reviewed the chart and agree that the record accurately reflects my personal performance of the history, physical exam, assessment and plan. I have also personally directed, reviewed, and agreed with the discharge instructions.

## 2021-12-13 NOTE — ASSESSMENT
[FreeTextEntry1] : \par  SHANON BEE is a female never  smoker who was 67 yo at diagnosis,  of adenocarcinoma lung \par Patient with no significant past medical history\par PET in 10/2020 with a RENAN spiculated lung mass 4.5 x 3cm with an SUV 11.1 with adjacent tree in bud opacity. Left lung, biopsy on 11/23/20: Adenocarcinoma\par \par \par -MRI Brain 12/29/20: negative \par -CT C/A/P 12/29/20: stable RENAN primary lesion, (4.7 x 2.3cm), 1.2 cm left lung apex lesion along with RUL stable lesion of 1.3 cm \par -PDL1 20% \par -Foundation one : ALK ELM 4 fusion variant, MS stable TMB 4 mut/ Mb, SF 3B1 \par \par Biopsy done of RUL: not malignant/ reactive \par -Initial plan for resection per Dr Choudhury, however EBUS prior to surgery with N2 disease. Patient was presented at Tumor board on 4/14/21 and a plan for definitive CRT \par -s/p chemoRT with Carbo and taxol weekly with RT, completed 6/21/21\par - She had a rough course with Rt mediated Pneumonitis/ superimposed PNA  in July/ aug 2021 requiring hospitalization\par -Currently on Immunotherapy with Durvalumab for 1 year Started on 9/7/21 Start delayed due to requiring Prednisone for RT Pneumonitis \par -Patient is a [employer]  -She had a h/o diverticulosis, need to monitor with IO \par    \par CT CHEST 10/25/21 \par 1.  Evolution of post radiation changes in the left greater than right lungs. New findings in the right lower lobe which may reflect delayed radiation effect or radiation pneumonitis as well as infection\par 2.  Stable right upper lobe nodule, A 1.4 x 0.9 cm lobulated right upper lobe nodule is stable compared to prior study although measured 1 x 0.8 cm on 3/7/2018; on the 10/12/2020 PET/CT, this nodule was not FDG avid.previously PET negative, increased in size on since 2018. Reassessment can be made on surveillance imaging. No new lung nodule.\par \par Scans stable, Rt upper lobe nodule s/p biopsy c/w Reactive \par - continue to monitor \par - f/u scans in Feb 2021 \par - continue Io with durvalumab, C8 due on 12/14/2021 \par

## 2021-12-13 NOTE — HISTORY OF PRESENT ILLNESS
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 Recent imaging has shown growth and development of a solid structure. The etiology remains unclear, but malignancy has been considered. She feels well has no complaints. She denies fever, chills, night sweats, weight loss, or weight gain. \par \par Followed by Dr Galeana in the past\par PPD+ in 2000 and received 6 months of INH\par PET positive RENAN posterior cavitary density in 2010\par TTNA and Bronch non-diagnostic. Cultures negative for fungus and TB\par CT at [facility] in 2010 and 2012 stable - felt to be benign. Lost to FU\par \par Patient had a PEt scan in Oct 2020 with a Left upper lobe spiculated lung mass 4.5 x 3cm with an SUV 11.1 with adjacent tree in bud opacity \par In addition Rt upper lobe opacity 1.2 x1 cm which is not FDG avid \par Discrete Left hilar/ infrahilar region  lesion with an SUV 5.2 which is possibly corresponding to a LN \par \par \par Patient s/p Left lung mass, core biopsy with pathology c/w Adenocarcinoma c/w pulmonary origin (see comment)\par CK7, TTF-1 : Positive CK20, PAX-8 : Negative\par  [de-identified] : SON: JUAN RAMON [phone] [de-identified] : Patient presents for follow up on C7D13 Durvalumab for adenocarcinoma of the RENAN.  She completed concurrent CRT with carbo/ taxol on 6/21/21. Start of Durvalumab delayed by RT pneumonitis and subsequent need for prednisone now s/p taper, started on 15mg/day decreased to 10mg on Sunday 9/5, then to 5 mg on Sunday 9/13 and completed on 9/20. Patient had significant SOB when seen for follow up visit on 7/28/21, her O2 sat was 94%, decreased to 82% on ambulation, likely related to RT pneumonitis. She was later admitted to [facility] from 8/1 - 8/6 due to low O2 saturation and SOB despite home O2. She was put on IV abx and continued on prednisone taper. Followed by Pulmonary, Dr. Tho Zayas.  Prednisone taper completed on 09/20/21\par \par Patient returns today for follow-up. Reports fatigue  poor appetite \par Reports vision problems which started a few weeks ago, advised to see ophthalmologist \par Reports strange sensation on left ear \par Reports fever 102 last week , subsequently 101, however has not had a fever since last week \par + muscle pain \par Stopped atorvastatin. \par \par CT CHEST 10/25/21 \par 1.  Evolution of post radiation changes in the left greater than right lungs. New findings in the right lower lobe which may reflect delayed radiation effect or radiation pneumonitis as well as infection\par 2.  Stable right upper lobe nodule, A 1.4 x 0.9 cm lobulated right upper lobe nodule is stable compared to prior study although measured 1 x 0.8 cm on 3/7/2018; on the 10/12/2020 PET/CT, this nodule was not FDG avid.previously PET negative, increased in size on since 2018. Reassessment can be made on surveillance imaging. No new lung nodule.\par \par \par

## 2021-12-14 ENCOUNTER — APPOINTMENT (OUTPATIENT)
Age: 66
End: 2021-12-14

## 2021-12-14 ENCOUNTER — RESULT REVIEW (OUTPATIENT)
Age: 66
End: 2021-12-14

## 2021-12-14 LAB
ALBUMIN SERPL ELPH-MCNC: 4.2 G/DL — SIGNIFICANT CHANGE UP (ref 3.3–5)
ALP SERPL-CCNC: 113 U/L — SIGNIFICANT CHANGE UP (ref 40–120)
ALT FLD-CCNC: 44 U/L — SIGNIFICANT CHANGE UP (ref 10–45)
ANION GAP SERPL CALC-SCNC: 13 MMOL/L — SIGNIFICANT CHANGE UP (ref 5–17)
AST SERPL-CCNC: 43 U/L — HIGH (ref 10–40)
BILIRUB SERPL-MCNC: <0.2 MG/DL — SIGNIFICANT CHANGE UP (ref 0.2–1.2)
BUN SERPL-MCNC: 10 MG/DL — SIGNIFICANT CHANGE UP (ref 7–23)
CALCIUM SERPL-MCNC: 9.5 MG/DL — SIGNIFICANT CHANGE UP (ref 8.4–10.5)
CEA SERPL-MCNC: 11 NG/ML — HIGH (ref 0–3.8)
CHLORIDE SERPL-SCNC: 102 MMOL/L — SIGNIFICANT CHANGE UP (ref 96–108)
CO2 SERPL-SCNC: 23 MMOL/L — SIGNIFICANT CHANGE UP (ref 22–31)
CREAT SERPL-MCNC: 0.55 MG/DL — SIGNIFICANT CHANGE UP (ref 0.5–1.3)
GLUCOSE SERPL-MCNC: 95 MG/DL — SIGNIFICANT CHANGE UP (ref 70–99)
POTASSIUM SERPL-MCNC: 4.4 MMOL/L — SIGNIFICANT CHANGE UP (ref 3.5–5.3)
POTASSIUM SERPL-SCNC: 4.4 MMOL/L — SIGNIFICANT CHANGE UP (ref 3.5–5.3)
PROT SERPL-MCNC: 7.2 G/DL — SIGNIFICANT CHANGE UP (ref 6–8.3)
SODIUM SERPL-SCNC: 138 MMOL/L — SIGNIFICANT CHANGE UP (ref 135–145)
T4 FREE SERPL-MCNC: 0.8 NG/DL — LOW (ref 0.9–1.8)
TSH SERPL-MCNC: 2.87 UIU/ML — SIGNIFICANT CHANGE UP (ref 0.27–4.2)

## 2021-12-17 ENCOUNTER — APPOINTMENT (OUTPATIENT)
Dept: HEMATOLOGY ONCOLOGY | Facility: CLINIC | Age: 66
End: 2021-12-17
Payer: MEDICARE

## 2021-12-17 VITALS
SYSTOLIC BLOOD PRESSURE: 108 MMHG | HEIGHT: 64 IN | OXYGEN SATURATION: 97 % | DIASTOLIC BLOOD PRESSURE: 71 MMHG | WEIGHT: 168 LBS | BODY MASS INDEX: 28.68 KG/M2 | HEART RATE: 71 BPM

## 2021-12-17 PROCEDURE — 99215 OFFICE O/P EST HI 40 MIN: CPT

## 2021-12-17 NOTE — HISTORY OF PRESENT ILLNESS
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 Recent imaging has shown growth and development of a solid structure. The etiology remains unclear, but malignancy has been considered. She feels well has no complaints. She denies fever, chills, night sweats, weight loss, or weight gain. \par \par Followed by Dr Galeana in the past\par PPD+ in 2000 and received 6 months of INH\par PET positive RENAN posterior cavitary density in 2010\par TTNA and Bronch non-diagnostic. Cultures negative for fungus and TB\par CT at [facility] in 2010 and 2012 stable - felt to be benign. Lost to FU\par \par Patient had a PEt scan in Oct 2020 with a Left upper lobe spiculated lung mass 4.5 x 3cm with an SUV 11.1 with adjacent tree in bud opacity \par In addition Rt upper lobe opacity 1.2 x1 cm which is not FDG avid \par Discrete Left hilar/ infrahilar region  lesion with an SUV 5.2 which is possibly corresponding to a LN \par \par \par Patient s/p Left lung mass, core biopsy with pathology c/w Adenocarcinoma c/w pulmonary origin (see comment)\par CK7, TTF-1 : Positive CK20, PAX-8 : Negative\par  [de-identified] : SON: JUAN RAMON [phone] [de-identified] : Patient presents for follow up on C8D3 Durvalumab for adenocarcinoma of the RENAN.  She completed concurrent CRT with carbo/ taxol on 6/21/21. Start of Durvalumab delayed by RT pneumonitis and subsequent need for prednisone now s/p taper, started on 15mg/day decreased to 10mg on Sunday 9/5, then to 5 mg on Sunday 9/13 and completed on 9/20. Patient had significant SOB when seen for follow up visit on 7/28/21, her O2 sat was 94%, decreased to 82% on ambulation, likely related to RT pneumonitis. She was later admitted to [facility] from 8/1 - 8/6 due to low O2 saturation and SOB despite home O2. She was put on IV abx and continued on prednisone taper. Followed by Pulmonary, Dr. Tho Zayas.  Prednisone taper completed on 09/20/21\par \par Reports neuropathy.  \par Reports drop in O2 levels when doing strenuous exercise\par Reports being anxious\par \par 12/14/21 CEA 11, previously 10 on 11/16/21, T4 0.8, TSH 2.87\par \par CT CHEST 10/25/21 \par 1.  Evolution of post radiation changes in the left greater than right lungs. New findings in the right lower lobe which may reflect delayed radiation effect or radiation pneumonitis as well as infection\par 2.  Stable right upper lobe nodule, A 1.4 x 0.9 cm lobulated right upper lobe nodule is stable compared to prior study although measured 1 x 0.8 cm on 3/7/2018; on the 10/12/2020 PET/CT, this nodule was not FDG avid.previously PET negative, increased in size on since 2018. Reassessment can be made on surveillance imaging. No new lung nodule.

## 2021-12-17 NOTE — ASSESSMENT
[FreeTextEntry1] : SHANON BEE is a female never  smoker who was 67 yo at diagnosis,  of adenocarcinoma lung \par Patient with no significant past medical history\par PET in 10/2020 with a RENAN spiculated lung mass 4.5 x 3cm with an SUV 11.1 with adjacent tree in bud opacity. Left lung, biopsy on 11/23/20: Adenocarcinoma\par \par -MRI Brain 12/29/20: negative \par -CT C/A/P 12/29/20: stable RENAN primary lesion, (4.7 x 2.3cm), 1.2 cm left lung apex lesion along with RUL stable lesion of 1.3 cm \par -PDL1 20% \par -Foundation one : ALK ELM 4 fusion variant, MS stable TMB 4 mut/ Mb, SF 3B1 \par \par Biopsy done of RUL: not malignant/ reactive \par -Initial plan for resection per Dr Choudhury, however EBUS prior to surgery with N2 disease. 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 She had a h/o diverticulosis, need to monitor with IO \par    \par CT CHEST 10/25/21 \par 1.  Evolution of post radiation changes in the left greater than right lungs. New findings in the right lower lobe which may reflect delayed radiation effect or radiation pneumonitis as well as infection\par 2.  Stable right upper lobe nodule, A 1.4 x 0.9 cm lobulated right upper lobe nodule is stable compared to prior study although measured 1 x 0.8 cm on 3/7/2018; on the 10/12/2020 PET/CT, this nodule was not FDG avid.previously PET negative, increased in size on since 2018. Reassessment can be made on surveillance imaging. No new lung nodule.\par \par Scans stable, Right upper lobe nodule s/p biopsy c/w Reactive \par - continue to monitor \par - f/u scans in Jan 2021\par - continue Io with durvalumab\par

## 2021-12-17 NOTE — ADDENDUM
[FreeTextEntry1] : Documented by Elicia Chowdhury acting as scribe for Dr. Simpson on 12/17/2021 \par \par All Medical record entries made by the Scribe were at my, Dr. Simpson's, direction and personally dictated by me on 12/17/2021 . I have reviewed the chart and agree that the record accurately reflects my personal performance of the history, physical exam, assessment and plan. I have also personally directed, reviewed, and agreed with the discharge instructions.\par

## 2021-12-23 LAB
CORTICOSTEROID BINDING GLOBULIN RESULT: 2.1 MG/DL — SIGNIFICANT CHANGE UP
CORTIS F/TOTAL MFR SERPL: 3.6 % — SIGNIFICANT CHANGE UP
CORTIS SERPL-MCNC: 4.9 UG/DL — SIGNIFICANT CHANGE UP
CORTISOL, FREE RESULT: 0.18 UG/DL — LOW

## 2021-12-28 ENCOUNTER — APPOINTMENT (OUTPATIENT)
Age: 66
End: 2021-12-28

## 2021-12-28 ENCOUNTER — RESULT REVIEW (OUTPATIENT)
Age: 66
End: 2021-12-28

## 2021-12-28 LAB
BASOPHILS # BLD AUTO: 0 K/UL — SIGNIFICANT CHANGE UP (ref 0–0.2)
BASOPHILS NFR BLD AUTO: 1 % — SIGNIFICANT CHANGE UP (ref 0–2)
CEA SERPL-MCNC: 10.7 NG/ML — HIGH (ref 0–3.8)
EOSINOPHIL # BLD AUTO: 0.1 K/UL — SIGNIFICANT CHANGE UP (ref 0–0.5)
EOSINOPHIL NFR BLD AUTO: 2.7 % — SIGNIFICANT CHANGE UP (ref 0–6)
HCT VFR BLD CALC: 36.6 % — SIGNIFICANT CHANGE UP (ref 34.5–45)
HGB BLD-MCNC: 11.9 G/DL — SIGNIFICANT CHANGE UP (ref 11.5–15.5)
LYMPHOCYTES # BLD AUTO: 0.8 K/UL — LOW (ref 1–3.3)
LYMPHOCYTES # BLD AUTO: 17.7 % — SIGNIFICANT CHANGE UP (ref 13–44)
MCHC RBC-ENTMCNC: 29.6 PG — SIGNIFICANT CHANGE UP (ref 27–34)
MCHC RBC-ENTMCNC: 32.6 G/DL — SIGNIFICANT CHANGE UP (ref 32–36)
MCV RBC AUTO: 90.8 FL — SIGNIFICANT CHANGE UP (ref 80–100)
MONOCYTES # BLD AUTO: 0.4 K/UL — SIGNIFICANT CHANGE UP (ref 0–0.9)
MONOCYTES NFR BLD AUTO: 8.8 % — SIGNIFICANT CHANGE UP (ref 2–14)
NEUTROPHILS # BLD AUTO: 3.3 K/UL — SIGNIFICANT CHANGE UP (ref 1.8–7.4)
NEUTROPHILS NFR BLD AUTO: 69.8 % — SIGNIFICANT CHANGE UP (ref 43–77)
PLATELET # BLD AUTO: 324 K/UL — SIGNIFICANT CHANGE UP (ref 150–400)
RBC # BLD: 4.03 M/UL — SIGNIFICANT CHANGE UP (ref 3.8–5.2)
RBC # FLD: 13.8 % — SIGNIFICANT CHANGE UP (ref 10.3–14.5)
T4 FREE SERPL-MCNC: 0.8 NG/DL — LOW (ref 0.9–1.8)
TSH SERPL-MCNC: 2.98 UIU/ML — SIGNIFICANT CHANGE UP (ref 0.27–4.2)
WBC # BLD: 4.8 K/UL — SIGNIFICANT CHANGE UP (ref 3.8–10.5)
WBC # FLD AUTO: 4.8 K/UL — SIGNIFICANT CHANGE UP (ref 3.8–10.5)

## 2021-12-29 LAB
ALBUMIN SERPL ELPH-MCNC: 3.9 G/DL — SIGNIFICANT CHANGE UP (ref 3.3–5)
ALP SERPL-CCNC: 114 U/L — SIGNIFICANT CHANGE UP (ref 40–120)
ALT FLD-CCNC: 22 U/L — SIGNIFICANT CHANGE UP (ref 10–45)
ANION GAP SERPL CALC-SCNC: 14 MMOL/L — SIGNIFICANT CHANGE UP (ref 5–17)
AST SERPL-CCNC: 28 U/L — SIGNIFICANT CHANGE UP (ref 10–40)
BILIRUB SERPL-MCNC: 0.2 MG/DL — SIGNIFICANT CHANGE UP (ref 0.2–1.2)
BUN SERPL-MCNC: 12 MG/DL — SIGNIFICANT CHANGE UP (ref 7–23)
CALCIUM SERPL-MCNC: 9.5 MG/DL — SIGNIFICANT CHANGE UP (ref 8.4–10.5)
CHLORIDE SERPL-SCNC: 102 MMOL/L — SIGNIFICANT CHANGE UP (ref 96–108)
CO2 SERPL-SCNC: 25 MMOL/L — SIGNIFICANT CHANGE UP (ref 22–31)
CREAT SERPL-MCNC: 0.6 MG/DL — SIGNIFICANT CHANGE UP (ref 0.5–1.3)
GLUCOSE SERPL-MCNC: 104 MG/DL — HIGH (ref 70–99)
POTASSIUM SERPL-MCNC: 4.1 MMOL/L — SIGNIFICANT CHANGE UP (ref 3.5–5.3)
POTASSIUM SERPL-SCNC: 4.1 MMOL/L — SIGNIFICANT CHANGE UP (ref 3.5–5.3)
PROT SERPL-MCNC: 7.1 G/DL — SIGNIFICANT CHANGE UP (ref 6–8.3)
SODIUM SERPL-SCNC: 140 MMOL/L — SIGNIFICANT CHANGE UP (ref 135–145)

## 2022-01-04 ENCOUNTER — APPOINTMENT (OUTPATIENT)
Dept: NEUROLOGY | Facility: CLINIC | Age: 67
End: 2022-01-04
Payer: MEDICARE

## 2022-01-04 DIAGNOSIS — R41.3 OTHER AMNESIA: ICD-10-CM

## 2022-01-04 PROCEDURE — 99214 OFFICE O/P EST MOD 30 MIN: CPT | Mod: 95

## 2022-01-05 ENCOUNTER — OUTPATIENT (OUTPATIENT)
Dept: OUTPATIENT SERVICES | Facility: HOSPITAL | Age: 67
LOS: 1 days | Discharge: HOME | End: 2022-01-05

## 2022-01-05 DIAGNOSIS — Z98.891 HISTORY OF UTERINE SCAR FROM PREVIOUS SURGERY: Chronic | ICD-10-CM

## 2022-01-05 DIAGNOSIS — Z98.890 OTHER SPECIFIED POSTPROCEDURAL STATES: Chronic | ICD-10-CM

## 2022-01-05 DIAGNOSIS — Z98.82 BREAST IMPLANT STATUS: Chronic | ICD-10-CM

## 2022-01-05 DIAGNOSIS — Z90.49 ACQUIRED ABSENCE OF OTHER SPECIFIED PARTS OF DIGESTIVE TRACT: Chronic | ICD-10-CM

## 2022-01-05 DIAGNOSIS — C34.90 MALIGNANT NEOPLASM OF UNSPECIFIED PART OF UNSPECIFIED BRONCHUS OR LUNG: ICD-10-CM

## 2022-01-05 LAB
CORTICOSTEROID BINDING GLOBULIN RESULT: 2.2 MG/DL — SIGNIFICANT CHANGE UP
CORTIS F/TOTAL MFR SERPL: 3.8 % — SIGNIFICANT CHANGE UP
CORTIS SERPL-MCNC: 6.2 UG/DL — SIGNIFICANT CHANGE UP
CORTISOL, FREE RESULT: 0.24 UG/DL — SIGNIFICANT CHANGE UP

## 2022-01-06 ENCOUNTER — RESULT REVIEW (OUTPATIENT)
Age: 67
End: 2022-01-06

## 2022-01-07 ENCOUNTER — OUTPATIENT (OUTPATIENT)
Dept: OUTPATIENT SERVICES | Facility: HOSPITAL | Age: 67
LOS: 1 days | End: 2022-01-07
Payer: MEDICARE

## 2022-01-07 ENCOUNTER — APPOINTMENT (OUTPATIENT)
Dept: CT IMAGING | Facility: CLINIC | Age: 67
End: 2022-01-07
Payer: MEDICARE

## 2022-01-07 DIAGNOSIS — Z90.49 ACQUIRED ABSENCE OF OTHER SPECIFIED PARTS OF DIGESTIVE TRACT: Chronic | ICD-10-CM

## 2022-01-07 DIAGNOSIS — Z98.891 HISTORY OF UTERINE SCAR FROM PREVIOUS SURGERY: Chronic | ICD-10-CM

## 2022-01-07 DIAGNOSIS — Z98.890 OTHER SPECIFIED POSTPROCEDURAL STATES: Chronic | ICD-10-CM

## 2022-01-07 DIAGNOSIS — Z00.8 ENCOUNTER FOR OTHER GENERAL EXAMINATION: ICD-10-CM

## 2022-01-07 DIAGNOSIS — Z98.82 BREAST IMPLANT STATUS: Chronic | ICD-10-CM

## 2022-01-07 PROCEDURE — 71250 CT THORAX DX C-: CPT | Mod: 26

## 2022-01-07 PROCEDURE — 71250 CT THORAX DX C-: CPT

## 2022-01-11 ENCOUNTER — RESULT REVIEW (OUTPATIENT)
Age: 67
End: 2022-01-11

## 2022-01-11 ENCOUNTER — APPOINTMENT (OUTPATIENT)
Dept: HEMATOLOGY ONCOLOGY | Facility: CLINIC | Age: 67
End: 2022-01-11
Payer: MEDICARE

## 2022-01-11 ENCOUNTER — APPOINTMENT (OUTPATIENT)
Age: 67
End: 2022-01-11

## 2022-01-11 VITALS
OXYGEN SATURATION: 96 % | BODY MASS INDEX: 29.02 KG/M2 | HEIGHT: 64 IN | HEART RATE: 76 BPM | WEIGHT: 170 LBS | SYSTOLIC BLOOD PRESSURE: 105 MMHG | DIASTOLIC BLOOD PRESSURE: 71 MMHG

## 2022-01-11 LAB
ALBUMIN SERPL ELPH-MCNC: 4.1 G/DL — SIGNIFICANT CHANGE UP (ref 3.3–5)
ALP SERPL-CCNC: 104 U/L — SIGNIFICANT CHANGE UP (ref 40–120)
ALT FLD-CCNC: 29 U/L — SIGNIFICANT CHANGE UP (ref 10–45)
ANION GAP SERPL CALC-SCNC: 14 MMOL/L — SIGNIFICANT CHANGE UP (ref 5–17)
AST SERPL-CCNC: 29 U/L — SIGNIFICANT CHANGE UP (ref 10–40)
BASOPHILS # BLD AUTO: 0 K/UL — SIGNIFICANT CHANGE UP (ref 0–0.2)
BASOPHILS NFR BLD AUTO: 0.8 % — SIGNIFICANT CHANGE UP (ref 0–2)
BILIRUB SERPL-MCNC: <0.2 MG/DL — SIGNIFICANT CHANGE UP (ref 0.2–1.2)
BUN SERPL-MCNC: 14 MG/DL — SIGNIFICANT CHANGE UP (ref 7–23)
CALCIUM SERPL-MCNC: 9.8 MG/DL — SIGNIFICANT CHANGE UP (ref 8.4–10.5)
CEA SERPL-MCNC: 12.3 NG/ML — HIGH (ref 0–3.8)
CHLORIDE SERPL-SCNC: 100 MMOL/L — SIGNIFICANT CHANGE UP (ref 96–108)
CO2 SERPL-SCNC: 25 MMOL/L — SIGNIFICANT CHANGE UP (ref 22–31)
CREAT SERPL-MCNC: 0.65 MG/DL — SIGNIFICANT CHANGE UP (ref 0.5–1.3)
EOSINOPHIL # BLD AUTO: 0.2 K/UL — SIGNIFICANT CHANGE UP (ref 0–0.5)
EOSINOPHIL NFR BLD AUTO: 2.6 % — SIGNIFICANT CHANGE UP (ref 0–6)
GLUCOSE SERPL-MCNC: 84 MG/DL — SIGNIFICANT CHANGE UP (ref 70–99)
HCT VFR BLD CALC: 37.7 % — SIGNIFICANT CHANGE UP (ref 34.5–45)
HGB BLD-MCNC: 12.5 G/DL — SIGNIFICANT CHANGE UP (ref 11.5–15.5)
LYMPHOCYTES # BLD AUTO: 0.9 K/UL — LOW (ref 1–3.3)
LYMPHOCYTES # BLD AUTO: 14.9 % — SIGNIFICANT CHANGE UP (ref 13–44)
MCHC RBC-ENTMCNC: 29.8 PG — SIGNIFICANT CHANGE UP (ref 27–34)
MCHC RBC-ENTMCNC: 33.2 G/DL — SIGNIFICANT CHANGE UP (ref 32–36)
MCV RBC AUTO: 89.9 FL — SIGNIFICANT CHANGE UP (ref 80–100)
MONOCYTES # BLD AUTO: 0.4 K/UL — SIGNIFICANT CHANGE UP (ref 0–0.9)
MONOCYTES NFR BLD AUTO: 7 % — SIGNIFICANT CHANGE UP (ref 2–14)
NEUTROPHILS # BLD AUTO: 4.5 K/UL — SIGNIFICANT CHANGE UP (ref 1.8–7.4)
NEUTROPHILS NFR BLD AUTO: 74.8 % — SIGNIFICANT CHANGE UP (ref 43–77)
PLATELET # BLD AUTO: 314 K/UL — SIGNIFICANT CHANGE UP (ref 150–400)
POTASSIUM SERPL-MCNC: 4.2 MMOL/L — SIGNIFICANT CHANGE UP (ref 3.5–5.3)
POTASSIUM SERPL-SCNC: 4.2 MMOL/L — SIGNIFICANT CHANGE UP (ref 3.5–5.3)
PROT SERPL-MCNC: 7.4 G/DL — SIGNIFICANT CHANGE UP (ref 6–8.3)
RBC # BLD: 4.2 M/UL — SIGNIFICANT CHANGE UP (ref 3.8–5.2)
RBC # FLD: 14.2 % — SIGNIFICANT CHANGE UP (ref 10.3–14.5)
SODIUM SERPL-SCNC: 139 MMOL/L — SIGNIFICANT CHANGE UP (ref 135–145)
T4 FREE SERPL-MCNC: 0.8 NG/DL — LOW (ref 0.9–1.8)
TSH SERPL-MCNC: 3.12 UIU/ML — SIGNIFICANT CHANGE UP (ref 0.27–4.2)
WBC # BLD: 6 K/UL — SIGNIFICANT CHANGE UP (ref 3.8–10.5)
WBC # FLD AUTO: 6 K/UL — SIGNIFICANT CHANGE UP (ref 3.8–10.5)

## 2022-01-11 PROCEDURE — 99214 OFFICE O/P EST MOD 30 MIN: CPT

## 2022-01-12 DIAGNOSIS — Z51.11 ENCOUNTER FOR ANTINEOPLASTIC CHEMOTHERAPY: ICD-10-CM

## 2022-01-13 NOTE — ASSESSMENT
[FreeTextEntry1] : SHANON BEE is a female never  smoker who was 67 yo at diagnosis,  of adenocarcinoma lung \par Patient with no significant past medical history\par PET in 10/2020 with a RENAN spiculated lung mass 4.5 x 3cm with an SUV 11.1 with adjacent tree in bud opacity. Left lung, biopsy on 11/23/20: Adenocarcinoma\par \par -MRI Brain 12/29/20: negative \par -CT C/A/P 12/29/20: stable RENAN primary lesion, (4.7 x 2.3cm), 1.2 cm left lung apex lesion along with RUL stable lesion of 1.3 cm \par -PDL1 20% \par -Foundation one : ALK ELM 4 fusion variant, MS stable TMB 4 mut/ Mb, SF 3B1 \par \par Biopsy done of RUL: not malignant/ reactive \par -Initial plan for resection per Dr Choudhury, however EBUS prior to surgery with N2 disease. 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 She had a h/o diverticulosis, need to monitor with IO \par    \par \par CT chest no contrast 1/7/22\par IMPRESSION:Since 2 months prior:\par -Evolving radiotherapy changes in the left upper and lower lobes, with slightly decreased\par consolidative component in the left upper lobe, at site of treated neoplasm.\par -Decreased prior right lower lobe abnormality, however multiple new peripheral areas of\par groundglass in the right upper and right lower lobes. The migratory pattern suggests organizing\par pneumonia, possibly drug-induced or radiation-induced. Acute infection, including COVID, could also\par account for the new findings.\par -Stable right apex 1.4 cm nodule.\par \par Scans stable, Right upper lobe nodule s/p biopsy c/w Reactive \par - continue to monitor \par - repeat scans in 3 months\par - continue Io with durvalumab\par

## 2022-01-13 NOTE — HISTORY OF PRESENT ILLNESS
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 Recent imaging has shown growth and development of a solid structure. The etiology remains unclear, but malignancy has been considered. She feels well has no complaints. She denies fever, chills, night sweats, weight loss, or weight gain. \par \par Followed by Dr Galeana in the past\par PPD+ in 2000 and received 6 months of INH\par PET positive RENAN posterior cavitary density in 2010\par TTNA and Bronch non-diagnostic. Cultures negative for fungus and TB\par CT at [facility] in 2010 and 2012 stable - felt to be benign. Lost to FU\par \par Patient had a PEt scan in Oct 2020 with a Left upper lobe spiculated lung mass 4.5 x 3cm with an SUV 11.1 with adjacent tree in bud opacity \par In addition Rt upper lobe opacity 1.2 x1 cm which is not FDG avid \par Discrete Left hilar/ infrahilar region  lesion with an SUV 5.2 which is possibly corresponding to a LN \par \par \par Patient s/p Left lung mass, core biopsy with pathology c/w Adenocarcinoma c/w pulmonary origin (see comment)\par CK7, TTF-1 : Positive CK20, PAX-8 : Negative\par  [de-identified] : SON: JUAN RAMON [phone] [de-identified] : Patient presents for follow up on C10 Durvalumab for adenocarcinoma of the RENAN.  She completed concurrent CRT with carbo/ taxol on 6/21/21. Start of Durvalumab delayed by RT pneumonitis and subsequent need for prednisone now s/p taper, started on 15mg/day decreased to 10mg on Sunday 9/5, then to 5 mg on Sunday 9/13 and completed on 9/20. \par Patient had significant SOB when seen for follow up visit on 7/28/21, her O2 sat was 94%, decreased to 82% on ambulation, likely related to RT pneumonitis. She was later admitted to [facility] from 8/1 - 8/6 due to low O2 saturation and SOB despite home O2. She was put on IV abx and continued on prednisone taper. Followed by Pulmonary, Dr. Tho Zayas.  Prednisone taper completed on 09/20/21\par \par Patient admits runny nose for the past 2 weeks\par Patient reports right shoulder itchiness. has been using OTC cream with some relief\par \par 1/11/22 12.3\par 12/28/21 10.7\par 12/14/21 CEA 11\par 11/30/21 CEA 11.5\par \par CT chest no contrast 1/7/22\par IMPRESSION:\par \par Since 2 months prior:\par \par Evolving radiotherapy changes in the left upper and lower lobes, with slightly decreased\par consolidative component in the left upper lobe, at site of treated neoplasm.\par \par Decreased prior right lower lobe abnormality, however multiple new peripheral areas of\par groundglass in the right upper and right lower lobes. The migratory pattern suggests organizing\par pneumonia, possibly drug-induced or radiation-induced. Acute infection, including COVID, could also\par account for the new findings.\par \par Stable right apex 1.4 cm nodule.\par \par

## 2022-01-13 NOTE — RESULTS/DATA
[FreeTextEntry1] : CT CHEST 10/25/21 \par 1.  Evolution of post radiation changes in the left greater than right lungs. New findings in the right lower lobe which may reflect delayed radiation effect or radiation pneumonitis as well as infection\par 2.  Stable right upper lobe nodule, A 1.4 x 0.9 cm lobulated right upper lobe nodule is stable compared to prior study although measured 1 x 0.8 cm on 3/7/2018; on the 10/12/2020 PET/CT, this nodule was not FDG avid.previously PET negative, increased in size on since 2018. Reassessment can be made on surveillance imaging. No new lung nodule. \par \par 7/21/21 \par CT CAP post treatment on 7/21/21 with stability in LEft upper lobe mass at 4.3 cm x 2.8 cm ( previously 4.2 x 3.2 cm)  1.3 cm rt upper lobe nodule is unchanged> new patchy groungglass opacity surrounding both upper lobe lesions which maybe 2/2 treatment effect vs infection

## 2022-01-18 ENCOUNTER — APPOINTMENT (OUTPATIENT)
Dept: PULMONOLOGY | Facility: CLINIC | Age: 67
End: 2022-01-18
Payer: MEDICARE

## 2022-01-18 VITALS — HEART RATE: 56 BPM | RESPIRATION RATE: 16 BRPM | OXYGEN SATURATION: 98 %

## 2022-01-18 VITALS — DIASTOLIC BLOOD PRESSURE: 60 MMHG | SYSTOLIC BLOOD PRESSURE: 120 MMHG

## 2022-01-18 DIAGNOSIS — Z78.9 OTHER SPECIFIED HEALTH STATUS: ICD-10-CM

## 2022-01-18 LAB — SARS-COV-2 N GENE NPH QL NAA+PROBE: NOT DETECTED

## 2022-01-18 PROCEDURE — 94010 BREATHING CAPACITY TEST: CPT

## 2022-01-18 PROCEDURE — 99215 OFFICE O/P EST HI 40 MIN: CPT | Mod: 25

## 2022-01-18 RX ORDER — UMECLIDINIUM BROMIDE AND VILANTEROL TRIFENATATE 62.5; 25 UG/1; UG/1
62.5-25 POWDER RESPIRATORY (INHALATION)
Qty: 1 | Refills: 5 | Status: DISCONTINUED | COMMUNITY
Start: 2021-10-18 | End: 2022-01-18

## 2022-01-18 NOTE — DISCUSSION/SUMMARY
[FreeTextEntry1] : 66-year-old female seen today for follow-up of probable radiation pneumonitis with improvement. ? NSIP,  vs residual RT pneumonitis.  History was suggestive of a significant bronchospastic component which has responding well to long-acting bronchodilators and inhaled corticosteroids.  Patient has been instructed to continue the same and to wean her oxygen as tolerated.  Follow-up CAT scan will be performed in 3 months from her last CT.\par \par Will once again reinstitute inhaled corticosteroids in the form of Trelegy and discontinue Anoro.

## 2022-01-18 NOTE — REASON FOR VISIT
[Follow-Up] : a follow-up visit [Lung Cancer] : lung cancer [Ad Hoc ] : provided by an ad hoc  [TextBox_44] : Drug induced ILD vs Radiation pneumonitis [Interpreters_FullName] : Lashanda [Interpreters_Relationshiptopatient] : daughter [TWNoteComboBox1] : [place]

## 2022-01-18 NOTE — END OF VISIT
[FreeTextEntry3] : Pacr rev with pt and daughter [Time Spent: ___ minutes] : I have spent [unfilled] minutes of time on the encounter.

## 2022-01-18 NOTE — HISTORY OF PRESENT ILLNESS
[Former] : former [>= 30 pack years] : >= 30 pack years [TextBox_4] : 66-year-old female with a history of an adenocarcinoma of the left upper lobe status post radiation therapy and chemotherapy.  Her course was complicated by radiation pneumonitis versus drug-induced durvalumab.  On her last visit she was found to have complete resolution of her cough with marked improvement in saturation with moderate exercise following treatment with prednisone.  She was found to have a significant bronchospastic component which has not been responding well to long-acting bronchodilators and inhaled corticosteroids. [YearQuit] : 2021

## 2022-01-18 NOTE — PROCEDURE
[Spirometry] : stable [___%] : current visit [unfilled]U% [FreeTextEntry1] : 11/23/2021: Exercise oximetry demonstrated normal saturation at rest.  Patient desaturated to 87% on room air with exercise.  She recovered to 96% on 3 L/min nasal cannula with exercise

## 2022-01-18 NOTE — CONSULT LETTER
[Dear  ___] : Dear  [unfilled], [Consult Letter:] : I had the pleasure of evaluating your patient, [unfilled]. [Please see my note below.] : Please see my note below. [Sincerely,] : Sincerely, [FreeTextEntry3] : Tho Zayas MD FCCP\par Pulmonary/Critical Care/Sleep Medicine\par Department of Internal Medicine\par \par [facility] [employer]\par

## 2022-01-19 LAB
CORTICOSTEROID BINDING GLOBULIN RESULT: 2.4 MG/DL — SIGNIFICANT CHANGE UP
CORTIS F/TOTAL MFR SERPL: 4 % — SIGNIFICANT CHANGE UP
CORTIS SERPL-MCNC: 8.1 UG/DL — SIGNIFICANT CHANGE UP
CORTISOL, FREE RESULT: 0.32 UG/DL — SIGNIFICANT CHANGE UP

## 2022-01-25 ENCOUNTER — RESULT REVIEW (OUTPATIENT)
Age: 67
End: 2022-01-25

## 2022-01-25 ENCOUNTER — APPOINTMENT (OUTPATIENT)
Age: 67
End: 2022-01-25

## 2022-01-25 LAB
BASOPHILS # BLD AUTO: 0.1 K/UL — SIGNIFICANT CHANGE UP (ref 0–0.2)
BASOPHILS NFR BLD AUTO: 1.1 % — SIGNIFICANT CHANGE UP (ref 0–2)
EOSINOPHIL # BLD AUTO: 0.1 K/UL — SIGNIFICANT CHANGE UP (ref 0–0.5)
EOSINOPHIL NFR BLD AUTO: 2.4 % — SIGNIFICANT CHANGE UP (ref 0–6)
HCT VFR BLD CALC: 37.4 % — SIGNIFICANT CHANGE UP (ref 34.5–45)
HGB BLD-MCNC: 11.9 G/DL — SIGNIFICANT CHANGE UP (ref 11.5–15.5)
LYMPHOCYTES # BLD AUTO: 0.8 K/UL — LOW (ref 1–3.3)
LYMPHOCYTES # BLD AUTO: 16.6 % — SIGNIFICANT CHANGE UP (ref 13–44)
MCHC RBC-ENTMCNC: 29.2 PG — SIGNIFICANT CHANGE UP (ref 27–34)
MCHC RBC-ENTMCNC: 31.9 G/DL — LOW (ref 32–36)
MCV RBC AUTO: 91.6 FL — SIGNIFICANT CHANGE UP (ref 80–100)
MONOCYTES # BLD AUTO: 0.4 K/UL — SIGNIFICANT CHANGE UP (ref 0–0.9)
MONOCYTES NFR BLD AUTO: 8.2 % — SIGNIFICANT CHANGE UP (ref 2–14)
NEUTROPHILS # BLD AUTO: 3.5 K/UL — SIGNIFICANT CHANGE UP (ref 1.8–7.4)
NEUTROPHILS NFR BLD AUTO: 71.6 % — SIGNIFICANT CHANGE UP (ref 43–77)
PLATELET # BLD AUTO: 303 K/UL — SIGNIFICANT CHANGE UP (ref 150–400)
RBC # BLD: 4.09 M/UL — SIGNIFICANT CHANGE UP (ref 3.8–5.2)
RBC # FLD: 14.1 % — SIGNIFICANT CHANGE UP (ref 10.3–14.5)
WBC # BLD: 4.9 K/UL — SIGNIFICANT CHANGE UP (ref 3.8–10.5)
WBC # FLD AUTO: 4.9 K/UL — SIGNIFICANT CHANGE UP (ref 3.8–10.5)

## 2022-01-26 LAB
ALBUMIN SERPL ELPH-MCNC: 4.2 G/DL — SIGNIFICANT CHANGE UP (ref 3.3–5)
ALP SERPL-CCNC: 107 U/L — SIGNIFICANT CHANGE UP (ref 40–120)
ALT FLD-CCNC: 34 U/L — SIGNIFICANT CHANGE UP (ref 10–45)
ANION GAP SERPL CALC-SCNC: 12 MMOL/L — SIGNIFICANT CHANGE UP (ref 5–17)
AST SERPL-CCNC: 27 U/L — SIGNIFICANT CHANGE UP (ref 10–40)
BILIRUB SERPL-MCNC: 0.2 MG/DL — SIGNIFICANT CHANGE UP (ref 0.2–1.2)
BUN SERPL-MCNC: 11 MG/DL — SIGNIFICANT CHANGE UP (ref 7–23)
CALCIUM SERPL-MCNC: 9.3 MG/DL — SIGNIFICANT CHANGE UP (ref 8.4–10.5)
CHLORIDE SERPL-SCNC: 102 MMOL/L — SIGNIFICANT CHANGE UP (ref 96–108)
CO2 SERPL-SCNC: 26 MMOL/L — SIGNIFICANT CHANGE UP (ref 22–31)
CREAT SERPL-MCNC: 0.68 MG/DL — SIGNIFICANT CHANGE UP (ref 0.5–1.3)
GLUCOSE SERPL-MCNC: 109 MG/DL — HIGH (ref 70–99)
POTASSIUM SERPL-MCNC: 4.1 MMOL/L — SIGNIFICANT CHANGE UP (ref 3.5–5.3)
POTASSIUM SERPL-SCNC: 4.1 MMOL/L — SIGNIFICANT CHANGE UP (ref 3.5–5.3)
PROT SERPL-MCNC: 6.7 G/DL — SIGNIFICANT CHANGE UP (ref 6–8.3)
SODIUM SERPL-SCNC: 140 MMOL/L — SIGNIFICANT CHANGE UP (ref 135–145)

## 2022-01-31 LAB
CORTICOSTEROID BINDING GLOBULIN RESULT: 2.3 MG/DL — SIGNIFICANT CHANGE UP
CORTIS F/TOTAL MFR SERPL: 3.4 % — SIGNIFICANT CHANGE UP
CORTIS SERPL-MCNC: 5.8 UG/DL — SIGNIFICANT CHANGE UP
CORTISOL, FREE RESULT: 0.2 UG/DL — SIGNIFICANT CHANGE UP

## 2022-02-03 ENCOUNTER — OUTPATIENT (OUTPATIENT)
Dept: OUTPATIENT SERVICES | Facility: HOSPITAL | Age: 67
LOS: 1 days | Discharge: HOME | End: 2022-02-03

## 2022-02-03 DIAGNOSIS — Z90.49 ACQUIRED ABSENCE OF OTHER SPECIFIED PARTS OF DIGESTIVE TRACT: Chronic | ICD-10-CM

## 2022-02-03 DIAGNOSIS — Z98.890 OTHER SPECIFIED POSTPROCEDURAL STATES: Chronic | ICD-10-CM

## 2022-02-03 DIAGNOSIS — C34.90 MALIGNANT NEOPLASM OF UNSPECIFIED PART OF UNSPECIFIED BRONCHUS OR LUNG: ICD-10-CM

## 2022-02-03 DIAGNOSIS — Z98.82 BREAST IMPLANT STATUS: Chronic | ICD-10-CM

## 2022-02-03 DIAGNOSIS — Z98.891 HISTORY OF UTERINE SCAR FROM PREVIOUS SURGERY: Chronic | ICD-10-CM

## 2022-02-08 ENCOUNTER — RESULT REVIEW (OUTPATIENT)
Age: 67
End: 2022-02-08

## 2022-02-08 ENCOUNTER — APPOINTMENT (OUTPATIENT)
Age: 67
End: 2022-02-08

## 2022-02-08 DIAGNOSIS — Z51.11 ENCOUNTER FOR ANTINEOPLASTIC CHEMOTHERAPY: ICD-10-CM

## 2022-02-08 DIAGNOSIS — R11.2 NAUSEA WITH VOMITING, UNSPECIFIED: ICD-10-CM

## 2022-02-08 LAB
BASOPHILS # BLD AUTO: 0.1 K/UL — SIGNIFICANT CHANGE UP (ref 0–0.2)
BASOPHILS NFR BLD AUTO: 1.2 % — SIGNIFICANT CHANGE UP (ref 0–2)
EOSINOPHIL # BLD AUTO: 0.1 K/UL — SIGNIFICANT CHANGE UP (ref 0–0.5)
EOSINOPHIL NFR BLD AUTO: 3.3 % — SIGNIFICANT CHANGE UP (ref 0–6)
HCT VFR BLD CALC: 38.1 % — SIGNIFICANT CHANGE UP (ref 34.5–45)
HGB BLD-MCNC: 12.4 G/DL — SIGNIFICANT CHANGE UP (ref 11.5–15.5)
LYMPHOCYTES # BLD AUTO: 0.8 K/UL — LOW (ref 1–3.3)
LYMPHOCYTES # BLD AUTO: 17.2 % — SIGNIFICANT CHANGE UP (ref 13–44)
MCHC RBC-ENTMCNC: 29.4 PG — SIGNIFICANT CHANGE UP (ref 27–34)
MCHC RBC-ENTMCNC: 32.4 G/DL — SIGNIFICANT CHANGE UP (ref 32–36)
MCV RBC AUTO: 90.7 FL — SIGNIFICANT CHANGE UP (ref 80–100)
MONOCYTES # BLD AUTO: 0.4 K/UL — SIGNIFICANT CHANGE UP (ref 0–0.9)
MONOCYTES NFR BLD AUTO: 9.3 % — SIGNIFICANT CHANGE UP (ref 2–14)
NEUTROPHILS # BLD AUTO: 3.1 K/UL — SIGNIFICANT CHANGE UP (ref 1.8–7.4)
NEUTROPHILS NFR BLD AUTO: 69 % — SIGNIFICANT CHANGE UP (ref 43–77)
PLATELET # BLD AUTO: 296 K/UL — SIGNIFICANT CHANGE UP (ref 150–400)
RBC # BLD: 4.2 M/UL — SIGNIFICANT CHANGE UP (ref 3.8–5.2)
RBC # FLD: 13.7 % — SIGNIFICANT CHANGE UP (ref 10.3–14.5)
WBC # BLD: 4.5 K/UL — SIGNIFICANT CHANGE UP (ref 3.8–10.5)
WBC # FLD AUTO: 4.5 K/UL — SIGNIFICANT CHANGE UP (ref 3.8–10.5)

## 2022-02-09 LAB
ALBUMIN SERPL ELPH-MCNC: 4.3 G/DL — SIGNIFICANT CHANGE UP (ref 3.3–5)
ALP SERPL-CCNC: 107 U/L — SIGNIFICANT CHANGE UP (ref 40–120)
ALT FLD-CCNC: 21 U/L — SIGNIFICANT CHANGE UP (ref 10–45)
ANION GAP SERPL CALC-SCNC: 13 MMOL/L — SIGNIFICANT CHANGE UP (ref 5–17)
AST SERPL-CCNC: 27 U/L — SIGNIFICANT CHANGE UP (ref 10–40)
BILIRUB SERPL-MCNC: 0.2 MG/DL — SIGNIFICANT CHANGE UP (ref 0.2–1.2)
BUN SERPL-MCNC: 14 MG/DL — SIGNIFICANT CHANGE UP (ref 7–23)
CALCIUM SERPL-MCNC: 9.6 MG/DL — SIGNIFICANT CHANGE UP (ref 8.4–10.5)
CEA SERPL-MCNC: 13 NG/ML — HIGH (ref 0–3.8)
CHLORIDE SERPL-SCNC: 101 MMOL/L — SIGNIFICANT CHANGE UP (ref 96–108)
CO2 SERPL-SCNC: 24 MMOL/L — SIGNIFICANT CHANGE UP (ref 22–31)
CREAT SERPL-MCNC: 0.69 MG/DL — SIGNIFICANT CHANGE UP (ref 0.5–1.3)
GLUCOSE SERPL-MCNC: 89 MG/DL — SIGNIFICANT CHANGE UP (ref 70–99)
POTASSIUM SERPL-MCNC: 4.1 MMOL/L — SIGNIFICANT CHANGE UP (ref 3.5–5.3)
POTASSIUM SERPL-SCNC: 4.1 MMOL/L — SIGNIFICANT CHANGE UP (ref 3.5–5.3)
PROT SERPL-MCNC: 7.2 G/DL — SIGNIFICANT CHANGE UP (ref 6–8.3)
SODIUM SERPL-SCNC: 139 MMOL/L — SIGNIFICANT CHANGE UP (ref 135–145)
T4 FREE SERPL-MCNC: 1 NG/DL — SIGNIFICANT CHANGE UP (ref 0.9–1.8)
TSH SERPL-MCNC: 1.25 UIU/ML — SIGNIFICANT CHANGE UP (ref 0.27–4.2)

## 2022-02-15 ENCOUNTER — APPOINTMENT (OUTPATIENT)
Dept: HEMATOLOGY ONCOLOGY | Facility: CLINIC | Age: 67
End: 2022-02-15
Payer: MEDICARE

## 2022-02-15 VITALS
SYSTOLIC BLOOD PRESSURE: 121 MMHG | OXYGEN SATURATION: 98 % | DIASTOLIC BLOOD PRESSURE: 80 MMHG | HEIGHT: 64 IN | BODY MASS INDEX: 29.02 KG/M2 | WEIGHT: 170 LBS | HEART RATE: 72 BPM

## 2022-02-15 PROCEDURE — 99215 OFFICE O/P EST HI 40 MIN: CPT

## 2022-02-15 NOTE — ASSESSMENT
[FreeTextEntry1] : SHANON BEE is a female never smoker who was 66 yo at diagnosis,  of adenocarcinoma lung \par Patient with no significant past medical history\par PET in 10/2020 with a RENAN spiculated lung mass 4.5 x 3cm with an SUV 11.1 with adjacent tree in bud opacity. Left lung, biopsy on 11/23/20: Adenocarcinoma\par \par -MRI Brain 12/29/20: negative \par -CT C/A/P 12/29/20: stable RENAN primary lesion, (4.7 x 2.3cm), 1.2 cm left lung apex lesion along with RUL stable lesion of 1.3 cm \par -PDL1 20% \par -Foundation one : ALK ELM 4 fusion variant, MS stable TMB 4 mut/ Mb, SF 3B1 \par \par Biopsy done of RUL: not malignant/ reactive \par -Initial plan for resection per Dr Choudhury, however EBUS prior to surgery with N2 disease. Patient was presented at Tumor board on 4/14/21 and a plan for definitive CRT \par -s/p chemoRT with Carbo and taxol weekly with RT, completed 6/21/21\par - She had a rough course with Rt mediated Pneumonitis/ superimposed PNA  in July/ Aug 2021 requiring hospitalization\par -Currently on Immunotherapy with Durvalumab for 1 year Started on 9/7/21 Start delayed due to requiring Prednisone for RT Pneumonitis \par -Patient is a Jehovah Witness. She had a h/o diverticulosis, need to monitor with IO \par    \par CT chest no contrast 1/7/22\par IMPRESSION:Since 2 months prior:\par -Evolving radiotherapy changes in the left upper and lower lobes, with slightly decreased\par consolidative component in the left upper lobe, at site of treated neoplasm.\par -Decreased prior right lower lobe abnormality, however multiple new peripheral areas of\par groundglass in the right upper and right lower lobes. The migratory pattern suggests organizing\par pneumonia, possibly drug-induced or radiation-induced. Acute infection, including COVID, could also\par account for the new findings.\par -Stable right apex 1.4 cm nodule.\par \par Scans stable, Right upper lobe nodule s/p biopsy c/w Reactive \par - continue to monitor \par - repeat scans in early April 2022 \par - continue Io with Durvalumab\par - F/u with Juju in 4-5 weeks \par - F/u with me in 8-10 weeks after scans

## 2022-02-15 NOTE — ADDENDUM
[FreeTextEntry1] : Documented by Harry Limon acting as scribe for Dr. Simpson on 02/15/2022. \par \par All Medical record entries made by the Scribe were at my, Dr. Simpson's, direction and personally dictated by me on 02/15/2022. I have reviewed the chart and agree that the record accurately reflects my personal performance of the history, physical exam, assessment and plan. I have also personally directed, reviewed, and agreed with the discharge instructions.

## 2022-02-15 NOTE — HISTORY OF PRESENT ILLNESS
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 Recent imaging has shown growth and development of a solid structure. The etiology remains unclear, but malignancy has been considered. She feels well has no complaints. She denies fever, chills, night sweats, weight loss, or weight gain. \par \par Followed by Dr Galeana in the past\par PPD+ in 2000 and received 6 months of INH\par PET positive RENAN posterior cavitary density in 2010\par TTNA and Bronch non-diagnostic. Cultures negative for fungus and TB\par CT at [facility] in 2010 and 2012 stable - felt to be benign. Lost to FU\par \par Patient had a PEt scan in Oct 2020 with a Left upper lobe spiculated lung mass 4.5 x 3cm with an SUV 11.1 with adjacent tree in bud opacity \par In addition Rt upper lobe opacity 1.2 x1 cm which is not FDG avid \par Discrete Left hilar/ infrahilar region  lesion with an SUV 5.2 which is possibly corresponding to a LN \par \par \par Patient s/p Left lung mass, core biopsy with pathology c/w Adenocarcinoma c/w pulmonary origin (see comment)\par CK7, TTF-1 : Positive CK20, PAX-8 : Negative\par  [de-identified] : SON: JUAN RAMON [phone] [de-identified] : Patient presents for follow up on C12 Durvalumab for adenocarcinoma of the RENAN.  She completed concurrent CRT with carbo/ taxol on 6/21/21. Start of Durvalumab delayed by RT pneumonitis and subsequent need for prednisone now s/p taper, started on 15mg/day decreased to 10mg on Sunday 9/5, then to 5 mg on Sunday 9/13 and completed on 9/20. \par Patient had significant SOB when seen for follow up visit on 7/28/21, her O2 sat was 94%, decreased to 82% on ambulation, likely related to RT pneumonitis. She was later admitted to [facility] from 8/1 - 8/6 due to low O2 saturation and SOB despite home O2. She was put on IV abx and continued on prednisone taper. Followed by Pulmonary, Dr. Tho Zayas.  Prednisone taper completed on 09/20/21\par \par Reports TOLBERT, following up with Pulmonology.\par C/o neck swelling and pain. US Neck done at  on 2/14/2022: No enlarged cervical chain lymph node. Some thyroid nodules in left and right lobes. Advised to be evaluated by Endocrinologist. \par Recent dental evaluation, notes TMJ. \par Reports itchiness. Advised to take Benadryl and to moisturize\par Denies abdominal pain \par \par \par 1/11/22 12.3\par 12/28/21 10.7\par 12/14/21 CEA 11\par 11/30/21 CEA 11.5\par \par CT chest no contrast 1/7/22\par IMPRESSION:\par \par Since 2 months prior:\par \par Evolving radiotherapy changes in the left upper and lower lobes, with slightly decreased\par consolidative component in the left upper lobe, at site of treated neoplasm.\par \par Decreased prior right lower lobe abnormality, however multiple new peripheral areas of\par groundglass in the right upper and right lower lobes. The migratory pattern suggests organizing\par pneumonia, possibly drug-induced or radiation-induced. Acute infection, including COVID, could also\par account for the new findings.\par \par Stable right apex 1.4 cm nodule.\par \par

## 2022-02-17 LAB
CORTICOSTEROID BINDING GLOBULIN RESULT: 2.1 MG/DL — SIGNIFICANT CHANGE UP
CORTIS F/TOTAL MFR SERPL: 3.3 % — SIGNIFICANT CHANGE UP
CORTIS SERPL-MCNC: 3.9 UG/DL — LOW
CORTISOL, FREE RESULT: 0.13 UG/DL — LOW

## 2022-02-25 ENCOUNTER — OUTPATIENT (OUTPATIENT)
Dept: OUTPATIENT SERVICES | Facility: HOSPITAL | Age: 67
LOS: 1 days | End: 2022-02-25
Payer: MEDICARE

## 2022-02-25 ENCOUNTER — APPOINTMENT (OUTPATIENT)
Dept: CT IMAGING | Facility: CLINIC | Age: 67
End: 2022-02-25
Payer: MEDICARE

## 2022-02-25 DIAGNOSIS — Z98.890 OTHER SPECIFIED POSTPROCEDURAL STATES: Chronic | ICD-10-CM

## 2022-02-25 DIAGNOSIS — Z98.891 HISTORY OF UTERINE SCAR FROM PREVIOUS SURGERY: Chronic | ICD-10-CM

## 2022-02-25 DIAGNOSIS — Z90.49 ACQUIRED ABSENCE OF OTHER SPECIFIED PARTS OF DIGESTIVE TRACT: Chronic | ICD-10-CM

## 2022-02-25 DIAGNOSIS — Z00.8 ENCOUNTER FOR OTHER GENERAL EXAMINATION: ICD-10-CM

## 2022-02-25 DIAGNOSIS — Z98.82 BREAST IMPLANT STATUS: Chronic | ICD-10-CM

## 2022-02-25 PROCEDURE — 70491 CT SOFT TISSUE NECK W/DYE: CPT

## 2022-02-25 PROCEDURE — 70491 CT SOFT TISSUE NECK W/DYE: CPT | Mod: 26

## 2022-03-01 ENCOUNTER — RESULT REVIEW (OUTPATIENT)
Age: 67
End: 2022-03-01

## 2022-03-01 ENCOUNTER — APPOINTMENT (OUTPATIENT)
Age: 67
End: 2022-03-01

## 2022-03-01 LAB
BASOPHILS # BLD AUTO: 0 K/UL — SIGNIFICANT CHANGE UP (ref 0–0.2)
BASOPHILS NFR BLD AUTO: 1.1 % — SIGNIFICANT CHANGE UP (ref 0–2)
CEA SERPL-MCNC: 14.8 NG/ML — HIGH (ref 0–3.8)
EOSINOPHIL # BLD AUTO: 0.1 K/UL — SIGNIFICANT CHANGE UP (ref 0–0.5)
EOSINOPHIL NFR BLD AUTO: 2.4 % — SIGNIFICANT CHANGE UP (ref 0–6)
HCT VFR BLD CALC: 41.2 % — SIGNIFICANT CHANGE UP (ref 34.5–45)
HGB BLD-MCNC: 13.1 G/DL — SIGNIFICANT CHANGE UP (ref 11.5–15.5)
LYMPHOCYTES # BLD AUTO: 0.8 K/UL — LOW (ref 1–3.3)
LYMPHOCYTES # BLD AUTO: 19 % — SIGNIFICANT CHANGE UP (ref 13–44)
MCHC RBC-ENTMCNC: 29.7 PG — SIGNIFICANT CHANGE UP (ref 27–34)
MCHC RBC-ENTMCNC: 31.9 G/DL — LOW (ref 32–36)
MCV RBC AUTO: 93.2 FL — SIGNIFICANT CHANGE UP (ref 80–100)
MONOCYTES # BLD AUTO: 0.3 K/UL — SIGNIFICANT CHANGE UP (ref 0–0.9)
MONOCYTES NFR BLD AUTO: 6.3 % — SIGNIFICANT CHANGE UP (ref 2–14)
NEUTROPHILS # BLD AUTO: 3.1 K/UL — SIGNIFICANT CHANGE UP (ref 1.8–7.4)
NEUTROPHILS NFR BLD AUTO: 71.2 % — SIGNIFICANT CHANGE UP (ref 43–77)
PLATELET # BLD AUTO: 309 K/UL — SIGNIFICANT CHANGE UP (ref 150–400)
RBC # BLD: 4.42 M/UL — SIGNIFICANT CHANGE UP (ref 3.8–5.2)
RBC # FLD: 14.4 % — SIGNIFICANT CHANGE UP (ref 10.3–14.5)
T4 FREE SERPL-MCNC: 1.2 NG/DL — SIGNIFICANT CHANGE UP (ref 0.9–1.8)
TSH SERPL-MCNC: 0.52 UIU/ML — SIGNIFICANT CHANGE UP (ref 0.27–4.2)
WBC # BLD: 4.3 K/UL — SIGNIFICANT CHANGE UP (ref 3.8–10.5)
WBC # FLD AUTO: 4.3 K/UL — SIGNIFICANT CHANGE UP (ref 3.8–10.5)

## 2022-03-02 LAB
ALBUMIN SERPL ELPH-MCNC: 4.5 G/DL — SIGNIFICANT CHANGE UP (ref 3.3–5)
ALP SERPL-CCNC: 104 U/L — SIGNIFICANT CHANGE UP (ref 40–120)
ALT FLD-CCNC: 25 U/L — SIGNIFICANT CHANGE UP (ref 10–45)
ANION GAP SERPL CALC-SCNC: 13 MMOL/L — SIGNIFICANT CHANGE UP (ref 5–17)
AST SERPL-CCNC: 28 U/L — SIGNIFICANT CHANGE UP (ref 10–40)
BILIRUB SERPL-MCNC: 0.2 MG/DL — SIGNIFICANT CHANGE UP (ref 0.2–1.2)
BUN SERPL-MCNC: 11 MG/DL — SIGNIFICANT CHANGE UP (ref 7–23)
CALCIUM SERPL-MCNC: 9.5 MG/DL — SIGNIFICANT CHANGE UP (ref 8.4–10.5)
CHLORIDE SERPL-SCNC: 101 MMOL/L — SIGNIFICANT CHANGE UP (ref 96–108)
CO2 SERPL-SCNC: 25 MMOL/L — SIGNIFICANT CHANGE UP (ref 22–31)
CREAT SERPL-MCNC: 0.63 MG/DL — SIGNIFICANT CHANGE UP (ref 0.5–1.3)
EGFR: 97 ML/MIN/1.73M2 — SIGNIFICANT CHANGE UP
GLUCOSE SERPL-MCNC: 98 MG/DL — SIGNIFICANT CHANGE UP (ref 70–99)
POTASSIUM SERPL-MCNC: 4.2 MMOL/L — SIGNIFICANT CHANGE UP (ref 3.5–5.3)
POTASSIUM SERPL-SCNC: 4.2 MMOL/L — SIGNIFICANT CHANGE UP (ref 3.5–5.3)
PROT SERPL-MCNC: 7.8 G/DL — SIGNIFICANT CHANGE UP (ref 6–8.3)
SODIUM SERPL-SCNC: 138 MMOL/L — SIGNIFICANT CHANGE UP (ref 135–145)

## 2022-03-09 ENCOUNTER — OUTPATIENT (OUTPATIENT)
Dept: OUTPATIENT SERVICES | Facility: HOSPITAL | Age: 67
LOS: 1 days | Discharge: HOME | End: 2022-03-09

## 2022-03-09 DIAGNOSIS — Z90.49 ACQUIRED ABSENCE OF OTHER SPECIFIED PARTS OF DIGESTIVE TRACT: Chronic | ICD-10-CM

## 2022-03-09 DIAGNOSIS — Z98.82 BREAST IMPLANT STATUS: Chronic | ICD-10-CM

## 2022-03-09 DIAGNOSIS — C34.90 MALIGNANT NEOPLASM OF UNSPECIFIED PART OF UNSPECIFIED BRONCHUS OR LUNG: ICD-10-CM

## 2022-03-09 DIAGNOSIS — Z98.891 HISTORY OF UTERINE SCAR FROM PREVIOUS SURGERY: Chronic | ICD-10-CM

## 2022-03-09 DIAGNOSIS — Z98.890 OTHER SPECIFIED POSTPROCEDURAL STATES: Chronic | ICD-10-CM

## 2022-03-11 PROBLEM — C34.90 MALIGNANT NEOPLASM OF UNSPECIFIED PART OF UNSPECIFIED BRONCHUS OR LUNG: Chronic | Status: ACTIVE | Noted: 2021-08-01

## 2022-03-11 PROBLEM — K57.92 DIVERTICULITIS OF INTESTINE, PART UNSPECIFIED, WITHOUT PERFORATION OR ABSCESS WITHOUT BLEEDING: Chronic | Status: ACTIVE | Noted: 2021-08-01

## 2022-03-12 LAB
CORTICOSTEROID BINDING GLOBULIN RESULT: 2.4 MG/DL — SIGNIFICANT CHANGE UP
CORTIS F/TOTAL MFR SERPL: 2.6 % — SIGNIFICANT CHANGE UP
CORTIS SERPL-MCNC: 3.1 UG/DL — LOW
CORTISOL, FREE RESULT: 0.08 UG/DL — LOW

## 2022-03-15 ENCOUNTER — RESULT REVIEW (OUTPATIENT)
Age: 67
End: 2022-03-15

## 2022-03-15 ENCOUNTER — APPOINTMENT (OUTPATIENT)
Age: 67
End: 2022-03-15

## 2022-03-15 LAB
BASOPHILS # BLD AUTO: 0.1 K/UL — SIGNIFICANT CHANGE UP (ref 0–0.2)
BASOPHILS NFR BLD AUTO: 1.6 % — SIGNIFICANT CHANGE UP (ref 0–2)
CEA SERPL-MCNC: 14.5 NG/ML — HIGH (ref 0–3.8)
EOSINOPHIL # BLD AUTO: 0.1 K/UL — SIGNIFICANT CHANGE UP (ref 0–0.5)
EOSINOPHIL NFR BLD AUTO: 1.5 % — SIGNIFICANT CHANGE UP (ref 0–6)
HCT VFR BLD CALC: 39.8 % — SIGNIFICANT CHANGE UP (ref 34.5–45)
HGB BLD-MCNC: 12.6 G/DL — SIGNIFICANT CHANGE UP (ref 11.5–15.5)
LYMPHOCYTES # BLD AUTO: 0.9 K/UL — LOW (ref 1–3.3)
LYMPHOCYTES # BLD AUTO: 17.7 % — SIGNIFICANT CHANGE UP (ref 13–44)
MCHC RBC-ENTMCNC: 30.3 PG — SIGNIFICANT CHANGE UP (ref 27–34)
MCHC RBC-ENTMCNC: 31.6 G/DL — LOW (ref 32–36)
MCV RBC AUTO: 95.8 FL — SIGNIFICANT CHANGE UP (ref 80–100)
MONOCYTES # BLD AUTO: 0.3 K/UL — SIGNIFICANT CHANGE UP (ref 0–0.9)
MONOCYTES NFR BLD AUTO: 6.7 % — SIGNIFICANT CHANGE UP (ref 2–14)
NEUTROPHILS # BLD AUTO: 3.7 K/UL — SIGNIFICANT CHANGE UP (ref 1.8–7.4)
NEUTROPHILS NFR BLD AUTO: 72.5 % — SIGNIFICANT CHANGE UP (ref 43–77)
PLATELET # BLD AUTO: 313 K/UL — SIGNIFICANT CHANGE UP (ref 150–400)
RBC # BLD: 4.15 M/UL — SIGNIFICANT CHANGE UP (ref 3.8–5.2)
RBC # FLD: 14 % — SIGNIFICANT CHANGE UP (ref 10.3–14.5)
T4 FREE SERPL-MCNC: 1.2 NG/DL — SIGNIFICANT CHANGE UP (ref 0.9–1.8)
TSH SERPL-MCNC: 0.75 UIU/ML — SIGNIFICANT CHANGE UP (ref 0.27–4.2)
WBC # BLD: 5.1 K/UL — SIGNIFICANT CHANGE UP (ref 3.8–10.5)
WBC # FLD AUTO: 5.1 K/UL — SIGNIFICANT CHANGE UP (ref 3.8–10.5)

## 2022-03-16 DIAGNOSIS — Z51.11 ENCOUNTER FOR ANTINEOPLASTIC CHEMOTHERAPY: ICD-10-CM

## 2022-03-16 LAB
ALBUMIN SERPL ELPH-MCNC: 4.3 G/DL — SIGNIFICANT CHANGE UP (ref 3.3–5)
ALP SERPL-CCNC: 118 U/L — SIGNIFICANT CHANGE UP (ref 40–120)
ALT FLD-CCNC: 40 U/L — SIGNIFICANT CHANGE UP (ref 10–45)
ANION GAP SERPL CALC-SCNC: 17 MMOL/L — SIGNIFICANT CHANGE UP (ref 5–17)
AST SERPL-CCNC: 35 U/L — SIGNIFICANT CHANGE UP (ref 10–40)
BILIRUB SERPL-MCNC: 0.2 MG/DL — SIGNIFICANT CHANGE UP (ref 0.2–1.2)
BUN SERPL-MCNC: 13 MG/DL — SIGNIFICANT CHANGE UP (ref 7–23)
CALCIUM SERPL-MCNC: 9.1 MG/DL — SIGNIFICANT CHANGE UP (ref 8.4–10.5)
CHLORIDE SERPL-SCNC: 101 MMOL/L — SIGNIFICANT CHANGE UP (ref 96–108)
CO2 SERPL-SCNC: 18 MMOL/L — LOW (ref 22–31)
CREAT SERPL-MCNC: 0.71 MG/DL — SIGNIFICANT CHANGE UP (ref 0.5–1.3)
EGFR: 93 ML/MIN/1.73M2 — SIGNIFICANT CHANGE UP
GLUCOSE SERPL-MCNC: 78 MG/DL — SIGNIFICANT CHANGE UP (ref 70–99)
POTASSIUM SERPL-MCNC: 4.4 MMOL/L — SIGNIFICANT CHANGE UP (ref 3.5–5.3)
POTASSIUM SERPL-SCNC: 4.4 MMOL/L — SIGNIFICANT CHANGE UP (ref 3.5–5.3)
PROT SERPL-MCNC: 7.2 G/DL — SIGNIFICANT CHANGE UP (ref 6–8.3)
SODIUM SERPL-SCNC: 136 MMOL/L — SIGNIFICANT CHANGE UP (ref 135–145)

## 2022-03-23 NOTE — DISCHARGE NOTE PROVIDER - [id number]_ALL_CORE
Early refill of medications was requested by patient. The prescriptions were sent, but they need the verbal or written authorization stating it is ok to fill early.
See other TE 3/23/22
This document is complete and the patient is ready for discharge.

## 2022-03-27 LAB
CORTICOSTEROID BINDING GLOBULIN RESULT: 2 MG/DL — SIGNIFICANT CHANGE UP
CORTIS F/TOTAL MFR SERPL: 7.2 % — SIGNIFICANT CHANGE UP
CORTIS SERPL-MCNC: 9.8 UG/DL — SIGNIFICANT CHANGE UP
CORTISOL, FREE RESULT: 0.71 UG/DL — SIGNIFICANT CHANGE UP

## 2022-03-28 ENCOUNTER — APPOINTMENT (OUTPATIENT)
Dept: PULMONOLOGY | Facility: CLINIC | Age: 67
End: 2022-03-28
Payer: MEDICARE

## 2022-03-28 VITALS — HEIGHT: 64 IN | WEIGHT: 168 LBS | BODY MASS INDEX: 28.68 KG/M2

## 2022-03-28 VITALS
RESPIRATION RATE: 14 BRPM | SYSTOLIC BLOOD PRESSURE: 118 MMHG | HEART RATE: 73 BPM | OXYGEN SATURATION: 98 % | DIASTOLIC BLOOD PRESSURE: 74 MMHG

## 2022-03-28 DIAGNOSIS — Z23 ENCOUNTER FOR IMMUNIZATION: ICD-10-CM

## 2022-03-28 DIAGNOSIS — Z01.818 ENCOUNTER FOR OTHER PREPROCEDURAL EXAMINATION: ICD-10-CM

## 2022-03-28 PROCEDURE — 94010 BREATHING CAPACITY TEST: CPT

## 2022-03-28 PROCEDURE — 99214 OFFICE O/P EST MOD 30 MIN: CPT | Mod: 25

## 2022-03-28 RX ORDER — FLUOXETINE HYDROCHLORIDE 40 MG/1
40 CAPSULE ORAL
Refills: 0 | Status: DISCONTINUED | COMMUNITY
End: 2022-03-28

## 2022-03-28 RX ORDER — AMOXICILLIN 500 MG/1
500 CAPSULE ORAL
Qty: 21 | Refills: 0 | Status: DISCONTINUED | COMMUNITY
Start: 2022-01-24 | End: 2022-03-28

## 2022-03-28 NOTE — HISTORY OF PRESENT ILLNESS
[Former] : former [>= 20 pack years] : >= 20 pack years [TextBox_4] : 67-year-old female with a history of an adenocarcinoma left upper lobe status post radiation and chemotherapy.  Course complicated by a radiation pneumonitis versus drug-induced pneumonitis secondary to durvalumab.  She is seen today for preop evaluation for proposed endoscopy for intermittent hematemesis.  She has been maintained on long-acting bronchodilators inhaled corticosteroids secondary to bronchospasm.  No history of fevers chills or chest pains. [YearQuit] : 2021

## 2022-03-28 NOTE — DISCUSSION/SUMMARY
[FreeTextEntry1] : 67-year-old female seen today for preop evaluation for proposed endoscopy for hematemesis.  No clinical evidence of worsening interstitial lung disease.  Differential diagnosis remains between drug-induced ILD, residual RT pneumonitis, cryptogenic organizing pneumonia or NSIP.  Bronchospasm has resolved on long-acting bronchodilators.  No pulmonary contraindication to proposed endoscopy.\par \par Follow-up CT will be performed in several weeks.  She has been instructed to continue her Trelegy.

## 2022-03-28 NOTE — CONSULT LETTER
[Dear  ___] : Dear  [unfilled], [Consult Letter:] : I had the pleasure of evaluating your patient, [unfilled]. [Please see my note below.] : Please see my note below. [Sincerely,] : Sincerely, [FreeTextEntry3] : Tho Zayas MD FCCP\par Pulmonary/Critical Care/Sleep Medicine\par Department of Internal Medicine\par \par [facility] [employer]\par  [DrDavid  ___] : Dr. CADET

## 2022-03-28 NOTE — REASON FOR VISIT
[Follow-Up] : a follow-up visit [Lung Cancer] : lung cancer [Pre-op Risk Stratification] : pre-op risk stratification [Ad Hoc ] : provided by an ad hoc  [TextBox_44] : Drug induced ILD vs Radiation pneumonitis [Interpreters_FullName] : Lashanda [Interpreters_Relationshiptopatient] : daughter [TWNoteComboBox1] : Polish

## 2022-03-29 ENCOUNTER — RESULT REVIEW (OUTPATIENT)
Age: 67
End: 2022-03-29

## 2022-03-29 ENCOUNTER — APPOINTMENT (OUTPATIENT)
Dept: HEMATOLOGY ONCOLOGY | Facility: CLINIC | Age: 67
End: 2022-03-29
Payer: MEDICARE

## 2022-03-29 ENCOUNTER — APPOINTMENT (OUTPATIENT)
Age: 67
End: 2022-03-29

## 2022-03-29 VITALS
OXYGEN SATURATION: 96 % | HEART RATE: 92 BPM | HEIGHT: 64 IN | SYSTOLIC BLOOD PRESSURE: 107 MMHG | DIASTOLIC BLOOD PRESSURE: 71 MMHG | BODY MASS INDEX: 29.53 KG/M2 | WEIGHT: 173 LBS

## 2022-03-29 LAB
ALBUMIN SERPL ELPH-MCNC: 4.6 G/DL — SIGNIFICANT CHANGE UP (ref 3.3–5)
ALP SERPL-CCNC: 108 U/L — SIGNIFICANT CHANGE UP (ref 40–120)
ALT FLD-CCNC: 22 U/L — SIGNIFICANT CHANGE UP (ref 10–45)
ANION GAP SERPL CALC-SCNC: 14 MMOL/L — SIGNIFICANT CHANGE UP (ref 5–17)
AST SERPL-CCNC: 23 U/L — SIGNIFICANT CHANGE UP (ref 10–40)
BASOPHILS # BLD AUTO: 0 K/UL — SIGNIFICANT CHANGE UP (ref 0–0.2)
BASOPHILS NFR BLD AUTO: 0.9 % — SIGNIFICANT CHANGE UP (ref 0–2)
BILIRUB SERPL-MCNC: 0.2 MG/DL — SIGNIFICANT CHANGE UP (ref 0.2–1.2)
BUN SERPL-MCNC: 11 MG/DL — SIGNIFICANT CHANGE UP (ref 7–23)
CALCIUM SERPL-MCNC: 9.7 MG/DL — SIGNIFICANT CHANGE UP (ref 8.4–10.5)
CHLORIDE SERPL-SCNC: 102 MMOL/L — SIGNIFICANT CHANGE UP (ref 96–108)
CO2 SERPL-SCNC: 24 MMOL/L — SIGNIFICANT CHANGE UP (ref 22–31)
CREAT SERPL-MCNC: 0.7 MG/DL — SIGNIFICANT CHANGE UP (ref 0.5–1.3)
EGFR: 95 ML/MIN/1.73M2 — SIGNIFICANT CHANGE UP
EOSINOPHIL # BLD AUTO: 0.1 K/UL — SIGNIFICANT CHANGE UP (ref 0–0.5)
EOSINOPHIL NFR BLD AUTO: 1.6 % — SIGNIFICANT CHANGE UP (ref 0–6)
GLUCOSE SERPL-MCNC: 114 MG/DL — HIGH (ref 70–99)
HCT VFR BLD CALC: 41.1 % — SIGNIFICANT CHANGE UP (ref 34.5–45)
HGB BLD-MCNC: 13.1 G/DL — SIGNIFICANT CHANGE UP (ref 11.5–15.5)
LYMPHOCYTES # BLD AUTO: 0.8 K/UL — LOW (ref 1–3.3)
LYMPHOCYTES # BLD AUTO: 19.2 % — SIGNIFICANT CHANGE UP (ref 13–44)
MCHC RBC-ENTMCNC: 30.5 PG — SIGNIFICANT CHANGE UP (ref 27–34)
MCHC RBC-ENTMCNC: 32 G/DL — SIGNIFICANT CHANGE UP (ref 32–36)
MCV RBC AUTO: 95.4 FL — SIGNIFICANT CHANGE UP (ref 80–100)
MONOCYTES # BLD AUTO: 0.4 K/UL — SIGNIFICANT CHANGE UP (ref 0–0.9)
MONOCYTES NFR BLD AUTO: 9 % — SIGNIFICANT CHANGE UP (ref 2–14)
NEUTROPHILS # BLD AUTO: 3 K/UL — SIGNIFICANT CHANGE UP (ref 1.8–7.4)
NEUTROPHILS NFR BLD AUTO: 69.3 % — SIGNIFICANT CHANGE UP (ref 43–77)
PLATELET # BLD AUTO: 313 K/UL — SIGNIFICANT CHANGE UP (ref 150–400)
POTASSIUM SERPL-MCNC: 3.9 MMOL/L — SIGNIFICANT CHANGE UP (ref 3.5–5.3)
POTASSIUM SERPL-SCNC: 3.9 MMOL/L — SIGNIFICANT CHANGE UP (ref 3.5–5.3)
PROT SERPL-MCNC: 7.7 G/DL — SIGNIFICANT CHANGE UP (ref 6–8.3)
RBC # BLD: 4.31 M/UL — SIGNIFICANT CHANGE UP (ref 3.8–5.2)
RBC # FLD: 13.7 % — SIGNIFICANT CHANGE UP (ref 10.3–14.5)
SODIUM SERPL-SCNC: 140 MMOL/L — SIGNIFICANT CHANGE UP (ref 135–145)
WBC # BLD: 4.4 K/UL — SIGNIFICANT CHANGE UP (ref 3.8–10.5)
WBC # FLD AUTO: 4.4 K/UL — SIGNIFICANT CHANGE UP (ref 3.8–10.5)

## 2022-03-29 PROCEDURE — 99214 OFFICE O/P EST MOD 30 MIN: CPT

## 2022-03-30 LAB
CEA SERPL-MCNC: 15.4 NG/ML — HIGH (ref 0–3.8)
T4 FREE SERPL-MCNC: 1.2 NG/DL — SIGNIFICANT CHANGE UP (ref 0.9–1.8)
TSH SERPL-MCNC: 0.59 UIU/ML — SIGNIFICANT CHANGE UP (ref 0.27–4.2)

## 2022-04-04 NOTE — ASSESSMENT
[FreeTextEntry1] : SHANON BEE is a female never smoker who was 66 yo at diagnosis,  of adenocarcinoma lung \par Patient with no significant past medical history\par PET in 10/2020 with a RENAN spiculated lung mass 4.5 x 3cm with an SUV 11.1 with adjacent tree in bud opacity. Left lung, biopsy on 11/23/20: Adenocarcinoma\par \par -MRI Brain 12/29/20: negative \par -CT C/A/P 12/29/20: stable RENAN primary lesion, (4.7 x 2.3cm), 1.2 cm left lung apex lesion along with RUL stable lesion of 1.3 cm \par -PDL1 20% \par -Foundation one : ALK ELM 4 fusion variant, MS stable TMB 4 mut/ Mb, SF 3B1 \par \par Biopsy done of RUL: not malignant/ reactive \par -Initial plan for resection per Dr Choudhury, however EBUS prior to surgery with N2 disease. Patient was presented at Tumor board on 4/14/21 and a plan for definitive CRT \par -s/p chemoRT with Carbo and taxol weekly with RT, completed 6/21/21\par - She had a rough course with Rt mediated Pneumonitis/ superimposed PNA  in July/ Aug 2021 requiring hospitalization\par -Currently on Immunotherapy with Durvalumab for 1 year Started on 9/7/21 Start delayed due to requiring Prednisone for RT Pneumonitis \par -Patient is a Jehovah Witness. She had a h/o diverticulosis, need to monitor with IO \par    \par CT chest no contrast 1/7/22\par IMPRESSION:Since 2 months prior:\par -Evolving radiotherapy changes in the left upper and lower lobes, with slightly decreased\par consolidative component in the left upper lobe, at site of treated neoplasm.\par -Decreased prior right lower lobe abnormality, however multiple new peripheral areas of\par ground glass in the right upper and right lower lobes. The migratory pattern suggests organizing\par pneumonia, possibly drug-induced or radiation-induced. Acute infection, including COVID, could also\par account for the new findings.\par -Stable right apex 1.4 cm nodule.\par \par Scans stable, Right upper lobe nodule s/p biopsy c/w Reactive \par - continue to monitor \par - repeat scans in early April 2022 \par - continue  with Durvalumab\par - F/u after scans

## 2022-04-04 NOTE — RESULTS/DATA
[FreeTextEntry1] : CT chest no contrast 1/7/22\par IMPRESSION: Since 2 months prior:\par -Evolving radiotherapy changes in the left upper and lower lobes, with slightly decreased\par consolidative component in the left upper lobe, at site of treated neoplasm.\par -Decreased prior right lower lobe abnormality, however multiple new peripheral areas of\par groundglass in the right upper and right lower lobes. The migratory pattern suggests organizing\par pneumonia, possibly drug-induced or radiation-induced. Acute infection, including COVID, could also\par account for the new findings.\par -Stable right apex 1.4 cm nodule.\par \par \par CT CHEST 10/25/21 \par 1.  Evolution of post radiation changes in the left greater than right lungs. New findings in the right lower lobe which may reflect delayed radiation effect or radiation pneumonitis as well as infection\par 2.  Stable right upper lobe nodule, A 1.4 x 0.9 cm lobulated right upper lobe nodule is stable compared to prior study although measured 1 x 0.8 cm on 3/7/2018; on the 10/12/2020 PET/CT, this nodule was not FDG avid.previously PET negative, increased in size on since 2018. Reassessment can be made on surveillance imaging. No new lung nodule. \par \par 7/21/21 \par CT CAP post treatment on 7/21/21 with stability in LEft upper lobe mass at 4.3 cm x 2.8 cm ( previously 4.2 x 3.2 cm)  1.3 cm rt upper lobe nodule is unchanged> new patchy groungglass opacity surrounding both upper lobe lesions which maybe 2/2 treatment effect vs infection

## 2022-04-04 NOTE — HISTORY OF PRESENT ILLNESS
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 Recent imaging has shown growth and development of a solid structure. The etiology remains unclear, but malignancy has been considered. She feels well has no complaints. She denies fever, chills, night sweats, weight loss, or weight gain. \par \par Followed by Dr Galeana in the past\par PPD+ in 2000 and received 6 months of INH\par PET positive RENAN posterior cavitary density in 2010\par TTNA and Bronch non-diagnostic. Cultures negative for fungus and TB\par CT at [facility] in 2010 and 2012 stable - felt to be benign. Lost to FU\par \par Patient had a PEt scan in Oct 2020 with a Left upper lobe spiculated lung mass 4.5 x 3cm with an SUV 11.1 with adjacent tree in bud opacity \par In addition Rt upper lobe opacity 1.2 x1 cm which is not FDG avid \par Discrete Left hilar/ infrahilar region  lesion with an SUV 5.2 which is possibly corresponding to a LN \par \par \par Patient s/p Left lung mass, core biopsy with pathology c/w Adenocarcinoma c/w pulmonary origin (see comment)\par CK7, TTF-1 : Positive CK20, PAX-8 : Negative\par  [de-identified] : SON: JUAN RAMON [phone] [de-identified] : Patient presents for follow up on C15 Durvalumab for adenocarcinoma of the RENAN.  She completed concurrent CRT with carbo/ taxol on 6/21/21. \par Start of Durvalumab delayed by RT pneumonitis and subsequent need for prednisone now s/p taper, started on 15mg/day decreased to 10mg on Sunday 9/5, then to 5 mg on Sunday 9/13 and completed on 9/20. \par \par Patient had significant SOB when seen for follow up visit on 7/28/21, her O2 sat was 94%, decreased to 82% on ambulation, likely related to RT pneumonitis. She was later admitted to [facility] from 8/1 - 8/6 due to low O2 saturation and SOB despite home O2. She was put on IV abx and continued on prednisone taper. Followed by Pulmonary, Dr. Tho Zayas.  Prednisone taper completed on 09/20/21\par \par Reports TOLBERT, stable following up with Pulmonology.\par C/o neck swelling and pain. US Neck done at  on 2/14/2022: No enlarged cervical chain lymph node. Some thyroid nodules in left and right lobes. Patient evaluated by ENT then referred to GI, Dr.Charles Morales. scheduled to have EGD on 3/31/22. Patient has since been started on Omeprazole and patient noticed improvement with neck pain. \par Recent dental evaluation, notes TMJ. \par Reports itchiness. Advised to take Benadryl and to moisturize\par Denies fever/chills, mouth sores, nausea, vomiting, diarrhea,constipation,  abdominal pain, bleeding, easy bruising or visual changes, chest pain, cough, SOB or TOLBERT,  neuropathy, LE edema.\par \par \par 3/15/22 CEA 14.5\par 1/11/22 CEA 12.3\par 12/28/21 CEA 10.7\par 12/14/21 CEA 11\par 11/30/21 CEA 11.5\par \par \par \par

## 2022-04-08 ENCOUNTER — OUTPATIENT (OUTPATIENT)
Dept: OUTPATIENT SERVICES | Facility: HOSPITAL | Age: 67
LOS: 1 days | Discharge: HOME | End: 2022-04-08

## 2022-04-08 DIAGNOSIS — Z98.890 OTHER SPECIFIED POSTPROCEDURAL STATES: Chronic | ICD-10-CM

## 2022-04-08 DIAGNOSIS — Z98.891 HISTORY OF UTERINE SCAR FROM PREVIOUS SURGERY: Chronic | ICD-10-CM

## 2022-04-08 DIAGNOSIS — Z90.49 ACQUIRED ABSENCE OF OTHER SPECIFIED PARTS OF DIGESTIVE TRACT: Chronic | ICD-10-CM

## 2022-04-08 DIAGNOSIS — Z98.82 BREAST IMPLANT STATUS: Chronic | ICD-10-CM

## 2022-04-08 DIAGNOSIS — C34.90 MALIGNANT NEOPLASM OF UNSPECIFIED PART OF UNSPECIFIED BRONCHUS OR LUNG: ICD-10-CM

## 2022-04-12 ENCOUNTER — APPOINTMENT (OUTPATIENT)
Age: 67
End: 2022-04-12

## 2022-04-12 ENCOUNTER — RESULT REVIEW (OUTPATIENT)
Age: 67
End: 2022-04-12

## 2022-04-12 LAB
BASOPHILS # BLD AUTO: 0.1 K/UL — SIGNIFICANT CHANGE UP (ref 0–0.2)
BASOPHILS NFR BLD AUTO: 1.9 % — SIGNIFICANT CHANGE UP (ref 0–2)
EOSINOPHIL # BLD AUTO: 0.1 K/UL — SIGNIFICANT CHANGE UP (ref 0–0.5)
EOSINOPHIL NFR BLD AUTO: 1.7 % — SIGNIFICANT CHANGE UP (ref 0–6)
HCT VFR BLD CALC: 38.7 % — SIGNIFICANT CHANGE UP (ref 34.5–45)
HGB BLD-MCNC: 12.6 G/DL — SIGNIFICANT CHANGE UP (ref 11.5–15.5)
LYMPHOCYTES # BLD AUTO: 0.9 K/UL — LOW (ref 1–3.3)
LYMPHOCYTES # BLD AUTO: 19.8 % — SIGNIFICANT CHANGE UP (ref 13–44)
MCHC RBC-ENTMCNC: 31 PG — SIGNIFICANT CHANGE UP (ref 27–34)
MCHC RBC-ENTMCNC: 32.6 G/DL — SIGNIFICANT CHANGE UP (ref 32–36)
MCV RBC AUTO: 95.2 FL — SIGNIFICANT CHANGE UP (ref 80–100)
MONOCYTES # BLD AUTO: 0.4 K/UL — SIGNIFICANT CHANGE UP (ref 0–0.9)
MONOCYTES NFR BLD AUTO: 8.3 % — SIGNIFICANT CHANGE UP (ref 2–14)
NEUTROPHILS # BLD AUTO: 3.1 K/UL — SIGNIFICANT CHANGE UP (ref 1.8–7.4)
NEUTROPHILS NFR BLD AUTO: 68.4 % — SIGNIFICANT CHANGE UP (ref 43–77)
PLATELET # BLD AUTO: 307 K/UL — SIGNIFICANT CHANGE UP (ref 150–400)
RBC # BLD: 4.06 M/UL — SIGNIFICANT CHANGE UP (ref 3.8–5.2)
RBC # FLD: 13.2 % — SIGNIFICANT CHANGE UP (ref 10.3–14.5)
WBC # BLD: 4.6 K/UL — SIGNIFICANT CHANGE UP (ref 3.8–10.5)
WBC # FLD AUTO: 4.6 K/UL — SIGNIFICANT CHANGE UP (ref 3.8–10.5)

## 2022-04-13 DIAGNOSIS — Z51.11 ENCOUNTER FOR ANTINEOPLASTIC CHEMOTHERAPY: ICD-10-CM

## 2022-04-13 LAB
ALBUMIN SERPL ELPH-MCNC: 4.2 G/DL — SIGNIFICANT CHANGE UP (ref 3.3–5)
ALP SERPL-CCNC: 100 U/L — SIGNIFICANT CHANGE UP (ref 40–120)
ALT FLD-CCNC: 28 U/L — SIGNIFICANT CHANGE UP (ref 10–45)
ANION GAP SERPL CALC-SCNC: 12 MMOL/L — SIGNIFICANT CHANGE UP (ref 5–17)
AST SERPL-CCNC: 25 U/L — SIGNIFICANT CHANGE UP (ref 10–40)
BILIRUB SERPL-MCNC: 0.2 MG/DL — SIGNIFICANT CHANGE UP (ref 0.2–1.2)
BUN SERPL-MCNC: 12 MG/DL — SIGNIFICANT CHANGE UP (ref 7–23)
CALCIUM SERPL-MCNC: 9.2 MG/DL — SIGNIFICANT CHANGE UP (ref 8.4–10.5)
CEA SERPL-MCNC: 14.8 NG/ML — HIGH (ref 0–3.8)
CHLORIDE SERPL-SCNC: 105 MMOL/L — SIGNIFICANT CHANGE UP (ref 96–108)
CO2 SERPL-SCNC: 23 MMOL/L — SIGNIFICANT CHANGE UP (ref 22–31)
CREAT SERPL-MCNC: 0.66 MG/DL — SIGNIFICANT CHANGE UP (ref 0.5–1.3)
EGFR: 96 ML/MIN/1.73M2 — SIGNIFICANT CHANGE UP
GLUCOSE SERPL-MCNC: 118 MG/DL — HIGH (ref 70–99)
POTASSIUM SERPL-MCNC: 3.7 MMOL/L — SIGNIFICANT CHANGE UP (ref 3.5–5.3)
POTASSIUM SERPL-SCNC: 3.7 MMOL/L — SIGNIFICANT CHANGE UP (ref 3.5–5.3)
PROT SERPL-MCNC: 7.2 G/DL — SIGNIFICANT CHANGE UP (ref 6–8.3)
SODIUM SERPL-SCNC: 141 MMOL/L — SIGNIFICANT CHANGE UP (ref 135–145)
T4 FREE SERPL-MCNC: 0.8 NG/DL — LOW (ref 0.9–1.8)
TSH SERPL-MCNC: 2.13 UIU/ML — SIGNIFICANT CHANGE UP (ref 0.27–4.2)

## 2022-04-18 ENCOUNTER — OUTPATIENT (OUTPATIENT)
Dept: OUTPATIENT SERVICES | Facility: HOSPITAL | Age: 67
LOS: 1 days | End: 2022-04-18
Payer: MEDICARE

## 2022-04-18 ENCOUNTER — APPOINTMENT (OUTPATIENT)
Dept: CT IMAGING | Facility: CLINIC | Age: 67
End: 2022-04-18
Payer: MEDICARE

## 2022-04-18 DIAGNOSIS — Z90.49 ACQUIRED ABSENCE OF OTHER SPECIFIED PARTS OF DIGESTIVE TRACT: Chronic | ICD-10-CM

## 2022-04-18 DIAGNOSIS — Z98.890 OTHER SPECIFIED POSTPROCEDURAL STATES: Chronic | ICD-10-CM

## 2022-04-18 DIAGNOSIS — Z00.8 ENCOUNTER FOR OTHER GENERAL EXAMINATION: ICD-10-CM

## 2022-04-18 DIAGNOSIS — Z98.891 HISTORY OF UTERINE SCAR FROM PREVIOUS SURGERY: Chronic | ICD-10-CM

## 2022-04-18 DIAGNOSIS — Z98.82 BREAST IMPLANT STATUS: Chronic | ICD-10-CM

## 2022-04-18 PROCEDURE — 71260 CT THORAX DX C+: CPT

## 2022-04-18 PROCEDURE — 71260 CT THORAX DX C+: CPT | Mod: 26

## 2022-04-19 LAB
CORTICOSTEROID BINDING GLOBULIN RESULT: 2.3 MG/DL — SIGNIFICANT CHANGE UP
CORTIS F/TOTAL MFR SERPL: 4 % — SIGNIFICANT CHANGE UP
CORTIS SERPL-MCNC: 7.4 UG/DL — SIGNIFICANT CHANGE UP
CORTISOL, FREE RESULT: 0.29 UG/DL — SIGNIFICANT CHANGE UP

## 2022-04-25 LAB
CORTICOSTEROID BINDING GLOBULIN RESULT: 2.2 MG/DL — SIGNIFICANT CHANGE UP
CORTIS F/TOTAL MFR SERPL: 2.8 % — SIGNIFICANT CHANGE UP
CORTIS SERPL-MCNC: 2.6 UG/DL — LOW
CORTISOL, FREE RESULT: 0.07 UG/DL — LOW

## 2022-04-26 ENCOUNTER — RESULT REVIEW (OUTPATIENT)
Age: 67
End: 2022-04-26

## 2022-04-26 ENCOUNTER — APPOINTMENT (OUTPATIENT)
Age: 67
End: 2022-04-26

## 2022-04-26 ENCOUNTER — APPOINTMENT (OUTPATIENT)
Dept: HEMATOLOGY ONCOLOGY | Facility: CLINIC | Age: 67
End: 2022-04-26
Payer: MEDICARE

## 2022-04-26 VITALS
WEIGHT: 173 LBS | DIASTOLIC BLOOD PRESSURE: 71 MMHG | HEIGHT: 64 IN | OXYGEN SATURATION: 95 % | SYSTOLIC BLOOD PRESSURE: 116 MMHG | BODY MASS INDEX: 29.53 KG/M2 | HEART RATE: 74 BPM

## 2022-04-26 DIAGNOSIS — F41.9 ANXIETY DISORDER, UNSPECIFIED: ICD-10-CM

## 2022-04-26 LAB
ALBUMIN SERPL ELPH-MCNC: 4.4 G/DL — SIGNIFICANT CHANGE UP (ref 3.3–5)
ALP SERPL-CCNC: 95 U/L — SIGNIFICANT CHANGE UP (ref 40–120)
ALT FLD-CCNC: 32 U/L — SIGNIFICANT CHANGE UP (ref 10–45)
ANION GAP SERPL CALC-SCNC: 13 MMOL/L — SIGNIFICANT CHANGE UP (ref 5–17)
AST SERPL-CCNC: 30 U/L — SIGNIFICANT CHANGE UP (ref 10–40)
BASOPHILS # BLD AUTO: 0.1 K/UL — SIGNIFICANT CHANGE UP (ref 0–0.2)
BASOPHILS NFR BLD AUTO: 1.5 % — SIGNIFICANT CHANGE UP (ref 0–2)
BILIRUB SERPL-MCNC: 0.2 MG/DL — SIGNIFICANT CHANGE UP (ref 0.2–1.2)
BUN SERPL-MCNC: 15 MG/DL — SIGNIFICANT CHANGE UP (ref 7–23)
CALCIUM SERPL-MCNC: 9.3 MG/DL — SIGNIFICANT CHANGE UP (ref 8.4–10.5)
CEA SERPL-MCNC: 19.4 NG/ML — HIGH (ref 0–3.8)
CHLORIDE SERPL-SCNC: 103 MMOL/L — SIGNIFICANT CHANGE UP (ref 96–108)
CO2 SERPL-SCNC: 24 MMOL/L — SIGNIFICANT CHANGE UP (ref 22–31)
CREAT SERPL-MCNC: 0.73 MG/DL — SIGNIFICANT CHANGE UP (ref 0.5–1.3)
EGFR: 90 ML/MIN/1.73M2 — SIGNIFICANT CHANGE UP
EOSINOPHIL # BLD AUTO: 0.1 K/UL — SIGNIFICANT CHANGE UP (ref 0–0.5)
EOSINOPHIL NFR BLD AUTO: 1.6 % — SIGNIFICANT CHANGE UP (ref 0–6)
GLUCOSE SERPL-MCNC: 110 MG/DL — HIGH (ref 70–99)
HCT VFR BLD CALC: 42 % — SIGNIFICANT CHANGE UP (ref 34.5–45)
HGB BLD-MCNC: 13.4 G/DL — SIGNIFICANT CHANGE UP (ref 11.5–15.5)
LYMPHOCYTES # BLD AUTO: 0.8 K/UL — LOW (ref 1–3.3)
LYMPHOCYTES # BLD AUTO: 20.6 % — SIGNIFICANT CHANGE UP (ref 13–44)
MCHC RBC-ENTMCNC: 30.4 PG — SIGNIFICANT CHANGE UP (ref 27–34)
MCHC RBC-ENTMCNC: 31.8 G/DL — LOW (ref 32–36)
MCV RBC AUTO: 95.6 FL — SIGNIFICANT CHANGE UP (ref 80–100)
MONOCYTES # BLD AUTO: 0.3 K/UL — SIGNIFICANT CHANGE UP (ref 0–0.9)
MONOCYTES NFR BLD AUTO: 9 % — SIGNIFICANT CHANGE UP (ref 2–14)
NEUTROPHILS # BLD AUTO: 2.6 K/UL — SIGNIFICANT CHANGE UP (ref 1.8–7.4)
NEUTROPHILS NFR BLD AUTO: 67.4 % — SIGNIFICANT CHANGE UP (ref 43–77)
PLATELET # BLD AUTO: 309 K/UL — SIGNIFICANT CHANGE UP (ref 150–400)
POTASSIUM SERPL-MCNC: 4.1 MMOL/L — SIGNIFICANT CHANGE UP (ref 3.5–5.3)
POTASSIUM SERPL-SCNC: 4.1 MMOL/L — SIGNIFICANT CHANGE UP (ref 3.5–5.3)
PROT SERPL-MCNC: 7.2 G/DL — SIGNIFICANT CHANGE UP (ref 6–8.3)
RBC # BLD: 4.4 M/UL — SIGNIFICANT CHANGE UP (ref 3.8–5.2)
RBC # FLD: 13.9 % — SIGNIFICANT CHANGE UP (ref 10.3–14.5)
SODIUM SERPL-SCNC: 140 MMOL/L — SIGNIFICANT CHANGE UP (ref 135–145)
T4 FREE SERPL-MCNC: 0.7 NG/DL — LOW (ref 0.9–1.8)
TSH SERPL-MCNC: 5.07 UIU/ML — HIGH (ref 0.27–4.2)
WBC # BLD: 3.9 K/UL — SIGNIFICANT CHANGE UP (ref 3.8–10.5)
WBC # FLD AUTO: 3.9 K/UL — SIGNIFICANT CHANGE UP (ref 3.8–10.5)

## 2022-04-26 PROCEDURE — 99215 OFFICE O/P EST HI 40 MIN: CPT

## 2022-04-26 NOTE — ADDENDUM
[FreeTextEntry1] : Documented by Harry Limon acting as scribe for Dr. Simpson on 04/26/2022. \par \par All Medical record entries made by the Scribe were at my, Dr. Simpson's, direction and personally dictated by me on 04/26/2022. I have reviewed the chart and agree that the record accurately reflects my personal performance of the history, physical exam, assessment and plan. I have also personally directed, reviewed, and agreed with the discharge instructions.

## 2022-04-26 NOTE — HISTORY OF PRESENT ILLNESS
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 Recent imaging has shown growth and development of a solid structure. The etiology remains unclear, but malignancy has been considered. She feels well has no complaints. She denies fever, chills, night sweats, weight loss, or weight gain. \par \par Followed by Dr Galeana in the past\par PPD+ in 2000 and received 6 months of INH\par PET positive RENAN posterior cavitary density in 2010\par TTNA and Bronch non-diagnostic. Cultures negative for fungus and TB\par CT at [facility] in 2010 and 2012 stable - felt to be benign. Lost to FU\par \par Patient had a PEt scan in Oct 2020 with a Left upper lobe spiculated lung mass 4.5 x 3cm with an SUV 11.1 with adjacent tree in bud opacity \par In addition Rt upper lobe opacity 1.2 x1 cm which is not FDG avid \par Discrete Left hilar/ infrahilar region  lesion with an SUV 5.2 which is possibly corresponding to a LN \par \par \par Patient s/p Left lung mass, core biopsy with pathology c/w Adenocarcinoma c/w pulmonary origin (see comment)\par CK7, TTF-1 : Positive CK20, PAX-8 : Negative\par  [de-identified] : SON: JUAN RAMON [phone] [de-identified] : Patient presents for follow up on C17 Durvalumab for adenocarcinoma of the RENAN. She completed concurrent CRT with carbo/ taxol on 6/21/21. \par Start of Durvalumab delayed by RT pneumonitis and subsequent need for prednisone now s/p taper, started on 15mg/day decreased to 10mg on Sunday 9/5, then to 5 mg on Sunday 9/13 and completed on 9/20. \par \par Patient had significant SOB when seen for follow up visit on 7/28/21, her O2 sat was 94%, decreased to 82% on ambulation, likely related to RT pneumonitis. She was later admitted to [facility] from 8/1 - 8/6 due to low O2 saturation and SOB despite home O2. She was put on IV abx and continued on prednisone taper. Followed by Pulmonary, Dr. Tho Zayas.  Prednisone taper completed on 09/20/21\par \par Overall feeling well. Tolerating treatment. \par SOB, Following up Pulmonology \par Denies headaches/dizziness\par Show me a log of BP with SPB ~135 intermittently at home. Family very concerned. In the office BP WNL \par going to see  Cardiologist, Derrick Garcia\par Planning on travel to [facility] for 2 weeks  06/17/22 - 7/03/22\par \par 4/18/22 CT Chest: Stable post radiation fibrosis in the left lung. Near-total resolution of groundglass densities in the right lung. No new groundglass. Stable right upper lobe nodule increased in size since 3/7/2018 although previously not FDG avid. No new lung nodule. Stable since 1/2021 however minimally grown since March 2018 (1 x 0.8 cm). \par \par 3/15/22 CEA 14.5\par 1/11/22 CEA 12.3\par 12/28/21 CEA 10.7\par 12/14/21 CEA 11\par 11/30/21 CEA 11.5\par \par

## 2022-04-26 NOTE — ASSESSMENT
[FreeTextEntry1] : SHANON BEE is a female never smoker who was 66 yo at diagnosis,  of adenocarcinoma lung \par Patient with no significant past medical history\par PET in 10/2020 with a RENAN spiculated lung mass 4.5 x 3cm with an SUV 11.1 with adjacent tree in bud opacity. Left lung, biopsy on 11/23/20: Adenocarcinoma\par \par -MRI Brain 12/29/20: negative \par -CT C/A/P 12/29/20: stable RENAN primary lesion, (4.7 x 2.3cm), 1.2 cm left lung apex lesion along with RUL stable lesion of 1.3 cm \par -PDL1 20% \par -Foundation one : ALK ELM 4 fusion variant, MS stable TMB 4 mut/ Mb, SF 3B1 \par \par Biopsy done of RUL: not malignant/ reactive \par -Initial plan for resection per Dr Choudhury, however EBUS prior to surgery with N2 disease. Patient was presented at [facility] on 4/14/21 and a plan for definitive CRT \par -s/p chemoRT with Carbo and taxol weekly with RT, completed 6/21/21\par - She had a rough course with Rt mediated Pneumonitis/ superimposed PNA  in July/ Aug 2021 requiring hospitalization\par -Currently on Immunotherapy with Durvalumab for 1 year Started on 9/7/21 Start delayed due to requiring Prednisone for RT Pneumonitis \par -Patient is a Jehovah Witness. She had a h/o diverticulosis, need to monitor with IO \par   \par CT chest no contrast 1/7/22\par IMPRESSION:Since 2 months prior:\par -Evolving radiotherapy changes in the left upper and lower lobes, with slightly decreased\par consolidative component in the left upper lobe, at site of treated neoplasm.\par -Decreased prior right lower lobe abnormality, however multiple new peripheral areas of\par ground glass in the right upper and right lower lobes. The migratory pattern suggests organizing\par pneumonia, possibly drug-induced or radiation-induced. Acute infection, including COVID, could also\par account for the new findings.\par -Stable right apex 1.4 cm nodule.\par \par CT Chest 4/18/22: Stable since 1/2021 however minimally grown since March 2018 (1 x 0.8 cm). \par \par Patient planning on traveling to [facility] from 6/17/22 to 7/03/22. Discussed about receiving dose dense of 15 mg flat dose Durvalumab however patient is nervous about side effects and given that she is very sensitive, will skip one treatment and continue treatment when she returns. \par \par Scans stable, Right upper lobe nodule s/p biopsy c/w Reactive \par - continue to monitor \par - Continue with Durvalumab\par - COmplaining about some elevataed BP intermittently ~ 135 sbp at home. However in office patient is usually < 120. F/u with cardiology Would question of element of anxiety \par - F/u with Juju in 4 weeks

## 2022-05-04 ENCOUNTER — OUTPATIENT (OUTPATIENT)
Dept: OUTPATIENT SERVICES | Facility: HOSPITAL | Age: 67
LOS: 1 days | Discharge: HOME | End: 2022-05-04

## 2022-05-04 DIAGNOSIS — C34.90 MALIGNANT NEOPLASM OF UNSPECIFIED PART OF UNSPECIFIED BRONCHUS OR LUNG: ICD-10-CM

## 2022-05-04 DIAGNOSIS — Z98.891 HISTORY OF UTERINE SCAR FROM PREVIOUS SURGERY: Chronic | ICD-10-CM

## 2022-05-04 DIAGNOSIS — Z90.49 ACQUIRED ABSENCE OF OTHER SPECIFIED PARTS OF DIGESTIVE TRACT: Chronic | ICD-10-CM

## 2022-05-04 DIAGNOSIS — Z98.890 OTHER SPECIFIED POSTPROCEDURAL STATES: Chronic | ICD-10-CM

## 2022-05-04 DIAGNOSIS — Z98.82 BREAST IMPLANT STATUS: Chronic | ICD-10-CM

## 2022-05-07 LAB
CORTICOSTEROID BINDING GLOBULIN RESULT: 1.9 MG/DL — SIGNIFICANT CHANGE UP
CORTIS F/TOTAL MFR SERPL: 4 % — SIGNIFICANT CHANGE UP
CORTIS SERPL-MCNC: 4.6 UG/DL — SIGNIFICANT CHANGE UP
CORTISOL, FREE RESULT: 0.18 UG/DL — LOW

## 2022-05-10 ENCOUNTER — RESULT REVIEW (OUTPATIENT)
Age: 67
End: 2022-05-10

## 2022-05-10 ENCOUNTER — APPOINTMENT (OUTPATIENT)
Dept: PULMONOLOGY | Facility: CLINIC | Age: 67
End: 2022-05-10
Payer: MEDICARE

## 2022-05-10 ENCOUNTER — APPOINTMENT (OUTPATIENT)
Age: 67
End: 2022-05-10

## 2022-05-10 VITALS
OXYGEN SATURATION: 96 % | SYSTOLIC BLOOD PRESSURE: 120 MMHG | DIASTOLIC BLOOD PRESSURE: 80 MMHG | RESPIRATION RATE: 16 BRPM | HEART RATE: 70 BPM

## 2022-05-10 DIAGNOSIS — Z99.81 HYPOXEMIA: ICD-10-CM

## 2022-05-10 DIAGNOSIS — R09.02 HYPOXEMIA: ICD-10-CM

## 2022-05-10 LAB
ALBUMIN SERPL ELPH-MCNC: 4.4 G/DL — SIGNIFICANT CHANGE UP (ref 3.3–5)
ALP SERPL-CCNC: 97 U/L — SIGNIFICANT CHANGE UP (ref 40–120)
ALT FLD-CCNC: 28 U/L — SIGNIFICANT CHANGE UP (ref 10–45)
ANION GAP SERPL CALC-SCNC: 15 MMOL/L — SIGNIFICANT CHANGE UP (ref 5–17)
AST SERPL-CCNC: 26 U/L — SIGNIFICANT CHANGE UP (ref 10–40)
BASOPHILS # BLD AUTO: 0.1 K/UL — SIGNIFICANT CHANGE UP (ref 0–0.2)
BASOPHILS NFR BLD AUTO: 2 % — SIGNIFICANT CHANGE UP (ref 0–2)
BILIRUB SERPL-MCNC: 0.3 MG/DL — SIGNIFICANT CHANGE UP (ref 0.2–1.2)
BUN SERPL-MCNC: 17 MG/DL — SIGNIFICANT CHANGE UP (ref 7–23)
CALCIUM SERPL-MCNC: 9.7 MG/DL — SIGNIFICANT CHANGE UP (ref 8.4–10.5)
CEA SERPL-MCNC: 20.4 NG/ML — HIGH (ref 0–3.8)
CHLORIDE SERPL-SCNC: 103 MMOL/L — SIGNIFICANT CHANGE UP (ref 96–108)
CO2 SERPL-SCNC: 22 MMOL/L — SIGNIFICANT CHANGE UP (ref 22–31)
CREAT SERPL-MCNC: 0.77 MG/DL — SIGNIFICANT CHANGE UP (ref 0.5–1.3)
EGFR: 84 ML/MIN/1.73M2 — SIGNIFICANT CHANGE UP
EOSINOPHIL # BLD AUTO: 0.1 K/UL — SIGNIFICANT CHANGE UP (ref 0–0.5)
EOSINOPHIL NFR BLD AUTO: 2.8 % — SIGNIFICANT CHANGE UP (ref 0–6)
GLUCOSE SERPL-MCNC: 163 MG/DL — HIGH (ref 70–99)
HCT VFR BLD CALC: 39.5 % — SIGNIFICANT CHANGE UP (ref 34.5–45)
HGB BLD-MCNC: 12.8 G/DL — SIGNIFICANT CHANGE UP (ref 11.5–15.5)
LYMPHOCYTES # BLD AUTO: 0.7 K/UL — LOW (ref 1–3.3)
LYMPHOCYTES # BLD AUTO: 19.7 % — SIGNIFICANT CHANGE UP (ref 13–44)
MCHC RBC-ENTMCNC: 30.9 PG — SIGNIFICANT CHANGE UP (ref 27–34)
MCHC RBC-ENTMCNC: 32.4 G/DL — SIGNIFICANT CHANGE UP (ref 32–36)
MCV RBC AUTO: 95.1 FL — SIGNIFICANT CHANGE UP (ref 80–100)
MONOCYTES # BLD AUTO: 0.2 K/UL — SIGNIFICANT CHANGE UP (ref 0–0.9)
MONOCYTES NFR BLD AUTO: 6 % — SIGNIFICANT CHANGE UP (ref 2–14)
NEUTROPHILS # BLD AUTO: 2.4 K/UL — SIGNIFICANT CHANGE UP (ref 1.8–7.4)
NEUTROPHILS NFR BLD AUTO: 69.5 % — SIGNIFICANT CHANGE UP (ref 43–77)
PLATELET # BLD AUTO: 286 K/UL — SIGNIFICANT CHANGE UP (ref 150–400)
POTASSIUM SERPL-MCNC: 4.2 MMOL/L — SIGNIFICANT CHANGE UP (ref 3.5–5.3)
POTASSIUM SERPL-SCNC: 4.2 MMOL/L — SIGNIFICANT CHANGE UP (ref 3.5–5.3)
PROT SERPL-MCNC: 7.4 G/DL — SIGNIFICANT CHANGE UP (ref 6–8.3)
RBC # BLD: 4.15 M/UL — SIGNIFICANT CHANGE UP (ref 3.8–5.2)
RBC # FLD: 13.8 % — SIGNIFICANT CHANGE UP (ref 10.3–14.5)
SODIUM SERPL-SCNC: 140 MMOL/L — SIGNIFICANT CHANGE UP (ref 135–145)
T4 FREE SERPL-MCNC: 0.7 NG/DL — LOW (ref 0.9–1.8)
TSH SERPL-MCNC: 5.79 UIU/ML — HIGH (ref 0.27–4.2)
WBC # BLD: 3.5 K/UL — LOW (ref 3.8–10.5)
WBC # FLD AUTO: 3.5 K/UL — LOW (ref 3.8–10.5)

## 2022-05-10 PROCEDURE — 94010 BREATHING CAPACITY TEST: CPT | Mod: 59

## 2022-05-10 PROCEDURE — 94617 EXERCISE TST BRNCSPSM W/ECG: CPT

## 2022-05-10 PROCEDURE — 99215 OFFICE O/P EST HI 40 MIN: CPT | Mod: 25

## 2022-05-10 RX ORDER — FLUTICASONE FUROATE, UMECLIDINIUM BROMIDE AND VILANTEROL TRIFENATATE 200; 62.5; 25 UG/1; UG/1; UG/1
200-62.5-25 POWDER RESPIRATORY (INHALATION) DAILY
Qty: 1 | Refills: 5 | Status: DISCONTINUED | COMMUNITY
Start: 2022-01-18 | End: 2022-05-10

## 2022-05-10 NOTE — CONSULT LETTER
[Dear  ___] : Dear  [unfilled], [Consult Letter:] : I had the pleasure of evaluating your patient, [unfilled]. [Please see my note below.] : Please see my note below. [Sincerely,] : Sincerely, [DrDavid  ___] : Dr. CADET [FreeTextEntry3] : Tho Zayas MD FCCP\par Pulmonary/Critical Care/Sleep Medicine\par Department of Internal Medicine\par \par [employer] [facility] [employer]\par

## 2022-05-10 NOTE — HISTORY OF PRESENT ILLNESS
[Former] : former [>= 20 pack years] : >= 20 pack years [TextBox_4] : 67-year-old female with a history of an adenocarcinoma left upper lobe status post radiation and chemotherapy.  Course complicated by a radiation pneumonitis versus drug-induced pneumonitis secondary to durvalumab.  Patient seen today for routine follow-up following CAT scan. \par \par Patient has been doing well without complaints of increased cough wheeze or sputum production.  She has been monitoring her own oxygen levels and has not found to have any significant decreases below the low 90s.  She is compliant with Breo as well as short acting bronchodilator but rarely uses the latter.\par \par Patient is traveling to [facility] in mid-to-late June and will restart durvalumab on her return. [YearQuit] : 2021

## 2022-05-10 NOTE — PROCEDURE
[Spirometry] : stable [___%] : last visit [unfilled]U% [FreeTextEntry1] : No desaturation noted with ambulation

## 2022-05-10 NOTE — REASON FOR VISIT
[Follow-Up] : a follow-up visit [Lung Cancer] : lung cancer [Pre-op Risk Stratification] : pre-op risk stratification [Ad Hoc ] : provided by an ad hoc  [TextBox_44] : Drug induced ILD vs Radiation pneumonitis [Interpreters_FullName] : Lashanda [Interpreters_Relationshiptopatient] : daughter [TWNoteComboBox1] : Tristanian

## 2022-05-10 NOTE — END OF VISIT
You can access the [facility], offered by [facility], by registering with the following website: [email or web address] [Time Spent: ___ minutes] : I have spent [unfilled] minutes of time on the encounter. [FreeTextEntry3] : CT reviewed on PACS with patient and daughter

## 2022-05-10 NOTE — DISCUSSION/SUMMARY
[FreeTextEntry1] : 67-year-old female with a history of adenocarcinoma of the left lung seen today in follow-up.  Previously seen infiltrates have resolved with residual changes most likely on the basis of residual tumor and radiation fibrosis.  Hypoxemia resolved.  Lung function now normal

## 2022-05-11 DIAGNOSIS — Z51.11 ENCOUNTER FOR ANTINEOPLASTIC CHEMOTHERAPY: ICD-10-CM

## 2022-05-23 LAB
CORTICOSTEROID BINDING GLOBULIN RESULT: 2.1 MG/DL — SIGNIFICANT CHANGE UP
CORTIS F/TOTAL MFR SERPL: 3.9 % — SIGNIFICANT CHANGE UP
CORTIS SERPL-MCNC: 5.7 UG/DL — SIGNIFICANT CHANGE UP
CORTISOL, FREE RESULT: 0.22 UG/DL — SIGNIFICANT CHANGE UP

## 2022-05-24 ENCOUNTER — RESULT REVIEW (OUTPATIENT)
Age: 67
End: 2022-05-24

## 2022-05-24 ENCOUNTER — APPOINTMENT (OUTPATIENT)
Dept: HEMATOLOGY ONCOLOGY | Facility: CLINIC | Age: 67
End: 2022-05-24
Payer: MEDICARE

## 2022-05-24 ENCOUNTER — APPOINTMENT (OUTPATIENT)
Age: 67
End: 2022-05-24

## 2022-05-24 DIAGNOSIS — E03.2 HYPOTHYROIDISM DUE TO MEDICAMENTS AND OTHER EXOGENOUS SUBSTANCES: ICD-10-CM

## 2022-05-24 LAB
ALBUMIN SERPL ELPH-MCNC: 4.3 G/DL — SIGNIFICANT CHANGE UP (ref 3.3–5)
ALP SERPL-CCNC: 92 U/L — SIGNIFICANT CHANGE UP (ref 40–120)
ALT FLD-CCNC: 20 U/L — SIGNIFICANT CHANGE UP (ref 10–45)
ANION GAP SERPL CALC-SCNC: 12 MMOL/L — SIGNIFICANT CHANGE UP (ref 5–17)
AST SERPL-CCNC: 27 U/L — SIGNIFICANT CHANGE UP (ref 10–40)
BASOPHILS # BLD AUTO: 0.1 K/UL — SIGNIFICANT CHANGE UP (ref 0–0.2)
BASOPHILS NFR BLD AUTO: 2.2 % — HIGH (ref 0–2)
BILIRUB SERPL-MCNC: 0.2 MG/DL — SIGNIFICANT CHANGE UP (ref 0.2–1.2)
BUN SERPL-MCNC: 10 MG/DL — SIGNIFICANT CHANGE UP (ref 7–23)
CALCIUM SERPL-MCNC: 9.4 MG/DL — SIGNIFICANT CHANGE UP (ref 8.4–10.5)
CEA SERPL-MCNC: 22.3 NG/ML — HIGH (ref 0–3.8)
CHLORIDE SERPL-SCNC: 107 MMOL/L — SIGNIFICANT CHANGE UP (ref 96–108)
CO2 SERPL-SCNC: 24 MMOL/L — SIGNIFICANT CHANGE UP (ref 22–31)
CREAT SERPL-MCNC: 0.7 MG/DL — SIGNIFICANT CHANGE UP (ref 0.5–1.3)
EGFR: 95 ML/MIN/1.73M2 — SIGNIFICANT CHANGE UP
EOSINOPHIL # BLD AUTO: 0.1 K/UL — SIGNIFICANT CHANGE UP (ref 0–0.5)
EOSINOPHIL NFR BLD AUTO: 3 % — SIGNIFICANT CHANGE UP (ref 0–6)
GLUCOSE SERPL-MCNC: 111 MG/DL — HIGH (ref 70–99)
HCT VFR BLD CALC: 37.2 % — SIGNIFICANT CHANGE UP (ref 34.5–45)
HGB BLD-MCNC: 12.5 G/DL — SIGNIFICANT CHANGE UP (ref 11.5–15.5)
LYMPHOCYTES # BLD AUTO: 0.8 K/UL — LOW (ref 1–3.3)
LYMPHOCYTES # BLD AUTO: 21.6 % — SIGNIFICANT CHANGE UP (ref 13–44)
MCHC RBC-ENTMCNC: 31.4 PG — SIGNIFICANT CHANGE UP (ref 27–34)
MCHC RBC-ENTMCNC: 33.6 G/DL — SIGNIFICANT CHANGE UP (ref 32–36)
MCV RBC AUTO: 93.5 FL — SIGNIFICANT CHANGE UP (ref 80–100)
MONOCYTES # BLD AUTO: 0.3 K/UL — SIGNIFICANT CHANGE UP (ref 0–0.9)
MONOCYTES NFR BLD AUTO: 7.7 % — SIGNIFICANT CHANGE UP (ref 2–14)
NEUTROPHILS # BLD AUTO: 2.5 K/UL — SIGNIFICANT CHANGE UP (ref 1.8–7.4)
NEUTROPHILS NFR BLD AUTO: 65.5 % — SIGNIFICANT CHANGE UP (ref 43–77)
PLATELET # BLD AUTO: 280 K/UL — SIGNIFICANT CHANGE UP (ref 150–400)
POTASSIUM SERPL-MCNC: 4 MMOL/L — SIGNIFICANT CHANGE UP (ref 3.5–5.3)
POTASSIUM SERPL-SCNC: 4 MMOL/L — SIGNIFICANT CHANGE UP (ref 3.5–5.3)
PROT SERPL-MCNC: 7.2 G/DL — SIGNIFICANT CHANGE UP (ref 6–8.3)
RBC # BLD: 3.97 M/UL — SIGNIFICANT CHANGE UP (ref 3.8–5.2)
RBC # FLD: 12.8 % — SIGNIFICANT CHANGE UP (ref 10.3–14.5)
SODIUM SERPL-SCNC: 142 MMOL/L — SIGNIFICANT CHANGE UP (ref 135–145)
T4 FREE SERPL-MCNC: 0.8 NG/DL — LOW (ref 0.9–1.8)
TSH SERPL-MCNC: 5.27 UIU/ML — HIGH (ref 0.27–4.2)
WBC # BLD: 3.8 K/UL — SIGNIFICANT CHANGE UP (ref 3.8–10.5)
WBC # FLD AUTO: 3.8 K/UL — SIGNIFICANT CHANGE UP (ref 3.8–10.5)

## 2022-05-24 PROCEDURE — 99214 OFFICE O/P EST MOD 30 MIN: CPT

## 2022-05-26 PROBLEM — E03.2 HYPOTHYROIDISM DUE TO MEDICATION: Status: ACTIVE | Noted: 2022-05-19

## 2022-05-26 NOTE — ASSESSMENT
[FreeTextEntry1] : SHANON BEE is a female never smoker who was 68 yo at diagnosis,  of adenocarcinoma lung \par Patient with no significant past medical history\par PET in 10/2020 with a RENAN spiculated lung mass 4.5 x 3cm with an SUV 11.1 with adjacent tree in bud opacity. Left lung, biopsy on 11/23/20: Adenocarcinoma\par \par -MRI Brain 12/29/20: negative \par -CT C/A/P 12/29/20: stable RENAN primary lesion, (4.7 x 2.3cm), 1.2 cm left lung apex lesion along with RUL stable lesion of 1.3 cm \par -PDL1 20% \par -Foundation one : ALK ELM 4 fusion variant, MS stable TMB 4 mut/ Mb, SF 3B1 \par \par Biopsy done of RUL: not malignant/ reactive \par -Initial plan for resection per Dr Choudhury, however EBUS prior to surgery with N2 disease. Patient was presented at [facility] board on 4/14/21 and a plan for definitive CRT \par -s/p chemoRT with Carbo and taxol weekly with RT, completed 6/21/21\par - She had a rough course with Rt mediated Pneumonitis/ superimposed PNA  in July/ Aug 2021 requiring hospitalization\par -Currently on Immunotherapy with Durvalumab for 1 year Started on 9/7/21 Start delayed due to requiring Prednisone for RT Pneumonitis \par -Patient is a Jehovah Witness. She had a h/o diverticulosis, need to monitor with IO \par   \par CT chest no contrast 1/7/22\par IMPRESSION:Since 2 months prior:\par -Evolving radiotherapy changes in the left upper and lower lobes, with slightly decreased\par consolidative component in the left upper lobe, at site of treated neoplasm.\par -Decreased prior right lower lobe abnormality, however multiple new peripheral areas of\par ground glass in the right upper and right lower lobes. The migratory pattern suggests organizing\par pneumonia, possibly drug-induced or radiation-induced. Acute infection, including COVID, could also\par account for the new findings.\par -Stable right apex 1.4 cm nodule.\par \par CT Chest 4/18/22: Stable since 1/2021 however minimally grown since March 2018 (1 x 0.8 cm). \par \par Patient planning on traveling to [facility] from 6/17/22 to 7/03/22. Discussed about receiving dose dense of 15 mg flat dose Durvalumab however patient is nervous about side effects and given that she is very sensitive, will skip one treatment and continue treatment when she returns. \par \par Scans stable, Right upper lobe nodule s/p biopsy c/w Reactive \par - continue to monitor \par - Continue with Durvalumab\par -TSH 5.79, T4 0.7- started levothyroxine 25 mcg. patient follow Endocrinology- Dr. Arias John\par - F/u after trip to [facility]

## 2022-05-26 NOTE — HISTORY OF PRESENT ILLNESS
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 Recent imaging has shown growth and development of a solid structure. The etiology remains unclear, but malignancy has been considered. She feels well has no complaints. She denies fever, chills, night sweats, weight loss, or weight gain. \par \par Followed by Dr Galeana in the past\par PPD+ in 2000 and received 6 months of INH\par PET positive RENAN posterior cavitary density in 2010\par TTNA and Bronch non-diagnostic. Cultures negative for fungus and TB\par CT at [facility] in 2010 and 2012 stable - felt to be benign. Lost to FU\par \par Patient had a PEt scan in Oct 2020 with a Left upper lobe spiculated lung mass 4.5 x 3cm with an SUV 11.1 with adjacent tree in bud opacity \par In addition Rt upper lobe opacity 1.2 x1 cm which is not FDG avid \par Discrete Left hilar/ infrahilar region  lesion with an SUV 5.2 which is possibly corresponding to a LN \par \par \par Patient s/p Left lung mass, core biopsy with pathology c/w Adenocarcinoma c/w pulmonary origin (see comment)\par CK7, TTF-1 : Positive CK20, PAX-8 : Negative\par  [de-identified] : SON: JUAN RAMON [phone] [de-identified] : Patient presents for follow up on C19 Durvalumab for adenocarcinoma of the RENAN. She completed concurrent CRT with carbo/ taxol on 6/21/21. \par Start of Durvalumab delayed by RT pneumonitis and subsequent need for prednisone now s/p taper, started on 15 mg/day decreased to 10mg on Sunday 9/5, then to 5 mg on Sunday 9/13 and completed on 9/20. \par \par Patient had significant SOB when seen for follow up visit on 7/28/21, her O2 sat was 94%, decreased to 82% on ambulation, likely related to RT pneumonitis. She was later admitted to [facility] from 8/1 - 8/6 due to low O2 saturation and SOB despite home O2. She was put on IV abx and continued on prednisone taper. Followed by Pulmonary, Dr. Tho Zayas.  Prednisone taper completed on 09/20/21\par \par Overall feeling well. Tolerating treatment. \par SOB, Following up Pulmonology \par Denies headaches/dizziness\par Planning on travel to [facility] for 2 weeks  06/17/22 - 7/03/22\par \par 4/18/22 CT Chest: Stable post radiation fibrosis in the left lung. Near-total resolution of ground glass densities in the right lung. No new ground glass. Stable right upper lobe nodule increased in size since 3/7/2018 although previously not FDG avid. No new lung nodule. Stable since 1/2021 however minimally grown since March 2018 (1 x 0.8 cm). \par \par 3/15/22 CEA 14.5\par 1/11/22 CEA 12.3\par 12/28/21 CEA 10.7\par 12/14/21 CEA 11\par 11/30/21 CEA 11.5\par \par

## 2022-06-01 ENCOUNTER — OUTPATIENT (OUTPATIENT)
Dept: OUTPATIENT SERVICES | Facility: HOSPITAL | Age: 67
LOS: 1 days | Discharge: HOME | End: 2022-06-01

## 2022-06-01 DIAGNOSIS — Z90.49 ACQUIRED ABSENCE OF OTHER SPECIFIED PARTS OF DIGESTIVE TRACT: Chronic | ICD-10-CM

## 2022-06-01 DIAGNOSIS — C34.90 MALIGNANT NEOPLASM OF UNSPECIFIED PART OF UNSPECIFIED BRONCHUS OR LUNG: ICD-10-CM

## 2022-06-01 DIAGNOSIS — Z98.890 OTHER SPECIFIED POSTPROCEDURAL STATES: Chronic | ICD-10-CM

## 2022-06-01 DIAGNOSIS — Z98.82 BREAST IMPLANT STATUS: Chronic | ICD-10-CM

## 2022-06-01 DIAGNOSIS — Z98.891 HISTORY OF UTERINE SCAR FROM PREVIOUS SURGERY: Chronic | ICD-10-CM

## 2022-06-01 LAB
CORTICOSTEROID BINDING GLOBULIN RESULT: 1.9 MG/DL — SIGNIFICANT CHANGE UP
CORTIS F/TOTAL MFR SERPL: 4.7 % — SIGNIFICANT CHANGE UP
CORTIS SERPL-MCNC: 6.1 UG/DL — SIGNIFICANT CHANGE UP
CORTISOL, FREE RESULT: 0.29 UG/DL — SIGNIFICANT CHANGE UP

## 2022-06-07 ENCOUNTER — RESULT REVIEW (OUTPATIENT)
Age: 67
End: 2022-06-07

## 2022-06-07 ENCOUNTER — APPOINTMENT (OUTPATIENT)
Age: 67
End: 2022-06-07

## 2022-06-07 LAB
BASOPHILS # BLD AUTO: 0.1 K/UL — SIGNIFICANT CHANGE UP (ref 0–0.2)
BASOPHILS NFR BLD AUTO: 1 % — SIGNIFICANT CHANGE UP (ref 0–2)
EOSINOPHIL # BLD AUTO: 0.1 K/UL — SIGNIFICANT CHANGE UP (ref 0–0.5)
EOSINOPHIL NFR BLD AUTO: 2.2 % — SIGNIFICANT CHANGE UP (ref 0–6)
HCT VFR BLD CALC: 36.6 % — SIGNIFICANT CHANGE UP (ref 34.5–45)
HGB BLD-MCNC: 12.2 G/DL — SIGNIFICANT CHANGE UP (ref 11.5–15.5)
LYMPHOCYTES # BLD AUTO: 0.9 K/UL — LOW (ref 1–3.3)
LYMPHOCYTES # BLD AUTO: 17.9 % — SIGNIFICANT CHANGE UP (ref 13–44)
MCHC RBC-ENTMCNC: 31.2 PG — SIGNIFICANT CHANGE UP (ref 27–34)
MCHC RBC-ENTMCNC: 33.3 G/DL — SIGNIFICANT CHANGE UP (ref 32–36)
MCV RBC AUTO: 93.8 FL — SIGNIFICANT CHANGE UP (ref 80–100)
MONOCYTES # BLD AUTO: 0.4 K/UL — SIGNIFICANT CHANGE UP (ref 0–0.9)
MONOCYTES NFR BLD AUTO: 8.7 % — SIGNIFICANT CHANGE UP (ref 2–14)
NEUTROPHILS # BLD AUTO: 3.6 K/UL — SIGNIFICANT CHANGE UP (ref 1.8–7.4)
NEUTROPHILS NFR BLD AUTO: 70.3 % — SIGNIFICANT CHANGE UP (ref 43–77)
PLATELET # BLD AUTO: 275 K/UL — SIGNIFICANT CHANGE UP (ref 150–400)
RBC # BLD: 3.9 M/UL — SIGNIFICANT CHANGE UP (ref 3.8–5.2)
RBC # FLD: 13 % — SIGNIFICANT CHANGE UP (ref 10.3–14.5)
WBC # BLD: 5.2 K/UL — SIGNIFICANT CHANGE UP (ref 3.8–10.5)
WBC # FLD AUTO: 5.2 K/UL — SIGNIFICANT CHANGE UP (ref 3.8–10.5)

## 2022-06-08 DIAGNOSIS — Z51.11 ENCOUNTER FOR ANTINEOPLASTIC CHEMOTHERAPY: ICD-10-CM

## 2022-06-08 LAB
ALBUMIN SERPL ELPH-MCNC: 3.9 G/DL — SIGNIFICANT CHANGE UP (ref 3.3–5)
ALP SERPL-CCNC: 86 U/L — SIGNIFICANT CHANGE UP (ref 40–120)
ALT FLD-CCNC: 29 U/L — SIGNIFICANT CHANGE UP (ref 10–45)
ANION GAP SERPL CALC-SCNC: 12 MMOL/L — SIGNIFICANT CHANGE UP (ref 5–17)
AST SERPL-CCNC: 39 U/L — SIGNIFICANT CHANGE UP (ref 10–40)
BILIRUB SERPL-MCNC: 0.2 MG/DL — SIGNIFICANT CHANGE UP (ref 0.2–1.2)
BUN SERPL-MCNC: 9 MG/DL — SIGNIFICANT CHANGE UP (ref 7–23)
CALCIUM SERPL-MCNC: 9.2 MG/DL — SIGNIFICANT CHANGE UP (ref 8.4–10.5)
CEA SERPL-MCNC: 24.8 NG/ML — HIGH (ref 0–3.8)
CHLORIDE SERPL-SCNC: 105 MMOL/L — SIGNIFICANT CHANGE UP (ref 96–108)
CO2 SERPL-SCNC: 22 MMOL/L — SIGNIFICANT CHANGE UP (ref 22–31)
CREAT SERPL-MCNC: 0.69 MG/DL — SIGNIFICANT CHANGE UP (ref 0.5–1.3)
EGFR: 95 ML/MIN/1.73M2 — SIGNIFICANT CHANGE UP
GLUCOSE SERPL-MCNC: 106 MG/DL — HIGH (ref 70–99)
POTASSIUM SERPL-MCNC: 4.4 MMOL/L — SIGNIFICANT CHANGE UP (ref 3.5–5.3)
POTASSIUM SERPL-SCNC: 4.4 MMOL/L — SIGNIFICANT CHANGE UP (ref 3.5–5.3)
PROT SERPL-MCNC: 6.9 G/DL — SIGNIFICANT CHANGE UP (ref 6–8.3)
SODIUM SERPL-SCNC: 139 MMOL/L — SIGNIFICANT CHANGE UP (ref 135–145)
T4 FREE SERPL-MCNC: 0.8 NG/DL — LOW (ref 0.9–1.8)
TSH SERPL-MCNC: 3.29 UIU/ML — SIGNIFICANT CHANGE UP (ref 0.27–4.2)

## 2022-06-14 LAB
CORTICOSTEROID BINDING GLOBULIN RESULT: 2 MG/DL — SIGNIFICANT CHANGE UP
CORTIS F/TOTAL MFR SERPL: 4.4 % — SIGNIFICANT CHANGE UP
CORTIS SERPL-MCNC: 6.2 UG/DL — SIGNIFICANT CHANGE UP
CORTISOL, FREE RESULT: 0.27 UG/DL — SIGNIFICANT CHANGE UP

## 2022-06-21 ENCOUNTER — APPOINTMENT (OUTPATIENT)
Age: 67
End: 2022-06-21

## 2022-06-22 NOTE — ASU PATIENT PROFILE, ADULT - HEALTHCARE QUESTIONS, PROFILE
ACTINIC KERATOSIS    Assessment and Plan:  Based on a thorough discussion of this condition and the management approach to it (including a comprehensive discussion of the known risks, side effects and potential benefits of treatment), the patient (family) agrees to implement the following specific plan:    Cryotherapy done in office today x 5 lesions on scalp   5 Fluorouracil injection done in office today x 4 lesions    Follow up in 6 weeks None

## 2022-07-05 ENCOUNTER — RESULT REVIEW (OUTPATIENT)
Age: 67
End: 2022-07-05

## 2022-07-05 ENCOUNTER — APPOINTMENT (OUTPATIENT)
Age: 67
End: 2022-07-05

## 2022-07-05 LAB
BASOPHILS # BLD AUTO: 0.1 K/UL — SIGNIFICANT CHANGE UP (ref 0–0.2)
BASOPHILS NFR BLD AUTO: 1.5 % — SIGNIFICANT CHANGE UP (ref 0–2)
CEA SERPL-MCNC: 31.5 NG/ML — HIGH (ref 0–3.8)
EOSINOPHIL # BLD AUTO: 0.1 K/UL — SIGNIFICANT CHANGE UP (ref 0–0.5)
EOSINOPHIL NFR BLD AUTO: 3 % — SIGNIFICANT CHANGE UP (ref 0–6)
HCT VFR BLD CALC: 35.7 % — SIGNIFICANT CHANGE UP (ref 34.5–45)
HGB BLD-MCNC: 11.9 G/DL — SIGNIFICANT CHANGE UP (ref 11.5–15.5)
LYMPHOCYTES # BLD AUTO: 1 K/UL — SIGNIFICANT CHANGE UP (ref 1–3.3)
LYMPHOCYTES # BLD AUTO: 21.6 % — SIGNIFICANT CHANGE UP (ref 13–44)
MCHC RBC-ENTMCNC: 31 PG — SIGNIFICANT CHANGE UP (ref 27–34)
MCHC RBC-ENTMCNC: 33.5 G/DL — SIGNIFICANT CHANGE UP (ref 32–36)
MCV RBC AUTO: 92.5 FL — SIGNIFICANT CHANGE UP (ref 80–100)
MONOCYTES # BLD AUTO: 0.3 K/UL — SIGNIFICANT CHANGE UP (ref 0–0.9)
MONOCYTES NFR BLD AUTO: 7.7 % — SIGNIFICANT CHANGE UP (ref 2–14)
NEUTROPHILS # BLD AUTO: 2.9 K/UL — SIGNIFICANT CHANGE UP (ref 1.8–7.4)
NEUTROPHILS NFR BLD AUTO: 66.2 % — SIGNIFICANT CHANGE UP (ref 43–77)
PLATELET # BLD AUTO: 330 K/UL — SIGNIFICANT CHANGE UP (ref 150–400)
RBC # BLD: 3.86 M/UL — SIGNIFICANT CHANGE UP (ref 3.8–5.2)
RBC # FLD: 12.6 % — SIGNIFICANT CHANGE UP (ref 10.3–14.5)
T4 FREE SERPL-MCNC: 0.9 NG/DL — SIGNIFICANT CHANGE UP (ref 0.9–1.8)
TSH SERPL-MCNC: 2.92 UIU/ML — SIGNIFICANT CHANGE UP (ref 0.27–4.2)
WBC # BLD: 4.4 K/UL — SIGNIFICANT CHANGE UP (ref 3.8–10.5)
WBC # FLD AUTO: 4.4 K/UL — SIGNIFICANT CHANGE UP (ref 3.8–10.5)

## 2022-07-06 LAB
ALBUMIN SERPL ELPH-MCNC: 3.9 G/DL — SIGNIFICANT CHANGE UP (ref 3.3–5)
ALP SERPL-CCNC: 146 U/L — HIGH (ref 40–120)
ALT FLD-CCNC: 51 U/L — HIGH (ref 10–45)
ANION GAP SERPL CALC-SCNC: 12 MMOL/L — SIGNIFICANT CHANGE UP (ref 5–17)
AST SERPL-CCNC: 32 U/L — SIGNIFICANT CHANGE UP (ref 10–40)
BILIRUB SERPL-MCNC: 0.2 MG/DL — SIGNIFICANT CHANGE UP (ref 0.2–1.2)
BUN SERPL-MCNC: 14 MG/DL — SIGNIFICANT CHANGE UP (ref 7–23)
CALCIUM SERPL-MCNC: 9.1 MG/DL — SIGNIFICANT CHANGE UP (ref 8.4–10.5)
CHLORIDE SERPL-SCNC: 104 MMOL/L — SIGNIFICANT CHANGE UP (ref 96–108)
CO2 SERPL-SCNC: 24 MMOL/L — SIGNIFICANT CHANGE UP (ref 22–31)
CREAT SERPL-MCNC: 0.62 MG/DL — SIGNIFICANT CHANGE UP (ref 0.5–1.3)
EGFR: 98 ML/MIN/1.73M2 — SIGNIFICANT CHANGE UP
GLUCOSE SERPL-MCNC: 115 MG/DL — HIGH (ref 70–99)
POTASSIUM SERPL-MCNC: 4.2 MMOL/L — SIGNIFICANT CHANGE UP (ref 3.5–5.3)
POTASSIUM SERPL-SCNC: 4.2 MMOL/L — SIGNIFICANT CHANGE UP (ref 3.5–5.3)
PROT SERPL-MCNC: 6.8 G/DL — SIGNIFICANT CHANGE UP (ref 6–8.3)
SODIUM SERPL-SCNC: 141 MMOL/L — SIGNIFICANT CHANGE UP (ref 135–145)

## 2022-07-11 LAB
CORTICOSTEROID BINDING GLOBULIN RESULT: 2 MG/DL — SIGNIFICANT CHANGE UP
CORTIS F/TOTAL MFR SERPL: 5.5 % — SIGNIFICANT CHANGE UP
CORTIS SERPL-MCNC: 8 UG/DL — SIGNIFICANT CHANGE UP
CORTISOL, FREE RESULT: 0.44 UG/DL — SIGNIFICANT CHANGE UP

## 2022-07-19 ENCOUNTER — RESULT REVIEW (OUTPATIENT)
Age: 67
End: 2022-07-19

## 2022-07-19 ENCOUNTER — APPOINTMENT (OUTPATIENT)
Age: 67
End: 2022-07-19

## 2022-07-19 LAB
BASOPHILS # BLD AUTO: 0.1 K/UL — SIGNIFICANT CHANGE UP (ref 0–0.2)
BASOPHILS NFR BLD AUTO: 1.1 % — SIGNIFICANT CHANGE UP (ref 0–2)
EOSINOPHIL # BLD AUTO: 0.1 K/UL — SIGNIFICANT CHANGE UP (ref 0–0.5)
EOSINOPHIL NFR BLD AUTO: 3.2 % — SIGNIFICANT CHANGE UP (ref 0–6)
HCT VFR BLD CALC: 36.8 % — SIGNIFICANT CHANGE UP (ref 34.5–45)
HGB BLD-MCNC: 12 G/DL — SIGNIFICANT CHANGE UP (ref 11.5–15.5)
LYMPHOCYTES # BLD AUTO: 1 K/UL — SIGNIFICANT CHANGE UP (ref 1–3.3)
LYMPHOCYTES # BLD AUTO: 21.6 % — SIGNIFICANT CHANGE UP (ref 13–44)
MCHC RBC-ENTMCNC: 30.7 PG — SIGNIFICANT CHANGE UP (ref 27–34)
MCHC RBC-ENTMCNC: 32.5 G/DL — SIGNIFICANT CHANGE UP (ref 32–36)
MCV RBC AUTO: 94.3 FL — SIGNIFICANT CHANGE UP (ref 80–100)
MONOCYTES # BLD AUTO: 0.5 K/UL — SIGNIFICANT CHANGE UP (ref 0–0.9)
MONOCYTES NFR BLD AUTO: 10.4 % — SIGNIFICANT CHANGE UP (ref 2–14)
NEUTROPHILS # BLD AUTO: 3 K/UL — SIGNIFICANT CHANGE UP (ref 1.8–7.4)
NEUTROPHILS NFR BLD AUTO: 63.7 % — SIGNIFICANT CHANGE UP (ref 43–77)
PLATELET # BLD AUTO: 307 K/UL — SIGNIFICANT CHANGE UP (ref 150–400)
RBC # BLD: 3.9 M/UL — SIGNIFICANT CHANGE UP (ref 3.8–5.2)
RBC # FLD: 12.7 % — SIGNIFICANT CHANGE UP (ref 10.3–14.5)
WBC # BLD: 4.7 K/UL — SIGNIFICANT CHANGE UP (ref 3.8–10.5)
WBC # FLD AUTO: 4.7 K/UL — SIGNIFICANT CHANGE UP (ref 3.8–10.5)

## 2022-07-20 LAB
ALBUMIN SERPL ELPH-MCNC: 4.1 G/DL — SIGNIFICANT CHANGE UP (ref 3.3–5)
ALP SERPL-CCNC: 111 U/L — SIGNIFICANT CHANGE UP (ref 40–120)
ALT FLD-CCNC: 36 U/L — SIGNIFICANT CHANGE UP (ref 10–45)
ANION GAP SERPL CALC-SCNC: 11 MMOL/L — SIGNIFICANT CHANGE UP (ref 5–17)
AST SERPL-CCNC: 31 U/L — SIGNIFICANT CHANGE UP (ref 10–40)
BILIRUB SERPL-MCNC: 0.2 MG/DL — SIGNIFICANT CHANGE UP (ref 0.2–1.2)
BUN SERPL-MCNC: 11 MG/DL — SIGNIFICANT CHANGE UP (ref 7–23)
CALCIUM SERPL-MCNC: 9.2 MG/DL — SIGNIFICANT CHANGE UP (ref 8.4–10.5)
CEA SERPL-MCNC: 36.5 NG/ML — HIGH (ref 0–3.8)
CHLORIDE SERPL-SCNC: 104 MMOL/L — SIGNIFICANT CHANGE UP (ref 96–108)
CO2 SERPL-SCNC: 23 MMOL/L — SIGNIFICANT CHANGE UP (ref 22–31)
CREAT SERPL-MCNC: 0.71 MG/DL — SIGNIFICANT CHANGE UP (ref 0.5–1.3)
EGFR: 93 ML/MIN/1.73M2 — SIGNIFICANT CHANGE UP
GLUCOSE SERPL-MCNC: 76 MG/DL — SIGNIFICANT CHANGE UP (ref 70–99)
POTASSIUM SERPL-MCNC: 4.1 MMOL/L — SIGNIFICANT CHANGE UP (ref 3.5–5.3)
POTASSIUM SERPL-SCNC: 4.1 MMOL/L — SIGNIFICANT CHANGE UP (ref 3.5–5.3)
PROT SERPL-MCNC: 6.9 G/DL — SIGNIFICANT CHANGE UP (ref 6–8.3)
SODIUM SERPL-SCNC: 138 MMOL/L — SIGNIFICANT CHANGE UP (ref 135–145)
T4 FREE SERPL-MCNC: 0.9 NG/DL — SIGNIFICANT CHANGE UP (ref 0.9–1.8)
TSH SERPL-MCNC: 1.17 UIU/ML — SIGNIFICANT CHANGE UP (ref 0.27–4.2)

## 2022-07-21 ENCOUNTER — OUTPATIENT (OUTPATIENT)
Dept: OUTPATIENT SERVICES | Facility: HOSPITAL | Age: 67
LOS: 1 days | Discharge: HOME | End: 2022-07-21

## 2022-07-21 DIAGNOSIS — C34.90 MALIGNANT NEOPLASM OF UNSPECIFIED PART OF UNSPECIFIED BRONCHUS OR LUNG: ICD-10-CM

## 2022-07-21 DIAGNOSIS — Z98.890 OTHER SPECIFIED POSTPROCEDURAL STATES: Chronic | ICD-10-CM

## 2022-07-21 DIAGNOSIS — Z98.891 HISTORY OF UTERINE SCAR FROM PREVIOUS SURGERY: Chronic | ICD-10-CM

## 2022-07-21 DIAGNOSIS — Z98.82 BREAST IMPLANT STATUS: Chronic | ICD-10-CM

## 2022-07-21 DIAGNOSIS — Z90.49 ACQUIRED ABSENCE OF OTHER SPECIFIED PARTS OF DIGESTIVE TRACT: Chronic | ICD-10-CM

## 2022-07-25 LAB
CORTICOSTEROID BINDING GLOBULIN RESULT: 2 MG/DL — SIGNIFICANT CHANGE UP
CORTIS F/TOTAL MFR SERPL: 3.8 % — SIGNIFICANT CHANGE UP
CORTIS SERPL-MCNC: 4.7 UG/DL — SIGNIFICANT CHANGE UP
CORTISOL, FREE RESULT: 0.18 UG/DL — LOW

## 2022-08-02 ENCOUNTER — APPOINTMENT (OUTPATIENT)
Age: 67
End: 2022-08-02

## 2022-08-02 ENCOUNTER — INPATIENT (INPATIENT)
Facility: HOSPITAL | Age: 67
LOS: 0 days | Discharge: HOME | DRG: 177 | End: 2022-08-03
Attending: HOSPITALIST | Admitting: STUDENT IN AN ORGANIZED HEALTH CARE EDUCATION/TRAINING PROGRAM
Payer: MEDICARE

## 2022-08-02 ENCOUNTER — APPOINTMENT (OUTPATIENT)
Dept: HEMATOLOGY ONCOLOGY | Facility: CLINIC | Age: 67
End: 2022-08-02

## 2022-08-02 VITALS
TEMPERATURE: 98 F | DIASTOLIC BLOOD PRESSURE: 88 MMHG | HEIGHT: 66 IN | SYSTOLIC BLOOD PRESSURE: 138 MMHG | WEIGHT: 179.9 LBS | HEART RATE: 87 BPM | OXYGEN SATURATION: 95 % | RESPIRATION RATE: 18 BRPM

## 2022-08-02 DIAGNOSIS — Z98.890 OTHER SPECIFIED POSTPROCEDURAL STATES: Chronic | ICD-10-CM

## 2022-08-02 DIAGNOSIS — Z90.49 ACQUIRED ABSENCE OF OTHER SPECIFIED PARTS OF DIGESTIVE TRACT: Chronic | ICD-10-CM

## 2022-08-02 DIAGNOSIS — Z98.82 BREAST IMPLANT STATUS: Chronic | ICD-10-CM

## 2022-08-02 DIAGNOSIS — Z98.891 HISTORY OF UTERINE SCAR FROM PREVIOUS SURGERY: Chronic | ICD-10-CM

## 2022-08-02 LAB
ALBUMIN SERPL ELPH-MCNC: 4.5 G/DL — SIGNIFICANT CHANGE UP (ref 3.3–5.2)
ALP SERPL-CCNC: 122 U/L — HIGH (ref 40–120)
ALT FLD-CCNC: 36 U/L — HIGH
ANION GAP SERPL CALC-SCNC: 12 MMOL/L — SIGNIFICANT CHANGE UP (ref 5–17)
AST SERPL-CCNC: 31 U/L — SIGNIFICANT CHANGE UP
BASOPHILS # BLD AUTO: 0.02 K/UL — SIGNIFICANT CHANGE UP (ref 0–0.2)
BASOPHILS NFR BLD AUTO: 0.5 % — SIGNIFICANT CHANGE UP (ref 0–2)
BILIRUB SERPL-MCNC: 0.3 MG/DL — LOW (ref 0.4–2)
BUN SERPL-MCNC: 14.6 MG/DL — SIGNIFICANT CHANGE UP (ref 8–20)
CALCIUM SERPL-MCNC: 9.2 MG/DL — SIGNIFICANT CHANGE UP (ref 8.4–10.5)
CHLORIDE SERPL-SCNC: 102 MMOL/L — SIGNIFICANT CHANGE UP (ref 98–107)
CO2 SERPL-SCNC: 24 MMOL/L — SIGNIFICANT CHANGE UP (ref 22–29)
CREAT SERPL-MCNC: 0.63 MG/DL — SIGNIFICANT CHANGE UP (ref 0.5–1.3)
CRP SERPL-MCNC: 9 MG/L — HIGH
EGFR: 97 ML/MIN/1.73M2 — SIGNIFICANT CHANGE UP
EOSINOPHIL # BLD AUTO: 0.07 K/UL — SIGNIFICANT CHANGE UP (ref 0–0.5)
EOSINOPHIL NFR BLD AUTO: 1.6 % — SIGNIFICANT CHANGE UP (ref 0–6)
FERRITIN SERPL-MCNC: 219 NG/ML — HIGH (ref 15–150)
GLUCOSE SERPL-MCNC: 110 MG/DL — HIGH (ref 70–99)
HCT VFR BLD CALC: 36.7 % — SIGNIFICANT CHANGE UP (ref 34.5–45)
HGB BLD-MCNC: 12.2 G/DL — SIGNIFICANT CHANGE UP (ref 11.5–15.5)
IMM GRANULOCYTES NFR BLD AUTO: 0.2 % — SIGNIFICANT CHANGE UP (ref 0–1.5)
LYMPHOCYTES # BLD AUTO: 0.84 K/UL — LOW (ref 1–3.3)
LYMPHOCYTES # BLD AUTO: 19.7 % — SIGNIFICANT CHANGE UP (ref 13–44)
MCHC RBC-ENTMCNC: 30.2 PG — SIGNIFICANT CHANGE UP (ref 27–34)
MCHC RBC-ENTMCNC: 33.2 GM/DL — SIGNIFICANT CHANGE UP (ref 32–36)
MCV RBC AUTO: 90.8 FL — SIGNIFICANT CHANGE UP (ref 80–100)
MONOCYTES # BLD AUTO: 0.63 K/UL — SIGNIFICANT CHANGE UP (ref 0–0.9)
MONOCYTES NFR BLD AUTO: 14.8 % — HIGH (ref 2–14)
NEUTROPHILS # BLD AUTO: 2.69 K/UL — SIGNIFICANT CHANGE UP (ref 1.8–7.4)
NEUTROPHILS NFR BLD AUTO: 63.2 % — SIGNIFICANT CHANGE UP (ref 43–77)
PLATELET # BLD AUTO: 268 K/UL — SIGNIFICANT CHANGE UP (ref 150–400)
POTASSIUM SERPL-MCNC: 4 MMOL/L — SIGNIFICANT CHANGE UP (ref 3.5–5.3)
POTASSIUM SERPL-SCNC: 4 MMOL/L — SIGNIFICANT CHANGE UP (ref 3.5–5.3)
PROCALCITONIN SERPL-MCNC: 0.06 NG/ML — SIGNIFICANT CHANGE UP (ref 0.02–0.1)
PROT SERPL-MCNC: 7.4 G/DL — SIGNIFICANT CHANGE UP (ref 6.6–8.7)
RBC # BLD: 4.04 M/UL — SIGNIFICANT CHANGE UP (ref 3.8–5.2)
RBC # FLD: 13.2 % — SIGNIFICANT CHANGE UP (ref 10.3–14.5)
SARS-COV-2 RNA SPEC QL NAA+PROBE: DETECTED
SODIUM SERPL-SCNC: 138 MMOL/L — SIGNIFICANT CHANGE UP (ref 135–145)
WBC # BLD: 4.26 K/UL — SIGNIFICANT CHANGE UP (ref 3.8–10.5)
WBC # FLD AUTO: 4.26 K/UL — SIGNIFICANT CHANGE UP (ref 3.8–10.5)

## 2022-08-02 PROCEDURE — 99448 NTRPROF PH1/NTRNET/EHR 21-30: CPT

## 2022-08-02 PROCEDURE — 71045 X-RAY EXAM CHEST 1 VIEW: CPT | Mod: 26

## 2022-08-02 PROCEDURE — 99285 EMERGENCY DEPT VISIT HI MDM: CPT

## 2022-08-02 NOTE — ED PROVIDER NOTE - NSICDXPASTSURGICALHX_GEN_ALL_CORE_FT
PAST SURGICAL HISTORY:  H/O cosmetic plastic surgery liposuction - 30 years ago    History of appendectomy     History of breast implant 15 years ago    History of colon surgery 3/2018 ( small colon mass resection - benign )    S/P appendectomy 2018    S/P  section long time ago

## 2022-08-02 NOTE — ED ADULT NURSE NOTE - HIV OFFER
Patient Education     Viral Croup  Croup is an illness that causes a child s voice box (larynx) and windpipe (trachea) to become irritated and swell. This makes it difficult for the child to talk and breathe. It is caused by a virus. It often occurs in children under 6 years of age. The respiratory distress croup causes can be scary. But most children fully recover from croup in 5 or 6 days. Viral croup is contagious for the first few days of symptoms.  You child may have had a fever for a day or two. Or he or she may have just had a cold. Symptoms of croup occur more often at night. Difficulty breathing, especially taking in a breath, occurs suddenly. Your child may sit upright and lean forward trying to breathe. He or she may be restless and agitated. Your child may make a musical sound when breathing in. This is called stridor. Other symptoms include a voice that is hoarse and hard to hear and a barking cough. Children with croup may have a difficult time swallowing. They may drool and have trouble eating. Some children develop sore throats and ear infections. In the course of 5 or 6 days, croup symptoms will come and go.  In most cases, croup can be safely treated at home. You may be given medication for your child.  Home care  Croup can sound frightening. But in many cases, the following tips can help ease your child s breathing:    Don t let anyone smoke in your home. Smoke can make your child's cough worse.    Keep your child s head raised. Prop an older child up in bed with extra pillows. Never use pillows with an infant younger than 12 months old.    Stay calm. If your child sees that you are frightened, this will make your child more anxious and make it harder for him or her to breathe.    Offer words of comfort such as  It will be OK. I m right here with you.     Sing your child s favorite bedtime song.    Offer a back rub or hold your child.    Offer a favorite toy  If the above tips don t help your  child s breathing, you may try having your child breathe in steam from a shower or cool, moist night air. According to the American Academy of Pediatrics and the American Academy of Family Physicians, no studies prove that inhaling steam or most air helps a child s breathing. But other medical experts still support this approach. Here s what to do:    Turn on the hot water in your bathroom shower.    Keep the door closed, so the room gets steamy.    Sit with your child in the steam for 15 or 20 minutes. Don t leave your child alone.    If your child wakes up at night, you can take him or her outdoors to breathe in cool night air. Make sure to wrap your child in warm clothing or blankets if the weather is chilly.  General care    Sleep in the same room with your child, if possible, to observe his or her breathing. Check your child s chest and ability to breathe.    Don t put a finger down your child s throat or try to make him or her vomit. If your child does vomit, hold his or her head down, then quickly sit your child back up.    Don t give your child cough drops or cough syrup. They will not help the swelling. They may also make it harder to cough up any secretions.    Make sure your child drinks plenty of clear fluids, such as water or diluted apple juice. Warm liquids may be more soothing.  Medicines  The healthcare provider may prescribe a medication to reduce swelling, make breathing easier, and treat fever. Follow all instructions for giving this medication to your child.  Follow-up care  Follow up with your child s healthcare provider, or as advised.  Special note to parents  Viral croup is contagious for the first few days of symptoms. Wash your hands with soap and warm water before and after caring for your child. Limit your child s contact with other people. This is to help prevent the spread of infection.  When to call 911  Call 911 right away if your child:     Makes a whistling sound (stridor) that  becomes louder with each breath    Has stridor when resting    Has a hard time swallowing his or her saliva, or drools    Has increased trouble breathing    Has a blue or dusky color around the fingernails, mouth, or nose    Struggles to catch his or her breath    Can't speak or make sounds  When to seek medical advice  Call your child's healthcare provider right away if any of these occur:    Fever (see Fever and children, below)    Cough or other symptoms don't get better or get worse    Trouble breathing, even at rest    Poor chest expansion    Skin on your child's chest pulls in when he or she breathes    Whistling sounds when breathing    Bluish tint around your child s mouth and fingernails    Severe drooling    Pain when swallowing    Poor eating    Trouble talking    Your child doesn't get better within a week     Fever and children  Always use a digital thermometer to check your child s temperature. Never use a mercury thermometer.  For infants and toddlers, be sure to use a rectal thermometer correctly. A rectal thermometer may accidentally poke a hole in (perforate) the rectum. It may also pass on germs from the stool. Always follow the product maker s directions for proper use. If you don t feel comfortable taking a rectal temperature, use another method. When you talk to your child s healthcare provider, tell him or her which method you used to take your child s temperature.  Here are guidelines for fever temperature. Ear temperatures aren t accurate before 6 months of age. Don t take an oral temperature until your child is at least 4 years old.  Infant under 3 months old:    Ask your child s healthcare provider how you should take the temperature.    Rectal or forehead (temporal artery) temperature of 100.4 F (38 C) or higher, or as directed by the provider    Armpit temperature of 99 F (37.2 C) or higher, or as directed by the provider  Child age 3 to 36 months:    Rectal, forehead (temporal artery),  or ear temperature of 102 F (38.9 C) or higher, or as directed by the provider    Armpit temperature of 101 F (38.3 C) or higher, or as directed by the provider  Child of any age:    Repeated temperature of 104 F (40 C) or higher, or as directed by the provider    Fever that lasts more than 24 hours in a child under 2 years old. Or a fever that lasts for 3 days in a child 2 years or older.      Date Last Reviewed: 10/1/2016    3515-0630 [employer]. [address] [place] [address]. All rights reserved. This information is not intended as a substitute for professional medical care. Always follow your healthcare professional's instructions.            Opt out

## 2022-08-02 NOTE — ED PROVIDER NOTE - CLINICAL SUMMARY MEDICAL DECISION MAKING FREE TEXT BOX
68 y/o female with lung cancer on immunotherapy COVID (+) on home test with hypoxia  labs, CTA eval for PE, admit

## 2022-08-02 NOTE — ED PROVIDER NOTE - OBJECTIVE STATEMENT
66 y/o female h/o lungs CA and hypothyroid presents c/o several days of generalized malaise, cough, body aches, and throat pain. She had a home test (+) for COVID today. During the day she started to feel sob and took her pulse ox and it was in the high 70's and low 80's. Patient reports that 2 months ago she was on home oxygen however has not been on it recently and her normal pulse ox is 98%

## 2022-08-02 NOTE — ED PROVIDER NOTE - ATTENDING APP SHARED VISIT CONTRIBUTION OF CARE
h/o lung cancer on immunotherapy (finished chemo and radiation approx 8 mths ago).  Close contact COVID exposure:  daughter was positive.  Patient reports negative home test yesterday; today positive.  Felt sick yesterday; worse today.  Using supplemental oxygen intermittently throughout the day today and noting fluctuations of pulse oximetry (normally doesn't use oxygen).  Prescribed paxlovid and azithromycin today.  Reports sore throat, cough with phlegm production and intermittent SOB.  PE:  NARD, pulse ox ranging 91-97% on supplemental oxygen, chest cta.  A/P  COVID-19 infection with intermittent hypoxia; high risk for advanced disease.  CTA, labs and admit.

## 2022-08-02 NOTE — ED ADULT NURSE NOTE - OBJECTIVE STATEMENT
Pt in no apparent distress at this time. Airway patent, breathing labored. Pt A&Ox3 resting in stretcher. Pt c/o       , SOB. tested positive today on home test. virtual MD derrick varela. pt on home o2 for lung CA but does not typically wear it and sats ~98% ra. now found to be 70's/80's ra. pt 99% ^l nc on monitor in ED

## 2022-08-02 NOTE — ED ADULT NURSE NOTE - CHIEF COMPLAINT QUOTE
pt is covid+ c/o SOB, and low 02 at home and in triage to the 70s at room air,  pt Is using home 02 on 6L , as pert family pt doesn't usually uses oxygen at home, hx lung CA,

## 2022-08-02 NOTE — ED PROVIDER NOTE - NSICDXPASTMEDICALHX_GEN_ALL_CORE_FT
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

## 2022-08-02 NOTE — ASSESSMENT
[FreeTextEntry1] : SHANON BEE is a female never smoker who was 68 yo at diagnosis,  of adenocarcinoma lung \par Patient with no significant past medical history\par PET in 10/2020 with a RENAN spiculated lung mass 4.5 x 3cm with an SUV 11.1 with adjacent tree in bud opacity. Left lung, biopsy on 11/23/20: Adenocarcinoma\par \par -MRI Brain 12/29/20: negative \par -CT C/A/P 12/29/20: stable RENAN primary lesion, (4.7 x 2.3cm), 1.2 cm left lung apex lesion along with RUL stable lesion of 1.3 cm \par -PDL1 20% \par -Foundation one : ALK ELM 4 fusion variant, MS stable TMB 4 mut/ Mb, SF 3B1 \par \par Biopsy done of RUL: not malignant/ reactive \par -Initial plan for resection per Dr Choudhury, however EBUS prior to surgery with N2 disease. Patient was presented at Tumor board on 4/14/21 and a plan for definitive CRT \par -s/p chemoRT with Carbo and taxol weekly with RT, completed 6/21/21\par - She had a rough course with Rt mediated Pneumonitis/ superimposed PNA  in July/ Aug 2021 requiring hospitalization\par -Currently on Immunotherapy with Durvalumab for 1 year Started on 9/7/21 Start delayed due to requiring Prednisone for RT Pneumonitis \par -Patient is a Jehov[employer]. She had a h/o diverticulosis, need to monitor with IO \par   \par CT chest no contrast 1/7/22\par IMPRESSION:Since 2 months prior:\par -Evolving radiotherapy changes in the left upper and lower lobes, with slightly decreased\par consolidative component in the left upper lobe, at site of treated neoplasm.\par -Decreased prior right lower lobe abnormality, however multiple new peripheral areas of\par ground glass in the right upper and right lower lobes. The migratory pattern suggests organizing\par pneumonia, possibly drug-induced or radiation-induced. Acute infection, including COVID, could also\par account for the new findings.\par -Stable right apex 1.4 cm nodule.\par \par CT Chest 4/18/22: Stable since 1/2021 however minimally grown since March 2018 (1 x 0.8 cm). \par - Was due Durvalumab today ( c 23) however she tested positive for covid will cancel treatment. advised to proceed with next treatment on 8/16/22 if symptom subside. She has not had restaging scans \par - Will order scans however advsied to wait 4 weeks to get them due to covid related lung changes\par - continue to monitor \par - Continue with Durvalumab for 1 y \par -TSH 5.79, T4 0.7- started levothyroxine 25 mcg. patient follow Endocrinology- Dr. Arias John\par

## 2022-08-02 NOTE — ED PROVIDER NOTE - CARE PLAN
First name:                       MR # [id number]  Date of Birth: [birth year]	Time of Birth:     Birth Weight:     Date of Admission:           Gestational Age: 37.3        Active Diagnoses: Term , TTN,  birth, feeding problem    ICU Vital Signs Last 24 Hrs  T(C): 37 (2019 23:00), Max: 37.5 (2019 05:00)  T(F): 98.6 (2019 23:00), Max: 99.5 (2019 05:00)  HR: 122 (2019 23:00) (106 - 144)  BP: 60/37 (2019 08:00) (60/37 - 60/37)  BP(mean): 48 (2019 08:00) (48 - 48)  ABP: --  ABP(mean): --  RR: 61 (2019 23:00) (19 - 91)  SpO2: 100% (2019 23:00) (95% - 100%)      Interval Events: no acute events            ADDITIONAL LABS:  CAPILLARY BLOOD GLUCOSE      POCT Blood Glucose.: 69 mg/dL (2019 03:19)                WEIGHT: Daily     Daily Weight Gm: 2925 (-125) gm  FLUIDS AND NUTRITION:     I&O's Detail    2019 07:  -  2019 07:00  --------------------------------------------------------  IN:    Oral Fluid: 105 mL    Tube Feeding Fluid: 110 mL  Total IN: 215 mL    OUT:    Voided: 46 mL  Total OUT: 46 mL    Total NET: 169 mL      2019 07:01  -  2019 23:25  --------------------------------------------------------  IN:    Oral Fluid: 263 mL  Total IN: 263 mL    OUT:    Voided: 94 mL  Total OUT: 94 mL    Total NET: 169 mL          Intake(ml/kg/day):   Urine output:      0.6 + 3                               Stools: 3    Diet - Enteral: ad wilfred feeding, nippling fair    PHYSICAL EXAM:  General:	         Alert, pink, vigorous  Chest/Lungs:      Breath sounds equal to auscultation. No retractions  CV:		No murmurs appreciated, normal pulses bilaterally  Abdomen:          Soft nontender nondistended, no masses, bowel sounds present  Neuro exam:	Appropriate tone, activity Principal Discharge DX:	Hypoxia   1

## 2022-08-02 NOTE — HISTORY OF PRESENT ILLNESS
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 Recent imaging has shown growth and development of a solid structure. The etiology remains unclear, but malignancy has been considered. She feels well has no complaints. She denies fever, chills, night sweats, weight loss, or weight gain. \par \par Followed by Dr Galeana in the past\par PPD+ in 2000 and received 6 months of INH\par PET positive RENAN posterior cavitary density in 2010\par TTNA and Bronch non-diagnostic. Cultures negative for fungus and TB\par CT at [facility] in 2010 and 2012 stable - felt to be benign. Lost to FU\par \par Patient had a PEt scan in Oct 2020 with a Left upper lobe spiculated lung mass 4.5 x 3cm with an SUV 11.1 with adjacent tree in bud opacity \par In addition Rt upper lobe opacity 1.2 x1 cm which is not FDG avid \par Discrete Left hilar/ infrahilar region  lesion with an SUV 5.2 which is possibly corresponding to a LN \par \par \par Patient s/p Left lung mass, core biopsy with pathology c/w Adenocarcinoma c/w pulmonary origin (see comment)\par CK7, TTF-1 : Positive CK20, PAX-8 : Negative\par  [de-identified] : SON: JUAN RAMON [phone] [de-identified] : This encounter was done via Telehealth/ Telephone at home \par The patient SHANON BEE was located at home at the time of the visit. \par Verbal consent for teleservices given on Aug 02, 2022  by  SHANON BEE\par \par Patient presents for follow up She tested positive for covid She is having Cough, sore throat, Fever. Anxiety \par Reports that she has crawling sensation on feet \par \par Was due Durvalumab today ( c 23) however she tested positive for covid will cancel treatment. advised to proceed with next treatment on 8/16/22 if symptom subside \par She has not had  restaging scans \par Will order scans \par \par For adenocarcinoma of the RENAN. She completed concurrent CRT with carbo/ taxol on 6/21/21. \par Start of Durvalumab delayed by RT pneumonitis and subsequent need for prednisone now s/p taper, started on 15 mg/day decreased to 10mg on Sunday 9/5, then to 5 mg on Sunday 9/13 and completed on 9/20. \par \par 4/18/22 CT Chest: Stable post radiation fibrosis in the left lung. Near-total resolution of ground glass densities in the right lung. No new ground glass. Stable right upper lobe nodule increased in size since 3/7/2018 although previously not FDG avid. No new lung nodule. Stable since 1/2021 however minimally grown since March 2018 (1 x 0.8 cm). \par \par 3/15/22 CEA 14.5\par 1/11/22 CEA 12.3\par 12/28/21 CEA 10.7\par 12/14/21 CEA 11\par 11/30/21 CEA 11.5\par \par

## 2022-08-02 NOTE — ED PROVIDER NOTE - NS ED ATTENDING STATEMENT MOD
This was a shared visit with the ANNIKA. I reviewed and verified the documentation and independently performed the documented:

## 2022-08-03 ENCOUNTER — TRANSCRIPTION ENCOUNTER (OUTPATIENT)
Age: 67
End: 2022-08-03

## 2022-08-03 VITALS
DIASTOLIC BLOOD PRESSURE: 73 MMHG | OXYGEN SATURATION: 97 % | HEART RATE: 94 BPM | SYSTOLIC BLOOD PRESSURE: 114 MMHG | RESPIRATION RATE: 18 BRPM

## 2022-08-03 DIAGNOSIS — R09.02 HYPOXEMIA: ICD-10-CM

## 2022-08-03 LAB
ALBUMIN SERPL ELPH-MCNC: 4.1 G/DL — SIGNIFICANT CHANGE UP (ref 3.3–5.2)
ALP SERPL-CCNC: 122 U/L — HIGH (ref 40–120)
ALT FLD-CCNC: 37 U/L — HIGH
AST SERPL-CCNC: 30 U/L — SIGNIFICANT CHANGE UP
BILIRUB DIRECT SERPL-MCNC: 0.1 MG/DL — SIGNIFICANT CHANGE UP (ref 0–0.3)
BILIRUB INDIRECT FLD-MCNC: 0.2 MG/DL — SIGNIFICANT CHANGE UP (ref 0.2–1)
BILIRUB SERPL-MCNC: 0.3 MG/DL — LOW (ref 0.4–2)
CREAT SERPL-MCNC: 0.75 MG/DL — SIGNIFICANT CHANGE UP (ref 0.5–1.3)
EGFR: 87 ML/MIN/1.73M2 — SIGNIFICANT CHANGE UP
INR BLD: 1.09 RATIO — SIGNIFICANT CHANGE UP (ref 0.88–1.16)
PROT SERPL-MCNC: 7.2 G/DL — SIGNIFICANT CHANGE UP (ref 6.6–8.7)
PROTHROM AB SERPL-ACNC: 12.7 SEC — SIGNIFICANT CHANGE UP (ref 10.5–13.4)

## 2022-08-03 PROCEDURE — 71275 CT ANGIOGRAPHY CHEST: CPT | Mod: 26,MA

## 2022-08-03 PROCEDURE — 12345: CPT | Mod: NC

## 2022-08-03 PROCEDURE — 99235 HOSP IP/OBS SAME DATE MOD 70: CPT

## 2022-08-03 RX ORDER — DEXAMETHASONE 0.5 MG/5ML
6 ELIXIR ORAL DAILY
Refills: 0 | Status: DISCONTINUED | OUTPATIENT
Start: 2022-08-03 | End: 2022-08-03

## 2022-08-03 RX ORDER — FLUOXETINE HCL 10 MG
1 CAPSULE ORAL
Qty: 0 | Refills: 0 | DISCHARGE

## 2022-08-03 RX ORDER — DONEPEZIL HYDROCHLORIDE 10 MG/1
10 TABLET, FILM COATED ORAL AT BEDTIME
Refills: 0 | Status: DISCONTINUED | OUTPATIENT
Start: 2022-08-03 | End: 2022-08-03

## 2022-08-03 RX ORDER — REMDESIVIR 5 MG/ML
INJECTION INTRAVENOUS
Refills: 0 | Status: DISCONTINUED | OUTPATIENT
Start: 2022-08-03 | End: 2022-08-03

## 2022-08-03 RX ORDER — HEPARIN SODIUM 5000 [USP'U]/ML
5000 INJECTION INTRAVENOUS; SUBCUTANEOUS EVERY 8 HOURS
Refills: 0 | Status: DISCONTINUED | OUTPATIENT
Start: 2022-08-03 | End: 2022-08-03

## 2022-08-03 RX ORDER — RIVAROXABAN 15 MG-20MG
1 KIT ORAL
Qty: 30 | Refills: 0
Start: 2022-08-03 | End: 2022-09-01

## 2022-08-03 RX ORDER — CEFPODOXIME PROXETIL 100 MG
1 TABLET ORAL
Qty: 14 | Refills: 0
Start: 2022-08-03 | End: 2022-08-09

## 2022-08-03 RX ORDER — ATORVASTATIN CALCIUM 80 MG/1
40 TABLET, FILM COATED ORAL AT BEDTIME
Refills: 0 | Status: DISCONTINUED | OUTPATIENT
Start: 2022-08-03 | End: 2022-08-03

## 2022-08-03 RX ORDER — ACETAMINOPHEN 500 MG
650 TABLET ORAL EVERY 4 HOURS
Refills: 0 | Status: DISCONTINUED | OUTPATIENT
Start: 2022-08-03 | End: 2022-08-03

## 2022-08-03 RX ORDER — FLUOXETINE HCL 10 MG
10 CAPSULE ORAL DAILY
Refills: 0 | Status: DISCONTINUED | OUTPATIENT
Start: 2022-08-03 | End: 2022-08-03

## 2022-08-03 RX ORDER — REMDESIVIR 5 MG/ML
200 INJECTION INTRAVENOUS EVERY 24 HOURS
Refills: 0 | Status: COMPLETED | OUTPATIENT
Start: 2022-08-03 | End: 2022-08-03

## 2022-08-03 RX ORDER — ALPRAZOLAM 0.25 MG
0.5 TABLET ORAL THREE TIMES A DAY
Refills: 0 | Status: DISCONTINUED | OUTPATIENT
Start: 2022-08-03 | End: 2022-08-03

## 2022-08-03 RX ORDER — LEVOTHYROXINE SODIUM 125 MCG
25 TABLET ORAL DAILY
Refills: 0 | Status: DISCONTINUED | OUTPATIENT
Start: 2022-08-03 | End: 2022-08-03

## 2022-08-03 RX ORDER — ALBUTEROL 90 UG/1
2 AEROSOL, METERED ORAL EVERY 6 HOURS
Refills: 0 | Status: DISCONTINUED | OUTPATIENT
Start: 2022-08-03 | End: 2022-08-03

## 2022-08-03 RX ORDER — REMDESIVIR 5 MG/ML
100 INJECTION INTRAVENOUS EVERY 24 HOURS
Refills: 0 | Status: DISCONTINUED | OUTPATIENT
Start: 2022-08-04 | End: 2022-08-03

## 2022-08-03 RX ORDER — PANTOPRAZOLE SODIUM 20 MG/1
40 TABLET, DELAYED RELEASE ORAL
Refills: 0 | Status: DISCONTINUED | OUTPATIENT
Start: 2022-08-03 | End: 2022-08-03

## 2022-08-03 RX ADMIN — Medication 25 MICROGRAM(S): at 06:05

## 2022-08-03 RX ADMIN — REMDESIVIR 500 MILLIGRAM(S): 5 INJECTION INTRAVENOUS at 12:51

## 2022-08-03 RX ADMIN — PANTOPRAZOLE SODIUM 40 MILLIGRAM(S): 20 TABLET, DELAYED RELEASE ORAL at 12:52

## 2022-08-03 RX ADMIN — HEPARIN SODIUM 5000 UNIT(S): 5000 INJECTION INTRAVENOUS; SUBCUTANEOUS at 06:05

## 2022-08-03 RX ADMIN — Medication 6 MILLIGRAM(S): at 06:04

## 2022-08-03 NOTE — DISCHARGE NOTE PROVIDER - HOSPITAL COURSE
66 y/o female with COVID PNA, Hypoxia, hx of NSLC s/p Chemo/RT finished in 6/21 s/p xrt induced pneumonitis, currently on immunotherapy, Hypothyroidism, HLD, Anxiety, remote hx of PPD + s/p INH. Patient admitted to medicine and had a ct angio:    No pulmonary embolism.    Consolidative changes in the perihilar left lung extending to the   periphery unchanged and previously characterized as postradiation changes.    A 1.5 cm lobulated right upper lobe lung nodule is unchanged. However   peripheral to this there is new patchy airspace consolidation of   uncertain etiology.  The differential includes both infection and neoplasm.      acute hypoxix resp failure - COVID PNA vs bacterial vs pneumonitis  - on room air is 97% now  - dc with vantin , [id number] po daily x 30 days  -Albuterol HFA    NSCL:  -O/P Oncology notes reviewed in HIE  -CTA chest as above no pe  -F/U with Dr. Simpson post DC for immunotherapy     Hypothyroidism:  -Cont. Synthroid    HLD:  -Statin    Anxiety:  -Xanax prn/Fluoxetine    spoke to daughter at [facility] and explained plan     medically stable for dc home

## 2022-08-03 NOTE — DISCHARGE NOTE PROVIDER - PROVIDER TOKENS
FREE:[LAST:[primary care],PHONE:[(   )    -],FAX:[(   )    -],ADDRESS:[[facility]]],FREE:[LAST:[oncology],PHONE:[(   )    -],FAX:[(   )    -],ADDRESS:[[facility]]]

## 2022-08-03 NOTE — H&P ADULT - HISTORY OF PRESENT ILLNESS
68 y/o female with hx of NSLC dx in 11/20 s/p Chemo and RT, currently on Immunotherapy with Imfinzi, radiation pneumonitis in past treated with Abx and Steroids. Patient has been on 02 in past and has at home but states has not required it since last year. She was at [facility] yesterday and was noted to not be feeling well and was around her Daughter who has COVID. She self tested with Ag test and it was positive. At [facility] she was told not to have her scheduled dose of Immunotherapy and was prescribed Paxlovid and Zithromax but she ended up coming to the ED due to SOB and Pulse ox in the 70s/80s. Denies any other recent travel changes in medications, rashes, N/V/D. In the ED she was confirmed to have hypoxia requiring 5 liters to maintain a sat in low 90s. CTA was negative for PE, but did show area of infiltrate around previously known lung mass. Remainder of vitals and labs essentially normal.

## 2022-08-03 NOTE — DISCHARGE NOTE NURSING/CASE MANAGEMENT/SOCIAL WORK - NSDCPEFALRISK_GEN_ALL_CORE
For information on Fall & Injury Prevention, visit: [email or web address] OR  [email or web address] OR  https://www.cdc.gov/steadi/patient.html

## 2022-08-03 NOTE — DISCHARGE NOTE PROVIDER - CARE PROVIDER_API CALL
primary care,   [facility]  Phone: (   )    -  Fax: (   )    -  Follow Up Time:     [facility]  Phone: (   )    -  Fax: (   )    -  Follow Up Time:

## 2022-08-03 NOTE — DISCHARGE NOTE PROVIDER - NSDCMRMEDTOKEN_GEN_ALL_CORE_FT
albuterol 90 mcg/inh inhalation aerosol: 2 puff(s) inhaled every 6 hours, As needed, Shortness of Breath and/or Wheezing  cefpodoxime 200 mg oral tablet: 1 tab(s) orally 2 times a day   donepezil 5 mg oral tablet: 2 tab(s) orally once a day (at bedtime)  FLUoxetine 10 mg oral tablet: 1 tab(s) orally once a day  Imfinzi 50 mg/mL intravenous solution:   Lipitor 40 mg oral tablet: 1 tab(s) orally once a day  Multiple Vitamins oral tablet, chewable: 1 tab(s) orally once a day  omeprazole 40 mg oral delayed release capsule: 1 cap(s) orally once a day  Synthroid 25 mcg (0.025 mg) oral tablet: 1 tab(s) orally once a day  Xanax 0.5 mg oral tablet: 1 tab(s) orally 3 times a day, As Needed  Xarelto 10 mg oral tablet: 1 tab(s) orally once a day (at bedtime)

## 2022-08-03 NOTE — DISCHARGE NOTE PROVIDER - ATTENDING DISCHARGE PHYSICAL EXAMINATION:
· Constitutional Comments	awake, alert, in NAD  · Eyes	conjunctiva clear  · ENMT	mouth  · Mouth	moist  · Respiratory	no rales; no rhonchi; diminished breath sounds, L; diminished breath sounds, R  · Cardiovascular	regular rate and rhythm; S1 S2 present  · Gastrointestinal	soft; nontender  · Mental Status	AAOX4  · Skin	warm and dry

## 2022-08-03 NOTE — H&P ADULT - ASSESSMENT
68 y/o female with COVID PNA, Hypoxia, hx of NSLC s/p Chemo/RT finished in 6/21 s/p xrt induced pneumonitis, currently on immunotherapy, Hypothyroidism, HLD, Anxiety, remote hx of PPD + s/p INH     COVID PNA:  -Admit to  bed  -Titrate fi02 as tolerated  -incentive spirometer  -Start on Decadron 6mg IVP daily  -Start on Remdesevir Taper  -Monitor labs  -DVT-P, OOB, ambulate, VC boots  -Tessalon pearls  -Prone as tolerated  -Albuterol HFA  -Isolation  -No role for Abx at this time  -Trend temp/WBC    NSCL:  -O/P Oncology notes reviewed in HIE  -CTA chest as above  -F/U with Dr. Simpson post DC for immunotherapy     Hypothyroidism:  -Cont. Synthroid    HLD:  -Statin    Anxiety:  -Xanax prn/Fluoxetine    Discussed with Patient using , all questions/concerns addressed.

## 2022-08-03 NOTE — DISCHARGE NOTE PROVIDER - NSDCCPCAREPLAN_GEN_ALL_CORE_FT
PRINCIPAL DISCHARGE DIAGNOSIS  Diagnosis: Hypoxia  Assessment and Plan of Treatment:       SECONDARY DISCHARGE DIAGNOSES  Diagnosis: Pneumonia  Assessment and Plan of Treatment:     Diagnosis: Hypothyroid  Assessment and Plan of Treatment:     Diagnosis: MDD (major depressive disorder)  Assessment and Plan of Treatment:

## 2022-08-03 NOTE — DISCHARGE NOTE NURSING/CASE MANAGEMENT/SOCIAL WORK - PATIENT PORTAL LINK FT
You can access the FollowMyHealth Patient Portal offered by [facility] by registering at the following website: [email or web address]. By joining [facility]’s FollowMyHealth portal, you will also be able to view your health information using other applications (apps) compatible with our system.

## 2022-08-16 ENCOUNTER — RESULT REVIEW (OUTPATIENT)
Age: 67
End: 2022-08-16

## 2022-08-16 ENCOUNTER — APPOINTMENT (OUTPATIENT)
Age: 67
End: 2022-08-16

## 2022-08-16 LAB
ALBUMIN SERPL ELPH-MCNC: 4.2 G/DL — SIGNIFICANT CHANGE UP (ref 3.3–5)
ALP SERPL-CCNC: 112 U/L — SIGNIFICANT CHANGE UP (ref 40–120)
ALT FLD-CCNC: 57 U/L — HIGH (ref 10–45)
ANION GAP SERPL CALC-SCNC: 12 MMOL/L — SIGNIFICANT CHANGE UP (ref 5–17)
AST SERPL-CCNC: 46 U/L — HIGH (ref 10–40)
BASOPHILS # BLD AUTO: 0 K/UL — SIGNIFICANT CHANGE UP (ref 0–0.2)
BASOPHILS NFR BLD AUTO: 0.8 % — SIGNIFICANT CHANGE UP (ref 0–2)
BILIRUB SERPL-MCNC: 0.2 MG/DL — SIGNIFICANT CHANGE UP (ref 0.2–1.2)
BUN SERPL-MCNC: 11 MG/DL — SIGNIFICANT CHANGE UP (ref 7–23)
CALCIUM SERPL-MCNC: 9.5 MG/DL — SIGNIFICANT CHANGE UP (ref 8.4–10.5)
CEA SERPL-MCNC: 53.9 NG/ML — HIGH (ref 0–3.8)
CHLORIDE SERPL-SCNC: 104 MMOL/L — SIGNIFICANT CHANGE UP (ref 96–108)
CO2 SERPL-SCNC: 23 MMOL/L — SIGNIFICANT CHANGE UP (ref 22–31)
CREAT SERPL-MCNC: 0.66 MG/DL — SIGNIFICANT CHANGE UP (ref 0.5–1.3)
EGFR: 96 ML/MIN/1.73M2 — SIGNIFICANT CHANGE UP
EOSINOPHIL # BLD AUTO: 0.1 K/UL — SIGNIFICANT CHANGE UP (ref 0–0.5)
EOSINOPHIL NFR BLD AUTO: 1.9 % — SIGNIFICANT CHANGE UP (ref 0–6)
GLUCOSE SERPL-MCNC: 84 MG/DL — SIGNIFICANT CHANGE UP (ref 70–99)
HCT VFR BLD CALC: 37.9 % — SIGNIFICANT CHANGE UP (ref 34.5–45)
HGB BLD-MCNC: 12.5 G/DL — SIGNIFICANT CHANGE UP (ref 11.5–15.5)
LYMPHOCYTES # BLD AUTO: 1 K/UL — SIGNIFICANT CHANGE UP (ref 1–3.3)
LYMPHOCYTES # BLD AUTO: 16.9 % — SIGNIFICANT CHANGE UP (ref 13–44)
MCHC RBC-ENTMCNC: 30.4 PG — SIGNIFICANT CHANGE UP (ref 27–34)
MCHC RBC-ENTMCNC: 33 G/DL — SIGNIFICANT CHANGE UP (ref 32–36)
MCV RBC AUTO: 92.2 FL — SIGNIFICANT CHANGE UP (ref 80–100)
MONOCYTES # BLD AUTO: 0.4 K/UL — SIGNIFICANT CHANGE UP (ref 0–0.9)
MONOCYTES NFR BLD AUTO: 6.3 % — SIGNIFICANT CHANGE UP (ref 2–14)
NEUTROPHILS # BLD AUTO: 4.4 K/UL — SIGNIFICANT CHANGE UP (ref 1.8–7.4)
NEUTROPHILS NFR BLD AUTO: 74.2 % — SIGNIFICANT CHANGE UP (ref 43–77)
PLATELET # BLD AUTO: 172 K/UL — SIGNIFICANT CHANGE UP (ref 150–400)
POTASSIUM SERPL-MCNC: 4 MMOL/L — SIGNIFICANT CHANGE UP (ref 3.5–5.3)
POTASSIUM SERPL-SCNC: 4 MMOL/L — SIGNIFICANT CHANGE UP (ref 3.5–5.3)
PROT SERPL-MCNC: 7 G/DL — SIGNIFICANT CHANGE UP (ref 6–8.3)
RBC # BLD: 4.11 M/UL — SIGNIFICANT CHANGE UP (ref 3.8–5.2)
RBC # FLD: 13 % — SIGNIFICANT CHANGE UP (ref 10.3–14.5)
SODIUM SERPL-SCNC: 138 MMOL/L — SIGNIFICANT CHANGE UP (ref 135–145)
T4 FREE SERPL-MCNC: 0.9 NG/DL — SIGNIFICANT CHANGE UP (ref 0.9–1.8)
TSH SERPL-MCNC: 1.44 UIU/ML — SIGNIFICANT CHANGE UP (ref 0.27–4.2)
WBC # BLD: 5.9 K/UL — SIGNIFICANT CHANGE UP (ref 3.8–10.5)
WBC # FLD AUTO: 5.9 K/UL — SIGNIFICANT CHANGE UP (ref 3.8–10.5)

## 2022-08-17 DIAGNOSIS — Z51.11 ENCOUNTER FOR ANTINEOPLASTIC CHEMOTHERAPY: ICD-10-CM

## 2022-08-30 ENCOUNTER — RESULT REVIEW (OUTPATIENT)
Age: 67
End: 2022-08-30

## 2022-08-30 ENCOUNTER — APPOINTMENT (OUTPATIENT)
Age: 67
End: 2022-08-30

## 2022-08-30 LAB
BASOPHILS # BLD AUTO: 0.1 K/UL — SIGNIFICANT CHANGE UP (ref 0–0.2)
BASOPHILS NFR BLD AUTO: 1.5 % — SIGNIFICANT CHANGE UP (ref 0–2)
EOSINOPHIL # BLD AUTO: 0.2 K/UL — SIGNIFICANT CHANGE UP (ref 0–0.5)
EOSINOPHIL NFR BLD AUTO: 4.4 % — SIGNIFICANT CHANGE UP (ref 0–6)
HCT VFR BLD CALC: 38.1 % — SIGNIFICANT CHANGE UP (ref 34.5–45)
HGB BLD-MCNC: 12.6 G/DL — SIGNIFICANT CHANGE UP (ref 11.5–15.5)
LYMPHOCYTES # BLD AUTO: 1 K/UL — SIGNIFICANT CHANGE UP (ref 1–3.3)
LYMPHOCYTES # BLD AUTO: 23.7 % — SIGNIFICANT CHANGE UP (ref 13–44)
MCHC RBC-ENTMCNC: 30.4 PG — SIGNIFICANT CHANGE UP (ref 27–34)
MCHC RBC-ENTMCNC: 33.2 G/DL — SIGNIFICANT CHANGE UP (ref 32–36)
MCV RBC AUTO: 91.5 FL — SIGNIFICANT CHANGE UP (ref 80–100)
MONOCYTES # BLD AUTO: 0.3 K/UL — SIGNIFICANT CHANGE UP (ref 0–0.9)
MONOCYTES NFR BLD AUTO: 8.3 % — SIGNIFICANT CHANGE UP (ref 2–14)
NEUTROPHILS # BLD AUTO: 2.5 K/UL — SIGNIFICANT CHANGE UP (ref 1.8–7.4)
NEUTROPHILS NFR BLD AUTO: 62.1 % — SIGNIFICANT CHANGE UP (ref 43–77)
PLATELET # BLD AUTO: 299 K/UL — SIGNIFICANT CHANGE UP (ref 150–400)
RBC # BLD: 4.16 M/UL — SIGNIFICANT CHANGE UP (ref 3.8–5.2)
RBC # FLD: 13.1 % — SIGNIFICANT CHANGE UP (ref 10.3–14.5)
WBC # BLD: 4 K/UL — SIGNIFICANT CHANGE UP (ref 3.8–10.5)
WBC # FLD AUTO: 4 K/UL — SIGNIFICANT CHANGE UP (ref 3.8–10.5)

## 2022-08-31 LAB
ALBUMIN SERPL ELPH-MCNC: 4.3 G/DL — SIGNIFICANT CHANGE UP (ref 3.3–5)
ALP SERPL-CCNC: 109 U/L — SIGNIFICANT CHANGE UP (ref 40–120)
ALT FLD-CCNC: 67 U/L — HIGH (ref 10–45)
ANION GAP SERPL CALC-SCNC: 14 MMOL/L — SIGNIFICANT CHANGE UP (ref 5–17)
AST SERPL-CCNC: 45 U/L — HIGH (ref 10–40)
BILIRUB SERPL-MCNC: 0.2 MG/DL — SIGNIFICANT CHANGE UP (ref 0.2–1.2)
BUN SERPL-MCNC: 13 MG/DL — SIGNIFICANT CHANGE UP (ref 7–23)
CALCIUM SERPL-MCNC: 9.3 MG/DL — SIGNIFICANT CHANGE UP (ref 8.4–10.5)
CEA SERPL-MCNC: 61.8 NG/ML — HIGH (ref 0–3.8)
CHLORIDE SERPL-SCNC: 105 MMOL/L — SIGNIFICANT CHANGE UP (ref 96–108)
CO2 SERPL-SCNC: 22 MMOL/L — SIGNIFICANT CHANGE UP (ref 22–31)
CREAT SERPL-MCNC: 0.62 MG/DL — SIGNIFICANT CHANGE UP (ref 0.5–1.3)
EGFR: 98 ML/MIN/1.73M2 — SIGNIFICANT CHANGE UP
GLUCOSE SERPL-MCNC: 157 MG/DL — HIGH (ref 70–99)
POTASSIUM SERPL-MCNC: 4.4 MMOL/L — SIGNIFICANT CHANGE UP (ref 3.5–5.3)
POTASSIUM SERPL-SCNC: 4.4 MMOL/L — SIGNIFICANT CHANGE UP (ref 3.5–5.3)
PROT SERPL-MCNC: 7.2 G/DL — SIGNIFICANT CHANGE UP (ref 6–8.3)
SODIUM SERPL-SCNC: 141 MMOL/L — SIGNIFICANT CHANGE UP (ref 135–145)
T4 FREE SERPL-MCNC: 0.8 NG/DL — LOW (ref 0.9–1.8)
TSH SERPL-MCNC: 1.18 UIU/ML — SIGNIFICANT CHANGE UP (ref 0.27–4.2)

## 2022-09-01 PROCEDURE — 85610 PROTHROMBIN TIME: CPT

## 2022-09-01 PROCEDURE — 80076 HEPATIC FUNCTION PANEL: CPT

## 2022-09-01 PROCEDURE — 71275 CT ANGIOGRAPHY CHEST: CPT | Mod: MA

## 2022-09-01 PROCEDURE — 82565 ASSAY OF CREATININE: CPT

## 2022-09-01 PROCEDURE — 84145 PROCALCITONIN (PCT): CPT

## 2022-09-01 PROCEDURE — 85025 COMPLETE CBC W/AUTO DIFF WBC: CPT

## 2022-09-01 PROCEDURE — 86140 C-REACTIVE PROTEIN: CPT

## 2022-09-01 PROCEDURE — 99285 EMERGENCY DEPT VISIT HI MDM: CPT

## 2022-09-01 PROCEDURE — 71045 X-RAY EXAM CHEST 1 VIEW: CPT

## 2022-09-01 PROCEDURE — U0003: CPT

## 2022-09-01 PROCEDURE — 82728 ASSAY OF FERRITIN: CPT

## 2022-09-01 PROCEDURE — 80053 COMPREHEN METABOLIC PANEL: CPT

## 2022-09-01 PROCEDURE — 36415 COLL VENOUS BLD VENIPUNCTURE: CPT

## 2022-09-01 PROCEDURE — U0005: CPT

## 2022-09-12 ENCOUNTER — APPOINTMENT (OUTPATIENT)
Dept: DERMATOLOGY | Facility: CLINIC | Age: 67
End: 2022-09-12

## 2022-09-12 LAB
CORTICOSTEROID BINDING GLOBULIN RESULT: 2 MG/DL — SIGNIFICANT CHANGE UP
CORTIS F/TOTAL MFR SERPL: 3.7 % — SIGNIFICANT CHANGE UP
CORTIS SERPL-MCNC: 4.6 UG/DL — SIGNIFICANT CHANGE UP
CORTISOL, FREE RESULT: 0.17 UG/DL — LOW

## 2022-09-12 PROCEDURE — 99203 OFFICE O/P NEW LOW 30 MIN: CPT

## 2022-09-13 ENCOUNTER — RESULT REVIEW (OUTPATIENT)
Age: 67
End: 2022-09-13

## 2022-09-13 ENCOUNTER — APPOINTMENT (OUTPATIENT)
Age: 67
End: 2022-09-13

## 2022-09-13 LAB
ALBUMIN SERPL ELPH-MCNC: 4.7 G/DL — SIGNIFICANT CHANGE UP (ref 3.3–5)
ALP SERPL-CCNC: 111 U/L — SIGNIFICANT CHANGE UP (ref 40–120)
ALT FLD-CCNC: 53 U/L — HIGH (ref 10–45)
ANION GAP SERPL CALC-SCNC: 13 MMOL/L — SIGNIFICANT CHANGE UP (ref 5–17)
AST SERPL-CCNC: 44 U/L — HIGH (ref 10–40)
BASOPHILS # BLD AUTO: 0.1 K/UL — SIGNIFICANT CHANGE UP (ref 0–0.2)
BASOPHILS NFR BLD AUTO: 1.8 % — SIGNIFICANT CHANGE UP (ref 0–2)
BILIRUB SERPL-MCNC: 0.2 MG/DL — SIGNIFICANT CHANGE UP (ref 0.2–1.2)
BUN SERPL-MCNC: 18 MG/DL — SIGNIFICANT CHANGE UP (ref 7–23)
CALCIUM SERPL-MCNC: 9.8 MG/DL — SIGNIFICANT CHANGE UP (ref 8.4–10.5)
CHLORIDE SERPL-SCNC: 102 MMOL/L — SIGNIFICANT CHANGE UP (ref 96–108)
CO2 SERPL-SCNC: 24 MMOL/L — SIGNIFICANT CHANGE UP (ref 22–31)
CREAT SERPL-MCNC: 0.64 MG/DL — SIGNIFICANT CHANGE UP (ref 0.5–1.3)
EGFR: 97 ML/MIN/1.73M2 — SIGNIFICANT CHANGE UP
EOSINOPHIL # BLD AUTO: 0.2 K/UL — SIGNIFICANT CHANGE UP (ref 0–0.5)
EOSINOPHIL NFR BLD AUTO: 2.8 % — SIGNIFICANT CHANGE UP (ref 0–6)
GLUCOSE SERPL-MCNC: 74 MG/DL — SIGNIFICANT CHANGE UP (ref 70–99)
HCT VFR BLD CALC: 39.1 % — SIGNIFICANT CHANGE UP (ref 34.5–45)
HGB BLD-MCNC: 13.1 G/DL — SIGNIFICANT CHANGE UP (ref 11.5–15.5)
LYMPHOCYTES # BLD AUTO: 1.5 K/UL — SIGNIFICANT CHANGE UP (ref 1–3.3)
LYMPHOCYTES # BLD AUTO: 27.7 % — SIGNIFICANT CHANGE UP (ref 13–44)
MCHC RBC-ENTMCNC: 30.4 PG — SIGNIFICANT CHANGE UP (ref 27–34)
MCHC RBC-ENTMCNC: 33.4 G/DL — SIGNIFICANT CHANGE UP (ref 32–36)
MCV RBC AUTO: 91.1 FL — SIGNIFICANT CHANGE UP (ref 80–100)
MONOCYTES # BLD AUTO: 0.4 K/UL — SIGNIFICANT CHANGE UP (ref 0–0.9)
MONOCYTES NFR BLD AUTO: 7.7 % — SIGNIFICANT CHANGE UP (ref 2–14)
NEUTROPHILS # BLD AUTO: 3.3 K/UL — SIGNIFICANT CHANGE UP (ref 1.8–7.4)
NEUTROPHILS NFR BLD AUTO: 60.1 % — SIGNIFICANT CHANGE UP (ref 43–77)
PLATELET # BLD AUTO: 293 K/UL — SIGNIFICANT CHANGE UP (ref 150–400)
POTASSIUM SERPL-MCNC: 4.5 MMOL/L — SIGNIFICANT CHANGE UP (ref 3.5–5.3)
POTASSIUM SERPL-SCNC: 4.5 MMOL/L — SIGNIFICANT CHANGE UP (ref 3.5–5.3)
PROT SERPL-MCNC: 7.7 G/DL — SIGNIFICANT CHANGE UP (ref 6–8.3)
RBC # BLD: 4.3 M/UL — SIGNIFICANT CHANGE UP (ref 3.8–5.2)
RBC # FLD: 12.9 % — SIGNIFICANT CHANGE UP (ref 10.3–14.5)
SODIUM SERPL-SCNC: 139 MMOL/L — SIGNIFICANT CHANGE UP (ref 135–145)
WBC # BLD: 5.5 K/UL — SIGNIFICANT CHANGE UP (ref 3.8–10.5)
WBC # FLD AUTO: 5.5 K/UL — SIGNIFICANT CHANGE UP (ref 3.8–10.5)

## 2022-09-13 RX ORDER — FLUOXETINE HCL 10 MG
1 CAPSULE ORAL
Qty: 0 | Refills: 0 | DISCHARGE

## 2022-09-13 RX ORDER — ALPRAZOLAM 0.25 MG
1 TABLET ORAL
Qty: 0 | Refills: 0 | DISCHARGE

## 2022-09-13 RX ORDER — DURVALUMAB 120 MG/2.4ML
0 INJECTION, SOLUTION INTRAVENOUS
Qty: 0 | Refills: 0 | DISCHARGE

## 2022-09-14 ENCOUNTER — APPOINTMENT (OUTPATIENT)
Dept: HEMATOLOGY ONCOLOGY | Facility: CLINIC | Age: 67
End: 2022-09-14

## 2022-09-14 VITALS
SYSTOLIC BLOOD PRESSURE: 110 MMHG | BODY MASS INDEX: 29.14 KG/M2 | WEIGHT: 177 LBS | OXYGEN SATURATION: 95 % | HEIGHT: 65.35 IN | HEART RATE: 70 BPM | DIASTOLIC BLOOD PRESSURE: 76 MMHG

## 2022-09-14 PROCEDURE — 99215 OFFICE O/P EST HI 40 MIN: CPT

## 2022-09-14 NOTE — HISTORY OF PRESENT ILLNESS
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 Recent imaging has shown growth and development of a solid structure. The etiology remains unclear, but malignancy has been considered. She feels well has no complaints. She denies fever, chills, night sweats, weight loss, or weight gain. \par \par Followed by Dr Galeana in the past\par PPD+ in 2000 and received 6 months of INH\par PET positive RENAN posterior cavitary density in 2010\par TTNA and Bronch non-diagnostic. Cultures negative for fungus and TB\par CT at [facility] in 2010 and 2012 stable - felt to be benign. Lost to FU\par \par Patient had a PEt scan in Oct 2020 with a Left upper lobe spiculated lung mass 4.5 x 3cm with an SUV 11.1 with adjacent tree in bud opacity \par In addition Rt upper lobe opacity 1.2 x1 cm which is not FDG avid \par Discrete Left hilar/ infrahilar region  lesion with an SUV 5.2 which is possibly corresponding to a LN \par \par \par Patient s/p Left lung mass, core biopsy with pathology c/w Adenocarcinoma c/w pulmonary origin (see comment)\par CK7, TTF-1 : Positive CK20, PAX-8 : Negative\par  [de-identified] : SON: JUAN RAMON [phone] [de-identified] : Patient presents for follow up.\par \par Patient saw Dr. torres for generalized pruritus likely 2/2 to durvalumab. Patient prescribed topical steroids but may possibly require oral histaminics.\par Patient admits dermatitis/pruritus of skin has slightly improved since using topical steroids. Admits no improvement in the scalp. Patient had trouble sleeping 2/2 to pruritus\par Patient reports cough upon exertion 2/2 to radiation.\par Reports recovered from covid.\par \par For adenocarcinoma of the RENAN. She completed concurrent CRT with carbo/ taxol on 6/21/21. \par Start of Durvalumab delayed by RT pneumonitis and subsequent need for prednisone now s/p taper, started on 15 mg/day decreased to 10mg on Sunday 9/5, then to 5 mg on Sunday 9/13 and completed on 9/20. \par \par 8/03/22 CT Angio chest \par NO PE. Consolidative changes in the perihilar left ling extending to the periphery unchanged and previous post radiation changes. 1.5cm  Rt upper lobe nodule unchanged however new patchy consolidation of uncertain etiology.\par \par 8/30/22: CEA 61.8\par 3/15/22 CEA 14.5\par 1/11/22 CEA 12.3\par 12/28/21 CEA 10.7\par 12/14/21 CEA 11\par 11/30/21 CEA 11.5\par \par

## 2022-09-14 NOTE — ASSESSMENT
[FreeTextEntry1] : SHANON BEE is a female never smoker who was 66 yo at diagnosis,  of adenocarcinoma lung \par Patient with no significant past medical history\par PET in 10/2020 with a RENAN spiculated lung mass 4.5 x 3cm with an SUV 11.1 with adjacent tree in bud opacity. Left lung, biopsy on 11/23/20: Adenocarcinoma\par \par -MRI Brain 12/29/20: negative \par -CT C/A/P 12/29/20: stable RENAN primary lesion, (4.7 x 2.3cm), 1.2 cm left lung apex lesion along with RUL stable lesion of 1.3 cm \par -PDL1 20% \par -Foundation one : ALK ELM 4 fusion variant, MS stable TMB 4 mut/ Mb, SF 3B1 \par \par Reviewed most recent 8/03/22 CT Angio chest:\par NO PE. Consolidative changes in the perihilar left lung extending to the periphery unchanged and previous post radiation changes. 1.5cm Rt uppet lobe  nodule unchanged however new patchy consolidation of uncertain etiology.\par \par Biopsy done of RUL: not malignant/ reactive \par -Initial plan for resection per Dr Choudhury, however EBUS prior to surgery with N2 disease. Patient was presented at Tumor board on 4/14/21 and a plan for definitive CRT \par -S/p chemo RT with Carbo and taxol weekly with RT, completed 6/21/21\par -She had a rough course with Rt mediated Pneumonitis/ superimposed PNA  in July/ Aug 2021 requiring hospitalization\par -Currently on Immunotherapy with Durvalumab for 1 year Started on 9/7/21 Start date delayed due to requiring Prednisone for RT Pneumonitis. Planned completion of Durvalumab on 10/25/22\par - discussed CT scan from 8/3/22 while she had covid.  RT changes in lt  lobe/ perihilar area,  stable 1.5 cm Rt upper lobe\par -Repeat CT scan in 3 months ( nov 2022) \par -F/u with Juju in 3-4 weeks\par \par #Pruritus:\par Patient saw Dr. Andre for generalized pruritus likely 2/2 to durvalumab. Patient prescribed topical steroids but may possibly require oral histaminics.\par Continue f/u with Dr. Andre if pruritus persists okay to start oral histaminics.\par \par \par Patient intake form reviewed and recommendations noted.\par \par \par

## 2022-09-14 NOTE — ADDENDUM
[FreeTextEntry1] : Documented by Soraya Lewis acting as scribe for Dr. Simpson on 09/14/2022 \par \par All Medical record entries made by the Scribe were at my, Dr. Simpson's, direction and personally dictated by me on 09/14/2022 . I have reviewed the chart and agree that the record accurately reflects my personal performance of the history, physical exam, assessment and plan. I have also personally directed, reviewed, and agreed with the discharge instructions.\par

## 2022-09-14 NOTE — RESULTS/DATA
[FreeTextEntry1] : 8/03/22 CT Angio chest \par NO PE. Consolidative changes in the perihilar left ling extending to the periphery unchanged and previous post radiation changes. 1.5cm  nodule unchanged however new patchy consolidation of uncertain etiology.\par \par CT Chest 4/18/22: Stable since 1/2021 however minimally grown since March 2018 (1 x 0.8 cm). \par - Was due Durvalumab today ( c 23) however she tested positive for covid will cancel treatment. advised to proceed with next treatment on 8/16/22 if symptom subside. She has not had restaging scans \par - Will order scans however advsied to wait 4 weeks to get them due to covid related lung changes\par - continue to monitor \par - Continue with Durvalumab for 1 y \par -TSH 5.79, T4 0.7- started levothyroxine 25 mcg. patient follow Endocrinology- Dr. Arias John\par \par \par CT chest no contrast 1/7/22\par IMPRESSION: Since 2 months prior:\par -Evolving radiotherapy changes in the left upper and lower lobes, with slightly decreased\par consolidative component in the left upper lobe, at site of treated neoplasm.\par -Decreased prior right lower lobe abnormality, however multiple new peripheral areas of\par groundglass in the right upper and right lower lobes. The migratory pattern suggests organizing\par pneumonia, possibly drug-induced or radiation-induced. Acute infection, including COVID, could also\par account for the new findings.\par -Stable right apex 1.4 cm nodule.\par \par \par CT CHEST 10/25/21 \par 1.  Evolution of post radiation changes in the left greater than right lungs. New findings in the right lower lobe which may reflect delayed radiation effect or radiation pneumonitis as well as infection\par 2.  Stable right upper lobe nodule, A 1.4 x 0.9 cm lobulated right upper lobe nodule is stable compared to prior study although measured 1 x 0.8 cm on 3/7/2018; on the 10/12/2020 PET/CT, this nodule was not FDG avid.previously PET negative, increased in size on since 2018. Reassessment can be made on surveillance imaging. No new lung nodule. \par \par 7/21/21 \par CT CAP post treatment on 7/21/21 with stability in LEft upper lobe mass at 4.3 cm x 2.8 cm ( previously 4.2 x 3.2 cm)  1.3 cm rt upper lobe nodule is unchanged> new patchy groungglass opacity surrounding both upper lobe lesions which maybe 2/2 treatment effect vs infection

## 2022-09-20 NOTE — H&P ADULT - MENTAL STATUS
[No Acute Distress] : no acute distress [Well Nourished] : well nourished [Well Developed] : well developed [Well-Appearing] : well-appearing [Normal Voice/Communication] : normal voice/communication [Normal Sclera/Conjunctiva] : normal sclera/conjunctiva [PERRL] : pupils equal round and reactive to light [EOMI] : extraocular movements intact [Normal Outer Ear/Nose] : the outer ears and nose were normal in appearance [Normal Oropharynx] : the oropharynx was normal [No JVD] : no jugular venous distention [No Lymphadenopathy] : no lymphadenopathy [Supple] : supple AAOX4 [Thyroid Normal, No Nodules] : the thyroid was normal and there were no nodules present [No Respiratory Distress] : no respiratory distress  [No Accessory Muscle Use] : no accessory muscle use [Clear to Auscultation] : lungs were clear to auscultation bilaterally [Normal Rate] : normal rate  [Regular Rhythm] : with a regular rhythm [Normal S1, S2] : normal S1 and S2 [No Murmur] : no murmur heard [No Carotid Bruits] : no carotid bruits [No Abdominal Bruit] : a ~M bruit was not heard ~T in the abdomen [No Varicosities] : no varicosities [Pedal Pulses Present] : the pedal pulses are present [No Edema] : there was no peripheral edema [No Palpable Aorta] : no palpable aorta [No Extremity Clubbing/Cyanosis] : no extremity clubbing/cyanosis [Soft] : abdomen soft [Non Tender] : non-tender [Non-distended] : non-distended [No Masses] : no abdominal mass palpated [No HSM] : no HSM [Normal Bowel Sounds] : normal bowel sounds [Normal Axillary Nodes] : no axillary lymphadenopathy [Normal Posterior Cervical Nodes] : no posterior cervical lymphadenopathy [Normal Anterior Cervical Nodes] : no anterior cervical lymphadenopathy [Normal Inguinal Nodes] : no inguinal lymphadenopathy [No CVA Tenderness] : no CVA  tenderness [No Spinal Tenderness] : no spinal tenderness [No Joint Swelling] : no joint swelling [Grossly Normal Strength/Tone] : grossly normal strength/tone [No Rash] : no rash [Coordination Grossly Intact] : coordination grossly intact [No Focal Deficits] : no focal deficits [Normal Gait] : normal gait

## 2022-09-21 ENCOUNTER — OUTPATIENT (OUTPATIENT)
Dept: OUTPATIENT SERVICES | Facility: HOSPITAL | Age: 67
LOS: 1 days | Discharge: HOME | End: 2022-09-21

## 2022-09-21 DIAGNOSIS — Z90.49 ACQUIRED ABSENCE OF OTHER SPECIFIED PARTS OF DIGESTIVE TRACT: Chronic | ICD-10-CM

## 2022-09-21 DIAGNOSIS — Z98.890 OTHER SPECIFIED POSTPROCEDURAL STATES: Chronic | ICD-10-CM

## 2022-09-21 DIAGNOSIS — Z98.891 HISTORY OF UTERINE SCAR FROM PREVIOUS SURGERY: Chronic | ICD-10-CM

## 2022-09-21 DIAGNOSIS — Z98.82 BREAST IMPLANT STATUS: Chronic | ICD-10-CM

## 2022-09-21 DIAGNOSIS — C34.90 MALIGNANT NEOPLASM OF UNSPECIFIED PART OF UNSPECIFIED BRONCHUS OR LUNG: ICD-10-CM

## 2022-09-27 ENCOUNTER — RESULT REVIEW (OUTPATIENT)
Age: 67
End: 2022-09-27

## 2022-09-27 ENCOUNTER — APPOINTMENT (OUTPATIENT)
Age: 67
End: 2022-09-27

## 2022-09-27 LAB
BASOPHILS # BLD AUTO: 0.1 K/UL — SIGNIFICANT CHANGE UP (ref 0–0.2)
BASOPHILS NFR BLD AUTO: 1.6 % — SIGNIFICANT CHANGE UP (ref 0–2)
CEA SERPL-MCNC: 75.7 NG/ML — HIGH (ref 0–3.8)
EOSINOPHIL # BLD AUTO: 0.1 K/UL — SIGNIFICANT CHANGE UP (ref 0–0.5)
EOSINOPHIL NFR BLD AUTO: 3 % — SIGNIFICANT CHANGE UP (ref 0–6)
HCT VFR BLD CALC: 36 % — SIGNIFICANT CHANGE UP (ref 34.5–45)
HGB BLD-MCNC: 12 G/DL — SIGNIFICANT CHANGE UP (ref 11.5–15.5)
LYMPHOCYTES # BLD AUTO: 0.9 K/UL — LOW (ref 1–3.3)
LYMPHOCYTES # BLD AUTO: 22 % — SIGNIFICANT CHANGE UP (ref 13–44)
MCHC RBC-ENTMCNC: 31.6 PG — SIGNIFICANT CHANGE UP (ref 27–34)
MCHC RBC-ENTMCNC: 33.2 G/DL — SIGNIFICANT CHANGE UP (ref 32–36)
MCV RBC AUTO: 94.9 FL — SIGNIFICANT CHANGE UP (ref 80–100)
MONOCYTES # BLD AUTO: 0.4 K/UL — SIGNIFICANT CHANGE UP (ref 0–0.9)
MONOCYTES NFR BLD AUTO: 9.5 % — SIGNIFICANT CHANGE UP (ref 2–14)
NEUTROPHILS # BLD AUTO: 2.5 K/UL — SIGNIFICANT CHANGE UP (ref 1.8–7.4)
NEUTROPHILS NFR BLD AUTO: 63.9 % — SIGNIFICANT CHANGE UP (ref 43–77)
PLATELET # BLD AUTO: 272 K/UL — SIGNIFICANT CHANGE UP (ref 150–400)
RBC # BLD: 3.79 M/UL — LOW (ref 3.8–5.2)
RBC # FLD: 12.8 % — SIGNIFICANT CHANGE UP (ref 10.3–14.5)
WBC # BLD: 3.9 K/UL — SIGNIFICANT CHANGE UP (ref 3.8–10.5)
WBC # FLD AUTO: 3.9 K/UL — SIGNIFICANT CHANGE UP (ref 3.8–10.5)

## 2022-09-28 DIAGNOSIS — Z51.11 ENCOUNTER FOR ANTINEOPLASTIC CHEMOTHERAPY: ICD-10-CM

## 2022-09-28 LAB
ALBUMIN SERPL ELPH-MCNC: 4.1 G/DL — SIGNIFICANT CHANGE UP (ref 3.3–5)
ALP SERPL-CCNC: 102 U/L — SIGNIFICANT CHANGE UP (ref 40–120)
ALT FLD-CCNC: 45 U/L — SIGNIFICANT CHANGE UP (ref 10–45)
ANION GAP SERPL CALC-SCNC: 13 MMOL/L — SIGNIFICANT CHANGE UP (ref 5–17)
AST SERPL-CCNC: 33 U/L — SIGNIFICANT CHANGE UP (ref 10–40)
BILIRUB SERPL-MCNC: 0.2 MG/DL — SIGNIFICANT CHANGE UP (ref 0.2–1.2)
BUN SERPL-MCNC: 16 MG/DL — SIGNIFICANT CHANGE UP (ref 7–23)
CALCIUM SERPL-MCNC: 9.4 MG/DL — SIGNIFICANT CHANGE UP (ref 8.4–10.5)
CHLORIDE SERPL-SCNC: 105 MMOL/L — SIGNIFICANT CHANGE UP (ref 96–108)
CO2 SERPL-SCNC: 24 MMOL/L — SIGNIFICANT CHANGE UP (ref 22–31)
CREAT SERPL-MCNC: 0.69 MG/DL — SIGNIFICANT CHANGE UP (ref 0.5–1.3)
EGFR: 95 ML/MIN/1.73M2 — SIGNIFICANT CHANGE UP
GLUCOSE SERPL-MCNC: 147 MG/DL — HIGH (ref 70–99)
POTASSIUM SERPL-MCNC: 4.1 MMOL/L — SIGNIFICANT CHANGE UP (ref 3.5–5.3)
POTASSIUM SERPL-SCNC: 4.1 MMOL/L — SIGNIFICANT CHANGE UP (ref 3.5–5.3)
PROT SERPL-MCNC: 6.7 G/DL — SIGNIFICANT CHANGE UP (ref 6–8.3)
SODIUM SERPL-SCNC: 142 MMOL/L — SIGNIFICANT CHANGE UP (ref 135–145)
T4 FREE SERPL-MCNC: 0.8 NG/DL — LOW (ref 0.9–1.8)
TSH SERPL-MCNC: 1.16 UIU/ML — SIGNIFICANT CHANGE UP (ref 0.27–4.2)

## 2022-09-29 LAB
CORTICOSTEROID BINDING GLOBULIN RESULT: 2.5 MG/DL — SIGNIFICANT CHANGE UP
CORTIS F/TOTAL MFR SERPL: 2.8 % — SIGNIFICANT CHANGE UP
CORTIS SERPL-MCNC: 4.7 UG/DL — SIGNIFICANT CHANGE UP
CORTISOL, FREE RESULT: 0.13 UG/DL — LOW

## 2022-10-10 LAB
CORTICOSTEROID BINDING GLOBULIN RESULT: 1.5 MG/DL — LOW
CORTIS F/TOTAL MFR SERPL: 5.4 % — SIGNIFICANT CHANGE UP
CORTIS SERPL-MCNC: 4.2 UG/DL — SIGNIFICANT CHANGE UP
CORTISOL, FREE RESULT: 0.23 UG/DL — SIGNIFICANT CHANGE UP

## 2022-10-11 ENCOUNTER — APPOINTMENT (OUTPATIENT)
Age: 67
End: 2022-10-11

## 2022-10-11 DIAGNOSIS — L29.9 PRURITUS, UNSPECIFIED: ICD-10-CM

## 2022-10-14 ENCOUNTER — APPOINTMENT (OUTPATIENT)
Dept: HEMATOLOGY ONCOLOGY | Facility: CLINIC | Age: 67
End: 2022-10-14

## 2022-10-25 ENCOUNTER — APPOINTMENT (OUTPATIENT)
Age: 67
End: 2022-10-25

## 2022-11-01 ENCOUNTER — APPOINTMENT (OUTPATIENT)
Dept: CT IMAGING | Facility: CLINIC | Age: 67
End: 2022-11-01

## 2022-11-01 ENCOUNTER — OUTPATIENT (OUTPATIENT)
Dept: OUTPATIENT SERVICES | Facility: HOSPITAL | Age: 67
LOS: 1 days | End: 2022-11-01
Payer: MEDICARE

## 2022-11-01 DIAGNOSIS — Z00.8 ENCOUNTER FOR OTHER GENERAL EXAMINATION: ICD-10-CM

## 2022-11-01 DIAGNOSIS — Z90.49 ACQUIRED ABSENCE OF OTHER SPECIFIED PARTS OF DIGESTIVE TRACT: Chronic | ICD-10-CM

## 2022-11-01 DIAGNOSIS — Z98.890 OTHER SPECIFIED POSTPROCEDURAL STATES: Chronic | ICD-10-CM

## 2022-11-01 DIAGNOSIS — Z98.891 HISTORY OF UTERINE SCAR FROM PREVIOUS SURGERY: Chronic | ICD-10-CM

## 2022-11-01 DIAGNOSIS — C80.1 MALIGNANT (PRIMARY) NEOPLASM, UNSPECIFIED: ICD-10-CM

## 2022-11-01 DIAGNOSIS — Z98.82 BREAST IMPLANT STATUS: Chronic | ICD-10-CM

## 2022-11-01 PROCEDURE — 71260 CT THORAX DX C+: CPT | Mod: 26

## 2022-11-01 PROCEDURE — 71260 CT THORAX DX C+: CPT

## 2022-11-15 ENCOUNTER — RESULT REVIEW (OUTPATIENT)
Age: 67
End: 2022-11-15

## 2022-11-15 ENCOUNTER — APPOINTMENT (OUTPATIENT)
Dept: HEMATOLOGY ONCOLOGY | Facility: CLINIC | Age: 67
End: 2022-11-15

## 2022-11-15 VITALS
OXYGEN SATURATION: 96 % | DIASTOLIC BLOOD PRESSURE: 76 MMHG | WEIGHT: 179 LBS | BODY MASS INDEX: 29.46 KG/M2 | SYSTOLIC BLOOD PRESSURE: 115 MMHG | HEIGHT: 65.35 IN | HEART RATE: 77 BPM | TEMPERATURE: 97.4 F

## 2022-11-15 LAB
BASOPHILS # BLD AUTO: 0.1 K/UL — SIGNIFICANT CHANGE UP (ref 0–0.2)
BASOPHILS NFR BLD AUTO: 1.6 % — SIGNIFICANT CHANGE UP (ref 0–2)
EOSINOPHIL # BLD AUTO: 0.2 K/UL — SIGNIFICANT CHANGE UP (ref 0–0.5)
EOSINOPHIL NFR BLD AUTO: 4.8 % — SIGNIFICANT CHANGE UP (ref 0–6)
HCT VFR BLD CALC: 37.2 % — SIGNIFICANT CHANGE UP (ref 34.5–45)
HGB BLD-MCNC: 12.6 G/DL — SIGNIFICANT CHANGE UP (ref 11.5–15.5)
LYMPHOCYTES # BLD AUTO: 1.1 K/UL — SIGNIFICANT CHANGE UP (ref 1–3.3)
LYMPHOCYTES # BLD AUTO: 23.7 % — SIGNIFICANT CHANGE UP (ref 13–44)
MCHC RBC-ENTMCNC: 31.2 PG — SIGNIFICANT CHANGE UP (ref 27–34)
MCHC RBC-ENTMCNC: 34 G/DL — SIGNIFICANT CHANGE UP (ref 32–36)
MCV RBC AUTO: 92 FL — SIGNIFICANT CHANGE UP (ref 80–100)
MONOCYTES # BLD AUTO: 0.5 K/UL — SIGNIFICANT CHANGE UP (ref 0–0.9)
MONOCYTES NFR BLD AUTO: 10.7 % — SIGNIFICANT CHANGE UP (ref 2–14)
NEUTROPHILS # BLD AUTO: 2.8 K/UL — SIGNIFICANT CHANGE UP (ref 1.8–7.4)
NEUTROPHILS NFR BLD AUTO: 59.2 % — SIGNIFICANT CHANGE UP (ref 43–77)
PLATELET # BLD AUTO: 290 K/UL — SIGNIFICANT CHANGE UP (ref 150–400)
RBC # BLD: 4.04 M/UL — SIGNIFICANT CHANGE UP (ref 3.8–5.2)
RBC # FLD: 12.2 % — SIGNIFICANT CHANGE UP (ref 10.3–14.5)
WBC # BLD: 4.7 K/UL — SIGNIFICANT CHANGE UP (ref 3.8–10.5)
WBC # FLD AUTO: 4.7 K/UL — SIGNIFICANT CHANGE UP (ref 3.8–10.5)

## 2022-11-15 PROCEDURE — 99215 OFFICE O/P EST HI 40 MIN: CPT

## 2022-11-15 NOTE — HISTORY OF PRESENT ILLNESS
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 Recent imaging has shown growth and development of a solid structure. The etiology remains unclear, but malignancy has been considered. She feels well has no complaints. She denies fever, chills, night sweats, weight loss, or weight gain. \par \par Followed by Dr Galeana in the past\par PPD+ in 2000 and received 6 months of INH\par PET positive RENAN posterior cavitary density in 2010\par TTNA and Bronch non-diagnostic. Cultures negative for fungus and TB\par CT at [facility] in 2010 and 2012 stable - felt to be benign. Lost to FU\par \par Patient had a PEt scan in Oct 2020 with a Left upper lobe spiculated lung mass 4.5 x 3cm with an SUV 11.1 with adjacent tree in bud opacity \par In addition Rt upper lobe opacity 1.2 x1 cm which is not FDG avid \par Discrete Left hilar/ infrahilar region  lesion with an SUV 5.2 which is possibly corresponding to a LN \par \par \par Patient s/p Left lung mass, core biopsy with pathology c/w Adenocarcinoma c/w pulmonary origin (see comment)\par CK7, TTF-1 : Positive CK20, PAX-8 : Negative\par  [de-identified] : SON: JUAN RAMON [phone] [de-identified] : Patient presents for follow up.\par \par CT chest: 11/1/22: \par \par Since 8/3/2022:\par Unchanged treated left upper lobe neoplasm and radiation associated scarring in the left lung.\par Unchanged 1.4 cm right upper lobe solid nodule.\par New opacities in the upper lobes and near resolved prior right upper lobe opacities. Given the waxing and waning/migratory pattern over multiple prior exams, consider organizing pneumonia.\par \par Since scan started on ANTIBIOTICS and cough improved\par Pruritics improved \par \par For adenocarcinoma of the RENAN. She completed concurrent CRT with carbo/ taxol on 6/21/21. \par Start of Durvalumab delayed by RT pneumonitis and subsequent need for prednisone now s/p taper, started on 15 mg/day decreased to 10mg on Sunday 9/5, then to 5 mg on Sunday 9/13 and completed on 9/20. \par \par \par

## 2022-11-15 NOTE — RESULTS/DATA
[FreeTextEntry1] : \par 8/03/22 CT Angio chest \par NO PE. Consolidative changes in the perihilar left ling extending to the periphery unchanged and previous post radiation changes. 1.5cm  Rt upper lobe nodule unchanged however new patchy consolidation of uncertain etiology.\par \par 8/30/22: CEA 61.8\par 3/15/22 CEA 14.5\par 1/11/22 CEA 12.3\par 12/28/21 CEA 10.7\par 12/14/21 CEA 11\par 11/30/21 CEA 11.5\par \par CT Chest 4/18/22: Stable since 1/2021 however minimally grown since March 2018 (1 x 0.8 cm). \par - Was due Durvalumab today ( c 23) however she tested positive for covid will cancel treatment. advised to proceed with next treatment on 8/16/22 if symptom subside. She has not had restaging scans \par - Will order scans however advsied to wait 4 weeks to get them due to covid related lung changes\par - continue to monitor \par - Continue with Durvalumab for 1 y \par -TSH 5.79, T4 0.7- started levothyroxine 25 mcg. patient follow Endocrinology- Dr. Arias John\par \par \par CT chest no contrast 1/7/22\par IMPRESSION: Since 2 months prior:\par -Evolving radiotherapy changes in the left upper and lower lobes, with slightly decreased\par consolidative component in the left upper lobe, at site of treated neoplasm.\par -Decreased prior right lower lobe abnormality, however multiple new peripheral areas of\par groundglass in the right upper and right lower lobes. The migratory pattern suggests organizing\par pneumonia, possibly drug-induced or radiation-induced. Acute infection, including COVID, could also\par account for the new findings.\par -Stable right apex 1.4 cm nodule.\par \par \par CT CHEST 10/25/21 \par 1.  Evolution of post radiation changes in the left greater than right lungs. New findings in the right lower lobe which may reflect delayed radiation effect or radiation pneumonitis as well as infection\par 2.  Stable right upper lobe nodule, A 1.4 x 0.9 cm lobulated right upper lobe nodule is stable compared to prior study although measured 1 x 0.8 cm on 3/7/2018; on the 10/12/2020 PET/CT, this nodule was not FDG avid.previously PET negative, increased in size on since 2018. Reassessment can be made on surveillance imaging. No new lung nodule. \par \par 7/21/21 \par CT CAP post treatment on 7/21/21 with stability in LEft upper lobe mass at 4.3 cm x 2.8 cm ( previously 4.2 x 3.2 cm)  1.3 cm rt upper lobe nodule is unchanged> new patchy groungglass opacity surrounding both upper lobe lesions which maybe 2/2 treatment effect vs infection

## 2022-11-15 NOTE — ASSESSMENT
[FreeTextEntry1] : SHANON BEE is a female never smoker who was 68 yo at diagnosis,  of adenocarcinoma lung \par Patient with no significant past medical history\par PET in 10/2020 with a RENAN spiculated lung mass 4.5 x 3cm with an SUV 11.1 with adjacent tree in bud opacity. Left lung, biopsy on 11/23/20: Adenocarcinoma\par \par -MRI Brain 12/29/20: negative \par -CT C/A/P 12/29/20: stable RENAN primary lesion, (4.7 x 2.3cm), 1.2 cm left lung apex lesion along with RUL stable lesion of 1.3 cm \par -PDL1 20% \par -Foundation one : ALK ELM 4 fusion variant, MS stable TMB 4 mut/ Mb, SF 3B1 \par \par Reviewed most recent 8/03/22 CT Angio chest:\par NO PE. Consolidative changes in the perihilar left lung extending to the periphery unchanged and previous post radiation changes. 1.5cm Rt uppet lobe  nodule unchanged however new patchy consolidation of uncertain etiology.\par \par Biopsy done of RUL: not malignant/ reactive \par -Initial plan for resection per Dr Choudhury, however EBUS prior to surgery with N2 disease. Patient was presented at Tumor board on 4/14/21 and a plan for definitive CRT \par -S/p chemo RT with Carbo and taxol weekly with RT, completed 6/21/21\par -She had a rough course with Rt mediated Pneumonitis/ superimposed PNA  in July/ Aug 2021 requiring hospitalization\par -Currently on Immunotherapy with Durvalumab for 1 year Started on 9/7/21 Start date delayed due to requiring Prednisone for RT Pneumonitis.\par - s/p completion of  Durvalumab on 10/25/22\par - discussed CT scan from 8/3/22 while she had covid.  RT changes in lt  lobe/ perihilar area,  stable 1.5 cm Rt upper lobe\par - CT CHEST On 11/1/22: Since 8/3/2022:\par Unchanged treated left upper lobe neoplasm and radiation associated scarring in the left lung.\par Unchanged 1.4 cm right upper lobe solid nodule.\par New opacities in the upper lobes and near resolved prior right upper lobe opacities. Given the waxing and waning/migratory pattern over multiple prior exams, consider organizing pneumonia.\par - Repeat scan in Feb 2023/ will order MRI brain at the time \par \par #Pruritus:\par Patient saw Dr. Andre for generalized pruritus likely 2/2 to durvalumab. Patient prescribed topical steroids but Improved with vistaryl \par - Now improved\par \par \par \par \par

## 2022-11-16 LAB
ALBUMIN SERPL ELPH-MCNC: 4.4 G/DL
ALP BLD-CCNC: 131 U/L
ALT SERPL-CCNC: 48 U/L
ANION GAP SERPL CALC-SCNC: 13 MMOL/L
AST SERPL-CCNC: 39 U/L
BILIRUB SERPL-MCNC: 0.2 MG/DL
BUN SERPL-MCNC: 13 MG/DL
CALCIUM SERPL-MCNC: 9.4 MG/DL
CHLORIDE SERPL-SCNC: 105 MMOL/L
CO2 SERPL-SCNC: 24 MMOL/L
CREAT SERPL-MCNC: 0.76 MG/DL
EGFR: 86 ML/MIN/1.73M2
GLUCOSE SERPL-MCNC: 81 MG/DL
POTASSIUM SERPL-SCNC: 4.7 MMOL/L
PROT SERPL-MCNC: 7.2 G/DL
SODIUM SERPL-SCNC: 142 MMOL/L

## 2022-11-29 ENCOUNTER — APPOINTMENT (OUTPATIENT)
Dept: PULMONOLOGY | Facility: CLINIC | Age: 67
End: 2022-11-29

## 2022-11-29 VITALS
OXYGEN SATURATION: 94 % | RESPIRATION RATE: 16 BRPM | HEART RATE: 74 BPM | SYSTOLIC BLOOD PRESSURE: 122 MMHG | DIASTOLIC BLOOD PRESSURE: 80 MMHG | WEIGHT: 173 LBS | BODY MASS INDEX: 28.48 KG/M2 | HEIGHT: 65.35 IN

## 2022-11-29 PROCEDURE — 99215 OFFICE O/P EST HI 40 MIN: CPT

## 2022-11-29 RX ORDER — NIRMATRELVIR AND RITONAVIR 300-100 MG
20 X 150 MG & KIT ORAL
Qty: 30 | Refills: 0 | Status: COMPLETED | COMMUNITY
Start: 2022-08-02

## 2022-11-29 RX ORDER — ALENDRONATE SODIUM 70 MG/1
70 TABLET ORAL
Qty: 12 | Refills: 0 | Status: DISCONTINUED | COMMUNITY
Start: 2022-01-24 | End: 2022-11-29

## 2022-11-29 NOTE — REASON FOR VISIT
[Follow-Up] : a follow-up visit [Lung Cancer] : lung cancer [Pre-op Risk Stratification] : pre-op risk stratification [Ad Hoc ] : provided by an ad hoc  [TextBox_44] : Drug induced ILD vs Radiation pneumonitis [Interpreters_FullName] : Lashanda [Interpreters_Relationshiptopatient] : daughter [TWNoteComboBox1] : Stateless

## 2022-11-29 NOTE — PROCEDURE
[___%] : last visit [unfilled]U% [Spirometry] : stable [FreeTextEntry1] : No desaturation noted with ambulation

## 2022-11-29 NOTE — CONSULT LETTER
[Dear  ___] : Dear  [unfilled], [Consult Letter:] : I had the pleasure of evaluating your patient, [unfilled]. [Please see my note below.] : Please see my note below. [Sincerely,] : Sincerely, [DrDavid  ___] : Dr. CADET [FreeTextEntry3] : Tho Zayas MD FCCP\par Pulmonary/Critical Care/Sleep Medicine\par Department of Internal Medicine\par \par [facility] [employer] [employer]\par

## 2022-11-29 NOTE — DISCUSSION/SUMMARY
[FreeTextEntry1] : 67-year-old female with a history of adenocarcinoma of the left upper lobe seen today in follow-up following radiation pneumonitis or drug-induced pneumonia.  Minimal interstitial changes may be a residual from recent COVID-19 infection.  Of concern now is possibility of underlying obstructive sleep apnea which may be precipitating reflux and intermittent sinus disease.  This may be the etiology of desaturations seen on home monitoring versus poor sensitivity of her equipment

## 2022-11-29 NOTE — HISTORY OF PRESENT ILLNESS
[Former] : former [>= 20 pack years] : >= 20 pack years [Obstructive Sleep Apnea] : obstructive sleep apnea [Awakes Unrefreshed] : awakes unrefreshed [Awakes with Dry Mouth] : awakes with dry mouth [Snoring] : snoring [Witnessed Apneas] : witnessed apneas [TextBox_4] : 67-year-old female with a history of an adenocarcinoma left upper lobe status post radiation and chemotherapy.  Course complicated by a radiation pneumonitis versus drug-induced pneumonitis secondary to durvalumab.  Patient seen today for routine follow-up following CAT scan. \par \par August mild Covid and used  O2\par \par Patient suffered an episode of COVID-19 in mid August treated with Paxilovid and requiring oxygen.\par \par Cough on ABX 10/31/22. Saw PMD and rx'ed with azithromycin and resolved. c/o am cough and sputum intermittently. Evidence of desaturations with sleep on home monitoring device.  Has not been using oxygen on a regular basis [YearQuit] : 2021 [TextBox_77] : 0031 [TextBox_79] : 1602 [TextBox_81] : 30 [TextBox_89] : 3-4 [ESS] : 8

## 2022-11-29 NOTE — END OF VISIT
[Time Spent: ___ minutes] : I have spent [unfilled] minutes of time on the encounter. [FreeTextEntry3] : CT reviewed on PACS with patient and daughter

## 2022-12-14 ENCOUNTER — OUTPATIENT (OUTPATIENT)
Dept: OUTPATIENT SERVICES | Facility: HOSPITAL | Age: 67
LOS: 1 days | End: 2022-12-14
Payer: MEDICARE

## 2022-12-14 DIAGNOSIS — Z98.891 HISTORY OF UTERINE SCAR FROM PREVIOUS SURGERY: Chronic | ICD-10-CM

## 2022-12-14 DIAGNOSIS — Z98.82 BREAST IMPLANT STATUS: Chronic | ICD-10-CM

## 2022-12-14 DIAGNOSIS — Z90.49 ACQUIRED ABSENCE OF OTHER SPECIFIED PARTS OF DIGESTIVE TRACT: Chronic | ICD-10-CM

## 2022-12-14 DIAGNOSIS — G47.33 OBSTRUCTIVE SLEEP APNEA (ADULT) (PEDIATRIC): ICD-10-CM

## 2022-12-14 DIAGNOSIS — Z98.890 OTHER SPECIFIED POSTPROCEDURAL STATES: Chronic | ICD-10-CM

## 2022-12-14 PROCEDURE — 95810 POLYSOM 6/> YRS 4/> PARAM: CPT | Mod: 26

## 2022-12-14 PROCEDURE — 95810 POLYSOM 6/> YRS 4/> PARAM: CPT

## 2022-12-22 LAB — CEA SERPL-MCNC: 112 NG/ML

## 2023-01-03 ENCOUNTER — RESULT REVIEW (OUTPATIENT)
Age: 68
End: 2023-01-03

## 2023-01-18 ENCOUNTER — APPOINTMENT (OUTPATIENT)
Dept: DERMATOLOGY | Facility: CLINIC | Age: 68
End: 2023-01-18
Payer: MEDICARE

## 2023-01-18 PROCEDURE — 99214 OFFICE O/P EST MOD 30 MIN: CPT

## 2023-02-02 ENCOUNTER — APPOINTMENT (OUTPATIENT)
Dept: CT IMAGING | Facility: CLINIC | Age: 68
End: 2023-02-02

## 2023-02-03 ENCOUNTER — APPOINTMENT (OUTPATIENT)
Dept: CT IMAGING | Facility: CLINIC | Age: 68
End: 2023-02-03
Payer: MEDICARE

## 2023-02-03 ENCOUNTER — OUTPATIENT (OUTPATIENT)
Dept: OUTPATIENT SERVICES | Facility: HOSPITAL | Age: 68
LOS: 1 days | End: 2023-02-03
Payer: MEDICARE

## 2023-02-03 DIAGNOSIS — Z98.890 OTHER SPECIFIED POSTPROCEDURAL STATES: Chronic | ICD-10-CM

## 2023-02-03 DIAGNOSIS — Z90.49 ACQUIRED ABSENCE OF OTHER SPECIFIED PARTS OF DIGESTIVE TRACT: Chronic | ICD-10-CM

## 2023-02-03 DIAGNOSIS — Z98.82 BREAST IMPLANT STATUS: Chronic | ICD-10-CM

## 2023-02-03 DIAGNOSIS — C80.1 MALIGNANT (PRIMARY) NEOPLASM, UNSPECIFIED: ICD-10-CM

## 2023-02-03 DIAGNOSIS — Z98.891 HISTORY OF UTERINE SCAR FROM PREVIOUS SURGERY: Chronic | ICD-10-CM

## 2023-02-03 PROCEDURE — 74177 CT ABD & PELVIS W/CONTRAST: CPT

## 2023-02-03 PROCEDURE — 74177 CT ABD & PELVIS W/CONTRAST: CPT | Mod: 26

## 2023-02-03 PROCEDURE — 71260 CT THORAX DX C+: CPT

## 2023-02-03 PROCEDURE — 71260 CT THORAX DX C+: CPT | Mod: 26

## 2023-02-05 ENCOUNTER — OUTPATIENT (OUTPATIENT)
Dept: OUTPATIENT SERVICES | Facility: HOSPITAL | Age: 68
LOS: 1 days | Discharge: HOME | End: 2023-02-05

## 2023-02-05 DIAGNOSIS — Z90.49 ACQUIRED ABSENCE OF OTHER SPECIFIED PARTS OF DIGESTIVE TRACT: Chronic | ICD-10-CM

## 2023-02-05 DIAGNOSIS — Z98.890 OTHER SPECIFIED POSTPROCEDURAL STATES: Chronic | ICD-10-CM

## 2023-02-05 DIAGNOSIS — Z98.891 HISTORY OF UTERINE SCAR FROM PREVIOUS SURGERY: Chronic | ICD-10-CM

## 2023-02-05 DIAGNOSIS — Z98.82 BREAST IMPLANT STATUS: Chronic | ICD-10-CM

## 2023-02-05 DIAGNOSIS — C34.90 MALIGNANT NEOPLASM OF UNSPECIFIED PART OF UNSPECIFIED BRONCHUS OR LUNG: ICD-10-CM

## 2023-02-06 ENCOUNTER — APPOINTMENT (OUTPATIENT)
Dept: MRI IMAGING | Facility: CLINIC | Age: 68
End: 2023-02-06
Payer: MEDICARE

## 2023-02-06 ENCOUNTER — OUTPATIENT (OUTPATIENT)
Dept: OUTPATIENT SERVICES | Facility: HOSPITAL | Age: 68
LOS: 1 days | End: 2023-02-06
Payer: MEDICARE

## 2023-02-06 DIAGNOSIS — C80.1 MALIGNANT (PRIMARY) NEOPLASM, UNSPECIFIED: ICD-10-CM

## 2023-02-06 PROCEDURE — 70553 MRI BRAIN STEM W/O & W/DYE: CPT | Mod: 26

## 2023-02-06 PROCEDURE — 70553 MRI BRAIN STEM W/O & W/DYE: CPT

## 2023-02-08 ENCOUNTER — APPOINTMENT (OUTPATIENT)
Dept: PULMONOLOGY | Facility: CLINIC | Age: 68
End: 2023-02-08
Payer: MEDICARE

## 2023-02-08 VITALS
RESPIRATION RATE: 16 BRPM | SYSTOLIC BLOOD PRESSURE: 122 MMHG | HEIGHT: 66 IN | DIASTOLIC BLOOD PRESSURE: 78 MMHG | OXYGEN SATURATION: 98 % | WEIGHT: 173 LBS | HEART RATE: 77 BPM | BODY MASS INDEX: 27.8 KG/M2

## 2023-02-08 PROCEDURE — 99215 OFFICE O/P EST HI 40 MIN: CPT

## 2023-02-08 NOTE — HISTORY OF PRESENT ILLNESS
[Obstructive Sleep Apnea] : obstructive sleep apnea [Awakes Unrefreshed] : awakes unrefreshed [Awakes with Dry Mouth] : awakes with dry mouth [Snoring] : snoring [Witnessed Apneas] : witnessed apneas [Lab] : lab [TextBox_4] : 11/29/2022:\par 67-year-old female with a history of an adenocarcinoma left upper lobe status post radiation and chemotherapy.  Course complicated by a radiation pneumonitis versus drug-induced pneumonitis secondary to durvalumab.  Patient seen today for routine follow-up following CAT scan. \par \par August mild Covid and used  O2. Off al chemo and immunotherapy since September\par \par Patient suffered an episode of COVID-19 in mid August treated with Paxilovid and requiring oxygen.\par \par Cough on ABX 10/31/22. Saw PMD and rx'ed with azithromycin and resolved. c/o am cough and sputum intermittently. Evidence of desaturations with sleep on home monitoring device.  Has not been using oxygen on a regular basis\par \par Work-up is consistent with radiation pneumonitis or drug-induced pneumonia.  Mild gestational changes may have been due to recent COVID-19 infection.  Patient was scheduled for sleep study.\par \par 2/8/2023:\par Just returned from [facility]. No c/o cough wheeze of increased SOB. No sputum [TextBox_77] : 0038 [TextBox_79] : 2995 [TextBox_81] : 30 [TextBox_89] : 3-4 [TextBox_100] : 12/14/2022 [TextBox_108] : 25 [TextBox_116] : 67 [TextBox_120] : REM index 60 [ESS] : 8

## 2023-02-08 NOTE — END OF VISIT
[Time Spent: ___ minutes] : I have spent [unfilled] minutes of time on the encounter. [FreeTextEntry3] : CT reviewed on PACS with patient and daughter.   used

## 2023-02-08 NOTE — CONSULT LETTER
[Dear  ___] : Dear  [unfilled], [Consult Letter:] : I had the pleasure of evaluating your patient, [unfilled]. [Please see my note below.] : Please see my note below. [Sincerely,] : Sincerely, [DrDavid  ___] : Dr. CADET [FreeTextEntry3] : Tho Zayas MD FCCP\par Pulmonary/Critical Care/Sleep Medicine\par Department of Internal Medicine\par \par [facility]\par

## 2023-02-08 NOTE — REASON FOR VISIT
[Follow-Up] : a follow-up visit [Lung Cancer] : lung cancer [Ad Hoc ] : provided by an ad hoc  [Abnormal CXR/ Chest CT] : an abnormal CXR/ chest CT [ILD] : ILD [Family Member] : family member [TextBox_44] : Drug induced ILD vs Radiation pneumonitis [Interpreters_FullName] : Lashanda [Interpreters_Relationshiptopatient] : daughter [TWNoteComboBox1] : [place]

## 2023-02-08 NOTE — DISCUSSION/SUMMARY
[Obstructive Sleep Apnea] : obstructive sleep apnea [Moderate] : moderate in severity [None] : There are no changes in medication management [Alcohol Avoidance] : alcohol avoidance [Sedative Avoidance] : sedative avoidance [Patient] : discussed with the patient [de-identified] : severe in REM [de-identified] : Resmed Airsense  autoset  in a range 4-16 with nasal mask [FreeTextEntry1] : 67-year-old female with a history of adenocarcinoma left upper lobe seen today following radiation pneumonitis and or drug-induced pneumonitis (checkpoint inhibitor).  Recent CAT scan demonstrates new increased infiltrate in the right apex which may be residual from a subclinical infection versus new onset of interstitial lung disease.  Patient relatively asymptomatic.  I am therefore recommending observation for another 3 months with a repeat CAT scan rather than proceeding with bronchoscopy or empiric therapy.  Other considerations include aspiration although this is an atypical area especially in light of her sleep apnea

## 2023-02-14 ENCOUNTER — APPOINTMENT (OUTPATIENT)
Dept: HEMATOLOGY ONCOLOGY | Facility: CLINIC | Age: 68
End: 2023-02-14
Payer: MEDICARE

## 2023-02-14 ENCOUNTER — RESULT REVIEW (OUTPATIENT)
Age: 68
End: 2023-02-14

## 2023-02-14 VITALS
DIASTOLIC BLOOD PRESSURE: 77 MMHG | BODY MASS INDEX: 27.72 KG/M2 | TEMPERATURE: 97.7 F | OXYGEN SATURATION: 96 % | SYSTOLIC BLOOD PRESSURE: 115 MMHG | HEIGHT: 66 IN | WEIGHT: 172.5 LBS | HEART RATE: 74 BPM

## 2023-02-14 LAB
BASOPHILS # BLD AUTO: 0 K/UL — SIGNIFICANT CHANGE UP (ref 0–0.2)
BASOPHILS NFR BLD AUTO: 0.9 % — SIGNIFICANT CHANGE UP (ref 0–2)
EOSINOPHIL # BLD AUTO: 0.1 K/UL — SIGNIFICANT CHANGE UP (ref 0–0.5)
EOSINOPHIL NFR BLD AUTO: 1.7 % — SIGNIFICANT CHANGE UP (ref 0–6)
HCT VFR BLD CALC: 38.4 % — SIGNIFICANT CHANGE UP (ref 34.5–45)
HGB BLD-MCNC: 13.3 G/DL — SIGNIFICANT CHANGE UP (ref 11.5–15.5)
LYMPHOCYTES # BLD AUTO: 1 K/UL — SIGNIFICANT CHANGE UP (ref 1–3.3)
LYMPHOCYTES # BLD AUTO: 20.4 % — SIGNIFICANT CHANGE UP (ref 13–44)
MCHC RBC-ENTMCNC: 30.8 PG — SIGNIFICANT CHANGE UP (ref 27–34)
MCHC RBC-ENTMCNC: 34.7 G/DL — SIGNIFICANT CHANGE UP (ref 32–36)
MCV RBC AUTO: 89 FL — SIGNIFICANT CHANGE UP (ref 80–100)
MONOCYTES # BLD AUTO: 0.4 K/UL — SIGNIFICANT CHANGE UP (ref 0–0.9)
MONOCYTES NFR BLD AUTO: 7 % — SIGNIFICANT CHANGE UP (ref 2–14)
NEUTROPHILS # BLD AUTO: 3.5 K/UL — SIGNIFICANT CHANGE UP (ref 1.8–7.4)
NEUTROPHILS NFR BLD AUTO: 70 % — SIGNIFICANT CHANGE UP (ref 43–77)
PLATELET # BLD AUTO: 305 K/UL — SIGNIFICANT CHANGE UP (ref 150–400)
RBC # BLD: 4.32 M/UL — SIGNIFICANT CHANGE UP (ref 3.8–5.2)
RBC # FLD: 11.8 % — SIGNIFICANT CHANGE UP (ref 10.3–14.5)
WBC # BLD: 5 K/UL — SIGNIFICANT CHANGE UP (ref 3.8–10.5)
WBC # FLD AUTO: 5 K/UL — SIGNIFICANT CHANGE UP (ref 3.8–10.5)

## 2023-02-14 PROCEDURE — 99215 OFFICE O/P EST HI 40 MIN: CPT

## 2023-02-14 NOTE — ASSESSMENT
[FreeTextEntry1] : SHANON BEE is a female never smoker who was 66 yo at diagnosis,  of adenocarcinoma lung \par Patient with no significant past medical history\par PET in 10/2020 with a RENAN spiculated lung mass 4.5 x 3cm with an SUV 11.1 with adjacent tree in bud opacity. Left lung, biopsy on 11/23/20: Adenocarcinoma\par \par -MRI Brain 12/29/20: negative \par -CT C/A/P 12/29/20: stable RENAN primary lesion, (4.7 x 2.3cm), 1.2 cm left lung apex lesion along with RUL stable lesion of 1.3 cm \par -PDL1 20% \par -Foundation one : ALK ELM 4 fusion variant, MS stable TMB 4 mut/ Mb, SF 3B1 \par \par Reviewed most recent 8/03/22 CT Angio chest:\par NO PE. Consolidative changes in the perihilar left lung extending to the periphery unchanged and previous post radiation changes. 1.5cm Rt uppet lobe  nodule unchanged however new patchy consolidation of uncertain etiology.\par \par Biopsy done of RUL: not malignant/ reactive \par -Initial plan for resection per Dr Choudhury, however EBUS prior to surgery with N2 disease. Patient was presented at Tumor board on 4/14/21 and a plan for definitive CRT \par -S/p chemo RT with Carbo and taxol weekly with RT, completed 6/21/21\par -She had a rough course with Rt mediated Pneumonitis/ superimposed PNA  in July/ Aug 2021 requiring hospitalization\par -Currently on Immunotherapy with Durvalumab for 1 year Started on 9/7/21 Start date delayed due to requiring Prednisone for RT Pneumonitis.\par - s/p completion of  Durvalumab on 10/25/22\par - discussed CT scan from 8/3/22 while she had covid.  RT changes in lt  lobe/ perihilar area,  stable 1.5 cm Rt upper lobe\par - CT CHest : 2/6/23:  Stable treated left upper lobe neoplasm with radiation induced scarring. Stable 1.4 cm bilobed solid right upper lobe nodule.\par Increased size of bilateral upper lobe groundglass opacities. Consider organizing pneumonia.\par  2/6/23 MRI Brain No evidence of Malignancy Mild Chronic White matter change f/u with neurology( she has her own neurologist) \par - f/u with me in 3-4 months after scans\par \par \par #Pruritus:\par Patient saw Dr. Andre for generalized pruritus likely 2/2 to durvalumab. Patient prescribed topical steroids\par - Vistaryl \par - f/u with dr Andre\par \par \par \par \par

## 2023-02-14 NOTE — RESULTS/DATA
[FreeTextEntry1] : \par CT chest: 11/1/22: \par Since 8/3/2022:\par Unchanged treated left upper lobe neoplasm and radiation associated scarring in the left lung.\par Unchanged 1.4 cm right upper lobe solid nodule.\par New opacities in the upper lobes and near resolved prior right upper lobe opacities. Given the waxing and waning/migratory pattern over multiple prior exams, consider organizing pneumonia.\par \par 8/03/22 CT Angio chest \par NO PE. Consolidative changes in the perihilar left ling extending to the periphery unchanged and previous post radiation changes. 1.5cm  Rt upper lobe nodule unchanged however new patchy consolidation of uncertain etiology.\par \par 8/30/22: CEA 61.8\par 3/15/22 CEA 14.5\par 1/11/22 CEA 12.3\par 12/28/21 CEA 10.7\par 12/14/21 CEA 11\par 11/30/21 CEA 11.5\par \par CT Chest 4/18/22: Stable since 1/2021 however minimally grown since March 2018 (1 x 0.8 cm). \par - Was due Durvalumab today ( c 23) however she tested positive for covid will cancel treatment. advised to proceed with next treatment on 8/16/22 if symptom subside. She has not had restaging scans \par - Will order scans however advsied to wait 4 weeks to get them due to covid related lung changes\par - continue to monitor \par - Continue with Durvalumab for 1 y \par -TSH 5.79, T4 0.7- started levothyroxine 25 mcg. patient follow Endocrinology- Dr. Arias John\par \par \par CT chest no contrast 1/7/22\par IMPRESSION: Since 2 months prior:\par -Evolving radiotherapy changes in the left upper and lower lobes, with slightly decreased\par consolidative component in the left upper lobe, at site of treated neoplasm.\par -Decreased prior right lower lobe abnormality, however multiple new peripheral areas of\par groundglass in the right upper and right lower lobes. The migratory pattern suggests organizing\par pneumonia, possibly drug-induced or radiation-induced. Acute infection, including COVID, could also\par account for the new findings.\par -Stable right apex 1.4 cm nodule.\par \par \par CT CHEST 10/25/21 \par 1.  Evolution of post radiation changes in the left greater than right lungs. New findings in the right lower lobe which may reflect delayed radiation effect or radiation pneumonitis as well as infection\par 2.  Stable right upper lobe nodule, A 1.4 x 0.9 cm lobulated right upper lobe nodule is stable compared to prior study although measured 1 x 0.8 cm on 3/7/2018; on the 10/12/2020 PET/CT, this nodule was not FDG avid.previously PET negative, increased in size on since 2018. Reassessment can be made on surveillance imaging. No new lung nodule. \par \par 7/21/21 \par CT CAP post treatment on 7/21/21 with stability in LEft upper lobe mass at 4.3 cm x 2.8 cm ( previously 4.2 x 3.2 cm)  1.3 cm rt upper lobe nodule is unchanged> new patchy groungglass opacity surrounding both upper lobe lesions which maybe 2/2 treatment effect vs infection

## 2023-02-14 NOTE — HISTORY OF PRESENT ILLNESS
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 Recent imaging has shown growth and development of a solid structure. The etiology remains unclear, but malignancy has been considered. She feels well has no complaints. She denies fever, chills, night sweats, weight loss, or weight gain. \par \par Followed by Dr Galeana in the past\par PPD+ in 2000 and received 6 months of INH\par PET positive RENAN posterior cavitary density in 2010\par TTNA and Bronch non-diagnostic. Cultures negative for fungus and TB\par CT at [facility] in 2010 and 2012 stable - felt to be benign. Lost to FU\par \par Patient had a PEt scan in Oct 2020 with a Left upper lobe spiculated lung mass 4.5 x 3cm with an SUV 11.1 with adjacent tree in bud opacity \par In addition Rt upper lobe opacity 1.2 x1 cm which is not FDG avid \par Discrete Left hilar/ infrahilar region  lesion with an SUV 5.2 which is possibly corresponding to a LN \par \par \par Patient s/p Left lung mass, core biopsy with pathology c/w Adenocarcinoma c/w pulmonary origin (see comment)\par CK7, TTF-1 : Positive CK20, PAX-8 : Negative\par  [de-identified] : SON: JUAN RAMON [phone] [de-identified] : Patient presents for follow up.\par \par 2/6/23: \par Stable treated left upper lobe neoplasm with radiation induced scarring. Stable 1.4 cm bilobed solid right upper lobe nodule.\par Increased size of bilateral upper lobe groundglass opacities. Consider organizing pneumonia.\par  2/6/23 MRI Brain No evidence of Malignancy Mild Chronic White matter change\par \par \par For adenocarcinoma of the RENAN. She completed concurrent CRT with carbo/ taxol on 6/21/21. \par and Durvalumab which was completed in Oct 2022 \par + Pruritis f/u Dermatology \par SOme increasing cough She will call Dr Godwin \par Repeat CT chest per pulm in May 2023\par

## 2023-02-15 LAB
ALBUMIN SERPL ELPH-MCNC: 4.4 G/DL
ALP BLD-CCNC: 146 U/L
ALT SERPL-CCNC: 57 U/L
ANION GAP SERPL CALC-SCNC: 12 MMOL/L
AST SERPL-CCNC: 31 U/L
BILIRUB SERPL-MCNC: 0.2 MG/DL
BUN SERPL-MCNC: 13 MG/DL
CALCIUM SERPL-MCNC: 9.8 MG/DL
CEA SERPL-MCNC: 92.1 NG/ML
CHLORIDE SERPL-SCNC: 101 MMOL/L
CO2 SERPL-SCNC: 26 MMOL/L
CREAT SERPL-MCNC: 0.69 MG/DL
EGFR: 94 ML/MIN/1.73M2
GLUCOSE SERPL-MCNC: 112 MG/DL
POTASSIUM SERPL-SCNC: 4.6 MMOL/L
PROT SERPL-MCNC: 7.1 G/DL
SODIUM SERPL-SCNC: 140 MMOL/L
TSH SERPL-ACNC: 5 UIU/ML

## 2023-02-21 ENCOUNTER — APPOINTMENT (OUTPATIENT)
Dept: DERMATOLOGY | Facility: CLINIC | Age: 68
End: 2023-02-21
Payer: MEDICARE

## 2023-02-21 ENCOUNTER — APPOINTMENT (OUTPATIENT)
Dept: NEUROLOGY | Facility: CLINIC | Age: 68
End: 2023-02-21
Payer: MEDICARE

## 2023-02-21 VITALS
DIASTOLIC BLOOD PRESSURE: 70 MMHG | BODY MASS INDEX: 27.32 KG/M2 | WEIGHT: 170 LBS | SYSTOLIC BLOOD PRESSURE: 118 MMHG | HEIGHT: 66 IN

## 2023-02-21 DIAGNOSIS — G31.84 MILD COGNITIVE IMPAIRMENT, SO STATED: ICD-10-CM

## 2023-02-21 PROCEDURE — 99214 OFFICE O/P EST MOD 30 MIN: CPT

## 2023-02-21 RX ORDER — RAMELTEON 8 MG/1
8 TABLET ORAL
Qty: 30 | Refills: 0 | Status: COMPLETED | COMMUNITY
Start: 2022-01-11 | End: 2023-02-21

## 2023-02-21 RX ORDER — FLUOXETINE HYDROCHLORIDE 20 MG/1
20 CAPSULE ORAL
Qty: 60 | Refills: 0 | Status: COMPLETED | COMMUNITY
Start: 2021-10-26 | End: 2023-02-21

## 2023-02-21 RX ORDER — FLUOXETINE HYDROCHLORIDE 10 MG/1
10 CAPSULE ORAL
Qty: 30 | Refills: 0 | Status: COMPLETED | COMMUNITY
Start: 2022-02-09 | End: 2023-02-21

## 2023-02-21 RX ORDER — MOMETASONE FUROATE 1 MG/G
0.1 CREAM TOPICAL
Qty: 45 | Refills: 0 | Status: COMPLETED | COMMUNITY
Start: 2022-01-24 | End: 2023-02-21

## 2023-02-21 RX ORDER — OMEPRAZOLE 40 MG/1
40 CAPSULE, DELAYED RELEASE ORAL
Refills: 0 | Status: COMPLETED | COMMUNITY
End: 2023-02-21

## 2023-02-21 RX ORDER — HYDROXYZINE HYDROCHLORIDE 25 MG/1
25 TABLET ORAL
Qty: 15 | Refills: 0 | Status: COMPLETED | COMMUNITY
Start: 2022-10-10 | End: 2023-02-21

## 2023-02-21 RX ORDER — PROCHLORPERAZINE MALEATE 10 MG/1
10 TABLET ORAL EVERY 6 HOURS
Qty: 120 | Refills: 3 | Status: COMPLETED | COMMUNITY
Start: 2021-05-17 | End: 2023-02-21

## 2023-02-21 RX ORDER — FLUTICASONE FUROATE AND VILANTEROL TRIFENATATE 200; 25 UG/1; UG/1
200-25 POWDER RESPIRATORY (INHALATION)
Qty: 60 | Refills: 0 | Status: COMPLETED | COMMUNITY
Start: 2021-10-18 | End: 2023-02-21

## 2023-02-21 RX ORDER — LEVOTHYROXINE SODIUM 0.03 MG/1
25 TABLET ORAL
Qty: 30 | Refills: 2 | Status: COMPLETED | COMMUNITY
Start: 2022-05-19 | End: 2023-02-21

## 2023-02-21 NOTE — HISTORY OF PRESENT ILLNESS
[FreeTextEntry1] : I saw her initially 1/29/21 with the following history. She is here with her daughter who serves as an  and independent historian. Patient reports some forgetfulness going on for about the last year. She is describing short-term memory loss. She thinks it is slowly progressive. She functions well with all activities of daily living.\par \par She was recently  with lung cancer.  She is under care of oncology and has received radiation therapy, chemotherapy, and immunotherapy.\par \par I referred her for an EEG which was normal. B12 and thyroid functions were ordered and these were normal as well. I discussed this over the phone with them. We started her on donepezil 5 mg a day after explaining to them that current medications work to help slow down further memory decline rather then reversed the memory loss. This was subsequently increased to 10 mg a day. She has tolerated the medication without a problem. Both her and her family feel that her memory has improved. \par \par She was seen today, 2/21/2023 and follow-up.  Have not seen her in over a year.  Son is with her to interpret.  She stopped the donepezil because she did not feel she needs it anymore.  She is taking vitamins.  Her and her family feel her memory has remained stable\par \par Recent brain MRI done 2/6/2023  showed some mild small vessel ischemic changes without any significant pathology.

## 2023-02-21 NOTE — DATA REVIEWED
[de-identified] : Images and report of brain MRI dated 12/29/20 reviewed. It is normal. [de-identified] : Other studies reviewed include:\par Oncology notes from Dr. Rizvi.\par Thoracic surgery notes from Dr. Choudhury

## 2023-02-21 NOTE — CONSULT LETTER
[Dear  ___] : Dear  [unfilled], [Consult Letter:] : I had the pleasure of evaluating your patient, [unfilled]. [Please see my note below.] : Please see my note below. [Consult Closing:] : Thank you very much for allowing me to participate in the care of this patient.  If you have any questions, please do not hesitate to contact me. [Sincerely,] : Sincerely, [FreeTextEntry3] : Bobby Marino MD, PhD, DPN-N\par [facility]\par Neurology at [place]\par Chief, Division of Neurology\par  [facility]\par

## 2023-02-21 NOTE — PHYSICAL EXAM
[FreeTextEntry1] : \par \par Mental status: Alert, fully oriented, speech comprehension intact, recent memory intact\par \par Cranial nerves: No ptosis  Extraocular movements full. Facial strength and sensation are normal. Tongue midline.\par \par Motor: Strength grossly normal throughout. There is no drift.. No abnormal movements observed.\par \par Sensation: Intact to touch throughout\par \par Coordination: Finger-nose intact\par \par Reflexes symmetrical, hypoactive\par \par Gait normal, Romberg absent\par \par

## 2023-02-21 NOTE — ASSESSMENT
[FreeTextEntry1] : History suggestive of some mild cognitive impairment\par Has remained stable\par Does not feel she needs donepezil anymore\par Continues follow-up with her oncologist\par Recent brain MRI showed nothing significant, just some mild small vessel ischemic changes\par \par Follow-up here as needed

## 2023-02-23 LAB — T4 FREE SERPL-MCNC: 0.8 NG/DL

## 2023-04-19 ENCOUNTER — APPOINTMENT (OUTPATIENT)
Dept: PULMONOLOGY | Facility: CLINIC | Age: 68
End: 2023-04-19
Payer: MEDICARE

## 2023-04-19 VITALS
SYSTOLIC BLOOD PRESSURE: 118 MMHG | HEART RATE: 78 BPM | RESPIRATION RATE: 16 BRPM | OXYGEN SATURATION: 97 % | HEIGHT: 66 IN | DIASTOLIC BLOOD PRESSURE: 80 MMHG | WEIGHT: 173 LBS | BODY MASS INDEX: 27.8 KG/M2

## 2023-04-19 PROCEDURE — 99214 OFFICE O/P EST MOD 30 MIN: CPT

## 2023-04-19 RX ORDER — MULTIVITAMIN
TABLET ORAL
Refills: 0 | Status: ACTIVE | COMMUNITY

## 2023-04-19 RX ORDER — ZOLPIDEM TARTRATE 10 MG/1
10 TABLET ORAL
Qty: 1 | Refills: 0 | Status: DISCONTINUED | COMMUNITY
Start: 2022-11-29 | End: 2023-04-19

## 2023-04-19 RX ORDER — ALBUTEROL SULFATE 90 UG/1
108 (90 BASE) AEROSOL, METERED RESPIRATORY (INHALATION)
Qty: 1 | Refills: 5 | Status: DISCONTINUED | COMMUNITY
Start: 2021-10-18 | End: 2023-04-19

## 2023-04-19 RX ORDER — DONEPEZIL HYDROCHLORIDE 10 MG/1
10 TABLET ORAL
Qty: 90 | Refills: 3 | Status: DISCONTINUED | COMMUNITY
Start: 2021-04-26 | End: 2023-04-19

## 2023-04-19 RX ORDER — AZITHROMYCIN 250 MG/1
250 TABLET, FILM COATED ORAL
Qty: 1 | Refills: 0 | Status: DISCONTINUED | COMMUNITY
Start: 2023-02-15 | End: 2023-04-19

## 2023-04-19 RX ORDER — PREDNISONE 50 MG/1
50 TABLET ORAL
Qty: 5 | Refills: 0 | Status: DISCONTINUED | COMMUNITY
Start: 2023-02-15 | End: 2023-04-19

## 2023-04-19 RX ORDER — ALPRAZOLAM 2 MG/1
TABLET ORAL
Refills: 0 | Status: ACTIVE | COMMUNITY

## 2023-04-19 NOTE — REASON FOR VISIT
[Follow-Up] : a follow-up visit [Abnormal CXR/ Chest CT] : an abnormal CXR/ chest CT [Lung Cancer] : lung cancer [ILD] : ILD [Family Member] : family member [Ad Hoc ] : provided by an ad hoc  [TextBox_44] : Drug induced ILD vs Radiation pneumonitis [Interpreters_FullName] : Lashanda [Interpreters_Relationshiptopatient] : daughter [TWNoteComboBox1] : Russian

## 2023-04-19 NOTE — HISTORY OF PRESENT ILLNESS
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  Course complicated by a radiation pneumonitis versus drug-induced pneumonitis secondary to durvalumab.  Patient seen today for routine follow-up following CAT scan. \par \par August mild Covid and used  O2. Off al chemo and immunotherapy since September\par \par Patient suffered an episode of COVID-19 in mid August treated with Paxilovid and requiring oxygen.\par \par Cough on ABX 10/31/22. Saw PMD and rx'ed with azithromycin and resolved. c/o am cough and sputum intermittently. Evidence of desaturations with sleep on home monitoring device.  Has not been using oxygen on a regular basis\par \par Work-up is consistent with radiation pneumonitis or drug-induced pneumonia.  Mild gestational changes may have been due to recent COVID-19 infection.  Patient was scheduled for sleep study.\par \par 2/8/2023:\par Just returned from [facility]. No c/o cough wheeze of increased SOB. No sputum\par Patient had demonstrated new increased infiltrates at the right apex with a repeat CAT scan advised in 3 months.\par \par 4/19/2023:\par  c/o pain 1 wk worse.Present x months\par Pain is localized to the right shoulder blade, worse on palpation without change with respirations.  There is no history of cough wheeze or sputum. [TextBox_77] : 0034 [TextBox_79] : 3042 [TextBox_81] : 30 [TextBox_89] : 3-4 [TextBox_100] : 12/14/2022 [TextBox_108] : 25 [TextBox_116] : 67 [TextBox_120] : REM index 60 [TextBox_125] : 4-16 [TextBox_127] : 4/1/2023 [TextBox_129] : 4/18/2033 [TextBox_133] : 100 [TextBox_137] : 100 [TextBox_147] : 2.2 [ESS] : 8

## 2023-04-19 NOTE — CONSULT LETTER
[Dear  ___] : Dear  [unfilled], [Consult Letter:] : I had the pleasure of evaluating your patient, [unfilled]. [Please see my note below.] : Please see my note below. [Sincerely,] : Sincerely, [DrDavid  ___] : Dr. CADET [FreeTextEntry3] : Tho Zayas MD FCCP\par Pulmonary/Critical Care/Sleep Medicine\par Department of Internal Medicine\par \par [facility]\par

## 2023-04-19 NOTE — END OF VISIT
[FreeTextEntry3] : CT reviewed on PACS with patient and daughter.   used [Time Spent: ___ minutes] : I have spent [unfilled] minutes of time on the encounter.

## 2023-04-19 NOTE — DISCUSSION/SUMMARY
[Obstructive Sleep Apnea] : obstructive sleep apnea [Moderate] : moderate in severity [None] : There are no changes in medication management [Alcohol Avoidance] : alcohol avoidance [Sedative Avoidance] : sedative avoidance [Patient] : discussed with the patient [FreeTextEntry1] : 67-year-old female with a history of adenocarcinoma left upper lobe seen today.  Patient's course appears to been complicated by radiation pneumonitis or drug-induced pneumonitis open (checkpoint inhibitor) no clinical worsening and follow-up CAT scan is pending.  Recent complaints of chest wall pain most likely musculoskeletal.  Patient has been advised to begin nonsteroidal anti-inflammatory agents and a follow-up CAT scan will define any pathology in that region.  Patient may require orthopedic evaluation and possible physical therapy [Responding to Treatment] : responding to treatment [de-identified] : Patient advised to request a larger mask secondary to pain at the bridge of her nose with CPAP usage

## 2023-05-05 ENCOUNTER — APPOINTMENT (OUTPATIENT)
Dept: CT IMAGING | Facility: CLINIC | Age: 68
End: 2023-05-05
Payer: MEDICARE

## 2023-05-05 ENCOUNTER — OUTPATIENT (OUTPATIENT)
Dept: OUTPATIENT SERVICES | Facility: HOSPITAL | Age: 68
LOS: 1 days | End: 2023-05-05
Payer: MEDICARE

## 2023-05-05 DIAGNOSIS — Z98.82 BREAST IMPLANT STATUS: Chronic | ICD-10-CM

## 2023-05-05 DIAGNOSIS — Z98.890 OTHER SPECIFIED POSTPROCEDURAL STATES: Chronic | ICD-10-CM

## 2023-05-05 DIAGNOSIS — Z90.49 ACQUIRED ABSENCE OF OTHER SPECIFIED PARTS OF DIGESTIVE TRACT: Chronic | ICD-10-CM

## 2023-05-05 DIAGNOSIS — Z98.891 HISTORY OF UTERINE SCAR FROM PREVIOUS SURGERY: Chronic | ICD-10-CM

## 2023-05-05 DIAGNOSIS — J84.9 INTERSTITIAL PULMONARY DISEASE, UNSPECIFIED: ICD-10-CM

## 2023-05-05 PROCEDURE — 71250 CT THORAX DX C-: CPT | Mod: 26

## 2023-05-05 PROCEDURE — 71250 CT THORAX DX C-: CPT

## 2023-05-15 ENCOUNTER — NON-APPOINTMENT (OUTPATIENT)
Age: 68
End: 2023-05-15

## 2023-05-16 ENCOUNTER — APPOINTMENT (OUTPATIENT)
Dept: PULMONOLOGY | Facility: CLINIC | Age: 68
End: 2023-05-16
Payer: MEDICARE

## 2023-05-16 VITALS
DIASTOLIC BLOOD PRESSURE: 73 MMHG | WEIGHT: 176 LBS | SYSTOLIC BLOOD PRESSURE: 105 MMHG | OXYGEN SATURATION: 98 % | RESPIRATION RATE: 16 BRPM | HEART RATE: 70 BPM | HEIGHT: 63 IN | BODY MASS INDEX: 31.18 KG/M2

## 2023-05-16 VITALS — WEIGHT: 176 LBS | BODY MASS INDEX: 31.18 KG/M2 | HEIGHT: 63 IN

## 2023-05-16 PROCEDURE — 99215 OFFICE O/P EST HI 40 MIN: CPT | Mod: 25

## 2023-05-16 PROCEDURE — 94729 DIFFUSING CAPACITY: CPT

## 2023-05-16 PROCEDURE — 94010 BREATHING CAPACITY TEST: CPT

## 2023-05-16 PROCEDURE — 94727 GAS DIL/WSHOT DETER LNG VOL: CPT

## 2023-05-16 PROCEDURE — 85018 HEMOGLOBIN: CPT | Mod: QW

## 2023-05-16 NOTE — END OF VISIT
[Time Spent: ___ minutes] : I have spent [unfilled] minutes of time on the encounter. [FreeTextEntry3] : CT reviewed on PACS with patient and daughter.   used.  New sample mask fitted by me

## 2023-05-16 NOTE — HISTORY OF PRESENT ILLNESS
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  Course complicated by a radiation pneumonitis versus drug-induced pneumonitis secondary to durvalumab.  Patient seen today for routine follow-up following CAT scan. \par \par August mild Covid and used  O2. Off al chemo and immunotherapy since September\par \par Patient suffered an episode of COVID-19 in mid August treated with Paxilovid and requiring oxygen.\par \par Cough on ABX 10/31/22. Saw PMD and rx'ed with azithromycin and resolved. c/o am cough and sputum intermittently. Evidence of desaturations with sleep on home monitoring device.  Has not been using oxygen on a regular basis\par \par Work-up is consistent with radiation pneumonitis or drug-induced pneumonia.  Mild gestational changes may have been due to recent COVID-19 infection.  Patient was scheduled for sleep study.\par \par 2/8/2023:\par Just returned from [facility]. No c/o cough wheeze of increased SOB. No sputum\par Patient had demonstrated new increased infiltrates at the right apex with a repeat CAT scan advised in 3 months.\par \par 4/19/2023:\par  c/o pain 1 wk worse.Present x months\par Pain is localized to the right shoulder blade, worse on palpation without change with respirations.  There is no history of cough wheeze or sputum.\par \par Patient was felt to have musculoskeletal pain and was started on a nonsteroidal.  A chest CT was ordered.\par \par 5/16/2023:\par Patient feeling well without complaints of cough wheeze or sputum production.  She did undergo a follow-up CAT scan which is included below.  There is no history of fevers chills or chest pains.  Previously noted musculoskeletal pain has resolved. [TextBox_77] : 0037 [TextBox_79] : 1531 [TextBox_81] : 30 [TextBox_89] : 3-4 [TextBox_100] : 12/14/2022 [TextBox_108] : 25 [TextBox_116] : 67 [TextBox_120] : REM index 60 [TextBox_125] : 4-16 [TextBox_127] : 4/16/2023 [TextBox_129] : 5/15/2023 [TextBox_133] : 100 [TextBox_137] : 100 [TextBox_147] : 3.2 [TextBox_158] : Domingo [TextBox_162] : 2/8/2023 [TextBox_165] : Wishes to try a new mask [ESS] : 8

## 2023-05-16 NOTE — REASON FOR VISIT
[Follow-Up] : a follow-up visit [Abnormal CXR/ Chest CT] : an abnormal CXR/ chest CT [Lung Cancer] : lung cancer [ILD] : ILD [Family Member] : family member [Ad Hoc ] : provided by an ad hoc  [TextBox_44] : Drug induced ILD vs Radiation pneumonitis [Interpreters_FullName] : Lashanda [Interpreters_Relationshiptopatient] : daughter [TWNoteComboBox1] : Sudanese

## 2023-05-16 NOTE — CONSULT LETTER
[Dear  ___] : Dear  [unfilled], [Consult Letter:] : I had the pleasure of evaluating your patient, [unfilled]. [Please see my note below.] : Please see my note below. [Sincerely,] : Sincerely, [DrDavid  ___] : Dr. CADET [FreeTextEntry3] : Tho Zayas MD FCCP\par Pulmonary/Critical Care/Sleep Medicine\par Department of Internal Medicine\par \par [facility] [employer] [facility]\par

## 2023-05-16 NOTE — PROCEDURE
[___%] : last visit [unfilled]U% [___%] : last visit [unfilled]U% [Spirometry] : stable [FreeTextEntry1] : 5/16/2023: Pulmonary function test-mild degree of restriction seen in functional residual capacity with a normal vital capacity and TLC.  Diffusion capacity when corrected for alveolar volume is normal.  Spirometry is normal.

## 2023-05-16 NOTE — DISCUSSION/SUMMARY
[Obstructive Sleep Apnea] : obstructive sleep apnea [Moderate] : moderate in severity [Responding to Treatment] : responding to treatment [None] : There are no changes in medication management [Alcohol Avoidance] : alcohol avoidance [Sedative Avoidance] : sedative avoidance [Patient] : discussed with the patient [FreeTextEntry1] : 68-year-old female with a history of adenocarcinoma left upper lobe seen today in follow-up.  Patient's radiation pneumonitis appears to have resolved versus drug-induced pneumonitis from checkpoint inhibitor.  No clinical worsening noted.  Musculoskeletal pain is resolved.  Residual tumor seen on CT.  Pulmonary function stable\par \par Plan:\par 1.  Further imaging as per oncology [de-identified] : Patient fitted with a small nasalEvora mask sample by me

## 2023-05-23 ENCOUNTER — OUTPATIENT (OUTPATIENT)
Dept: OUTPATIENT SERVICES | Facility: HOSPITAL | Age: 68
LOS: 1 days | Discharge: HOME | End: 2023-05-23

## 2023-05-23 DIAGNOSIS — Z98.890 OTHER SPECIFIED POSTPROCEDURAL STATES: Chronic | ICD-10-CM

## 2023-05-23 DIAGNOSIS — Z90.49 ACQUIRED ABSENCE OF OTHER SPECIFIED PARTS OF DIGESTIVE TRACT: Chronic | ICD-10-CM

## 2023-05-23 DIAGNOSIS — Z98.82 BREAST IMPLANT STATUS: Chronic | ICD-10-CM

## 2023-05-23 DIAGNOSIS — Z98.891 HISTORY OF UTERINE SCAR FROM PREVIOUS SURGERY: Chronic | ICD-10-CM

## 2023-05-23 DIAGNOSIS — C34.90 MALIGNANT NEOPLASM OF UNSPECIFIED PART OF UNSPECIFIED BRONCHUS OR LUNG: ICD-10-CM

## 2023-06-06 ENCOUNTER — RESULT REVIEW (OUTPATIENT)
Age: 68
End: 2023-06-06

## 2023-06-06 ENCOUNTER — APPOINTMENT (OUTPATIENT)
Dept: HEMATOLOGY ONCOLOGY | Facility: CLINIC | Age: 68
End: 2023-06-06
Payer: MEDICARE

## 2023-06-06 VITALS
WEIGHT: 180.01 LBS | HEART RATE: 90 BPM | DIASTOLIC BLOOD PRESSURE: 80 MMHG | HEIGHT: 63 IN | BODY MASS INDEX: 31.89 KG/M2 | OXYGEN SATURATION: 96 % | SYSTOLIC BLOOD PRESSURE: 122 MMHG | TEMPERATURE: 98 F

## 2023-06-06 DIAGNOSIS — R42 DIZZINESS AND GIDDINESS: ICD-10-CM

## 2023-06-06 LAB
BASOPHILS # BLD AUTO: 0.1 K/UL — SIGNIFICANT CHANGE UP (ref 0–0.2)
BASOPHILS NFR BLD AUTO: 1.6 % — SIGNIFICANT CHANGE UP (ref 0–2)
EOSINOPHIL # BLD AUTO: 0.1 K/UL — SIGNIFICANT CHANGE UP (ref 0–0.5)
EOSINOPHIL NFR BLD AUTO: 2.3 % — SIGNIFICANT CHANGE UP (ref 0–6)
HCT VFR BLD CALC: 36.2 % — SIGNIFICANT CHANGE UP (ref 34.5–45)
HGB BLD-MCNC: 12.5 G/DL — SIGNIFICANT CHANGE UP (ref 11.5–15.5)
LYMPHOCYTES # BLD AUTO: 1.1 K/UL — SIGNIFICANT CHANGE UP (ref 1–3.3)
LYMPHOCYTES # BLD AUTO: 23.2 % — SIGNIFICANT CHANGE UP (ref 13–44)
MCHC RBC-ENTMCNC: 30.4 PG — SIGNIFICANT CHANGE UP (ref 27–34)
MCHC RBC-ENTMCNC: 34.6 G/DL — SIGNIFICANT CHANGE UP (ref 32–36)
MCV RBC AUTO: 88.1 FL — SIGNIFICANT CHANGE UP (ref 80–100)
MONOCYTES # BLD AUTO: 0.4 K/UL — SIGNIFICANT CHANGE UP (ref 0–0.9)
MONOCYTES NFR BLD AUTO: 8.6 % — SIGNIFICANT CHANGE UP (ref 2–14)
NEUTROPHILS # BLD AUTO: 3 K/UL — SIGNIFICANT CHANGE UP (ref 1.8–7.4)
NEUTROPHILS NFR BLD AUTO: 64.4 % — SIGNIFICANT CHANGE UP (ref 43–77)
PLATELET # BLD AUTO: 275 K/UL — SIGNIFICANT CHANGE UP (ref 150–400)
RBC # BLD: 4.11 M/UL — SIGNIFICANT CHANGE UP (ref 3.8–5.2)
RBC # FLD: 12.5 % — SIGNIFICANT CHANGE UP (ref 10.3–14.5)
WBC # BLD: 4.6 K/UL — SIGNIFICANT CHANGE UP (ref 3.8–10.5)
WBC # FLD AUTO: 4.6 K/UL — SIGNIFICANT CHANGE UP (ref 3.8–10.5)

## 2023-06-06 PROCEDURE — 99215 OFFICE O/P EST HI 40 MIN: CPT

## 2023-06-06 NOTE — ADDENDUM
[FreeTextEntry1] : \par Documented by Delaney Isaac acting as scribe for Dr. Simpson on 06/06/2023.\par \par All Medical record entries made by the Scribe were at my, Dr. Simpson, direction and personally dictated by me on 06/06/2023. I have reviewed the chart and agree that the record accurately reflects my personal performance of the history, physical exam, assessment and plan. I have also personally directed, reviewed, and agreed with the discharge instructions.

## 2023-06-06 NOTE — HISTORY OF PRESENT ILLNESS
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 Recent imaging has shown growth and development of a solid structure. The etiology remains unclear, but malignancy has been considered. She feels well has no complaints. She denies fever, chills, night sweats, weight loss, or weight gain. \par \par Followed by Dr Galeana in the past\par PPD+ in 2000 and received 6 months of INH\par PET positive RENAN posterior cavitary density in 2010\par TTNA and Bronch non-diagnostic. Cultures negative for fungus and TB\par CT at [facility] in 2010 and 2012 stable - felt to be benign. Lost to FU\par \par Patient had a PEt scan in Oct 2020 with a Left upper lobe spiculated lung mass 4.5 x 3cm with an SUV 11.1 with adjacent tree in bud opacity \par In addition Rt upper lobe opacity 1.2 x1 cm which is not FDG avid \par Discrete Left hilar/ infrahilar region  lesion with an SUV 5.2 which is possibly corresponding to a LN \par \par \par Patient s/p Left lung mass, core biopsy with pathology c/w Adenocarcinoma c/w pulmonary origin (see comment)\par CK7, TTF-1 : Positive CK20, PAX-8 : Negative\par  [de-identified] : SON: JUAN RAMON [phone] [de-identified] : Patient presents for follow up.\par \par 2/6/23: \par Stable treated left upper lobe neoplasm with radiation induced scarring. Stable 1.4 cm bilobed solid right upper lobe nodule.\par Increased size of bilateral upper lobe groundglass opacities. Consider organizing pneumonia.\par  2/6/23 MRI Brain No evidence of Malignancy Mild Chronic White matter change\par \par For adenocarcinoma of the RENAN. She completed concurrent CRT with carbo/ taxol on 6/21/21. \par and Durvalumab which was completed in Oct 2022 \par + Pruritis f/u Dermatology \par SOme increasing cough She will call Dr Godwin \par Repeat CT chest per pulm in May 2023\par \par 6/6/23: \par Returns from visiting family in [facility]. Feels well overall. \par + improved breathing since 4/2023 with CPAP onset use \par Denies new or worsening symptoms

## 2023-06-06 NOTE — ASSESSMENT
[FreeTextEntry1] : SHANON BEE is a 67 yo female never smoker who was 66 yo at diagnosis of adenocarcinoma lung. \par Patient with no significant past medical history\par PET in 10/2020 with a RENAN spiculated lung mass 4.5 x 3cm with an SUV 11.1 with adjacent tree in bud opacity. Left lung, biopsy on 11/23/20: Adenocarcinoma\par \par -MRI Brain 12/29/20: negative \par -CT C/A/P 12/29/20: stable RENAN primary lesion, (4.7 x 2.3cm), 1.2 cm left lung apex lesion along with RUL stable lesion of 1.3 cm \par -PDL1 20% \par -Foundation one : ALK ELM 4 fusion variant, MS stable TMB 4 mut/ Mb, SF 3B1 \par \par  8/03/22 CT Angio chest:\par NO PE. Consolidative changes in the perihilar left lung extending to the periphery unchanged and previous post radiation changes. 1.5cm Rt uppet lobe  nodule unchanged however new patchy consolidation of uncertain etiology.\par \par Biopsy done of RUL: not malignant/ reactive \par -Initial plan for resection per Dr Choudhury, however EBUS prior to surgery with N2 disease. Patient was presented at [facility] on 4/14/21 and a plan for definitive CRT \par -S/p chemo RT with Carbo and taxol weekly with RT, completed 6/21/21\par -She had a rough course with Rt mediated Pneumonitis/ superimposed PNA  in July/ Aug 2021 requiring hospitalization\par -Currently on Immunotherapy with Durvalumab for 1 year Started on 9/7/21 Start date delayed due to requiring Prednisone for RT Pneumonitis.\par - s/p completion of  Durvalumab on 10/25/22\par - discussed CT scan from 8/3/22 while she had covid.  RT changes in lt  lobe/ perihilar area,  stable 1.5 cm Rt upper lobe\par - CT CHest : 2/6/23:  Stable treated left upper lobe neoplasm with radiation induced scarring. Stable 1.4 cm bilobed solid right upper lobe nodule.\par Increased size of bilateral upper lobe groundglass opacities. Consider organizing pneumonia.\par  2/6/23 MRI Brain No evidence of Malignancy Mild Chronic White matter change f/u with neurology( she has her own neurologist) \par - CT chest 5/5/23 1. LEFT perihilar malignant consolidation, unchanged from February 2023\par 2. Resolution of the patchy upper lobe consolidation present in February 2023\par 3. Right upper lobe pulmonary nodules, unchanged\par \par - Next CT Chest and MR brain ordered in 9/2023 \par - CEA ordered. CEA 92 (2/14/23)\par \par #Pruritus:\par Patient saw Dr. Andre for generalized pruritus likely 2/2 to Durvalumab. Patient prescribed topical steroids\par - Vistaryl \par - resolved \par \par RTO in 9/2023, after scans CTCAP and MRI brain

## 2023-06-07 LAB
ALBUMIN SERPL ELPH-MCNC: 4.1 G/DL
ALP BLD-CCNC: 172 U/L
ALT SERPL-CCNC: 83 U/L
ANION GAP SERPL CALC-SCNC: 14 MMOL/L
AST SERPL-CCNC: 43 U/L
BILIRUB SERPL-MCNC: 0.2 MG/DL
BUN SERPL-MCNC: 11 MG/DL
CALCIUM SERPL-MCNC: 9.2 MG/DL
CEA SERPL-MCNC: 142 NG/ML
CHLORIDE SERPL-SCNC: 106 MMOL/L
CO2 SERPL-SCNC: 20 MMOL/L
CREAT SERPL-MCNC: 0.7 MG/DL
EGFR: 94 ML/MIN/1.73M2
GLUCOSE SERPL-MCNC: 171 MG/DL
POTASSIUM SERPL-SCNC: 4.4 MMOL/L
PROT SERPL-MCNC: 6.6 G/DL
SODIUM SERPL-SCNC: 140 MMOL/L

## 2023-06-21 ENCOUNTER — NON-APPOINTMENT (OUTPATIENT)
Age: 68
End: 2023-06-21

## 2023-06-21 ENCOUNTER — APPOINTMENT (OUTPATIENT)
Dept: PULMONOLOGY | Facility: CLINIC | Age: 68
End: 2023-06-21
Payer: MEDICARE

## 2023-06-21 ENCOUNTER — OUTPATIENT (OUTPATIENT)
Dept: OUTPATIENT SERVICES | Facility: HOSPITAL | Age: 68
LOS: 1 days | End: 2023-06-21
Payer: MEDICARE

## 2023-06-21 VITALS
RESPIRATION RATE: 16 BRPM | HEART RATE: 70 BPM | SYSTOLIC BLOOD PRESSURE: 122 MMHG | OXYGEN SATURATION: 96 % | DIASTOLIC BLOOD PRESSURE: 76 MMHG

## 2023-06-21 VITALS — WEIGHT: 175 LBS | BODY MASS INDEX: 31.01 KG/M2 | HEIGHT: 63 IN

## 2023-06-21 DIAGNOSIS — Z98.891 HISTORY OF UTERINE SCAR FROM PREVIOUS SURGERY: Chronic | ICD-10-CM

## 2023-06-21 DIAGNOSIS — Z90.49 ACQUIRED ABSENCE OF OTHER SPECIFIED PARTS OF DIGESTIVE TRACT: Chronic | ICD-10-CM

## 2023-06-21 DIAGNOSIS — Z98.82 BREAST IMPLANT STATUS: Chronic | ICD-10-CM

## 2023-06-21 DIAGNOSIS — R06.09 OTHER FORMS OF DYSPNEA: ICD-10-CM

## 2023-06-21 DIAGNOSIS — Z98.890 OTHER SPECIFIED POSTPROCEDURAL STATES: Chronic | ICD-10-CM

## 2023-06-21 PROCEDURE — 99215 OFFICE O/P EST HI 40 MIN: CPT

## 2023-06-21 PROCEDURE — 71046 X-RAY EXAM CHEST 2 VIEWS: CPT | Mod: 26

## 2023-06-21 NOTE — PROCEDURE
[___%] : last visit [unfilled]U% [Spirometry] : stable [FreeTextEntry1] : 5/16/2023: Pulmonary function test-mild degree of restriction seen in functional residual capacity with a normal vital capacity and TLC.  Diffusion capacity when corrected for alveolar volume is normal.  Spirometry is normal.

## 2023-06-21 NOTE — PHYSICAL EXAM
[No Acute Distress] : no acute distress [Normal Oropharynx] : normal oropharynx [Normal Appearance] : normal appearance [No Neck Mass] : no neck mass [Normal Rate/Rhythm] : normal rate/rhythm [Normal S1, S2] : normal s1, s2 [No Murmurs] : no murmurs [No Resp Distress] : no resp distress [Clear to Auscultation Bilaterally] : clear to auscultation bilaterally [Benign] : benign [No Abnormalities] : no abnormalities [Normal Gait] : normal gait [No Clubbing] : no clubbing [No Cyanosis] : no cyanosis [No Edema] : no edema [FROM] : FROM [Normal Color/ Pigmentation] : normal color/ pigmentation [No Focal Deficits] : no focal deficits [Normal Affect] : normal affect [Oriented x3] : oriented x3

## 2023-06-21 NOTE — HISTORY OF PRESENT ILLNESS
[Obstructive Sleep Apnea] : obstructive sleep apnea [Awakes Unrefreshed] : awakes unrefreshed [Awakes with Dry Mouth] : awakes with dry mouth [Witnessed Apneas] : witnessed apneas [Snoring] : snoring [Lab] : lab [APAP:] : APAP [TextBox_4] : 11/29/2022:\par 67-year-old female with a history of an adenocarcinoma left upper lobe status post radiation and chemotherapy.  Course complicated by a radiation pneumonitis versus drug-induced pneumonitis secondary to durvalumab.  Patient seen today for routine follow-up following CAT scan. \par \par August mild Covid and used  O2. Off al chemo and immunotherapy since September\par \par Patient suffered an episode of COVID-19 in mid August treated with Paxilovid and requiring oxygen.\par \par Cough on ABX 10/31/22. Saw PMD and rx'ed with azithromycin and resolved. c/o am cough and sputum intermittently. Evidence of desaturations with sleep on home monitoring device.  Has not been using oxygen on a regular basis\par \par Work-up is consistent with radiation pneumonitis or drug-induced pneumonia.  Mild gestational changes may have been due to recent COVID-19 infection.  Patient was scheduled for sleep study.\par \par 2/8/2023:\[facility] No c/o cough wheeze of increased SOB. No sputum\par Patient had demonstrated new increased infiltrates at the right apex with a repeat CAT scan advised in 3 months.\par \par \par \par 4/19/2023:\par  c/o pain 1 wk worse.Present x months\par Pain is localized to the right shoulder blade, worse on palpation without change with respirations.  There is no history of cough wheeze or sputum.\par \par Patient was felt to have musculoskeletal pain and was started on a nonsteroidal.  A chest CT was ordered.\par \par 5/16/2023:\par Patient feeling well without complaints of cough wheeze or sputum production.  She did undergo a follow-up CAT scan which is included below.  There is no history of fevers chills or chest pains.  Previously noted musculoskeletal pain has resolved.\par Evaluation demonstrated resolution of radiation pneumonitis.\par \par 6/21/2023:\par Off all chemo and check point inhibitors.  [facility]  Over the last several weeks is noted increased fatigue with palpitations and questionable intermittent hypoxemia.  No cough wheeze or sputum. [TextBox_77] : 0039 [TextBox_79] : 0196 [TextBox_81] : 30 [TextBox_89] : 3-4 [TextBox_100] : 12/14/2022 [TextBox_108] : 25 [TextBox_116] : 67 [TextBox_120] : REM index 60 [TextBox_125] : 4-16 [TextBox_127] : 5/22/2023 [TextBox_129] : 6/20/2023 [TextBox_133] : 67 [TextBox_137] : 53 [TextBox_147] : 2 [TextBox_158] : Domingo [TextBox_162] : 2/8/2023 [TextBox_165] : Wishes to try a new mask.  Mask is hurting her\par Compliance affected by recent travel [ESS] : 8

## 2023-06-21 NOTE — END OF VISIT
[Time Spent: ___ minutes] : I have spent [unfilled] minutes of time on the encounter. [FreeTextEntry3] :   used.  New sample mask fitted by me

## 2023-06-21 NOTE — REASON FOR VISIT
[Follow-Up] : a follow-up visit [Abnormal CXR/ Chest CT] : an abnormal CXR/ chest CT [Lung Cancer] : lung cancer [ILD] : ILD [Family Member] : family member [Ad Hoc ] : provided by an ad hoc  [TextBox_44] : Drug induced ILD vs Radiation pneumonitis [Interpreters_FullName] : Lashanda [Interpreters_Relationshiptopatient] : daughter [TWNoteComboBox1] : Cambodian

## 2023-06-21 NOTE — DISCUSSION/SUMMARY
[Obstructive Sleep Apnea] : obstructive sleep apnea [Moderate] : moderate in severity [Responding to Treatment] : responding to treatment [None] : There are no changes in medication management [Alcohol Avoidance] : alcohol avoidance [Sedative Avoidance] : sedative avoidance [Patient] : discussed with the patient [FreeTextEntry1] : \par \par Recent complaints of dyspnea may be more on the basis of anxiety.  Will evaluate with a repeat chest x-ray as well as D-dimer and BNP.  Recent labs and chemistries only demonstrated elevations in liver function test which has been previously seen.  Differential diagnosis does include arrhythmias and the patient has been referred back to her cardiologist. [de-identified] : Patient fitted with a medium fullface lEvora mask sample by me

## 2023-06-21 NOTE — CONSULT LETTER
[Dear  ___] : Dear  [unfilled], [Consult Letter:] : I had the pleasure of evaluating your patient, [unfilled]. [Please see my note below.] : Please see my note below. [Sincerely,] : Sincerely, [DrDavid  ___] : Dr. CADET [FreeTextEntry3] : Tho Zayas MD FCCP\par Pulmonary/Critical Care/Sleep Medicine\par Department of Internal Medicine\par \par [facility] [employer]\par

## 2023-06-22 ENCOUNTER — NON-APPOINTMENT (OUTPATIENT)
Age: 68
End: 2023-06-22

## 2023-06-22 LAB
DEPRECATED D DIMER PPP IA-ACNC: <150 NG/ML DDU
NT-PROBNP SERPL-MCNC: 49 PG/ML

## 2023-08-11 ENCOUNTER — APPOINTMENT (OUTPATIENT)
Dept: CT IMAGING | Facility: CLINIC | Age: 68
End: 2023-08-11
Payer: MEDICARE

## 2023-08-11 ENCOUNTER — OUTPATIENT (OUTPATIENT)
Dept: OUTPATIENT SERVICES | Facility: HOSPITAL | Age: 68
LOS: 1 days | End: 2023-08-11
Payer: MEDICARE

## 2023-08-11 ENCOUNTER — APPOINTMENT (OUTPATIENT)
Dept: MRI IMAGING | Facility: CLINIC | Age: 68
End: 2023-08-11
Payer: MEDICARE

## 2023-08-11 DIAGNOSIS — Z98.890 OTHER SPECIFIED POSTPROCEDURAL STATES: Chronic | ICD-10-CM

## 2023-08-11 DIAGNOSIS — Z98.891 HISTORY OF UTERINE SCAR FROM PREVIOUS SURGERY: Chronic | ICD-10-CM

## 2023-08-11 DIAGNOSIS — Z90.49 ACQUIRED ABSENCE OF OTHER SPECIFIED PARTS OF DIGESTIVE TRACT: Chronic | ICD-10-CM

## 2023-08-11 DIAGNOSIS — Z98.82 BREAST IMPLANT STATUS: Chronic | ICD-10-CM

## 2023-08-11 DIAGNOSIS — C80.1 MALIGNANT (PRIMARY) NEOPLASM, UNSPECIFIED: ICD-10-CM

## 2023-08-11 PROCEDURE — 74177 CT ABD & PELVIS W/CONTRAST: CPT | Mod: 26

## 2023-08-11 PROCEDURE — 70553 MRI BRAIN STEM W/O & W/DYE: CPT | Mod: 26

## 2023-08-11 PROCEDURE — 71260 CT THORAX DX C+: CPT | Mod: 26

## 2023-08-11 PROCEDURE — A9585: CPT

## 2023-08-11 PROCEDURE — 70553 MRI BRAIN STEM W/O & W/DYE: CPT

## 2023-08-11 PROCEDURE — 71260 CT THORAX DX C+: CPT

## 2023-08-11 PROCEDURE — 74177 CT ABD & PELVIS W/CONTRAST: CPT

## 2023-08-15 ENCOUNTER — APPOINTMENT (OUTPATIENT)
Dept: PULMONOLOGY | Facility: CLINIC | Age: 68
End: 2023-08-15
Payer: MEDICARE

## 2023-08-15 VITALS
DIASTOLIC BLOOD PRESSURE: 74 MMHG | SYSTOLIC BLOOD PRESSURE: 122 MMHG | BODY MASS INDEX: 30.48 KG/M2 | RESPIRATION RATE: 16 BRPM | HEIGHT: 63 IN | OXYGEN SATURATION: 98 % | WEIGHT: 172 LBS | HEART RATE: 66 BPM

## 2023-08-15 PROCEDURE — 99215 OFFICE O/P EST HI 40 MIN: CPT

## 2023-08-15 RX ORDER — FLUTICASONE FUROATE, UMECLIDINIUM BROMIDE AND VILANTEROL TRIFENATATE 200; 62.5; 25 UG/1; UG/1; UG/1
200-62.5-25 POWDER RESPIRATORY (INHALATION)
Refills: 0 | Status: DISCONTINUED | COMMUNITY
End: 2023-08-15

## 2023-08-15 RX ORDER — FLUOXETINE HYDROCHLORIDE 40 MG/1
CAPSULE ORAL
Refills: 0 | Status: ACTIVE | COMMUNITY

## 2023-08-15 NOTE — PROCEDURE
[FreeTextEntry1] : 5/16/2023: Pulmonary function test-mild degree of restriction seen in functional residual capacity with a normal vital capacity and TLC.  Diffusion capacity when corrected for alveolar volume is normal.  Spirometry is normal.

## 2023-08-15 NOTE — CONSULT LETTER
[Dear  ___] : Dear  [unfilled], [Consult Letter:] : I had the pleasure of evaluating your patient, [unfilled]. [Please see my note below.] : Please see my note below. [Sincerely,] : Sincerely, [DrDavid  ___] : Dr. CADET [FreeTextEntry3] : Tho Zayas MD FCCP\par  Pulmonary/Critical Care/Sleep Medicine\par  Department of Internal Medicine\par  \par  [facility] [employer] [facility]\par

## 2023-08-15 NOTE — DISCUSSION/SUMMARY
[Obstructive Sleep Apnea] : obstructive sleep apnea [Moderate] : moderate in severity [Responding to Treatment] : responding to treatment [None] : There are no changes in medication management [Alcohol Avoidance] : alcohol avoidance [Sedative Avoidance] : sedative avoidance [Patient] : discussed with the patient [FreeTextEntry1] : Patient is complaints of shortness of breath.  Been due to anxiety.  Recent CAT scan shows evidence of chronic fibrosis with some postobstructive changes most likely on the basis of prior radiation.  No significant change in her CAT scan.  Further recommendations per oncology [de-identified] : Change Mask to Large Prasad FFM

## 2023-08-15 NOTE — HISTORY OF PRESENT ILLNESS
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  Course complicated by a radiation pneumonitis versus drug-induced pneumonitis secondary to durvalumab.  Patient seen today for routine follow-up following CAT scan.   August mild Covid and used  O2. Off al chemo and immunotherapy since September  Patient suffered an episode of COVID-19 in mid August treated with Paxilovid and requiring oxygen.  Cough on ABX 10/31/22. Saw PMD and rx'ed with azithromycin and resolved. c/o am cough and sputum intermittently. Evidence of desaturations with sleep on home monitoring device.  Has not been using oxygen on a regular basis  Work-up is consistent with radiation pneumonitis or drug-induced pneumonia.  Mild gestational changes may have been due to recent COVID-19 infection.  Patient was scheduled for sleep study.  2/8/2023: Just returned from [facility]. No c/o cough wheeze of increased SOB. No sputum Patient had demonstrated new increased infiltrates at the right apex with a repeat CAT scan advised in 3 months.    4/19/2023:  c/o pain 1 wk worse.Present x months Pain is localized to the right shoulder blade, worse on palpation without change with respirations.  There is no history of cough wheeze or sputum.  Patient was felt to have musculoskeletal pain and was started on a nonsteroidal.  A chest CT was ordered.  5/16/2023: Patient feeling well without complaints of cough wheeze or sputum production.  She did undergo a follow-up CAT scan which is included below.  There is no history of fevers chills or chest pains.  Previously noted musculoskeletal pain has resolved. Evaluation demonstrated resolution of radiation pneumonitis.  6/21/2023: Off all chemo and check point inhibitors.  Recently returned from [facility].  Over the last several weeks is noted increased fatigue with palpitations and questionable intermittent hypoxemia.  No cough wheeze or sputum.  8/15/2023: Remains off chemotherapy and checkpoint inhibitors.  No new complaints of increased cough wheeze shortness of breath weight loss palpitations lightheadedness or dizziness. [Awakes Unrefreshed] : does not awaken unrefreshed [Awakes with Dry Mouth] : does not awaken with dry mouth [Snoring] : no snoring [Witnessed Apneas] : no witnessed apneas [TextBox_77] : 003 [TextBox_79] : 7932 [TextBox_81] : 30 [TextBox_89] : 3-4 [TextBox_100] : 12/14/2022 [TextBox_108] : 25 [TextBox_116] : 67 [TextBox_120] : REM index 60 [TextBox_125] : 4-16 [TextBox_127] : 7/12/2023 [TextBox_129] : 8/10/2023 [TextBox_133] : 100 [TextBox_137] : 97 [TextBox_141] : 6 [TextBox_143] : 43 [TextBox_147] : 3.0 [TextBox_158] : Domingo [TextBox_162] : 2/8/2023 [TextBox_165] : Patient fitted with a full face Prasad mask large size which she likes [ESS] : 8

## 2023-08-15 NOTE — END OF VISIT
[Time Spent: ___ minutes] : I have spent [unfilled] minutes of time on the encounter. [FreeTextEntry3] :   used.  CT reviewed on PACS with patient and daughter

## 2023-08-15 NOTE — REASON FOR VISIT
[Follow-Up] : a follow-up visit [Abnormal CXR/ Chest CT] : an abnormal CXR/ chest CT [Lung Cancer] : lung cancer [ILD] : ILD [Family Member] : family member [Ad Hoc ] : provided by an ad hoc  [TextBox_44] : Drug induced ILD vs Radiation pneumonitis [Interpreters_FullName] : Lashanda [Interpreters_Relationshiptopatient] : daughter

## 2023-08-29 ENCOUNTER — OUTPATIENT (OUTPATIENT)
Dept: OUTPATIENT SERVICES | Facility: HOSPITAL | Age: 68
LOS: 1 days | Discharge: HOME | End: 2023-08-29

## 2023-08-29 DIAGNOSIS — Z90.49 ACQUIRED ABSENCE OF OTHER SPECIFIED PARTS OF DIGESTIVE TRACT: Chronic | ICD-10-CM

## 2023-08-29 DIAGNOSIS — C34.90 MALIGNANT NEOPLASM OF UNSPECIFIED PART OF UNSPECIFIED BRONCHUS OR LUNG: ICD-10-CM

## 2023-08-29 DIAGNOSIS — Z98.891 HISTORY OF UTERINE SCAR FROM PREVIOUS SURGERY: Chronic | ICD-10-CM

## 2023-08-29 DIAGNOSIS — Z98.82 BREAST IMPLANT STATUS: Chronic | ICD-10-CM

## 2023-08-29 DIAGNOSIS — Z98.890 OTHER SPECIFIED POSTPROCEDURAL STATES: Chronic | ICD-10-CM

## 2023-08-30 ENCOUNTER — APPOINTMENT (OUTPATIENT)
Dept: HEMATOLOGY ONCOLOGY | Facility: CLINIC | Age: 68
End: 2023-08-30
Payer: MEDICARE

## 2023-08-30 VITALS
WEIGHT: 173.06 LBS | SYSTOLIC BLOOD PRESSURE: 109 MMHG | HEIGHT: 63 IN | DIASTOLIC BLOOD PRESSURE: 64 MMHG | OXYGEN SATURATION: 98 % | BODY MASS INDEX: 30.66 KG/M2 | HEART RATE: 72 BPM

## 2023-08-30 PROCEDURE — 99215 OFFICE O/P EST HI 40 MIN: CPT

## 2023-08-30 NOTE — REVIEW OF SYSTEMS
[Diarrhea: Grade 0] : Diarrhea: Grade 0 [FreeTextEntry2] : negative except as indicated in interval history

## 2023-08-30 NOTE — ASSESSMENT
[FreeTextEntry1] : SHANON BEE is a 67 yo female never smoker who was 68 yo at diagnosis of adenocarcinoma lung.  Patient with no significant past medical history PET in 10/2020 with a RENAN spiculated lung mass 4.5 x 3cm with an SUV 11.1 with adjacent tree in bud opacity. Left lung, biopsy on 11/23/20: Adenocarcinoma  -MRI Brain 12/29/20: negative  -CT C/A/P 12/29/20: stable RENAN primary lesion, (4.7 x 2.3cm), 1.2 cm left lung apex lesion along with RUL stable lesion of 1.3 cm  -PDL1 20%  -Foundation one : ALK ELM 4 fusion variant, MS stable TMB 4 mut/ Mb, SF 3B1    8/03/22 CT Angio chest: NO PE. Consolidative changes in the perihilar left lung extending to the periphery unchanged and previous post radiation changes. 1.5cm Rt uppet lobe  nodule unchanged however new patchy consolidation of uncertain etiology.  Biopsy done of RUL: not malignant/ reactive  -Initial plan for resection per Dr Choudhury, however EBUS prior to surgery with N2 disease. Patient was presented at Tumor board on 4/14/21 and a plan for definitive CRT  -S/p chemo RT with Carbo and taxol weekly with RT, completed 6/21/21 -She had a rough course with Rt mediated Pneumonitis/ superimposed PNA  in July/ Aug 2021 requiring hospitalization -Currently on Immunotherapy with Durvalumab for 1 year Started on 9/7/21 Start date delayed due to requiring Prednisone for RT Pneumonitis. - s/p completion of  Durvalumab on 10/25/22 - discussed CT scan from 8/3/22 while she had covid.  RT changes in lt  lobe/ perihilar area,  stable 1.5 cm Rt upper lobe - CT CHest : 2/6/23:  Stable treated left upper lobe neoplasm with radiation induced scarring. Stable 1.4 cm bilobed solid right upper lobe nodule. Increased size of bilateral upper lobe groundglass opacities. Consider organizing pneumonia.  2/6/23 MRI Brain No evidence of Malignancy Mild Chronic White matter change f/u with neurology( she has her own neurologist)  - CT chest 5/5/23 1. LEFT perihilar malignant consolidation, unchanged from February 2023 2. Resolution of the patchy upper lobe consolidation present in February 2023 3. Right upper lobe pulmonary nodules, unchanged MRi 8/11/23 NO EVIDENCE OF A MASS, ABNORMAL ENHANCEMENT, OR CURRENT EVIDENCE OF ACUTE ISCHEMIA.  MILD CHRONIC WHITE MATTER ISCHEMIC CHANGES. NO SIGNIFICANT INTERVAL CHANGE FROM PRIOR CT CAP 8/11/23  - Redemonstrated left upper lobe and lower lobe perihilar consolidations with air bronchograms and peripheral groundglass opacities, without significant interval change compared to prior. Right upper lobe bilobar nodule (3-49), 1.4 cm, stable. No new or enlarging suspicious pulmonary nodule. No new focal consolidation. Trace left pleural effusion, new. Mild anterior bladder wall thickening with perivesicular fat stranding. Findings may be secondary to cystitis; correlate with urinalysis. NO UTII symptoms - CEA ordered. CEA 92 (2/14/23)  #Pruritus: Patient saw Dr. Andre for generalized pruritus likely 2/2 to Durvalumab.  -however patient has now been done with IO for ~ 1 y unlikely related - advised benadryl and famotidine   RTO in 4 months after repeat scans Dec /Jan 2023

## 2023-08-30 NOTE — HISTORY OF PRESENT ILLNESS
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 She had an episode of diverticulitis in April 2020  \par  +ve PPD in the past and was treated with 6 months of INH \par  She is a never smoker.\par  \par  Patient had a Pulm lesion in the past and had an EBUS \par  She has a history of a pulmonary cyst that by her son's report was biopsied nearly 15 years ago and demonstrated no evidence of malignancy. Recent imaging has shown growth and development of a solid structure. The etiology remains unclear, but malignancy has been considered. She feels well has no complaints. She denies fever, chills, night sweats, weight loss, or weight gain. \par  \par  Followed by Dr Galeana in the past\par  PPD+ in 2000 and received 6 months of INH\par  PET positive RENAN posterior cavitary density in 2010\par  TTNA and Bronch non-diagnostic. Cultures negative for fungus and TB\par  CT at [facility] in 2010 and 2012 stable - felt to be benign. Lost to FU\par  \par  Patient had a PEt scan in Oct 2020 with a Left upper lobe spiculated lung mass 4.5 x 3cm with an SUV 11.1 with adjacent tree in bud opacity \par  In addition Rt upper lobe opacity 1.2 x1 cm which is not FDG avid \par  Discrete Left hilar/ infrahilar region  lesion with an SUV 5.2 which is possibly corresponding to a LN \par  \par  \par  Patient s/p Left lung mass, core biopsy with pathology c/w Adenocarcinoma c/w pulmonary origin (see comment)\par  CK7, TTF-1 : Positive CK20, PAX-8 : Negative\par   [de-identified] : SON: JUAN RAMON [phone] [de-identified] : Patient presents for follow up.  2/6/23:  Stable treated left upper lobe neoplasm with radiation induced scarring. Stable 1.4 cm bilobed solid right upper lobe nodule. Increased size of bilateral upper lobe groundglass opacities. Consider organizing pneumonia.  2/6/23 MRI Brain No evidence of Malignancy Mild Chronic White matter change  For adenocarcinoma of the RENAN. She completed concurrent CRT with carbo/ taxol on 6/21/21.  and Durvalumab which was completed in Oct 2022  + Pruritis f/u Dermatology  SOme increasing cough She will call Dr Godwin  Repeat CT chest per pulm in May 2023  6/6/23:  Returns from visiting family in [facility]. Feels well overall.  + improved breathing since 4/2023 with CPAP onset use  Denies new or worsening symptoms   8/30/23: Patient and daughter return for f/u  Reports some difficulty with sleeping  SOme Itching advised pepcid , bendaryl discussed that she has been off IO close to 1y unlikely 2/2 IO   8/11/23 CTCAP: Redemonstrated left upper lobe and lower lobe perihilar consolidations with air bronchograms and peripheral groundglass opacities, without significant interval change compared to prior. Right upper lobe bilobar nodule (3-49), 1.4 cm, stable. No new or enlarging suspicious pulmonary nodule. No new focal consolidation. Trace left pleural effusion, new. Mild anterior bladder wall thickening with perivesicular fat stranding. Findings may be secondary to cystitis; correlate with urinalysis.

## 2023-11-15 ENCOUNTER — NON-APPOINTMENT (OUTPATIENT)
Age: 68
End: 2023-11-15

## 2023-11-16 ENCOUNTER — OUTPATIENT (OUTPATIENT)
Dept: OUTPATIENT SERVICES | Facility: HOSPITAL | Age: 68
LOS: 1 days | End: 2023-11-16
Payer: MEDICARE

## 2023-11-16 ENCOUNTER — APPOINTMENT (OUTPATIENT)
Dept: RADIOLOGY | Facility: CLINIC | Age: 68
End: 2023-11-16
Payer: MEDICARE

## 2023-11-16 DIAGNOSIS — Z98.890 OTHER SPECIFIED POSTPROCEDURAL STATES: Chronic | ICD-10-CM

## 2023-11-16 DIAGNOSIS — Z98.891 HISTORY OF UTERINE SCAR FROM PREVIOUS SURGERY: Chronic | ICD-10-CM

## 2023-11-16 DIAGNOSIS — Z90.49 ACQUIRED ABSENCE OF OTHER SPECIFIED PARTS OF DIGESTIVE TRACT: Chronic | ICD-10-CM

## 2023-11-16 DIAGNOSIS — Z98.82 BREAST IMPLANT STATUS: Chronic | ICD-10-CM

## 2023-11-16 DIAGNOSIS — R05.9 COUGH, UNSPECIFIED: ICD-10-CM

## 2023-11-16 PROCEDURE — 71046 X-RAY EXAM CHEST 2 VIEWS: CPT | Mod: 26

## 2023-11-16 PROCEDURE — 71046 X-RAY EXAM CHEST 2 VIEWS: CPT

## 2023-11-17 ENCOUNTER — EMERGENCY (EMERGENCY)
Facility: HOSPITAL | Age: 68
LOS: 1 days | Discharge: HOME | End: 2023-11-17
Attending: STUDENT IN AN ORGANIZED HEALTH CARE EDUCATION/TRAINING PROGRAM
Payer: MEDICARE

## 2023-11-17 VITALS
OXYGEN SATURATION: 100 % | DIASTOLIC BLOOD PRESSURE: 77 MMHG | RESPIRATION RATE: 20 BRPM | HEART RATE: 71 BPM | SYSTOLIC BLOOD PRESSURE: 132 MMHG

## 2023-11-17 VITALS
TEMPERATURE: 98 F | SYSTOLIC BLOOD PRESSURE: 131 MMHG | OXYGEN SATURATION: 100 % | HEART RATE: 69 BPM | DIASTOLIC BLOOD PRESSURE: 81 MMHG | HEIGHT: 66 IN | WEIGHT: 167.99 LBS | RESPIRATION RATE: 20 BRPM

## 2023-11-17 DIAGNOSIS — Z98.891 HISTORY OF UTERINE SCAR FROM PREVIOUS SURGERY: Chronic | ICD-10-CM

## 2023-11-17 DIAGNOSIS — Z98.890 OTHER SPECIFIED POSTPROCEDURAL STATES: Chronic | ICD-10-CM

## 2023-11-17 DIAGNOSIS — Z90.49 ACQUIRED ABSENCE OF OTHER SPECIFIED PARTS OF DIGESTIVE TRACT: Chronic | ICD-10-CM

## 2023-11-17 DIAGNOSIS — Z98.82 BREAST IMPLANT STATUS: Chronic | ICD-10-CM

## 2023-11-17 LAB
ALBUMIN SERPL ELPH-MCNC: 5.1 G/DL — SIGNIFICANT CHANGE UP (ref 3.3–5.2)
ALBUMIN SERPL ELPH-MCNC: 5.1 G/DL — SIGNIFICANT CHANGE UP (ref 3.3–5.2)
ALP SERPL-CCNC: 125 U/L — HIGH (ref 40–120)
ALP SERPL-CCNC: 125 U/L — HIGH (ref 40–120)
ALT FLD-CCNC: 24 U/L — SIGNIFICANT CHANGE UP
ALT FLD-CCNC: 24 U/L — SIGNIFICANT CHANGE UP
ANION GAP SERPL CALC-SCNC: 15 MMOL/L — SIGNIFICANT CHANGE UP (ref 5–17)
ANION GAP SERPL CALC-SCNC: 15 MMOL/L — SIGNIFICANT CHANGE UP (ref 5–17)
APTT BLD: 36 SEC — HIGH (ref 24.5–35.6)
APTT BLD: 36 SEC — HIGH (ref 24.5–35.6)
AST SERPL-CCNC: 22 U/L — SIGNIFICANT CHANGE UP
AST SERPL-CCNC: 22 U/L — SIGNIFICANT CHANGE UP
BASOPHILS # BLD AUTO: 0.05 K/UL — SIGNIFICANT CHANGE UP (ref 0–0.2)
BASOPHILS # BLD AUTO: 0.05 K/UL — SIGNIFICANT CHANGE UP (ref 0–0.2)
BASOPHILS NFR BLD AUTO: 0.9 % — SIGNIFICANT CHANGE UP (ref 0–2)
BASOPHILS NFR BLD AUTO: 0.9 % — SIGNIFICANT CHANGE UP (ref 0–2)
BILIRUB SERPL-MCNC: 0.4 MG/DL — SIGNIFICANT CHANGE UP (ref 0.4–2)
BILIRUB SERPL-MCNC: 0.4 MG/DL — SIGNIFICANT CHANGE UP (ref 0.4–2)
BUN SERPL-MCNC: 14.2 MG/DL — SIGNIFICANT CHANGE UP (ref 8–20)
BUN SERPL-MCNC: 14.2 MG/DL — SIGNIFICANT CHANGE UP (ref 8–20)
CALCIUM SERPL-MCNC: 10.1 MG/DL — SIGNIFICANT CHANGE UP (ref 8.4–10.5)
CALCIUM SERPL-MCNC: 10.1 MG/DL — SIGNIFICANT CHANGE UP (ref 8.4–10.5)
CHLORIDE SERPL-SCNC: 100 MMOL/L — SIGNIFICANT CHANGE UP (ref 96–108)
CHLORIDE SERPL-SCNC: 100 MMOL/L — SIGNIFICANT CHANGE UP (ref 96–108)
CO2 SERPL-SCNC: 26 MMOL/L — SIGNIFICANT CHANGE UP (ref 22–29)
CO2 SERPL-SCNC: 26 MMOL/L — SIGNIFICANT CHANGE UP (ref 22–29)
CREAT SERPL-MCNC: 0.75 MG/DL — SIGNIFICANT CHANGE UP (ref 0.5–1.3)
CREAT SERPL-MCNC: 0.75 MG/DL — SIGNIFICANT CHANGE UP (ref 0.5–1.3)
EGFR: 87 ML/MIN/1.73M2 — SIGNIFICANT CHANGE UP
EGFR: 87 ML/MIN/1.73M2 — SIGNIFICANT CHANGE UP
EOSINOPHIL # BLD AUTO: 0.12 K/UL — SIGNIFICANT CHANGE UP (ref 0–0.5)
EOSINOPHIL # BLD AUTO: 0.12 K/UL — SIGNIFICANT CHANGE UP (ref 0–0.5)
EOSINOPHIL NFR BLD AUTO: 2.2 % — SIGNIFICANT CHANGE UP (ref 0–6)
EOSINOPHIL NFR BLD AUTO: 2.2 % — SIGNIFICANT CHANGE UP (ref 0–6)
GLUCOSE SERPL-MCNC: 83 MG/DL — SIGNIFICANT CHANGE UP (ref 70–99)
GLUCOSE SERPL-MCNC: 83 MG/DL — SIGNIFICANT CHANGE UP (ref 70–99)
HCT VFR BLD CALC: 41.8 % — SIGNIFICANT CHANGE UP (ref 34.5–45)
HCT VFR BLD CALC: 41.8 % — SIGNIFICANT CHANGE UP (ref 34.5–45)
HGB BLD-MCNC: 14.1 G/DL — SIGNIFICANT CHANGE UP (ref 11.5–15.5)
HGB BLD-MCNC: 14.1 G/DL — SIGNIFICANT CHANGE UP (ref 11.5–15.5)
IMM GRANULOCYTES NFR BLD AUTO: 0.4 % — SIGNIFICANT CHANGE UP (ref 0–0.9)
IMM GRANULOCYTES NFR BLD AUTO: 0.4 % — SIGNIFICANT CHANGE UP (ref 0–0.9)
INR BLD: 0.95 RATIO — SIGNIFICANT CHANGE UP (ref 0.85–1.18)
INR BLD: 0.95 RATIO — SIGNIFICANT CHANGE UP (ref 0.85–1.18)
LIDOCAIN IGE QN: 70 U/L — HIGH (ref 22–51)
LIDOCAIN IGE QN: 70 U/L — HIGH (ref 22–51)
LYMPHOCYTES # BLD AUTO: 1.14 K/UL — SIGNIFICANT CHANGE UP (ref 1–3.3)
LYMPHOCYTES # BLD AUTO: 1.14 K/UL — SIGNIFICANT CHANGE UP (ref 1–3.3)
LYMPHOCYTES # BLD AUTO: 20.9 % — SIGNIFICANT CHANGE UP (ref 13–44)
LYMPHOCYTES # BLD AUTO: 20.9 % — SIGNIFICANT CHANGE UP (ref 13–44)
MAGNESIUM SERPL-MCNC: 2.2 MG/DL — SIGNIFICANT CHANGE UP (ref 1.6–2.6)
MAGNESIUM SERPL-MCNC: 2.2 MG/DL — SIGNIFICANT CHANGE UP (ref 1.6–2.6)
MCHC RBC-ENTMCNC: 30.3 PG — SIGNIFICANT CHANGE UP (ref 27–34)
MCHC RBC-ENTMCNC: 30.3 PG — SIGNIFICANT CHANGE UP (ref 27–34)
MCHC RBC-ENTMCNC: 33.7 GM/DL — SIGNIFICANT CHANGE UP (ref 32–36)
MCHC RBC-ENTMCNC: 33.7 GM/DL — SIGNIFICANT CHANGE UP (ref 32–36)
MCV RBC AUTO: 89.9 FL — SIGNIFICANT CHANGE UP (ref 80–100)
MCV RBC AUTO: 89.9 FL — SIGNIFICANT CHANGE UP (ref 80–100)
MONOCYTES # BLD AUTO: 0.4 K/UL — SIGNIFICANT CHANGE UP (ref 0–0.9)
MONOCYTES # BLD AUTO: 0.4 K/UL — SIGNIFICANT CHANGE UP (ref 0–0.9)
MONOCYTES NFR BLD AUTO: 7.3 % — SIGNIFICANT CHANGE UP (ref 2–14)
MONOCYTES NFR BLD AUTO: 7.3 % — SIGNIFICANT CHANGE UP (ref 2–14)
NEUTROPHILS # BLD AUTO: 3.72 K/UL — SIGNIFICANT CHANGE UP (ref 1.8–7.4)
NEUTROPHILS # BLD AUTO: 3.72 K/UL — SIGNIFICANT CHANGE UP (ref 1.8–7.4)
NEUTROPHILS NFR BLD AUTO: 68.3 % — SIGNIFICANT CHANGE UP (ref 43–77)
NEUTROPHILS NFR BLD AUTO: 68.3 % — SIGNIFICANT CHANGE UP (ref 43–77)
PLATELET # BLD AUTO: 316 K/UL — SIGNIFICANT CHANGE UP (ref 150–400)
PLATELET # BLD AUTO: 316 K/UL — SIGNIFICANT CHANGE UP (ref 150–400)
POTASSIUM SERPL-MCNC: 4.3 MMOL/L — SIGNIFICANT CHANGE UP (ref 3.5–5.3)
POTASSIUM SERPL-MCNC: 4.3 MMOL/L — SIGNIFICANT CHANGE UP (ref 3.5–5.3)
POTASSIUM SERPL-SCNC: 4.3 MMOL/L — SIGNIFICANT CHANGE UP (ref 3.5–5.3)
POTASSIUM SERPL-SCNC: 4.3 MMOL/L — SIGNIFICANT CHANGE UP (ref 3.5–5.3)
PROT SERPL-MCNC: 8.8 G/DL — HIGH (ref 6.6–8.7)
PROT SERPL-MCNC: 8.8 G/DL — HIGH (ref 6.6–8.7)
PROTHROM AB SERPL-ACNC: 10.6 SEC — SIGNIFICANT CHANGE UP (ref 9.5–13)
PROTHROM AB SERPL-ACNC: 10.6 SEC — SIGNIFICANT CHANGE UP (ref 9.5–13)
RBC # BLD: 4.65 M/UL — SIGNIFICANT CHANGE UP (ref 3.8–5.2)
RBC # BLD: 4.65 M/UL — SIGNIFICANT CHANGE UP (ref 3.8–5.2)
RBC # FLD: 13.6 % — SIGNIFICANT CHANGE UP (ref 10.3–14.5)
RBC # FLD: 13.6 % — SIGNIFICANT CHANGE UP (ref 10.3–14.5)
SODIUM SERPL-SCNC: 141 MMOL/L — SIGNIFICANT CHANGE UP (ref 135–145)
SODIUM SERPL-SCNC: 141 MMOL/L — SIGNIFICANT CHANGE UP (ref 135–145)
TROPONIN T, HIGH SENSITIVITY RESULT: 6 NG/L — SIGNIFICANT CHANGE UP (ref 0–51)
TROPONIN T, HIGH SENSITIVITY RESULT: 6 NG/L — SIGNIFICANT CHANGE UP (ref 0–51)
TROPONIN T, HIGH SENSITIVITY RESULT: 8 NG/L — SIGNIFICANT CHANGE UP (ref 0–51)
TROPONIN T, HIGH SENSITIVITY RESULT: 8 NG/L — SIGNIFICANT CHANGE UP (ref 0–51)
WBC # BLD: 5.45 K/UL — SIGNIFICANT CHANGE UP (ref 3.8–10.5)
WBC # BLD: 5.45 K/UL — SIGNIFICANT CHANGE UP (ref 3.8–10.5)
WBC # FLD AUTO: 5.45 K/UL — SIGNIFICANT CHANGE UP (ref 3.8–10.5)
WBC # FLD AUTO: 5.45 K/UL — SIGNIFICANT CHANGE UP (ref 3.8–10.5)

## 2023-11-17 PROCEDURE — 80053 COMPREHEN METABOLIC PANEL: CPT

## 2023-11-17 PROCEDURE — 71045 X-RAY EXAM CHEST 1 VIEW: CPT

## 2023-11-17 PROCEDURE — 36415 COLL VENOUS BLD VENIPUNCTURE: CPT

## 2023-11-17 PROCEDURE — 93010 ELECTROCARDIOGRAM REPORT: CPT | Mod: 76

## 2023-11-17 PROCEDURE — 84484 ASSAY OF TROPONIN QUANT: CPT

## 2023-11-17 PROCEDURE — 99285 EMERGENCY DEPT VISIT HI MDM: CPT | Mod: 25

## 2023-11-17 PROCEDURE — 83735 ASSAY OF MAGNESIUM: CPT

## 2023-11-17 PROCEDURE — 71275 CT ANGIOGRAPHY CHEST: CPT | Mod: MA

## 2023-11-17 PROCEDURE — 71045 X-RAY EXAM CHEST 1 VIEW: CPT | Mod: 26

## 2023-11-17 PROCEDURE — 85025 COMPLETE CBC W/AUTO DIFF WBC: CPT

## 2023-11-17 PROCEDURE — 85610 PROTHROMBIN TIME: CPT

## 2023-11-17 PROCEDURE — 99285 EMERGENCY DEPT VISIT HI MDM: CPT

## 2023-11-17 PROCEDURE — 93005 ELECTROCARDIOGRAM TRACING: CPT

## 2023-11-17 PROCEDURE — 83690 ASSAY OF LIPASE: CPT

## 2023-11-17 PROCEDURE — 85730 THROMBOPLASTIN TIME PARTIAL: CPT

## 2023-11-17 PROCEDURE — 71275 CT ANGIOGRAPHY CHEST: CPT | Mod: 26,MA

## 2023-11-17 PROCEDURE — 96374 THER/PROPH/DIAG INJ IV PUSH: CPT | Mod: XU

## 2023-11-17 RX ORDER — ACETAMINOPHEN 500 MG
1000 TABLET ORAL ONCE
Refills: 0 | Status: COMPLETED | OUTPATIENT
Start: 2023-11-17 | End: 2023-11-17

## 2023-11-17 RX ADMIN — Medication 1000 MILLIGRAM(S): at 13:40

## 2023-11-17 RX ADMIN — Medication 1000 MILLIGRAM(S): at 14:33

## 2023-11-17 RX ADMIN — Medication 400 MILLIGRAM(S): at 13:38

## 2023-11-17 NOTE — ED PROVIDER NOTE - CLINICAL SUMMARY MEDICAL DECISION MAKING FREE TEXT BOX
67yo female with pmh of lung cancer s/p chemo and immunotherapy in remission for 1 year presents with back pain and sob 67yo female with pmh of lung cancer s/p chemo and immunotherapy in remission for 1 year presents with back pain and sob, pt symptoms likely due to cancer reoccurrence, pt has follow up with oncologist. Pt low risk MACE, ekg no acute ischemic changes, delta trop <3, pt with no PE, VSS, stable for dc with follow up

## 2023-11-17 NOTE — ED ADULT NURSE NOTE - CHIEF COMPLAINT QUOTE
c/o of L sided upper back pain, weakness and pain upon inspiration x2 weeks. Hx of Lung Ca in remission since 2021

## 2023-11-17 NOTE — ED PROVIDER NOTE - PATIENT PORTAL LINK FT
You can access the FollowMyHealth Patient Portal offered by [facility] by registering at the following website: [email or web address]. By joining [facility]’s FollowMyHealth portal, you will also be able to view your health information using other applications (apps) compatible with our system.

## 2023-11-17 NOTE — ED PROVIDER NOTE - NSFOLLOWUPINSTRUCTIONS_ED_ALL_ED_FT
-Please follow-up with your primary care doctor and oncologist in the next 2 days.  Please call tomorrow for an appointment.  If you cannot follow-up with your primary care doctor please return to the ED for any urgent issues.  - You were given a copy of the tests performed today.  Please bring the results with you and review them with your primary care doctor.  - If you have any worsening of symptoms or any other concerns please return to the ED immediately.  - Please continue taking your home medications as directed.    Por favor tenga seguimiento con brito doctor primario y oncologo entre 2 alfredo.  Regrese a urgencias por cualquier preocupacion medica.  Lleve los resultados de brito examen a brito doctor y oncologo

## 2023-11-17 NOTE — ED ADULT NURSE REASSESSMENT NOTE - NS ED NURSE REASSESS COMMENT FT1
Pt is AAOX3. Resting comfortably on stretcher. Tele monitor in place, NSR. Advised of current status, awaiting CT Scan at this time. Will continue to monitor and assess

## 2023-11-17 NOTE — ED ADULT NURSE NOTE - CAS EDN DISCHARGE ASSESSMENT
Detail Level: Simple Additional Notes: Patient consent was obtained to proceed with the visit and recommended plan of care after discussion of all risks and benefits, including the risks of COVID-19 exposure. Render Risk Assessment In Note?: no Alert and oriented to person, place and time

## 2023-11-17 NOTE — ED ADULT NURSE NOTE - CAS DISCH CONDITION
Daily Note     Today's date: 2020  Patient name: Zehra Sendjordan  :   MRN: [id number]  Referring provider: Bere Bianchi DO  Dx:   Encounter Diagnosis     ICD-10-CM    1  Primary osteoarthritis of left knee  M17 12    2  Patellofemoral syndrome of both knees  M22 2X1     M22 2X2    3  Weakness of both hips  R29 898    4  Hamstring tightness of both lower extremities  M62 9                   Subjective: Patient states she has plantar calluses and that causes pain in her feet  She reports that she saw podiatry last week and this is being addressed there  In terms of her knee, she states it's getting better with slight soreness at start of session  She reports most knee pain when she gets up after sitting for a long period of time on the couch  Objective: See treatment diary below      Assessment: Tolerated treatment well  Patient educated in importance of doing exercises before getting up from the couch and to limit the amount of time sitting  Advised to get up every 20 minutes to at least change position  Decreased knee pain from start to finish of session  Patient would benefit from continued PT  She has no complaints post session  She has been educated several times in use of e-stim, but she continues to wear pants that cannot roll up past her knee and she does not wear shorts, as suggested  Therefore, cannot complete this modality to further address pain level  Plan: Continue per plan of care        Precautions: language barrier, high cholesterol, high blood pressure, h/o MI and cardiac bypass, patient tolerance         Manuals 7/8 7/10 7/13 7/15  7/20 7/28 8/3 8/6 8/10 8/17   IASTM L medial knee  8' 8' CF 8'  8' 8' 8' 8' 8' 5'   L Ant tibial glide         5' 5' 5'                             Neuro Re-Ed             Quad set 3"x20 3"x20 3"x20 3" x 20  3"x20 3"x20 3"x20  3"x20    Towel crush 3"x20  3"X20 nv         SAQ   unable  2x10 3"x20 #1  2# x20  NV    biodex LOS lv 11 x5 Lv 11 x5 Lv 10 x 5 Lv 10 x 5   Standing theraband press back        Red x 20 ea Red x 20 ea Into SLB red x 20 ea                             Ther Ex             Gastroc stretch w strap 4x15" 4x15" 4x15" 4 x15" 4x20" 4x20" Wall 3 x 20" ea  Wall 5 x 20" ea    Recumbent bike rocking  (NV) 5 min Full rev 5 min Full 5 min Full 8' Full 8' 10' 10' 10' Full 10'   Hamstring stretch  NV By PT 5 x 20"           SLR Left   w PT assist 2 x 10 w strap 2 x 10 nv B 1# 2 x 10 B 1# 2 x 10 B 2# 2 x10 ea  B 2# 2 x 10 ea B 2# 2 x 10 ea   LAQ  2x10 w strap assist w strap 2 x 10 nv X10; 1# 2x10 1# 2x10 2# 2 x 10 ea 2# 2 x 10 ea 3# 2 x 10 ea B 2# 2 x 10 ea   Heel slides  x15 5"x15 5" x 15  5"x15 5"x15 5"x20  5"x20     adductor set NV  3"x20 nv 3"x20 3"x20 3"x20      Heel raises   x20 20x x20 x20 x20 x20     Standing hamstring curl   x20  20x  x20 x20 x20      Standing hip abd   2x10 2 x 10  Red 2 x 10 ea Red 2 x 10 ea Red 2 x10 ea Green 2 x10 ea Green 2 x10 ea Green 2 x10 ea   Standing hip extension     Red 2 x10 ea Red 2 x10 ea Red 2 x 10 ea Green 2 x 10 ea Green 2 x 10 ea Green 2 x10 ea   Side step w band        Green x 6 Green x 6 Green x 6   Ther Activity                                       Gait Training             Step ups        4" x20 ea 6"x10 ea biodex x 10 ea                Modalities             Cold pack- L knee decline 10' 10' 10' 10' 10' 10' 10' decline decline Improved

## 2023-11-17 NOTE — ED ADULT NURSE NOTE - NSFALLUNIVINTERV_ED_ALL_ED
Bed/Stretcher in lowest position, wheels locked, appropriate side rails in place/Call bell, personal items and telephone in reach/Instruct patient to call for assistance before getting out of bed/chair/stretcher/Non-slip footwear applied when patient is off stretcher/[place] to call system/Physically safe environment - no spills, clutter or unnecessary equipment/Purposeful proactive rounding/Room/bathroom lighting operational, light cord in reach

## 2023-11-17 NOTE — ED PROVIDER NOTE - PHYSICAL EXAMINATION
Const: Awake, alert and oriented. In no acute distress. Well appearing.  HEENT: NC/AT. Moist mucous membranes.  Eyes: No scleral icterus. EOMI.  Neck:. Soft and supple. Full ROM without pain.  Cardiac: Regular rate and regular rhythm. +S1/S2. Peripheral pulses 2+ and symmetric. No LE edema.  Resp: Speaking in full sentences. No evidence of respiratory distress. No wheezes, rales or rhonchi.  Abd: Soft, non-tender, non-distended. Normal bowel sounds in all 4 quadrants. No guarding or rebound.  Back: Spine midline and non-tender. No CVAT. +point tenderness at the left upper back   Skin: No rashes, abrasions or lacerations.  Lymph: No cervical lymphadenopathy.  Neuro: Awake, alert & oriented x 3. Moves all extremities symmetrically.

## 2023-11-20 ENCOUNTER — OUTPATIENT (OUTPATIENT)
Dept: OUTPATIENT SERVICES | Facility: HOSPITAL | Age: 68
LOS: 1 days | Discharge: HOME | End: 2023-11-20

## 2023-11-20 DIAGNOSIS — Z90.49 ACQUIRED ABSENCE OF OTHER SPECIFIED PARTS OF DIGESTIVE TRACT: Chronic | ICD-10-CM

## 2023-11-20 DIAGNOSIS — Z98.891 HISTORY OF UTERINE SCAR FROM PREVIOUS SURGERY: Chronic | ICD-10-CM

## 2023-11-20 DIAGNOSIS — Z98.890 OTHER SPECIFIED POSTPROCEDURAL STATES: Chronic | ICD-10-CM

## 2023-11-20 DIAGNOSIS — C34.90 MALIGNANT NEOPLASM OF UNSPECIFIED PART OF UNSPECIFIED BRONCHUS OR LUNG: ICD-10-CM

## 2023-11-20 DIAGNOSIS — Z98.82 BREAST IMPLANT STATUS: Chronic | ICD-10-CM

## 2023-11-24 ENCOUNTER — APPOINTMENT (OUTPATIENT)
Dept: NUCLEAR MEDICINE | Facility: CLINIC | Age: 68
End: 2023-11-24
Payer: MEDICARE

## 2023-11-24 ENCOUNTER — OUTPATIENT (OUTPATIENT)
Dept: OUTPATIENT SERVICES | Facility: HOSPITAL | Age: 68
LOS: 1 days | End: 2023-11-24

## 2023-11-24 DIAGNOSIS — Z98.891 HISTORY OF UTERINE SCAR FROM PREVIOUS SURGERY: Chronic | ICD-10-CM

## 2023-11-24 DIAGNOSIS — Z98.82 BREAST IMPLANT STATUS: Chronic | ICD-10-CM

## 2023-11-24 DIAGNOSIS — C34.12 MALIGNANT NEOPLASM OF UPPER LOBE, LEFT BRONCHUS OR LUNG: ICD-10-CM

## 2023-11-24 DIAGNOSIS — Z90.49 ACQUIRED ABSENCE OF OTHER SPECIFIED PARTS OF DIGESTIVE TRACT: Chronic | ICD-10-CM

## 2023-11-24 DIAGNOSIS — Z98.890 OTHER SPECIFIED POSTPROCEDURAL STATES: Chronic | ICD-10-CM

## 2023-11-24 PROCEDURE — 78815 PET IMAGE W/CT SKULL-THIGH: CPT | Mod: 26,PS

## 2023-11-29 ENCOUNTER — APPOINTMENT (OUTPATIENT)
Dept: PULMONOLOGY | Facility: CLINIC | Age: 68
End: 2023-11-29
Payer: MEDICARE

## 2023-11-29 VITALS
HEIGHT: 64 IN | SYSTOLIC BLOOD PRESSURE: 114 MMHG | WEIGHT: 163 LBS | HEART RATE: 63 BPM | BODY MASS INDEX: 27.83 KG/M2 | OXYGEN SATURATION: 97 % | DIASTOLIC BLOOD PRESSURE: 60 MMHG

## 2023-11-29 PROCEDURE — 99215 OFFICE O/P EST HI 40 MIN: CPT

## 2023-11-29 RX ORDER — ROSUVASTATIN CALCIUM 5 MG/1
5 TABLET, FILM COATED ORAL
Refills: 0 | Status: ACTIVE | COMMUNITY

## 2023-11-29 RX ORDER — ALBUTEROL SULFATE 90 UG/1
108 AEROSOL, METERED RESPIRATORY (INHALATION)
Refills: 0 | Status: ACTIVE | COMMUNITY

## 2023-12-04 ENCOUNTER — APPOINTMENT (OUTPATIENT)
Dept: HEMATOLOGY ONCOLOGY | Facility: CLINIC | Age: 68
End: 2023-12-04
Payer: MEDICARE

## 2023-12-04 ENCOUNTER — RESULT REVIEW (OUTPATIENT)
Age: 68
End: 2023-12-04

## 2023-12-04 VITALS
HEART RATE: 70 BPM | WEIGHT: 167.88 LBS | SYSTOLIC BLOOD PRESSURE: 106 MMHG | BODY MASS INDEX: 28.66 KG/M2 | HEIGHT: 64 IN | DIASTOLIC BLOOD PRESSURE: 68 MMHG | OXYGEN SATURATION: 99 %

## 2023-12-04 DIAGNOSIS — Z29.89 ENCOUNTER. FOR OTHER SPECIFIED PROPHYLACTIC MEASURES: ICD-10-CM

## 2023-12-04 LAB
BASOPHILS # BLD AUTO: 0.1 K/UL — SIGNIFICANT CHANGE UP (ref 0–0.2)
BASOPHILS # BLD AUTO: 0.1 K/UL — SIGNIFICANT CHANGE UP (ref 0–0.2)
BASOPHILS NFR BLD AUTO: 1.3 % — SIGNIFICANT CHANGE UP (ref 0–2)
BASOPHILS NFR BLD AUTO: 1.3 % — SIGNIFICANT CHANGE UP (ref 0–2)
EOSINOPHIL # BLD AUTO: 0.2 K/UL — SIGNIFICANT CHANGE UP (ref 0–0.5)
EOSINOPHIL # BLD AUTO: 0.2 K/UL — SIGNIFICANT CHANGE UP (ref 0–0.5)
EOSINOPHIL NFR BLD AUTO: 2.8 % — SIGNIFICANT CHANGE UP (ref 0–6)
EOSINOPHIL NFR BLD AUTO: 2.8 % — SIGNIFICANT CHANGE UP (ref 0–6)
HCT VFR BLD CALC: 38.7 % — SIGNIFICANT CHANGE UP (ref 34.5–45)
HCT VFR BLD CALC: 38.7 % — SIGNIFICANT CHANGE UP (ref 34.5–45)
HGB BLD-MCNC: 12.7 G/DL — SIGNIFICANT CHANGE UP (ref 11.5–15.5)
HGB BLD-MCNC: 12.7 G/DL — SIGNIFICANT CHANGE UP (ref 11.5–15.5)
LYMPHOCYTES # BLD AUTO: 1.2 K/UL — SIGNIFICANT CHANGE UP (ref 1–3.3)
LYMPHOCYTES # BLD AUTO: 1.2 K/UL — SIGNIFICANT CHANGE UP (ref 1–3.3)
LYMPHOCYTES # BLD AUTO: 22.2 % — SIGNIFICANT CHANGE UP (ref 13–44)
LYMPHOCYTES # BLD AUTO: 22.2 % — SIGNIFICANT CHANGE UP (ref 13–44)
MCHC RBC-ENTMCNC: 31.4 PG — SIGNIFICANT CHANGE UP (ref 27–34)
MCHC RBC-ENTMCNC: 31.4 PG — SIGNIFICANT CHANGE UP (ref 27–34)
MCHC RBC-ENTMCNC: 32.8 G/DL — SIGNIFICANT CHANGE UP (ref 32–36)
MCHC RBC-ENTMCNC: 32.8 G/DL — SIGNIFICANT CHANGE UP (ref 32–36)
MCV RBC AUTO: 95.7 FL — SIGNIFICANT CHANGE UP (ref 80–100)
MCV RBC AUTO: 95.7 FL — SIGNIFICANT CHANGE UP (ref 80–100)
MONOCYTES # BLD AUTO: 0.3 K/UL — SIGNIFICANT CHANGE UP (ref 0–0.9)
MONOCYTES # BLD AUTO: 0.3 K/UL — SIGNIFICANT CHANGE UP (ref 0–0.9)
MONOCYTES NFR BLD AUTO: 5.5 % — SIGNIFICANT CHANGE UP (ref 2–14)
MONOCYTES NFR BLD AUTO: 5.5 % — SIGNIFICANT CHANGE UP (ref 2–14)
NEUTROPHILS # BLD AUTO: 3.8 K/UL — SIGNIFICANT CHANGE UP (ref 1.8–7.4)
NEUTROPHILS # BLD AUTO: 3.8 K/UL — SIGNIFICANT CHANGE UP (ref 1.8–7.4)
NEUTROPHILS NFR BLD AUTO: 68.2 % — SIGNIFICANT CHANGE UP (ref 43–77)
NEUTROPHILS NFR BLD AUTO: 68.2 % — SIGNIFICANT CHANGE UP (ref 43–77)
PLATELET # BLD AUTO: 344 K/UL — SIGNIFICANT CHANGE UP (ref 150–400)
PLATELET # BLD AUTO: 344 K/UL — SIGNIFICANT CHANGE UP (ref 150–400)
RBC # BLD: 4.04 M/UL — SIGNIFICANT CHANGE UP (ref 3.8–5.2)
RBC # BLD: 4.04 M/UL — SIGNIFICANT CHANGE UP (ref 3.8–5.2)
RBC # FLD: 12.5 % — SIGNIFICANT CHANGE UP (ref 10.3–14.5)
RBC # FLD: 12.5 % — SIGNIFICANT CHANGE UP (ref 10.3–14.5)
WBC # BLD: 5.6 K/UL — SIGNIFICANT CHANGE UP (ref 3.8–10.5)
WBC # BLD: 5.6 K/UL — SIGNIFICANT CHANGE UP (ref 3.8–10.5)
WBC # FLD AUTO: 5.6 K/UL — SIGNIFICANT CHANGE UP (ref 3.8–10.5)
WBC # FLD AUTO: 5.6 K/UL — SIGNIFICANT CHANGE UP (ref 3.8–10.5)

## 2023-12-04 PROCEDURE — 99215 OFFICE O/P EST HI 40 MIN: CPT

## 2023-12-06 LAB
ALBUMIN SERPL ELPH-MCNC: 4.4 G/DL
ALP BLD-CCNC: 111 U/L
ALT SERPL-CCNC: 15 U/L
ANION GAP SERPL CALC-SCNC: 12 MMOL/L
AST SERPL-CCNC: 16 U/L
BILIRUB SERPL-MCNC: 0.2 MG/DL
BUN SERPL-MCNC: 16 MG/DL
CALCIUM SERPL-MCNC: 9.9 MG/DL
CEA SERPL-MCNC: 366 NG/ML
CHLORIDE SERPL-SCNC: 102 MMOL/L
CO2 SERPL-SCNC: 26 MMOL/L
CREAT SERPL-MCNC: 0.78 MG/DL
EGFR: 83 ML/MIN/1.73M2
GLUCOSE SERPL-MCNC: 104 MG/DL
POTASSIUM SERPL-SCNC: 5.1 MMOL/L
PROT SERPL-MCNC: 7.2 G/DL
SODIUM SERPL-SCNC: 141 MMOL/L

## 2023-12-11 ENCOUNTER — APPOINTMENT (OUTPATIENT)
Dept: THORACIC SURGERY | Facility: CLINIC | Age: 68
End: 2023-12-11
Payer: MEDICARE

## 2023-12-11 VITALS
RESPIRATION RATE: 17 BRPM | BODY MASS INDEX: 26.2 KG/M2 | SYSTOLIC BLOOD PRESSURE: 109 MMHG | HEART RATE: 77 BPM | HEIGHT: 66 IN | DIASTOLIC BLOOD PRESSURE: 71 MMHG | WEIGHT: 163 LBS | TEMPERATURE: 98 F | OXYGEN SATURATION: 98 %

## 2023-12-11 DIAGNOSIS — Z83.79 FAMILY HISTORY OF OTHER DISEASES OF THE DIGESTIVE SYSTEM: ICD-10-CM

## 2023-12-11 DIAGNOSIS — Z80.9 FAMILY HISTORY OF MALIGNANT NEOPLASM, UNSPECIFIED: ICD-10-CM

## 2023-12-11 PROCEDURE — 99214 OFFICE O/P EST MOD 30 MIN: CPT

## 2023-12-18 ENCOUNTER — NON-APPOINTMENT (OUTPATIENT)
Age: 68
End: 2023-12-18

## 2023-12-19 ENCOUNTER — APPOINTMENT (OUTPATIENT)
Dept: CT IMAGING | Facility: CLINIC | Age: 68
End: 2023-12-19

## 2023-12-20 ENCOUNTER — TRANSCRIPTION ENCOUNTER (OUTPATIENT)
Age: 68
End: 2023-12-20

## 2023-12-21 ENCOUNTER — OUTPATIENT (OUTPATIENT)
Dept: OUTPATIENT SERVICES | Facility: HOSPITAL | Age: 68
LOS: 1 days | End: 2023-12-21
Payer: MEDICARE

## 2023-12-21 ENCOUNTER — RESULT REVIEW (OUTPATIENT)
Age: 68
End: 2023-12-21

## 2023-12-21 ENCOUNTER — APPOINTMENT (OUTPATIENT)
Dept: THORACIC SURGERY | Facility: HOSPITAL | Age: 68
End: 2023-12-21

## 2023-12-21 DIAGNOSIS — Z90.49 ACQUIRED ABSENCE OF OTHER SPECIFIED PARTS OF DIGESTIVE TRACT: Chronic | ICD-10-CM

## 2023-12-21 DIAGNOSIS — C34.90 MALIGNANT NEOPLASM OF UNSPECIFIED PART OF UNSPECIFIED BRONCHUS OR LUNG: ICD-10-CM

## 2023-12-21 DIAGNOSIS — Z98.890 OTHER SPECIFIED POSTPROCEDURAL STATES: Chronic | ICD-10-CM

## 2023-12-21 DIAGNOSIS — Z98.891 HISTORY OF UTERINE SCAR FROM PREVIOUS SURGERY: Chronic | ICD-10-CM

## 2023-12-21 DIAGNOSIS — Z98.82 BREAST IMPLANT STATUS: Chronic | ICD-10-CM

## 2023-12-21 LAB
B PERT IGG+IGM PNL SER: ABNORMAL
B PERT IGG+IGM PNL SER: ABNORMAL
COLOR FLD: ABNORMAL
COLOR FLD: ABNORMAL
FLUID INTAKE SUBSTANCE CLASS: SIGNIFICANT CHANGE UP
FLUID INTAKE SUBSTANCE CLASS: SIGNIFICANT CHANGE UP
GRAM STN FLD: ABNORMAL
GRAM STN FLD: ABNORMAL
LYMPHOCYTES # FLD: 14 % — SIGNIFICANT CHANGE UP
LYMPHOCYTES # FLD: 14 % — SIGNIFICANT CHANGE UP
MONOS+MACROS # FLD: 7 % — SIGNIFICANT CHANGE UP
MONOS+MACROS # FLD: 7 % — SIGNIFICANT CHANGE UP
NEUTROPHILS-BODY FLUID: 52 % — SIGNIFICANT CHANGE UP
NEUTROPHILS-BODY FLUID: 52 % — SIGNIFICANT CHANGE UP
NIGHT BLUE STAIN TISS: SIGNIFICANT CHANGE UP
NIGHT BLUE STAIN TISS: SIGNIFICANT CHANGE UP
OTHER CELLS FLD MANUAL: 26 % — SIGNIFICANT CHANGE UP
OTHER CELLS FLD MANUAL: 26 % — SIGNIFICANT CHANGE UP
RCV VOL RI: HIGH /UL (ref 0–0)
RCV VOL RI: HIGH /UL (ref 0–0)
SPECIMEN SOURCE FLD: SIGNIFICANT CHANGE UP
SPECIMEN SOURCE FLD: SIGNIFICANT CHANGE UP
SPECIMEN SOURCE: SIGNIFICANT CHANGE UP
TOTAL NUCLEATED CELL COUNT, BODY FLUID: 80 /UL — SIGNIFICANT CHANGE UP
TOTAL NUCLEATED CELL COUNT, BODY FLUID: 80 /UL — SIGNIFICANT CHANGE UP

## 2023-12-21 PROCEDURE — 31623 DX BRONCHOSCOPE/BRUSH: CPT

## 2023-12-21 PROCEDURE — 88104 CYTOPATH FL NONGYN SMEARS: CPT | Mod: 26,59

## 2023-12-21 PROCEDURE — 88173 CYTOPATH EVAL FNA REPORT: CPT

## 2023-12-21 PROCEDURE — 88172 CYTP DX EVAL FNA 1ST EA SITE: CPT

## 2023-12-21 PROCEDURE — 88172 CYTP DX EVAL FNA 1ST EA SITE: CPT | Mod: 26,59

## 2023-12-21 PROCEDURE — 31652 BRONCH EBUS SAMPLNG 1/2 NODE: CPT

## 2023-12-21 PROCEDURE — 87594 PNEUMCYSTS JIROVECII AMP PRB: CPT

## 2023-12-21 PROCEDURE — 87070 CULTURE OTHR SPECIMN AEROBIC: CPT

## 2023-12-21 PROCEDURE — 88305 TISSUE EXAM BY PATHOLOGIST: CPT | Mod: 26

## 2023-12-21 PROCEDURE — 89051 BODY FLUID CELL COUNT: CPT

## 2023-12-21 PROCEDURE — 31624 DX BRONCHOSCOPE/LAVAGE: CPT

## 2023-12-21 PROCEDURE — 88112 CYTOPATH CELL ENHANCE TECH: CPT | Mod: 26,59

## 2023-12-21 PROCEDURE — 88305 TISSUE EXAM BY PATHOLOGIST: CPT

## 2023-12-21 PROCEDURE — 87116 MYCOBACTERIA CULTURE: CPT

## 2023-12-21 PROCEDURE — 87102 FUNGUS ISOLATION CULTURE: CPT

## 2023-12-21 PROCEDURE — 88173 CYTOPATH EVAL FNA REPORT: CPT | Mod: 26,59

## 2023-12-21 PROCEDURE — 88112 CYTOPATH CELL ENHANCE TECH: CPT

## 2023-12-21 PROCEDURE — 87206 SMEAR FLUORESCENT/ACID STAI: CPT

## 2023-12-21 PROCEDURE — 88104 CYTOPATH FL NONGYN SMEARS: CPT

## 2023-12-21 PROCEDURE — 87077 CULTURE AEROBIC IDENTIFY: CPT

## 2023-12-21 PROCEDURE — 87015 SPECIMEN INFECT AGNT CONCNTJ: CPT

## 2023-12-21 NOTE — BRIEF OPERATIVE NOTE - NSICDXBRIEFPROCEDURE_GEN_ALL_CORE_FT
PROCEDURES:  Flexible bronchoscopy 21-Dec-2023 12:02:54 w/ ebus   w/ bal   w/ brushing Karen Killian

## 2023-12-23 LAB
CULTURE RESULTS: ABNORMAL
CULTURE RESULTS: ABNORMAL
SPECIMEN SOURCE: SIGNIFICANT CHANGE UP
SPECIMEN SOURCE: SIGNIFICANT CHANGE UP

## 2023-12-24 LAB
P JIROVECII DNA L RESP QL NAA+NON-PROBE: NEGATIVE — SIGNIFICANT CHANGE UP
P JIROVECII DNA L RESP QL NAA+NON-PROBE: NEGATIVE — SIGNIFICANT CHANGE UP
SPECIMEN SOURCE: SIGNIFICANT CHANGE UP
SPECIMEN SOURCE: SIGNIFICANT CHANGE UP

## 2023-12-27 ENCOUNTER — OUTPATIENT (OUTPATIENT)
Dept: OUTPATIENT SERVICES | Facility: HOSPITAL | Age: 68
LOS: 1 days | End: 2023-12-27
Payer: MEDICARE

## 2023-12-27 ENCOUNTER — APPOINTMENT (OUTPATIENT)
Dept: MRI IMAGING | Facility: CLINIC | Age: 68
End: 2023-12-27
Payer: MEDICARE

## 2023-12-27 DIAGNOSIS — Z90.49 ACQUIRED ABSENCE OF OTHER SPECIFIED PARTS OF DIGESTIVE TRACT: Chronic | ICD-10-CM

## 2023-12-27 DIAGNOSIS — Z00.8 ENCOUNTER FOR OTHER GENERAL EXAMINATION: ICD-10-CM

## 2023-12-27 DIAGNOSIS — Z98.891 HISTORY OF UTERINE SCAR FROM PREVIOUS SURGERY: Chronic | ICD-10-CM

## 2023-12-27 DIAGNOSIS — Z98.890 OTHER SPECIFIED POSTPROCEDURAL STATES: Chronic | ICD-10-CM

## 2023-12-27 DIAGNOSIS — Z98.82 BREAST IMPLANT STATUS: Chronic | ICD-10-CM

## 2023-12-27 DIAGNOSIS — C34.12 MALIGNANT NEOPLASM OF UPPER LOBE, LEFT BRONCHUS OR LUNG: ICD-10-CM

## 2023-12-27 PROCEDURE — 70553 MRI BRAIN STEM W/O & W/DYE: CPT

## 2023-12-27 PROCEDURE — A9585: CPT

## 2023-12-27 PROCEDURE — 70553 MRI BRAIN STEM W/O & W/DYE: CPT | Mod: 26

## 2023-12-28 LAB
NON-GYNECOLOGICAL CYTOLOGY STUDY: SIGNIFICANT CHANGE UP
NON-GYNECOLOGICAL CYTOLOGY STUDY: SIGNIFICANT CHANGE UP

## 2024-01-02 ENCOUNTER — APPOINTMENT (OUTPATIENT)
Dept: HEMATOLOGY ONCOLOGY | Facility: CLINIC | Age: 69
End: 2024-01-02
Payer: MEDICARE

## 2024-01-02 VITALS
TEMPERATURE: 98.1 F | BODY MASS INDEX: 27 KG/M2 | SYSTOLIC BLOOD PRESSURE: 116 MMHG | HEIGHT: 66 IN | OXYGEN SATURATION: 94 % | HEART RATE: 74 BPM | WEIGHT: 168 LBS | DIASTOLIC BLOOD PRESSURE: 73 MMHG

## 2024-01-02 DIAGNOSIS — J84.9 INTERSTITIAL PULMONARY DISEASE, UNSPECIFIED: ICD-10-CM

## 2024-01-02 DIAGNOSIS — C34.12 MALIGNANT NEOPLASM OF UPPER LOBE, LEFT BRONCHUS OR LUNG: ICD-10-CM

## 2024-01-02 PROCEDURE — 99215 OFFICE O/P EST HI 40 MIN: CPT

## 2024-01-02 NOTE — ADDENDUM
[FreeTextEntry1] :  Documented by Emily Raymundo acting as scribe for Dr. Simpson on  12/04/2023.   All Medical record entries made by the Scribe were at my, Dr. Simpson's, direction and personally dictated by me on  12/04/2023. I have reviewed the chart and agree that the record accurately reflects my personal performance of the history, physical exam, assessment and plan. I have also personally directed, reviewed, and agreed with the discharge instructions.

## 2024-01-02 NOTE — ASSESSMENT
[With Patient/Caregiver] : With Patient/Caregiver [FreeTextEntry1] : SHANON BEE is a 67 yo female never smoker who was 66 yo at diagnosis of adenocarcinoma lung. Patient with no significant past medical history PET in 10/2020 with a RENAN spiculated lung mass 4.5 x 3cm with an SUV 11.1 with adjacent tree in bud opacity. Left lung, biopsy on 11/23/20: Adenocarcinoma  -MRI Brain 12/29/20: negative -CT C/A/P 12/29/20: stable RENAN primary lesion, (4.7 x 2.3cm), 1.2 cm left lung apex lesion along with RUL stable lesion of 1.3 cm -PDL1 20% -Foundation one : ALK ELM 4 fusion variant, MS stable TMB 4 mut/ Mb, SF 3B1   8/03/22 CT Angio chest: NO PE. Consolidative changes in the perihilar left lung extending to the periphery unchanged and previous post radiation changes. 1.5cm Rt uppet lobe nodule unchanged however new patchy consolidation of uncertain etiology.  Biopsy done of RUL: not malignant/ reactive -Initial plan for resection per Dr Choudhury, however EBUS prior to surgery with N2 disease. Patient was presented at Tumor board on 4/14/21 and a plan for definitive CRT -S/p chemo RT with Carbo and taxol weekly with RT, completed 6/21/21 -She had a rough course with Rt mediated Pneumonitis/ superimposed PNA in July/ Aug 2021 requiring hospitalization -Currently on Immunotherapy with Durvalumab for 1 year Started on 9/7/21 Start date delayed due to requiring Prednisone for RT Pneumonitis. - s/p completion of Durvalumab on 10/25/22 - discussed CT scan from 8/3/22 while she had covid. RT changes in lt lobe/ perihilar area, stable 1.5 cm Rt upper lobe - CT Chest : 2/6/23: Stable treated left upper lobe neoplasm with radiation induced scarring. Stable 1.4 cm bilobed solid right upper lobe nodule. Increased size of bilateral upper lobe groundglass opacities. Consider organizing pneumonia.  2/6/23 MRI Brain No evidence of Malignancy Mild Chronic White matter change f/u with neurology( she has her own neurologist) - CT chest 5/5/23 1. LEFT perihilar malignant consolidation, unchanged from February 2023 2. Resolution of the patchy upper lobe consolidation present in February 2023 3. Right upper lobe pulmonary nodules, unchanged MRi 8/11/23 NO EVIDENCE OF A MASS, ABNORMAL ENHANCEMENT, OR CURRENT EVIDENCE OF ACUTE ISCHEMIA. MILD CHRONIC WHITE MATTER ISCHEMIC CHANGES. NO SIGNIFICANT INTERVAL CHANGE FROM PRIOR CT CAP 8/11/23 - Redemonstrated left upper lobe and lower lobe perihilar consolidations with air bronchograms and peripheral groundglass opacities, without significant interval change compared to prior. Right upper lobe bilobar nodule (3-49), 1.4 cm, stable. No new or enlarging suspicious pulmonary nodule. No new focal consolidation. Trace left pleural effusion, new. Mild anterior bladder wall thickening with perivesicular fat stranding. Findings may be secondary to cystitis; correlate with urinalysis. NO UTII symptoms - CEA ordered.  (06/07/23) -Worsening left lung lesion with intense uptake and perihilar lymphadenopathy.  May represent post radiation fibrosis, the degree of activity seen in the regions raises possibility of current malignancy due to its intensity and the time course that has elapsed since completion of RT -Given recent PET scan with worsening PET scan and worsening left lung lesion with intense uptake, concern for recurrence, S/p EBUS on 12/21/23 Normal right-sided tracheobronchial tree.  Left side with some cobblestoning at the secondary damien and near complete occlusion of the left lower lobe bronchus which appeared to be external compression.  Multiple brushings were taken from the region and sent for permanent pathology. The EBUS scope was then inserted, and the left lower lobe was examined.  There was noted to be what appeared to be adenopathy in the left hilar region.     LEft Lower lobe Bronchial Brush non diagnoisitic, L10 Ln Atypical findings   BAL Negative MRi krishna HANNAH - Given strong h/o malignancy d/w patient possiblity of repeating EBUS, Will d/w Dr Choudhury  - Will present at [facility]  - Discussed Pros and cons of Guardant currently  - Repeat CT Chest in 4 weeks  and will consider send guardant at that time  - f/u with me in 4 weeks   #Pruritus: Patient saw Dr. Andre for generalized pruritus likely 2/2 to Durvalumab. -however patient has now been done with IO for ~ 1 y unlikely related - advised benadryl and famotidine   [AdvancecareDate] : 12/4/23

## 2024-01-02 NOTE — HISTORY OF PRESENT ILLNESS
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 She had an episode of diverticulitis in April 2020  \par  +ve PPD in the past and was treated with 6 months of INH \par  She is a never smoker.\par  \par  Patient had a Pulm lesion in the past and had an EBUS \par  She has a history of a pulmonary cyst that by her son's report was biopsied nearly 15 years ago and demonstrated no evidence of malignancy. Recent imaging has shown growth and development of a solid structure. The etiology remains unclear, but malignancy has been considered. She feels well has no complaints. She denies fever, chills, night sweats, weight loss, or weight gain. \par  \par  Followed by Dr Galeana in the past\par  PPD+ in 2000 and received 6 months of INH\par  PET positive RENAN posterior cavitary density in 2010\par  TTNA and Bronch non-diagnostic. Cultures negative for fungus and TB\par  CT at [facility] in 2010 and 2012 stable - felt to be benign. Lost to FU\par  \par  Patient had a PEt scan in Oct 2020 with a Left upper lobe spiculated lung mass 4.5 x 3cm with an SUV 11.1 with adjacent tree in bud opacity \par  In addition Rt upper lobe opacity 1.2 x1 cm which is not FDG avid \par  Discrete Left hilar/ infrahilar region  lesion with an SUV 5.2 which is possibly corresponding to a LN \par  \par  \par  Patient s/p Left lung mass, core biopsy with pathology c/w Adenocarcinoma c/w pulmonary origin (see comment)\par  CK7, TTF-1 : Positive CK20, PAX-8 : Negative\par   [de-identified] : SON: JUAN RAMON [phone] [de-identified] : Patient presents for follow up.  2/6/23:  Stable treated left upper lobe neoplasm with radiation induced scarring. Stable 1.4 cm bilobed solid right upper lobe nodule. Increased size of bilateral upper lobe groundglass opacities. Consider organizing pneumonia.  2/6/23 MRI Brain No evidence of Malignancy Mild Chronic White matter change  For adenocarcinoma of the RENAN. She completed concurrent CRT with carbo/ taxol on 6/21/21.  and Durvalumab which was completed in Oct 2022  + Pruritis f/u Dermatology  SOme increasing cough She will call Dr Godwin  Repeat CT chest per pulm in May 2023  6/6/23:  Returns from visiting family in [facility]. Feels well overall.  + improved breathing since 4/2023 with CPAP onset use  Denies new or worsening symptoms   8/30/23: Patient and daughter return for f/u  Reports some difficulty with sleeping  SOme Itching advised pepcid , bendaryl discussed that she has been off IO close to 1y unlikely 2/2 IO   8/11/23 CTCAP: Redemonstrated left upper lobe and lower lobe perihilar consolidations with air bronchograms and peripheral groundglass opacities, without significant interval change compared to prior. Right upper lobe bilobar nodule (3-49), 1.4 cm, stable. No new or enlarging suspicious pulmonary nodule. No new focal consolidation. Trace left pleural effusion, new. Mild anterior bladder wall thickening with perivesicular fat stranding. Findings may be secondary to cystitis; correlate with urinalysis.  12/4/23: Pt returns for f/u with daughter Pt went to the hospital and got the CT scan 11/24/23 due to pain in the lungs, stronger pain with deep breathing Pt reports wanting to see family in beginning of January 2024, will hold plans until appt with Dr. Choudhury is established Pt has eye surgery middle of January  PET/CT 11/24/23: Hypermetabolic opacity in the LEFT lung (5.6 x 5.5 cm, SUV 13.1, image 75). Previously this was a hypermetabolic ill-defined LEFT lung mass (3.5 x 3.3 cm, SUV 11.4). Hypermetabolic ill-defined separate LEFT perihilar foci (SUV 10.8, image 72). Previously there was a hypermetabolic lymph node at this location which is now well-defined at present (previously 2.1 x 1.8 cm, SUV 5.2). Separate LEFT infrahilar area of consolidation/opacification (4.4 x 4.3 cm, SUV 5.8, image 75). Hypermetabolic pleural-based nodularity adjacent to the DEscending thoracic aorta (1.3 x 1.2 cm, SUV 4.5, image 90). Ill-defined RIGHT UPPER lobe nodularity, possibly with some motion artifact 1.2 x 1.1 cm, SUV 1.6, image 65). Left UPPER lobe nodularity seen on recent chest CT on series 5, image 28 is unchanged from recent chest CT and demonstrates very mild focal activity (SUV 1.7, image 54). Since prior FDG-PET/CT scan 10/12/2020: 1.  Intense uptake is seen in the LEFT lung field at sites of previously seen LEFT lung mass and perihilar lymphadenopathy. Although the CT findings may represent post radiation fibrosis, the degree of activity seen in the regions raises possibility of current malignancy due to its intensity and the time course is has elapsed since completion of radiation therapy, as radiation pneumonitis typically reaches its peak at approximately 6 months after radiotherapy completion. 2.  Rounded RIGHT lung nodularity is minimally avid and likely represents a separate etiology. Attention to follow-up.  1/2/24: Patient seen  S/p EBUS on 12/21/23  LEft Lower lobe Bronchial Brush non diagnoisitic, L10 Ln Atypical findings   BAL Negative

## 2024-01-02 NOTE — RESULTS/DATA
[FreeTextEntry1] :  PET/CT 11/24/23: Hypermetabolic opacity in the LEFT lung (5.6 x 5.5 cm, SUV 13.1, image 75). Previously this was a hypermetabolic ill-defined LEFT lung mass (3.5 x 3.3 cm, SUV 11.4). Hypermetabolic ill-defined separate LEFT perihilar foci (SUV 10.8, image 72). Previously there was a hypermetabolic lymph node at this location which is now well-defined at present (previously 2.1 x 1.8 cm, SUV 5.2). Separate LEFT infrahilar area of consolidation/opacification (4.4 x 4.3 cm, SUV 5.8, image 75). Hypermetabolic pleural-based nodularity adjacent to the DEscending thoracic aorta (1.3 x 1.2 cm, SUV 4.5, image 90). Ill-defined RIGHT UPPER lobe nodularity, possibly with some motion artifact 1.2 x 1.1 cm, SUV 1.6, image 65). Left UPPER lobe nodularity seen on recent chest CT on series 5, image 28 is unchanged from recent chest CT and demonstrates very mild focal activity (SUV 1.7, image 54).  CT chest: 11/1/22:  Since 8/3/2022: Unchanged treated left upper lobe neoplasm and radiation associated scarring in the left lung. Unchanged 1.4 cm right upper lobe solid nodule. New opacities in the upper lobes and near resolved prior right upper lobe opacities. Given the waxing and waning/migratory pattern over multiple prior exams, consider organizing pneumonia.  8/03/22 CT Angio chest  NO PE. Consolidative changes in the perihilar left ling extending to the periphery unchanged and previous post radiation changes. 1.5cm  Rt upper lobe nodule unchanged however new patchy consolidation of uncertain etiology.  8/30/22: CEA 61.8 3/15/22 CEA 14.5 1/11/22 CEA 12.3 12/28/21 CEA 10.7 12/14/21 CEA 11 11/30/21 CEA 11.5  CT Chest 4/18/22: Stable since 1/2021 however minimally grown since March 2018 (1 x 0.8 cm).  - Was due Durvalumab today ( c 23) however she tested positive for covid will cancel treatment. advised to proceed with next treatment on 8/16/22 if symptom subside. She has not had restaging scans  - Will order scans however advsied to wait 4 weeks to get them due to covid related lung changes - continue to monitor  - Continue with Durvalumab for 1 y  -TSH 5.79, T4 0.7- started levothyroxine 25 mcg. patient follow Endocrinology- Dr. Arias John   CT chest no contrast 1/7/22 IMPRESSION: Since 2 months prior: -Evolving radiotherapy changes in the left upper and lower lobes, with slightly decreased consolidative component in the left upper lobe, at site of treated neoplasm. -Decreased prior right lower lobe abnormality, however multiple new peripheral areas of groundglass in the right upper and right lower lobes. The migratory pattern suggests organizing pneumonia, possibly drug-induced or radiation-induced. Acute infection, including COVID, could also account for the new findings. -Stable right apex 1.4 cm nodule.   CT CHEST 10/25/21  1.  Evolution of post radiation changes in the left greater than right lungs. New findings in the right lower lobe which may reflect delayed radiation effect or radiation pneumonitis as well as infection 2.  Stable right upper lobe nodule, A 1.4 x 0.9 cm lobulated right upper lobe nodule is stable compared to prior study although measured 1 x 0.8 cm on 3/7/2018; on the 10/12/2020 PET/CT, this nodule was not FDG avid.previously PET negative, increased in size on since 2018. Reassessment can be made on surveillance imaging. No new lung nodule.   7/21/21  CT CAP post treatment on 7/21/21 with stability in LEft upper lobe mass at 4.3 cm x 2.8 cm ( previously 4.2 x 3.2 cm)  1.3 cm rt upper lobe nodule is unchanged> new patchy groungglass opacity surrounding both upper lobe lesions which maybe 2/2 treatment effect vs infection

## 2024-01-08 ENCOUNTER — APPOINTMENT (OUTPATIENT)
Dept: PULMONOLOGY | Facility: CLINIC | Age: 69
End: 2024-01-08
Payer: MEDICARE

## 2024-01-08 ENCOUNTER — APPOINTMENT (OUTPATIENT)
Dept: THORACIC SURGERY | Facility: CLINIC | Age: 69
End: 2024-01-08
Payer: MEDICARE

## 2024-01-08 VITALS
TEMPERATURE: 97.9 F | HEIGHT: 66 IN | DIASTOLIC BLOOD PRESSURE: 77 MMHG | BODY MASS INDEX: 26.2 KG/M2 | WEIGHT: 163 LBS | SYSTOLIC BLOOD PRESSURE: 118 MMHG | OXYGEN SATURATION: 99 % | RESPIRATION RATE: 16 BRPM | HEART RATE: 74 BPM

## 2024-01-08 VITALS
OXYGEN SATURATION: 98 % | DIASTOLIC BLOOD PRESSURE: 80 MMHG | RESPIRATION RATE: 16 BRPM | HEART RATE: 72 BPM | SYSTOLIC BLOOD PRESSURE: 120 MMHG

## 2024-01-08 DIAGNOSIS — Z71.89 OTHER SPECIFIED COUNSELING: ICD-10-CM

## 2024-01-08 DIAGNOSIS — J70.0 ACUTE PULMONARY MANIFESTATIONS DUE TO RADIATION: ICD-10-CM

## 2024-01-08 DIAGNOSIS — R06.09 OTHER FORMS OF DYSPNEA: ICD-10-CM

## 2024-01-08 PROCEDURE — 99215 OFFICE O/P EST HI 40 MIN: CPT

## 2024-01-08 PROCEDURE — 99214 OFFICE O/P EST MOD 30 MIN: CPT

## 2024-01-08 RX ORDER — PREDNISONE 20 MG/1
20 TABLET ORAL DAILY
Qty: 10 | Refills: 0 | Status: ACTIVE | COMMUNITY
Start: 2024-01-08 | End: 1900-01-01

## 2024-01-08 NOTE — HISTORY OF PRESENT ILLNESS
[Obstructive Sleep Apnea] : obstructive sleep apnea [Lab] : lab [APAP:] : APAP [TextBox_4] : 68-year-old female with a history of stage IIIa adenocarcinoma left upper lobe status postchemotherapy and radiation in 2021 with a course complicated by radiation pneumonitis.  Patient seen today in follow-up for complaints of increased cough with a recent PET scan demonstrating the below.  She denies any fevers chills chest pains palpitations lightheadedness or dizziness.  She did undergo a fiberoptic bronchoscopy with endobronchial wash by Dr. Steven Choudhury on 12/21/2023 with negative pathology.  Plans currently include transthoracic needle biopsy and the patient is an her daughter are seeking a second opinion at [facility] [Awakes Unrefreshed] : does not awaken unrefreshed [Awakes with Dry Mouth] : does not awaken with dry mouth [Snoring] : no snoring [Witnessed Apneas] : no witnessed apneas [TextBox_77] : 0038 [TextBox_79] : 6866 [TextBox_81] : 30 [TextBox_89] : 3-4 [TextBox_100] : 12/14/2022 [TextBox_108] : 25 [TextBox_116] : 67 [TextBox_120] : REM index 60 [TextBox_125] : 4-16 [TextBox_127] : 12/9/2023 [TextBox_129] : 1/7/2024 [TextBox_133] : 100 [TextBox_137] : 100 [TextBox_141] : 7 [TextBox_143] : 41 [TextBox_147] : 0.6 [TextBox_158] : Domingo [TextBox_162] : 2/8/2023 [TextBox_165] : Patient fitted with a full face Prasad mask large size which she likes [ESS] : 8

## 2024-01-08 NOTE — ASSESSMENT
[FreeTextEntry1] : Nadege is a 68-year-old female with a history of stage III lung cancer in the past who has what appears to be a recurrence.  Endobronchial ultrasound demonstrated atypical cells but no definitive malignancy.  At this point I have recommended a transthoracic needle biopsy to obtain additional tissue.  Thank you for allowing me to participate in the care of your patient.  30 minutes was spent during this encounter.  Steven Choudhury MD Department of Cardiovascular and Thoracic Surgery  [employer] [facility]

## 2024-01-08 NOTE — RESULTS/DATA
[TextEntry] :  EXAM: [id number] - MR BRAIN WAW IC - ORDERED BY: JARRETTCRISS PATELTO PROCEDURE DATE: 12/27/2023 INTERPRETATION: . CLINICAL INFORMATION: Pulmonary adenocarcinoma. Staging. TECHNIQUE: Multiplanar multisequential MRI of the brain was acquired with and without the administration of IV gadolinium. 7.5 cc's of IV Gadavist was administered for the purposes of this examination. 0 cc were discarded.  COMPARISON: Prior contrast enhanced brain MRI study from 1123.  FINDINGS: Multiple small rounded nonspecific foci of T2/FLAIR hyperintensity are noted throughout the deep and periventricular white matter of the cerebral hemispheres. There is no associated mass effect. There is no evidence of acute ischemia on the diffusion-weighted images.  No abnormal brain parenchymal or leptomeningeal enhancement is seen.  There is diffuse cerebral volume loss with prominence of the sulci, fissures, and cisternal spaces which is normal for the patient's age. Ventricular size and configuration is unremarkable. Flow-voids are noted throughout the major intracranial vessels, on the T2 weighted images, consistent with their patency. The sellar region and posterior fossa appear unremarkable.  The paranasal sinuses and tympanomastoid cavities are clear. Calvarial signal is within normal limits. There is evidence of bilateral cataract removal. Platybasia and retroflexion of the dens appears unchanged.  IMPRESSION: No acute intracranial hemorrhage, acute ischemia, or abnormal intracranial enhancement.  Multiple similar appearing nonspecific abnormal white matter foci of T2/FLAIR prolongation statistically favoring microvascular type changes.  No interval change when compared with 8/11/2023.  --- End of Report ---       FLORENCIA CUNNINGHAM MD; Attending Radiologist This document has been electronically signed. Dec 28 2023 10:41PM

## 2024-01-08 NOTE — DATA REVIEWED
[FreeTextEntry1] : Accession:                             [id number] Collected Date/Time:                   12/21/2023 17:49 EST Received Date/Time:                    12/21/2023 17:49 EST  Fine Needle Aspiration Report - Auth (Verified)  Specimen(s) Submitted 1. LUNG, LEFT LOWER LOBE, BRONCHIAL BRUSH 2. LYMPH NODE, L10, EBUS-GUIDED FNA 3. LUNG, BRONCHOALVEOLAR LAVAGE  Final Diagnosis 1. LUNG, LEFT LOWER LOBE, BRONCHIAL BRUSH NON DIAGNOSTIC. Cytology slides and cell block show benign ciliated bronchial epithelial  cell only.  2. LYMPH NODE, L10, EBUS-GUIDED FNA ATYPICAL FINDINGS.  Cytology slides and cell block reveal rare clusters and rare single-lying atypical cells displaying anisonucleosis, vesicular chromatin, irregular nuclear outlines and prominent nucleoli in a background of benign ciliated bronchial epithelial cells.  3. LUNG, BRONCHOALVEOLAR LAVAGE NEGATIVE FOR MALIGNANT CELLS. Cytology slide and cell block show ciliated bronchial epithelial cells and scattered alveolar macrophages.  This case was reviewed as an intradepartmental consultation with staff cytopathologists who concur with the diagnosis on    12/28/2023  Screened by: Henrietta Ali CT(ASCP) Verified by: Denise Sotomayor MD (Electronic Signature) Reported on: 12/28/23 16:40 EST       Office visit note from Dr. Ohara 12/4/23 appreciated.   PETCT [facility] ONC FDG SUBS - ORDERED BY: MADISON MI PROCEDURE DATE: 11/24/2023 INTERPRETATION: PROCEDURE: PET SKULL TO THIGH ONC FDG SUBSQ CLINICAL INFORMATION: 68 year-old female with pulmonary adenocarcinoma. Chemoradiation and immunotherapy last given in 2022.  TREATMENT STRATEGY EVALUATION: Subsequent FASTING BLOOD SUGAR: 96 mg/dl RADIOPHARMACEUTICAL: 13.83 mCi F-18, FDG, I.V. UPTAKE PERIOD: 51 minutes SCANNER: [place] 710 ORAL CONTRAST: Patient drank OMNIPAQUE contrast during the uptake period.  TECHNIQUE: Following intravenous injection of radiopharmaceutical and above uptake period, PET/CT was obtained from base of skull to mid-thigh. CT protocol was optimized for PET attenuation correction and anatomic localization and was not designed to produce and cannot replace state-of-the-art diagnostic CT images with specific imaging protocols for different body parts and indications. Images were reconstructed and reviewed in axial, coronal and sagittal views and three-dimensional MIP.  Standardized uptake values ("SUV") are normalized to patient body weight and indicate the highest activity concentration (SUVmax) in a given disease site. All image numbers refer to axial image numbers.  COMPARISON: FDG PET/CT 10/12/2020  OTHER STUDIES USED FOR CORRELATION: CT pulmonary angiogram 11/17/2020, CT chest and abdomen/pelvis 8/11/2023  FINDINGS:  HEAD/NECK: Physiologic FDG activity in visualized brain, head, and neck.  CHEST: Hypermetabolic lung masses involving the LEFT hilum, described below. No central mediastinal lymphadenopathy or RIGHT perihilar abnormality. Scant atherosclerotic calcification of coronary arteries and nonaneurysmal thoracic aorta. BILATERAL breast implants with no abnormal activity.  LUNGS: Hypermetabolic opacity in the LEFT lung (5.6 x 5.5 cm, SUV 13.1, image 75). Previously this was a hypermetabolic ill-defined LEFT lung mass (3.5 x 3.3 cm, SUV 11.4). Hypermetabolic ill-defined separate LEFT perihilar foci (SUV 10.8, image 72). Previously there was a hypermetabolic lymph node at this location which is now well-defined at present (previously 2.1 x 1.8 cm, SUV 5.2). Separate LEFT infrahilar area of consolidation/opacification (4.4 x 4.3 cm, SUV 5.8, image 75). Hypermetabolic pleural-based nodularity adjacent to the DEscending thoracic aorta (1.3 x 1.2 cm, SUV 4.5, image 90).  Ill-defined RIGHT UPPER lobe nodularity, possibly with some motion artifact 1.2 x 1.1 cm, SUV 1.6, image 65). Left UPPER lobe nodularity seen on recent chest CT on series 5, image 28 is unchanged from recent chest CT and demonstrates very mild focal activity (SUV 1.7, image 54).  PLEURA/PERICARDIUM: Small LEFT pleural effusion.  ESOPHAGUS/STOMACH: No abnormal FDG activity.  HEPATOBILIARY/PANCREAS: Physiologic hepatobiliary FDG activity. For reference, liver SUV mean is 2.4, previously 2.1.  SPLEEN: Physiologic FDG activity. Normal size.  ADRENAL GLANDS: No abnormal FDG activity. No nodule(s).  KIDNEYS/URINARY BLADDER: Physiologic excreted FDG activity. Suspected BILATERAL parapelvic/renal cysts with physiologic uptake.  REPRODUCTIVE ORGANS: No abnormal FDG activity.  ABDOMINOPELVIC NODES: No enlarged or FDG-avid lymph node.  BOWEL/PERITONEUM/MESENTERY: No abnormal FDG activity.  ABDOMINAL WALL: No abnormal FDG activity.  BONES/SOFT TISSUES: No abnormal FDG activity.  VESSELS: Atherosclerotic calcification of nonaneurysmal abdominal aorta including visceral and/or runoff branches.  IMPRESSION: Since prior FDG-PET/CT scan 10/12/2020: 1. Intense uptake is seen in the LEFT lung field at sites of previously seen LEFT lung mass and perihilar lymphadenopathy. Although the CT findings may represent post radiation fibrosis, the degree of activity seen in the regions raises possibility of current malignancy due to its intensity and the time course is has elapsed since completion of radiation therapy, as radiation pneumonitis typically reaches its peak at approximately 6 months after radiotherapy completion. 2. Rounded RIGHT lung nodularity is minimally avid and likely represents a separate etiology. Attention to follow-up.  --- End of Report ---  INDIANA RORDIGUEZ MD; Attending Radiologist       CT ANGIO CHEST PULM ART performed at [facility] on 11/17/23: IMPRESSION: - No pulmonary embolus - Dense opacity with convex margins within left upper lobe radiation fibrosis, unchanged from 8/11/2023, but larger with loss of air bronchograms compared to 8/3/2022, raising the possibility of recurrence, PET/CT may help clarify. - slightly larger 1.2 cm left lower lobe subpleural nodule. Other unchanged nodules in both lungs - increased small right pleural effusion          CT ABDOMEN AND PELVIS IC - ORDERED BY: HIWOT LAYTON CT CHEST IC - ORDERED BY: HIWOT LAYTON  PROCEDURE DATE: 08/11/2023 INTERPRETATION: CLINICAL INFORMATION: Pulmonary adenocarcinoma. Restaging. COMPARISON: CT chest 5/5/2023. CT chest/abdomen/pelvis 2/3/2023. CONTRAST/COMPLICATIONS: IV Contrast: Omnipaque 350 90 cc administered 10 cc discarded Oral Contrast: Omnipaque 300 Complications: None reported at time of study completion  PROCEDURE: CT of the Chest, Abdomen and Pelvis was performed. Sagittal and coronal reformats were performed.  FINDINGS: CHEST: LUNGS AND LARGE AIRWAYS: Patent central airways. Redemonstrated left upper lobe and lower lobe perihilar consolidations with air bronchograms and peripheral groundglass opacities, without significant interval change compared to prior. Right upper lobe bilobar nodule (3-49), 1.4 cm, stable. No new or enlarging suspicious pulmonary nodule. No new focal consolidation. PLEURA: Trace left pleural effusion, new. VESSELS: Mild atherosclerotic changes of the aorta. HEART: Heart size is normal. No pericardial effusion. MEDIASTINUM AND STEVEN: No lymphadenopathy. CHEST WALL AND LOWER NECK: Bilateral breast implants.  ABDOMEN AND PELVIS: LIVER: Steatosis. BILE DUCTS: Normal caliber. GALLBLADDER: Within normal limits. SPLEEN: Within normal limits. PANCREAS: Within normal limits. ADRENALS: Within normal limits. KIDNEYS/URETERS: Within normal limits.  BLADDER: Mild anterior bladder wall thickening with perivesicular fat stranding. REPRODUCTIVE ORGANS: Uterus within normal limits.  BOWEL: Colonic diverticulosis. No bowel obstruction. Appendix is not visualized. No evidence of inflammation in the pericecal region. PERITONEUM: No ascites. VESSELS: Atherosclerotic changes. RETROPERITONEUM/LYMPH NODES: No lymphadenopathy. ABDOMINAL WALL: Postsurgical changes. BONES: Degenerative changes.  IMPRESSION: Redemonstrated left perihilar consolidations with air bronchograms and peripheral groundglass opacities, without significant interval change compared to prior likely post treatment changes.  Right upper lobe bilobar nodule, 1.4 cm, stable. No new or enlarging suspicious pulmonary nodule.  Trace left pleural effusion, new.  Mild anterior bladder wall thickening with perivesicular fat stranding. Findings may be secondary to cystitis; correlate with urinalysis.  Hepatic steatosis.  --- End of Report ---  BRE DAY MD; Attending Radiologist.

## 2024-01-08 NOTE — DISCUSSION/SUMMARY
[FreeTextEntry1] : Unfortunate 68-year-old female seen today for the above.  Patient underlying obstructive sleep apnea appears well-controlled on her current CPAP.  Unfortunately her PET/CT is consistent with recurrence of disease with a differential diagnosis including regional inflammation.  Agree with pursuing transthoracic needle biopsy.  In light of her complaints of cough this does not appear to be bronchospastic in nature.  Gabapentin is being initiated as a central suppressant and can be doubled in dose if there is no response within 10 to 14 days.  Short trial of steroids is being given.  I understand the patient was considering cataract surgery but would withhold until the patient's cough can be better managed

## 2024-01-08 NOTE — HISTORY OF PRESENT ILLNESS
[FreeTextEntry1] : Ms. SHANON BEE is a 68-year-old female, referred by Dr. Ohara, who presents for a follow up appointment. She has a history of stage IIIa adenocarcinoma of the left upper lobe, status post chemotherapy and radiation in 2021, course complicated by radiation pneumonitis. She recently had PET imaging that noted intense uptake in the left lung field at sites of previously seen LEFT lung mass and perihilar lymphadenopathy; the degree of activity seen in the regions raises possibility of current malignancy due to its intensity and the time course is has elapsed since completion of radiation therapy, as radiation pneumonitis typically reaches its peak at approximately 6 months after radiotherapy completion. She is most recently status post flexible bronchoscopy with bronchial lavage, endobronchial brushing, endobronchial ultrasound with transbronchial needle aspiration from 12/21/23. She presents today to review the pathology results.  Past medical history includes PPD+ (2000, received 6 months of INH), diverticulitis.  Today she reports that she continues to experience cough and shortness of breath on exertion. Patient is going for a second oncology opinion later this week.   The patient is accompanied by her daughter, Lashanda, who assists with history taking.

## 2024-01-08 NOTE — REASON FOR VISIT
[Follow-Up] : a follow-up visit [Lung Cancer] : lung cancer [Cough] : cough [Spouse] : spouse [Sleep Apnea] : sleep apnea [Other: _____] : [unfilled]

## 2024-01-08 NOTE — CONSULT LETTER
[Dear  ___] : Dear  [unfilled], [Please see my note below.] : Please see my note below. [Consult Closing:] : Thank you very much for allowing me to participate in the care of this patient.  If you have any questions, please do not hesitate to contact me. [Sincerely,] : Sincerely, [FreeTextEntry3] : Tho Zayas MD FCCP Pulmonary/Critical Care/Sleep Medicine Department of Internal Medicine  [facility]

## 2024-01-08 NOTE — PHYSICAL EXAM
[FreeTextEntry1] : She is wheezing left greater than right [Heart Rate And Rhythm] : heart rate was normal and rhythm regular [Heart Sounds] : normal S1 and S2 [Heart Sounds Gallop] : no gallops [Murmurs] : no murmurs [Heart Sounds Pericardial Friction Rub] : no pericardial rub [Examination Of The Chest] : the chest was normal in appearance [Chest Visual Inspection Thoracic Asymmetry] : no chest asymmetry [Diminished Respiratory Excursion] : normal chest expansion [No Focal Deficits] : no focal deficits [Oriented To Time, Place, And Person] : oriented to person, place, and time [Impaired Insight] : insight and judgment were intact [Affect] : the affect was normal

## 2024-01-08 NOTE — REVIEW OF SYSTEMS
[Feeling Poorly] : feeling poorly [Feeling Tired] : feeling tired [Cough] : cough [SOB on Exertion] : shortness of breath during exertion [Negative] : Heme/Lymph [Fever] : no fever [Chills] : no chills

## 2024-01-08 NOTE — END OF VISIT
[FreeTextEntry3] :  chart review, PACS review, case discussed with patient and daughter [Time Spent: ___ minutes] : I have spent [unfilled] minutes of time on the encounter.

## 2024-01-12 RX ORDER — ALBUTEROL 90 MCG
90 AEROSOL (GRAM) INHALATION
Refills: 0 | Status: ACTIVE | COMMUNITY

## 2024-01-15 RX ORDER — ALBUTEROL SULFATE 90 UG/1
108 (90 BASE) INHALANT RESPIRATORY (INHALATION)
Qty: 3 | Refills: 3 | Status: ACTIVE | COMMUNITY
Start: 2024-01-12 | End: 1900-01-01

## 2024-01-20 LAB
CULTURE RESULTS: SIGNIFICANT CHANGE UP
SPECIMEN SOURCE: SIGNIFICANT CHANGE UP

## 2024-01-29 ENCOUNTER — APPOINTMENT (OUTPATIENT)
Dept: CT IMAGING | Facility: CLINIC | Age: 69
End: 2024-01-29

## 2024-01-29 ENCOUNTER — APPOINTMENT (OUTPATIENT)
Dept: HEMATOLOGY ONCOLOGY | Facility: CLINIC | Age: 69
End: 2024-01-29

## 2024-02-07 ENCOUNTER — APPOINTMENT (OUTPATIENT)
Dept: HEMATOLOGY ONCOLOGY | Facility: CLINIC | Age: 69
End: 2024-02-07

## 2024-02-07 LAB
CULTURE RESULTS: SIGNIFICANT CHANGE UP
SPECIMEN SOURCE: SIGNIFICANT CHANGE UP

## 2024-02-13 ENCOUNTER — APPOINTMENT (OUTPATIENT)
Dept: PULMONOLOGY | Facility: CLINIC | Age: 69
End: 2024-02-13

## 2024-02-20 ENCOUNTER — APPOINTMENT (OUTPATIENT)
Dept: PULMONOLOGY | Facility: CLINIC | Age: 69
End: 2024-02-20

## 2024-02-23 ENCOUNTER — EMERGENCY (EMERGENCY)
Facility: HOSPITAL | Age: 69
LOS: 1 days | Discharge: HOME | End: 2024-02-23
Attending: EMERGENCY MEDICINE
Payer: MEDICARE

## 2024-02-23 VITALS
RESPIRATION RATE: 18 BRPM | HEART RATE: 67 BPM | DIASTOLIC BLOOD PRESSURE: 67 MMHG | SYSTOLIC BLOOD PRESSURE: 133 MMHG | TEMPERATURE: 98 F | HEIGHT: 66 IN | OXYGEN SATURATION: 99 % | WEIGHT: 181.22 LBS

## 2024-02-23 DIAGNOSIS — Z98.82 BREAST IMPLANT STATUS: Chronic | ICD-10-CM

## 2024-02-23 DIAGNOSIS — Z90.49 ACQUIRED ABSENCE OF OTHER SPECIFIED PARTS OF DIGESTIVE TRACT: Chronic | ICD-10-CM

## 2024-02-23 DIAGNOSIS — Z98.891 HISTORY OF UTERINE SCAR FROM PREVIOUS SURGERY: Chronic | ICD-10-CM

## 2024-02-23 DIAGNOSIS — Z98.890 OTHER SPECIFIED POSTPROCEDURAL STATES: Chronic | ICD-10-CM

## 2024-02-23 LAB
ALBUMIN SERPL ELPH-MCNC: 3.9 G/DL — SIGNIFICANT CHANGE UP (ref 3.3–5.2)
ALP SERPL-CCNC: 120 U/L — SIGNIFICANT CHANGE UP (ref 40–120)
ALT FLD-CCNC: 28 U/L — SIGNIFICANT CHANGE UP
ANION GAP SERPL CALC-SCNC: 9 MMOL/L — SIGNIFICANT CHANGE UP (ref 5–17)
APTT BLD: 28 SEC — SIGNIFICANT CHANGE UP (ref 24.5–35.6)
AST SERPL-CCNC: 39 U/L — HIGH
BASOPHILS # BLD AUTO: 0.04 K/UL — SIGNIFICANT CHANGE UP (ref 0–0.2)
BASOPHILS NFR BLD AUTO: 0.9 % — SIGNIFICANT CHANGE UP (ref 0–2)
BILIRUB SERPL-MCNC: 0.6 MG/DL — SIGNIFICANT CHANGE UP (ref 0.4–2)
BUN SERPL-MCNC: 18.5 MG/DL — SIGNIFICANT CHANGE UP (ref 8–20)
CALCIUM SERPL-MCNC: 8.5 MG/DL — SIGNIFICANT CHANGE UP (ref 8.4–10.5)
CHLORIDE SERPL-SCNC: 104 MMOL/L — SIGNIFICANT CHANGE UP (ref 96–108)
CO2 SERPL-SCNC: 26 MMOL/L — SIGNIFICANT CHANGE UP (ref 22–29)
CREAT SERPL-MCNC: 0.8 MG/DL — SIGNIFICANT CHANGE UP (ref 0.5–1.3)
D DIMER BLD IA.RAPID-MCNC: 168 NG/ML DDU — SIGNIFICANT CHANGE UP
EGFR: 80 ML/MIN/1.73M2 — SIGNIFICANT CHANGE UP
EOSINOPHIL # BLD AUTO: 0.13 K/UL — SIGNIFICANT CHANGE UP (ref 0–0.5)
EOSINOPHIL NFR BLD AUTO: 2.8 % — SIGNIFICANT CHANGE UP (ref 0–6)
GLUCOSE SERPL-MCNC: 98 MG/DL — SIGNIFICANT CHANGE UP (ref 70–99)
HCT VFR BLD CALC: 31.2 % — LOW (ref 34.5–45)
HGB BLD-MCNC: 10.4 G/DL — LOW (ref 11.5–15.5)
IMM GRANULOCYTES NFR BLD AUTO: 0.7 % — SIGNIFICANT CHANGE UP (ref 0–0.9)
INR BLD: 0.89 RATIO — SIGNIFICANT CHANGE UP (ref 0.85–1.18)
LYMPHOCYTES # BLD AUTO: 1.08 K/UL — SIGNIFICANT CHANGE UP (ref 1–3.3)
LYMPHOCYTES # BLD AUTO: 23.4 % — SIGNIFICANT CHANGE UP (ref 13–44)
MAGNESIUM SERPL-MCNC: 2.3 MG/DL — SIGNIFICANT CHANGE UP (ref 1.6–2.6)
MCHC RBC-ENTMCNC: 30.3 PG — SIGNIFICANT CHANGE UP (ref 27–34)
MCHC RBC-ENTMCNC: 33.3 GM/DL — SIGNIFICANT CHANGE UP (ref 32–36)
MCV RBC AUTO: 91 FL — SIGNIFICANT CHANGE UP (ref 80–100)
MONOCYTES # BLD AUTO: 0.39 K/UL — SIGNIFICANT CHANGE UP (ref 0–0.9)
MONOCYTES NFR BLD AUTO: 8.5 % — SIGNIFICANT CHANGE UP (ref 2–14)
NEUTROPHILS # BLD AUTO: 2.94 K/UL — SIGNIFICANT CHANGE UP (ref 1.8–7.4)
NEUTROPHILS NFR BLD AUTO: 63.7 % — SIGNIFICANT CHANGE UP (ref 43–77)
NT-PROBNP SERPL-SCNC: 105 PG/ML — SIGNIFICANT CHANGE UP (ref 0–300)
PLATELET # BLD AUTO: 227 K/UL — SIGNIFICANT CHANGE UP (ref 150–400)
POTASSIUM SERPL-MCNC: 4 MMOL/L — SIGNIFICANT CHANGE UP (ref 3.5–5.3)
POTASSIUM SERPL-SCNC: 4 MMOL/L — SIGNIFICANT CHANGE UP (ref 3.5–5.3)
PROT SERPL-MCNC: 6.5 G/DL — LOW (ref 6.6–8.7)
PROTHROM AB SERPL-ACNC: 9.9 SEC — SIGNIFICANT CHANGE UP (ref 9.5–13)
RBC # BLD: 3.43 M/UL — LOW (ref 3.8–5.2)
RBC # FLD: 15.8 % — HIGH (ref 10.3–14.5)
SODIUM SERPL-SCNC: 139 MMOL/L — SIGNIFICANT CHANGE UP (ref 135–145)
TROPONIN T, HIGH SENSITIVITY RESULT: 11 NG/L — SIGNIFICANT CHANGE UP (ref 0–51)
TROPONIN T, HIGH SENSITIVITY RESULT: 11 NG/L — SIGNIFICANT CHANGE UP (ref 0–51)
WBC # BLD: 4.61 K/UL — SIGNIFICANT CHANGE UP (ref 3.8–10.5)
WBC # FLD AUTO: 4.61 K/UL — SIGNIFICANT CHANGE UP (ref 3.8–10.5)

## 2024-02-23 PROCEDURE — 71275 CT ANGIOGRAPHY CHEST: CPT | Mod: 26,MC

## 2024-02-23 PROCEDURE — 99223 1ST HOSP IP/OBS HIGH 75: CPT

## 2024-02-23 PROCEDURE — 71045 X-RAY EXAM CHEST 1 VIEW: CPT | Mod: 26

## 2024-02-23 PROCEDURE — 93010 ELECTROCARDIOGRAM REPORT: CPT

## 2024-02-23 RX ORDER — LEVOTHYROXINE SODIUM 125 MCG
1 TABLET ORAL
Qty: 0 | Refills: 0 | DISCHARGE

## 2024-02-23 RX ORDER — FLUOXETINE HCL 10 MG
10 CAPSULE ORAL DAILY
Refills: 0 | Status: DISCONTINUED | OUTPATIENT
Start: 2024-02-23 | End: 2024-03-02

## 2024-02-23 RX ORDER — ATORVASTATIN CALCIUM 80 MG/1
20 TABLET, FILM COATED ORAL AT BEDTIME
Refills: 0 | Status: DISCONTINUED | OUTPATIENT
Start: 2024-02-23 | End: 2024-03-02

## 2024-02-23 RX ORDER — ALPRAZOLAM 0.25 MG
0.5 TABLET ORAL THREE TIMES A DAY
Refills: 0 | Status: DISCONTINUED | OUTPATIENT
Start: 2024-02-23 | End: 2024-02-23

## 2024-02-23 RX ORDER — DONEPEZIL HYDROCHLORIDE 10 MG/1
2 TABLET, FILM COATED ORAL
Qty: 0 | Refills: 0 | DISCHARGE

## 2024-02-23 RX ORDER — ATORVASTATIN CALCIUM 80 MG/1
1 TABLET, FILM COATED ORAL
Qty: 0 | Refills: 0 | DISCHARGE

## 2024-02-23 RX ORDER — ALBUTEROL 90 UG/1
2 AEROSOL, METERED ORAL EVERY 6 HOURS
Refills: 0 | Status: DISCONTINUED | OUTPATIENT
Start: 2024-02-23 | End: 2024-03-02

## 2024-02-23 RX ADMIN — Medication 0.5 MILLIGRAM(S): at 23:06

## 2024-02-23 NOTE — ED PROVIDER NOTE - ENMT, MLM
Positioning (Leave Blank If You Do Not Want): The patient was placed in a comfortable position exposing the surgical site. Airway patent, Nasal mucosa clear. Mouth with normal mucosa.

## 2024-02-23 NOTE — ED PROVIDER NOTE - OBJECTIVE STATEMENT
70 y/o F with a PMHx of NSLC, Hypothyroidism, HLD, anxiety presents to the ED with for medical evaluation from [facility] of SOB, LE swelling, TOLBERT, CP. Per pt, Chest pain began approximately 1 week ago, described as a feeling of pressure. States pain is worse during activity and with inspiration, mild improvement with rest and sitting. States Bilateral LE swelling began 3 days ago, and with associated feeling of "heaviness" and a "burning" sensation in bilateral calfs. Endorses palpitations, weakness, fatigue, dizziness, constipation. Denies HA, fever/chills, n/v, diarrhea, LOC, fall.

## 2024-02-23 NOTE — ED CDU PROVIDER INITIAL DAY NOTE - NSICDXPASTMEDICALHX_GEN_ALL_CORE_FT
PAST MEDICAL HISTORY:  Acute lateral meniscal injury of right knee, initial encounter     Acute medial meniscus tear of right knee, initial encounter     Anxiety     At risk for sleep apnea Bang score 3    Depression     Diverticulitis     Fatty liver     H/O pneumothorax following lung biopsy right lung    Hyperlipidemia     Hypothyroidism     Malignant neoplasm of lung, unspecified laterality, unspecified part of lung     Malignant neoplasm of unspecified part of unspecified bronchus or lung     Osteoarthritis right    Osteoporosis     Patient is Religious     PPD positive 2000 -  treated with INH    Pulmonary mass monitoring -no surgical intervention    Small bowel mass incidental finding while appendectomy ( Benign s/p colon mass resection - 3/2018)

## 2024-02-23 NOTE — ED PROVIDER NOTE - CLINICAL SUMMARY MEDICAL DECISION MAKING FREE TEXT BOX
69 y/p F with a PMHx of NSLC, Hypothyroidism, HLD, anxiety, presents from [facility] [place] with CP, SOB, TOLBERT, Bilateral LE swelling for 1 wks duration. Worse with movement, relief with rest/sitting.     PE: NAD, RRR, S1/S2, CTA b/l, NTND, BSx4, FROM x 4, Bilateral Edema L>R, A&Ox3    Given PE/History, unlikely to be Bilateral DVT but with CA history, will obtain CBC, CMP, trop I, EKG, CXR, D-Dimer, Place on telemetry, Coags. Will f/u labs, imaging. if d-dimer eval, will pursue CT A PE study, Bilateral LE Duplex. 69 y/p F with a PMHx of NSLC, Hypothyroidism, HLD, anxiety, presents from [facility] [place] with CP, SOB, TOLBERT, Bilateral LE swelling for 1 wks duration. Worse with movement, relief with rest/sitting.     PE: NAD, RRR, S1/S2, CTA b/l, NTND, BSx4, FROM x 4, Bilateral Edema L>R, A&Ox3    Given PE/History, unlikely to be Bilateral DVT but with CA history, will obtain CBC, CMP, trop I, EKG, CXR, D-Dimer, Place on telemetry, Coags. Will f/u labs, imaging. if d-dimer eval, will pursue CT A PE study, Bilateral LE Duplex.    patient signed to me by Dr. Yeung - CTA without PE but with new ground glas sopacicites in RENAN. states has chronic cough nonproductive, improving. Has had intermittent sweats since starting her new oral chemo at the beginning of the month. afebrile, will hold on tx for PNA at [facility]. trop stable, ekg nonischemic, probnp normal, lungs CTA on reassessment but 1+ pitting edema of b/l LE. patient placed in obs for cards (Dr. agarwal) and echo

## 2024-02-23 NOTE — ED PROVIDER NOTE - ATTENDING CONTRIBUTION TO CARE
I performed a history and physical exam of the patient and discussed their management with the resident. I reviewed the resident's note and agree with the documented findings and plan of care. My medical decision making and observations are found below.      69-year-old female with past medical history of non-small cell lung cancer, hypothyroidism, hyperlipidemia, anxiety, presenting from [facility] with chest pain associated shortness of breath, dyspnea exertion, bilateral lower extremity edema.  Symptoms started 1 week ago, no fevers or chills.  No cough.  Denies fevers/chills. An EKG was  independently reviewed, normal sinus rhythm, no ST elevations or depressions, no T wave inversions.  Chest x-ray was reviewed, patient with opacity noted in left upper lobe, haziness noted in left lower lobe.  Labs are obtained, initial troponin  8, pending repeat.  proBNP negative.  Plan to obtain CTA chest, although patient has negative D-dimer, her risk for PE is increased due to her active cancer, and she would benefit from repeat imaging. I performed a history and physical exam of the patient and discussed their management with the resident. I reviewed the resident's note and agree with the documented findings and plan of care. My medical decision making and observations are found below.      69-year-old female with past medical history of non-small cell lung cancer, hypothyroidism, hyperlipidemia, anxiety, presenting from [facility] with chest pain associated shortness of breath, dyspnea exertion, bilateral lower extremity edema.  Symptoms started 1 week ago, no fevers or chills.  No cough.  Denies fevers/chills. An EKG was  independently reviewed, normal sinus rhythm, no ST elevations or depressions, no T wave inversions.  Chest x-ray was reviewed, patient with opacity noted in left upper lobe, haziness noted in left lower lobe.  Labs are obtained, initial troponin  11, pending repeat.  proBNP negative.  Plan to obtain CTA chest, although patient has negative D-dimer, her risk for PE is increased due to her active cancer, and she would benefit from repeat imaging.

## 2024-02-23 NOTE — ED CDU PROVIDER INITIAL DAY NOTE - CLINICAL SUMMARY MEDICAL DECISION MAKING FREE TEXT BOX
68 yo female with pmhx NSLC on oral chemi recently started 1/27, HLD, anxiety was sent in by oncologist for evaluation fo SOB, LE edema and chest pressure. trop stable x2, ekg nonischemic. CTA chest negative for PE. CTA chest showed patchy groundglass opacities are noted in the posterior segment of the   left upper lobe, etiology is unclear. Afebrile, no WBC count, pt does admit to cough intermittently, dry, non productive, chronic. At this time, no evidence of acute infectious processes. Pt placed in obs for cards and echo.

## 2024-02-23 NOTE — ED ADULT NURSE NOTE - OBJECTIVE STATEMENT
Pt. states DR sent her in due to swelling in legs, chest tightness, and shortness of breath. Pt. AxO3 with equal and unlabored respirations. Pt. has PMH of lung cancer and is undergoing chemo. Pt. showing no signs of distress.

## 2024-02-23 NOTE — ED ADULT NURSE NOTE - CAS ELECT INFOMATION PROVIDED
Patient AA&Ox4, resp even/unlabored, ambulatory, steady gait noted, discharged home with all belongings. Med rec and discharge instructions explained and given to pt by SAL Glover. Patient verbalizes understanding on physician follow up, medication regimen and next dose, s/s of complications and monitoring. No distress noted. VSS, recorded in EMR. Peripheral IV removed, intact, bleeding controlled./DC instructions

## 2024-02-23 NOTE — ED ADULT NURSE NOTE - NSICDXPASTMEDICALHX_GEN_ALL_CORE_FT
PAST MEDICAL HISTORY:  Acute lateral meniscal injury of right knee, initial encounter     Acute medial meniscus tear of right knee, initial encounter     Anxiety     At risk for sleep apnea Bang score 3    Depression     Diverticulitis     Fatty liver     H/O pneumothorax following lung biopsy right lung    Hyperlipidemia     Hypothyroidism     Malignant neoplasm of lung, unspecified laterality, unspecified part of lung     Malignant neoplasm of unspecified part of unspecified bronchus or lung     Osteoarthritis right    Osteoporosis     Patient is Spiritism     PPD positive 2000 -  treated with INH    Pulmonary mass monitoring -no surgical intervention    Small bowel mass incidental finding while appendectomy ( Benign s/p colon mass resection - 3/2018)

## 2024-02-23 NOTE — ED CDU PROVIDER INITIAL DAY NOTE - OBJECTIVE STATEMENT
68 yo female with pmhx NSLC on oral chemi recently started 1/27, HLD, anxiety was sent in by oncologist for evaluation fo SOB, LE edema and chest pressure. Pt states that over the last wk, she has been experiencing TOLBERT that has progressively getting worse. States that she has been getting short of breath just getting dressed. States she has to rest for a few minutes in order for the SOB to resolve. SOB assoc with mid-sternal chest pressure that resolves once the SOB resolves. Also endorsing LE edema b/l x4 days. Pt states she called her oncologist Andrés Hall from [facility] and they told her to come here for evaluation. Denies fever, chills, body aches, dizziness, LOC, vision changes, cp, palpitations, abd pain, n/v/c/d, dysuria, hematuria, paresthesias in the extremities, rashes.   : John

## 2024-02-23 NOTE — ED ADULT TRIAGE NOTE - CHIEF COMPLAINT QUOTE
SOB, LE swelling, TOLBERT, and chest pressure x 3 days, sent by [facility] for cardiac work up. PMH lung cancer on oral chemo.

## 2024-02-23 NOTE — ED PROVIDER NOTE - PROGRESS NOTE DETAILS
patient signed to me by Dr. Yeung - CTA without PE but with new ground glas sopacicites in RENAN. states has chronic cough nonproductive, improving. Has had intermittent sweats since starting her new oral chemo at the beginning of the month. afebrile, will hold on tx for PNA at [facility]. trop stable, ekg nonischemic, probnp normal, lungs CTA on reassessment but 1+ pitting edema of b/l LE. patient placed in obs for cards (Dr. agarwal) and echo

## 2024-02-23 NOTE — ED PROVIDER NOTE - NSICDXPASTMEDICALHX_GEN_ALL_CORE_FT
PAST MEDICAL HISTORY:  Acute lateral meniscal injury of right knee, initial encounter     Acute medial meniscus tear of right knee, initial encounter     Anxiety     At risk for sleep apnea Bang score 3    Depression     Diverticulitis     Fatty liver     H/O pneumothorax following lung biopsy right lung    Hyperlipidemia     Hypothyroidism     Malignant neoplasm of lung, unspecified laterality, unspecified part of lung     Malignant neoplasm of unspecified part of unspecified bronchus or lung     Osteoarthritis right    Osteoporosis     Patient is Restorationism     PPD positive 2000 -  treated with INH    Pulmonary mass monitoring -no surgical intervention    Small bowel mass incidental finding while appendectomy ( Benign s/p colon mass resection - 3/2018)

## 2024-02-23 NOTE — ED CDU PROVIDER INITIAL DAY NOTE - ATTENDING APP SHARED VISIT CONTRIBUTION OF CARE
pt sent in by her oncologist for cp, sob, LE edema.  CTA neg. rest of workup reviewed. will put in obs for TTE and cards consult.

## 2024-02-23 NOTE — ED CDU PROVIDER INITIAL DAY NOTE - PHYSICAL EXAMINATION
Gen: No acute distress, non toxic  HEENT: Mucous membranes moist, pink conjunctivae, EOMI. PERRL. Airway patent  Neck: FROM.   CV: RRR, nl s1/s2.  Resp: CTAB, normal rate and effort  GI: Abdomen soft, NT, ND. No rebound, no guarding  Neuro: A&O x 3, moving all 4 extremities  MSK: No spine or joint tenderness to palpation  Skin: No rashes. intact and perfused. cap refill <2sec  Vascular: Radial and dorsalis pedal pulses 2+ b/l. + LE edema b/l

## 2024-02-23 NOTE — ED CDU PROVIDER INITIAL DAY NOTE - NS ED ROS FT
Gen: denies fever, chills, fatigue, weight loss  Skin: denies rashes, laceration, bruising  HEENT: denies visual changes, ear pain, nasal congestion, throat pain  Respiratory: +TOLBERT, SO, cough denies wheezing  Cardiovascular: +chest pressure. denies palpitations, diaphoresis, LE edema  GI: denies abdominal pain, n/v/d  : denies dysuria, frequency, urgency, bowel/bladder incontinence  MSK: denies joint swelling/pain, back pain, neck pain  Neuro: denies headache, dizziness, weakness, numbness  Psych: denies anxiety, depression, SI/HI, visual/auditory hallucinations

## 2024-02-23 NOTE — ED ADULT TRIAGE NOTE - GLASGOW COMA SCALE: BEST VERBAL RESPONSE, MLM
Spoke with Kulwant regarding wet mount positive for yeast and urine negative for infection .Medication sent to her pharmacy. Verbalized understanding.  See note below.   (V5) oriented

## 2024-02-24 ENCOUNTER — RESULT REVIEW (OUTPATIENT)
Age: 69
End: 2024-02-24

## 2024-02-24 VITALS
OXYGEN SATURATION: 98 % | TEMPERATURE: 98 F | DIASTOLIC BLOOD PRESSURE: 65 MMHG | RESPIRATION RATE: 18 BRPM | SYSTOLIC BLOOD PRESSURE: 109 MMHG | HEART RATE: 56 BPM

## 2024-02-24 LAB
FLUAV AG NPH QL: SIGNIFICANT CHANGE UP
FLUBV AG NPH QL: SIGNIFICANT CHANGE UP
RSV RNA NPH QL NAA+NON-PROBE: SIGNIFICANT CHANGE UP
SARS-COV-2 RNA SPEC QL NAA+PROBE: SIGNIFICANT CHANGE UP

## 2024-02-24 PROCEDURE — 93306 TTE W/DOPPLER COMPLETE: CPT | Mod: 26

## 2024-02-24 PROCEDURE — 85025 COMPLETE CBC W/AUTO DIFF WBC: CPT

## 2024-02-24 PROCEDURE — 85730 THROMBOPLASTIN TIME PARTIAL: CPT

## 2024-02-24 PROCEDURE — 85610 PROTHROMBIN TIME: CPT

## 2024-02-24 PROCEDURE — T1013: CPT

## 2024-02-24 PROCEDURE — 71275 CT ANGIOGRAPHY CHEST: CPT | Mod: MC

## 2024-02-24 PROCEDURE — 99239 HOSP IP/OBS DSCHRG MGMT >30: CPT

## 2024-02-24 PROCEDURE — G0378: CPT

## 2024-02-24 PROCEDURE — 84484 ASSAY OF TROPONIN QUANT: CPT

## 2024-02-24 PROCEDURE — 99285 EMERGENCY DEPT VISIT HI MDM: CPT | Mod: 25

## 2024-02-24 PROCEDURE — 93356 MYOCRD STRAIN IMG SPCKL TRCK: CPT

## 2024-02-24 PROCEDURE — 87637 SARSCOV2&INF A&B&RSV AMP PRB: CPT

## 2024-02-24 PROCEDURE — 83880 ASSAY OF NATRIURETIC PEPTIDE: CPT

## 2024-02-24 PROCEDURE — 96374 THER/PROPH/DIAG INJ IV PUSH: CPT | Mod: XU

## 2024-02-24 PROCEDURE — 93005 ELECTROCARDIOGRAM TRACING: CPT

## 2024-02-24 PROCEDURE — 36415 COLL VENOUS BLD VENIPUNCTURE: CPT

## 2024-02-24 PROCEDURE — 80053 COMPREHEN METABOLIC PANEL: CPT

## 2024-02-24 PROCEDURE — 93306 TTE W/DOPPLER COMPLETE: CPT

## 2024-02-24 PROCEDURE — 71045 X-RAY EXAM CHEST 1 VIEW: CPT

## 2024-02-24 PROCEDURE — 83735 ASSAY OF MAGNESIUM: CPT

## 2024-02-24 PROCEDURE — 85379 FIBRIN DEGRADATION QUANT: CPT

## 2024-02-24 RX ORDER — FUROSEMIDE 40 MG
20 TABLET ORAL ONCE
Refills: 0 | Status: COMPLETED | OUTPATIENT
Start: 2024-02-24 | End: 2024-02-24

## 2024-02-24 RX ADMIN — Medication 20 MILLIGRAM(S): at 11:46

## 2024-02-24 RX ADMIN — Medication 10 MILLIGRAM(S): at 06:39

## 2024-02-24 RX ADMIN — ATORVASTATIN CALCIUM 20 MILLIGRAM(S): 80 TABLET, FILM COATED ORAL at 05:21

## 2024-02-24 NOTE — ED ADULT NURSE REASSESSMENT NOTE - MUSCULOSKELETAL ASSESSMENT
-Current VIS rendered        *Vaccine screen scanned under screening forms   5.  ankylosing spondylitis  6.  Simponi  
- - -

## 2024-02-24 NOTE — ED CDU PROVIDER DISPOSITION NOTE - NS ED ATTENDING STATEMENT MOD
Attending with Complex Repair And Bilobe Flap Text: The defect edges were debeveled with a #15 scalpel blade.  The primary defect was closed partially with a complex linear closure.  Given the location of the remaining defect, shape of the defect and the proximity to free margins a bilobe flap was deemed most appropriate for complete closure of the defect.  Using a sterile surgical marker, an appropriate advancement flap was drawn incorporating the defect and placing the expected incisions within the relaxed skin tension lines where possible.    The area thus outlined was incised deep to adipose tissue with a #15 scalpel blade.  The skin margins were undermined to an appropriate distance in all directions utilizing iris scissors.

## 2024-02-24 NOTE — ED CDU PROVIDER DISPOSITION NOTE - CLINICAL COURSE
This is a 69y old female with a history of non-small cell lung cancer on chemotherapy with alectinib, hypothyroidism and hyperlipidemia who presents with SOB, LE swelling and chest pain.  Patient seen by cardiology, underwent echo, advise outpatient f/u with oncology and cardiology.  Given copies of all results understands and agrees to proceed.

## 2024-02-24 NOTE — ED CDU PROVIDER DISPOSITION NOTE - ATTENDING CONTRIBUTION TO CARE
I agree with the PA's note and was available for any issues/concerns. I was directly involved in patient care. My brief overall assessment is as follows: had sob, LE, seen by cards, had echo, cleared for outpt f/u. return precautions.

## 2024-02-24 NOTE — ED CDU PROVIDER DISPOSITION NOTE - NSFOLLOWUPINSTRUCTIONS_ED_ALL_ED_FT
Please follow up as outpatient with cardiology and oncology      Return if symptoms persist or worsen

## 2024-02-24 NOTE — ED CDU PROVIDER SUBSEQUENT DAY NOTE - CLINICAL SUMMARY MEDICAL DECISION MAKING FREE TEXT BOX
8 yo female with pmhx NSLC on oral chemi recently started 1/27, HLD, anxiety was sent in by oncologist for evaluation fo SOB, LE edema and chest pressure. trop stable x2, ekg nonischemic. CTA chest negative for PE. CTA chest showed patchy groundglass opacities are noted in the posterior segment of the   left upper lobe, etiology is unclear. Afebrile, no WBC count, pt does admit to cough intermittently, dry, non productive, chronic. At this time, no evidence of acute infectious processes. Pt placed in obs for cards and echo.

## 2024-02-24 NOTE — ED CDU PROVIDER SUBSEQUENT DAY NOTE - PHYSICAL EXAMINATION
Gen: No acute distress, non toxic  HEENT: Mucous membranes moist, pink conjunctivae, EOMI. PERRL. Airway patent  Neck: FROM.   CV: RRR, nl s1/s2.  Resp: CTAB, normal rate and effort  GI: Abdomen soft, NT, ND. No rebound, no guarding  Neuro: A&O x 3, moving all 4 extremities  MSK: No spine or joint tenderness to palpation. FROM UE and LE b/l   Skin: No rashes. intact and perfused. cap refill <2sec  Vascular: Radial and dorsalis pedal pulses 2+ b/l. + LE edema b/l

## 2024-02-24 NOTE — CONSULT NOTE ADULT - ASSESSMENT
69y old female with a history of non-small cell lung cancer on chemotherapy with alectinib, hypothyroidism and hyperlipidemia who presents with SOB, LE swelling and chest pain. Chest pain began ~1 week ago and feels like a pressure that is worse with activity and with inspiration and improves with rest and sitting down.  Bilateral leg swelling started 3 days ago.    Chest pain:  -follow-up cardiology recommendations   -cardiac enzymes negative x2  -CT angiogram with no evidence of PE   -cardiology recommended echocardiogram, and if echo unremarkable, can be discharged home with outpatient follow-up with cardiology    History of non-small cell lung cancer on alectinib   -can hold Alectinib during inpatient stay   -to resume on outpatient basis after follow-up with [facility]  -will update [facility] daily

## 2024-02-24 NOTE — ED CDU PROVIDER SUBSEQUENT DAY NOTE - NS ED ROS FT
Gen: denies fever, chills, fatigue, weight loss  Skin: denies rashes, laceration, bruising  HEENT: denies visual changes, ear pain, nasal congestion, throat pain  Respiratory: +TOLBERT, SOB, cough denies wheezing  Cardiovascular: +chest pressure. denies palpitations, diaphoresis, LE edema  GI: denies abdominal pain, n/v/d  : denies dysuria, frequency, urgency, bowel/bladder incontinence  MSK: denies joint swelling/pain, back pain, neck pain  Neuro: denies headache, dizziness, weakness, numbness  Psych: denies anxiety, depression, SI/HI, visual/auditory hallucinations

## 2024-02-24 NOTE — CONSULT NOTE ADULT - SUBJECTIVE AND OBJECTIVE BOX
69y old female with a history of non-small cell lung cancer on chemotherapy with alectinib, hypothyroidism and hyperlipidemia who presents with SOB, LE swelling and chest pain. Chest pain began ~1 week ago and feels like a pressure that is worse with activity and with inspiration and improves with rest and sitting down.  Bilateral leg swelling started 3 days ago.    PAST MEDICAL HISTORY:  Pulmonary mass  PPD positive  Small bowel mass  Acute medial meniscus tear of right knee, initial encounter  Acute lateral meniscal injury of right knee, initial encounter  Patient is Gnosticism  Fatty liver  Osteoarthritis  Depression  Hyperlipidemia  At risk for sleep apnea  Malignant neoplasm of unspecified part of unspecified bronchus or lung  H/O pneumothorax  Osteoporosis  Malignant neoplasm of lung, unspecified laterality, unspecified part of lung  Diverticulitis  Hypothyroidism  Anxiety  PAST SURGICAL HISTORY:  S/P appendectomy  S/P  section  History of breast implant  H/O cosmetic plastic surgery  History of colon surgery  History of appendectomy      MEDICATIONS  (STANDING):  atorvastatin 20 milliGRAM(s) Oral at bedtime  FLUoxetine 10 milliGRAM(s) Oral daily    MEDICATIONS  (PRN):  albuterol    90 MICROgram(s) HFA Inhaler 2 Puff(s) Inhalation every 6 hours PRN Shortness of Breath and/or Wheezing  ALPRAZolam 0.5 milliGRAM(s) Oral three times a day PRN anxiety    ALLERGIES:  No Known Allergies    SOCIAL HISTORY:  Tobacco: denies  Alcohol: denies  Drugs: denies    FAMILY HISTORY:  No pertinent family history in first degree relatives      REVIEW OF SYSTEMS:  Constitutional: + fatigue, no fevers/chills, no weight change, + weakness  HEENT: no visual disturbances, no hearing loss  Cardiac: + chest pain, + palpitations, no orthopnea, no PND, + leg swelling  Respiratory: + shortness of breath, no cough  GI: no abdominal pain, no blood in the stool, no N/V, no diarrhea, + constipation  Urological: no dysuria, no hematuria  Hematological: no easy bruising or bleeding  Musculoskeletal: no joint pain, no back pain  Neurological: no headaches, no syncope, + dizziness  Psychiatric: + anxiety, no depression  Skin: no rashes    PHYSICAL EXAM:  Weight (kg): 82.2 ( @ 14:00)  T(C): 36.4 (24 @ 07:25), Max: 36.5 (24 @ 22:49)  T(F): 97.6 (24 @ 07:25), Max: 97.7 (24 @ 22:49)  HR: 61 (24 @ 07:25) (60 - 67)  BP: 108/69 (24 @ 07:25) (106/64 - 133/67)  RR: 18 (24 @ 07:25) (14 - 18)  SpO2: 99% (24 @ 07:25) (96% - 100%)  Wt(kg): --  Telemetry: Sinus, HR 50s-60s  General: comfortable, in NAD  HEENT: EOMI, normocephalic, atraumatic  Neck: no JVD, no carotid bruits  Heart: +S1S2, RRR, 1/6 systolic murmur at the base and LLSB, no rubs, no gallops  Lungs: decreased breath sounds at the left base, no wheezes, no rales, no rhonchi  Abdomen: soft, non-tender, non-distended, + bowel sounds  Extremities: no clubbing, no cyanosis, 2+ LLE and 1-2+ RLE edema  Vascular: 2+ dorsalis pedis pulses bilaterally  Neuro: A&O x3
Cardiology Consult    CHIEF COMPLAINT:   HISTORY OF PRESENT ILLNESS: SHANON BEE is a 69y old female with a history of non-small cell lung cancer on chemotherapy with alectinib, hypothyroidism and hyperlipidemia who presents with SOB, LE swelling and chest pain. Chest pain began ~1 week ago and feels like a pressure that is worse with activity and with inspiration and improves with rest and sitting down. Bilateral leg swelling started 3 days ago. She also complains of palpitations, weakness, fatigue, dizziness and constipation. She started taking her oral chemo a few weeks ago. She currently denies chest pain but still feels SOB. She denies orthopnea, PND or syncope.      PROBLEM LIST:    PAST MEDICAL HISTORY:  Pulmonary mass    PPD positive    Small bowel mass    Acute medial meniscus tear of right knee, initial encounter    Acute lateral meniscal injury of right knee, initial encounter    Patient is Anabaptist    Fatty liver    Osteoarthritis    Depression    Hyperlipidemia    At risk for sleep apnea    Malignant neoplasm of unspecified part of unspecified bronchus or lung    H/O pneumothorax    Osteoporosis    Malignant neoplasm of lung, unspecified laterality, unspecified part of lung    Diverticulitis    Hypothyroidism    Anxiety      PAST SURGICAL HISTORY:  S/P appendectomy    S/P  section    History of breast implant    H/O cosmetic plastic surgery    History of colon surgery    History of appendectomy      MEDICATIONS  (STANDING):  atorvastatin 20 milliGRAM(s) Oral at bedtime  FLUoxetine 10 milliGRAM(s) Oral daily    MEDICATIONS  (PRN):  albuterol    90 MICROgram(s) HFA Inhaler 2 Puff(s) Inhalation every 6 hours PRN Shortness of Breath and/or Wheezing  ALPRAZolam 0.5 milliGRAM(s) Oral three times a day PRN anxiety    ALLERGIES:  No Known Allergies    SOCIAL HISTORY:  Tobacco: denies  Alcohol: denies  Drugs: denies    FAMILY HISTORY:  No pertinent family history in first degree relatives      REVIEW OF SYSTEMS:  Constitutional: + fatigue, no fevers/chills, no weight change, + weakness  HEENT: no visual disturbances, no hearing loss  Cardiac: + chest pain, + palpitations, no orthopnea, no PND, + leg swelling  Respiratory: + shortness of breath, no cough  GI: no abdominal pain, no blood in the stool, no N/V, no diarrhea, + constipation  Urological: no dysuria, no hematuria  Hematological: no easy bruising or bleeding  Musculoskeletal: no joint pain, no back pain  Neurological: no headaches, no syncope, + dizziness  Psychiatric: + anxiety, no depression  Skin: no rashes    PHYSICAL EXAM:    Weight (kg): 82.2 ( @ 14:00)  T(C): 36.4 (24 @ 07:25), Max: 36.5 (24 @ 22:49)  T(F): 97.6 (24 @ 07:25), Max: 97.7 (24 @ 22:49)  HR: 61 (24 @ 07:25) (60 - 67)  BP: 108/69 (24 @ 07:25) (106/64 - 133/67)  RR: 18 (24 @ 07:25) (14 - 18)  SpO2: 99% (24 @ 07:25) (96% - 100%)  Wt(kg): --  Telemetry: Sinus, HR 50s-60s  General: comfortable, in NAD  HEENT: EOMI, normocephalic, atraumatic  Neck: no JVD, no carotid bruits  Heart: +S1S2, RRR, 1/6 systolic murmur at the base and LLSB, no rubs, no gallops  Lungs: decreased breath sounds at the left base, no wheezes, no rales, no rhonchi  Abdomen: soft, non-tender, non-distended, + bowel sounds  Extremities: no clubbing, no cyanosis, 2+ LLE and 1-2+ RLE edema  Vascular: 2+ dorsalis pedis pulses bilaterally  Neuro: A&O x3    EKG: Sinus rhythm, HR 69, normal axis, possible anterior infarct age undetermined, no ST-T wave abnormalities    LABS:      D-Dimer Assay, Quantitative: 168 ng/mL DDU ( @ 16:40)      CBC:            10.4   4.61  >-----------< 227     @ 16:40            31.2         CHEMISTRY:   139   |  104    |  18.5   ----------------------------< 98       @ 16:40    4.0   |  26.0   |  0.80     eGFR non-  --     eGFR African American  --       Magnesium: 2.3 mg/dL (02-23 @ 16:40)    TPro --    / Alb 3.9   / TBili 0.6   / DBili --    / AST 39    / ALT 28    / AlkPhos 120     @ 16:40    COAGS:  PT/INR - ( 2024 16:40 )   PT: 9.9 sec;   INR: 0.89 ratio         PTT - ( 2024 16:40 )  PTT:28.0 sec    RADIOLOGY & ADDITIONAL TESTS:  CTA Chest:  No pulmonary embolus is noted.  Patchy groundglass opacities are noted in the posterior segment of the   left upper lobe. Etiology is unclear.  Heart is enlarged in size. No pericardial effusion is noted. Pulmonary   arteries are normal in caliber. No filling defects are noted.      ASSESSMENT:  SHANON BEE is a 69y old female with a history of non-small cell lung cancer on chemotherapy with alectinib, hypothyroidism and hyperlipidemia who presents with SOB, LE swelling and chest pain.    Please permit me to suggest the followin. Cardiac enzymes were negative x2. I doubt that this is ACS. She had a negative Lexiscan nuclear stress test in 2023.  2. D-dimer was normal and CTA was negative for PE.  3. BNP is normal so this is likely not CHF however an Echo is pending. She had an unremarkable Echo in 2022.  4. Alectinib is known to cause SOB and edema. I suspect that this is the cause of these symptoms. I will, however, give her a dose of Lasix 20 mg IV x1 to help with the edema.   5. Provided that her Echo doesn't show anything significant, she may be discharged home with out-patient follow-up. If any question about the Echo findings, you may call me at [phone].

## 2024-02-24 NOTE — ED CDU PROVIDER SUBSEQUENT DAY NOTE - NSICDXPASTMEDICALHX_GEN_ALL_CORE_FT
PAST MEDICAL HISTORY:  Acute lateral meniscal injury of right knee, initial encounter     Acute medial meniscus tear of right knee, initial encounter     Anxiety     At risk for sleep apnea Bang score 3    Depression     Diverticulitis     Fatty liver     H/O pneumothorax following lung biopsy right lung    Hyperlipidemia     Hypothyroidism     Malignant neoplasm of lung, unspecified laterality, unspecified part of lung     Malignant neoplasm of unspecified part of unspecified bronchus or lung     Osteoarthritis right    Osteoporosis     Patient is Episcopal     PPD positive 2000 -  treated with INH    Pulmonary mass monitoring -no surgical intervention    Small bowel mass incidental finding while appendectomy ( Benign s/p colon mass resection - 3/2018)

## 2024-02-24 NOTE — ED CDU PROVIDER SUBSEQUENT DAY NOTE - ATTENDING APP SHARED VISIT CONTRIBUTION OF CARE
I agree with the PA's note and was available for any issues/concerns. I was directly involved in patient care. My brief overall assessment is as follows: no events overnight. awaiting echo. resting comfortably.

## 2024-02-24 NOTE — ED ADULT NURSE REASSESSMENT NOTE - NS ED NURSE REASSESS COMMENT FT1
Patient AA&Ox4, resp even/unlabored, ambulatory, steady gait noted, discharged home with all belongings. Med rec and discharge instructions explained and given to pt by SAL Glover. Patient verbalizes understanding on physician follow up, medication regimen and next dose, s/s of complications and monitoring. No distress noted. VSS, recorded in EMR. Peripheral IV removed, intact, bleeding controlled.
assumed care of pt @ 1930 from previous RN Gary. Pt breathing even and unlabored at this time, no acute distress noted. Pt resting comfortably, awaiting CTr. Pt able to make needs known, has no complaints at this time. Stretcher in lowest position, wheels locked, call bell within reach. family at bedside.
pt. is asleep, no acute distress noted. safety maintained.
rec pt. from rn LM. pt. is aaox4. denies chest pain, appears comfortable. no acute resp distress noted. pt. placed on monitor. pending tte and cards consult in am. safety maintained.
report given to JARED Mills. pt breathing even and unlabored, no acute distress noted at this time. pt awaiting TTE and cardiology consult. pt to be moved to A7L.
Assumed care of patient at 07:15 from CELE Spencer. Charting as noted. Patient AA&Ox4, resp even/unlabored, ambulatory, steady gait noted, MAEx4, NAD, presents to ED c/o SOB, BL LE edema, and chest pressure. At time of assessment, patient denies pain/discomfort, denies CP/SOB. Patient updated on the plan of care, awaiting cardiology consult and TTE echo. Stretcher locked in lowest position, IV site flushed w/ NS. No redness, swelling or pain noted to site. No signs of acute distress noted, safety maintained. Pt remains on CM in NSR, rate 66, SpO2 99% on room air.

## 2024-02-24 NOTE — ED CDU PROVIDER DISPOSITION NOTE - PATIENT PORTAL LINK FT
You can access the FollowMyHealth Patient Portal offered by [facility] by registering at the following website: [email or web address]. By joining [employer]’s FollowMyHealth portal, you will also be able to view your health information using other applications (apps) compatible with our system.

## 2024-05-01 NOTE — ASSESSMENT
[FreeTextEntry1] : SHANON BEE is a never smoker Female who was 64 yo at the time of diagnosis. Patient has no significant past medical history. 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 and was treated with 6 months of INH \par \par MRI Brain : negative \par  CT Chest/ Abd/ Pelvis 12/29/20: with stable Lt UPPER LOBE primary lesion, ( 4.7 x 2.3cm), 1.2 cm left lung apex lesion along with Rt upper lobe stable lesion of 1.3 cm  \par \par - Discussed at tumor board on 1/13/21, \par - and s/p IR biopsy of Rt upper lobe lesion  on 1/26/21 c/w reactive cells negative for malignancy \par - PDL1 20% \par - Foundation one : ALK ELM 4 fusion variant ,\par MS stable TMB 4 mut/ Mb, SF 3B1 \par Discussed the Alk fusion mutation and targeted options however this is only approved in the metastatic setting\par \par Plan for Resection per DR jeffery, however EBUS prior to surgery  WITH n2 disease. Patient was represented at Tumor board on 4/14/21 and a plan for definitive CRT \par \par - PLAN for  chemoRT with Carbo AUC 2 and taxol 50 mg/ m2 weekly with RT followed by Immunotherapy with Durvalumab for 1 year \par \par - s/p C5 of Carbo/ taxol, will plan to go concurrent with RT till 6/21/21 \par - stable counts \par - Nausea- taking compazine\par \par - Patient is a [employer] \par - She had a h/o Diverticulosis monitor with IO \par She has MRI brain scheduled on 6/18/21 Ordered priior to starting treatment however not done yet \par She will require F/u scans CT Chest abd pelvis 4 weeks after completion of CRT ( July 3rd week 2021) \par - f/u with me post scans\par - F/u with ronna in 3 weeks \par \par \par \par \par SON: JUAN RAMON [phone]\par  [FreeTextEntry1] : pt will let us know when ready for 2-night HHS. Repeat CT 1 year from prior April 3, 2025.  Task sent as reminder. Clinical reevaluation in 12 months. Sooner PRN. Follow-up sooner if recent symptoms not completely resolved. Discussed with patient and son.  35 minutes spent in evaluation management and review of studies as well as discussion.

## 2024-05-02 NOTE — H&P PST ADULT - NS PRO LAST MENSTRUAL DATE
Nephrology Consult Progress Note    Admit Date: 4/30/2024    Interval history:  Breathing better    CURRENT MEDICATIONS:    Current Facility-Administered Medications:     apixaban (Eliquis) tablet 5 mg, 5 mg, oral, BID, Mauricio Estrada MD, 5 mg at 05/02/24 0855    atorvastatin (Lipitor) tablet 40 mg, 40 mg, oral, Nightly, Mauricio Estrada MD    B complex-vitamin C-folic acid (Nephrocaps) capsule 1 capsule, 1 capsule, oral, Daily, Mauricio Estrada MD, 1 capsule at 05/02/24 0854    dextrose 50 % injection 12.5 g, 12.5 g, intravenous, q15 min PRN, Mauricio Estrada MD    dextrose 50 % injection 25 g, 25 g, intravenous, q15 min PRN, Mauricio Estrada MD    glucagon (Glucagen) injection 1 mg, 1 mg, intramuscular, q15 min PRN, Mauricio Estrada MD    glucagon (Glucagen) injection 1 mg, 1 mg, intramuscular, q15 min PRN, Mauricio Estrada MD    heparin 1,000 unit/mL injection 3,100 Units, 3,100 Units, intra-catheter, After Dialysis, Michelle Drummond PA-C, 3,100 Units at 05/02/24 0248    heparin 1,000 unit/mL injection 3,100 Units, 3,100 Units, intra-catheter, After Dialysis, Michelle Drummond PA-C, 3,100 Units at 05/02/24 0247    insulin lispro (HumaLOG) injection 0-5 Units, 0-5 Units, subcutaneous, TID with meals, Mauricio Estrada MD, 3 Units at 05/02/24 0856    levothyroxine (Synthroid, Levoxyl) tablet 125 mcg, 125 mcg, oral, Daily before breakfast, Mauricio Estrada MD, 125 mcg at 05/01/24 0630    loperamide (Imodium) capsule 2 mg, 2 mg, oral, q4h PRN, Mauricio Estrada MD    melatonin tablet 3 mg, 3 mg, oral, Nightly PRN, Mauricio Estrada MD    nitroglycerin (Nitrostat) SL tablet 0.4 mg, 0.4 mg, sublingual, q5 min PRN, Mauricio Estrada MD    ondansetron (Zofran) injection 4 mg, 4 mg, intravenous, q6h PRN, Mauricio Estrada MD    oxyCODONE (Roxicodone) immediate release tablet 5 mg, 5 mg, oral, q4h PRN, Mauricio Estrada MD, 5 mg at 05/01/24 0520    polyethylene glycol (Glycolax, Miralax) packet 17 g, 17 g, oral, Daily, Mauricio Estrada MD, 17  "g at 05/02/24 0646    sennosides (Senokot) tablet 8.6 mg, 8.6 mg, oral, BID, Mauricio Estrada MD    sevelamer carbonate (Renvela) tablet 2,400 mg, 2,400 mg, oral, TID with meals, Mauricio Estrada MD, 2,400 mg at 05/02/24 0855    simethicone (Mylicon) chewable tablet 80 mg, 80 mg, oral, q8h PRN, Mauricio Estrada MD    vancomycin (Vancocin) pharmacy to dose - pharmacy monitoring, , miscellaneous, Daily PRN, Mat Jauregui MD       Intake/Output Summary (Last 24 hours) at 5/2/2024 1052  Last data filed at 5/2/2024 0323  Gross per 24 hour   Intake 2700 ml   Output 1400 ml   Net 1300 ml       PHYSICAL EXAM:  BP 96/58   Pulse 82   Temp 36.1 °C (97 °F) (Oral)   Resp 14   Ht 1.753 m (5' 9\")   Wt 65.8 kg (145 lb)   SpO2 99%   BMI 21.41 kg/m²     Intake/Output Summary (Last 24 hours) at 5/2/2024 1052  Last data filed at 5/2/2024 0323  Gross per 24 hour   Intake 2700 ml   Output 1400 ml   Net 1300 ml     Gen: AAO, NAD  Neck: No JVD  Cardiac: RRR  Resp: clear BS  Abd: Soft, non tender, +BS, non distended   Ext: No edema   RUE amputation, L BKA  Access: R femoral TDC  Neuro: moves 4 ext  Peripheral Pulses: weak peripheral pulses.  Skin: Skin color, texture, turgor normal, no suspicious rashes or lesions.    Labs:  Results for orders placed or performed during the hospital encounter of 04/30/24 (from the past 24 hour(s))   POCT GLUCOSE   Result Value Ref Range    POCT Glucose 425 (H) 74 - 99 mg/dL   Blood Culture    Specimen: Peripheral Venipuncture; Blood culture   Result Value Ref Range    Blood Culture Loaded on Instrument - Culture in progress    Blood Culture    Specimen: Dialysis; Blood culture   Result Value Ref Range    Blood Culture Loaded on Instrument - Culture in progress    POCT GLUCOSE   Result Value Ref Range    POCT Glucose 372 (H) 74 - 99 mg/dL   POCT GLUCOSE   Result Value Ref Range    POCT Glucose 264 (H) 74 - 99 mg/dL        DATA:   Diagnostic tests reviewed for today's visit:    New labs and imaging "     Assessment and Plan:  Pt with ESKD, exhausted vascular access, T2D, amputations, DVT, HTN, coming with back pain and missed dialysis. Pt reports having fever 2 days ago, CLABSI due to Staph aureus - true bacteremia   - ESKD on HD: had HD late last night   Improved mild hypervolemia   BP: controlled  BMD: on sevelamer  Hb >10    PLAN:  - HD tomorrow  - agree with trying avoid line holiday, difficult access, line exchange might be  more appropriate     Will continue to follow.     Signature: Edwar Arango MD    postmenopausal

## 2024-05-08 ENCOUNTER — RESULT REVIEW (OUTPATIENT)
Age: 69
End: 2024-05-08

## 2024-05-08 ENCOUNTER — EMERGENCY (EMERGENCY)
Facility: HOSPITAL | Age: 69
LOS: 1 days | Discharge: HOME | End: 2024-05-08
Attending: STUDENT IN AN ORGANIZED HEALTH CARE EDUCATION/TRAINING PROGRAM
Payer: MEDICARE

## 2024-05-08 VITALS
DIASTOLIC BLOOD PRESSURE: 88 MMHG | SYSTOLIC BLOOD PRESSURE: 108 MMHG | RESPIRATION RATE: 17 BRPM | HEART RATE: 77 BPM | OXYGEN SATURATION: 99 %

## 2024-05-08 VITALS
RESPIRATION RATE: 18 BRPM | SYSTOLIC BLOOD PRESSURE: 105 MMHG | OXYGEN SATURATION: 95 % | HEIGHT: 66 IN | TEMPERATURE: 98 F | HEART RATE: 83 BPM | WEIGHT: 182.98 LBS | DIASTOLIC BLOOD PRESSURE: 69 MMHG

## 2024-05-08 DIAGNOSIS — Z90.49 ACQUIRED ABSENCE OF OTHER SPECIFIED PARTS OF DIGESTIVE TRACT: Chronic | ICD-10-CM

## 2024-05-08 DIAGNOSIS — Z98.891 HISTORY OF UTERINE SCAR FROM PREVIOUS SURGERY: Chronic | ICD-10-CM

## 2024-05-08 DIAGNOSIS — Z98.82 BREAST IMPLANT STATUS: Chronic | ICD-10-CM

## 2024-05-08 DIAGNOSIS — Z98.890 OTHER SPECIFIED POSTPROCEDURAL STATES: Chronic | ICD-10-CM

## 2024-05-08 PROBLEM — E03.9 HYPOTHYROIDISM, UNSPECIFIED: Chronic | Status: ACTIVE | Noted: 2024-02-23

## 2024-05-08 PROBLEM — F41.9 ANXIETY DISORDER, UNSPECIFIED: Chronic | Status: ACTIVE | Noted: 2024-02-23

## 2024-05-08 LAB
ALBUMIN SERPL ELPH-MCNC: 4.1 G/DL — SIGNIFICANT CHANGE UP (ref 3.3–5.2)
ALP SERPL-CCNC: 75 U/L — SIGNIFICANT CHANGE UP (ref 40–120)
ALT FLD-CCNC: 30 U/L — SIGNIFICANT CHANGE UP
ANION GAP SERPL CALC-SCNC: 11 MMOL/L — SIGNIFICANT CHANGE UP (ref 5–17)
AST SERPL-CCNC: 31 U/L — SIGNIFICANT CHANGE UP
BASOPHILS # BLD AUTO: 0.03 K/UL — SIGNIFICANT CHANGE UP (ref 0–0.2)
BASOPHILS NFR BLD AUTO: 0.7 % — SIGNIFICANT CHANGE UP (ref 0–2)
BILIRUB SERPL-MCNC: 0.2 MG/DL — LOW (ref 0.4–2)
BUN SERPL-MCNC: 12.7 MG/DL — SIGNIFICANT CHANGE UP (ref 8–20)
CALCIUM SERPL-MCNC: 9.2 MG/DL — SIGNIFICANT CHANGE UP (ref 8.4–10.5)
CHLORIDE SERPL-SCNC: 102 MMOL/L — SIGNIFICANT CHANGE UP (ref 96–108)
CO2 SERPL-SCNC: 26 MMOL/L — SIGNIFICANT CHANGE UP (ref 22–29)
CREAT SERPL-MCNC: 0.59 MG/DL — SIGNIFICANT CHANGE UP (ref 0.5–1.3)
EGFR: 98 ML/MIN/1.73M2 — SIGNIFICANT CHANGE UP
EOSINOPHIL # BLD AUTO: 0.18 K/UL — SIGNIFICANT CHANGE UP (ref 0–0.5)
EOSINOPHIL NFR BLD AUTO: 4.5 % — SIGNIFICANT CHANGE UP (ref 0–6)
GLUCOSE SERPL-MCNC: 106 MG/DL — HIGH (ref 70–99)
HCT VFR BLD CALC: 37.2 % — SIGNIFICANT CHANGE UP (ref 34.5–45)
HGB BLD-MCNC: 12.1 G/DL — SIGNIFICANT CHANGE UP (ref 11.5–15.5)
IMM GRANULOCYTES NFR BLD AUTO: 0.5 % — SIGNIFICANT CHANGE UP (ref 0–0.9)
LYMPHOCYTES # BLD AUTO: 0.88 K/UL — LOW (ref 1–3.3)
LYMPHOCYTES # BLD AUTO: 21.9 % — SIGNIFICANT CHANGE UP (ref 13–44)
MAGNESIUM SERPL-MCNC: 2.3 MG/DL — SIGNIFICANT CHANGE UP (ref 1.8–2.6)
MCHC RBC-ENTMCNC: 29.8 PG — SIGNIFICANT CHANGE UP (ref 27–34)
MCHC RBC-ENTMCNC: 32.5 GM/DL — SIGNIFICANT CHANGE UP (ref 32–36)
MCV RBC AUTO: 91.6 FL — SIGNIFICANT CHANGE UP (ref 80–100)
MONOCYTES # BLD AUTO: 0.45 K/UL — SIGNIFICANT CHANGE UP (ref 0–0.9)
MONOCYTES NFR BLD AUTO: 11.2 % — SIGNIFICANT CHANGE UP (ref 2–14)
NEUTROPHILS # BLD AUTO: 2.46 K/UL — SIGNIFICANT CHANGE UP (ref 1.8–7.4)
NEUTROPHILS NFR BLD AUTO: 61.2 % — SIGNIFICANT CHANGE UP (ref 43–77)
NT-PROBNP SERPL-SCNC: 133 PG/ML — SIGNIFICANT CHANGE UP (ref 0–300)
PLATELET # BLD AUTO: 224 K/UL — SIGNIFICANT CHANGE UP (ref 150–400)
POTASSIUM SERPL-MCNC: 4.1 MMOL/L — SIGNIFICANT CHANGE UP (ref 3.5–5.3)
POTASSIUM SERPL-SCNC: 4.1 MMOL/L — SIGNIFICANT CHANGE UP (ref 3.5–5.3)
PROT SERPL-MCNC: 7.1 G/DL — SIGNIFICANT CHANGE UP (ref 6.6–8.7)
RBC # BLD: 4.06 M/UL — SIGNIFICANT CHANGE UP (ref 3.8–5.2)
RBC # FLD: 14.3 % — SIGNIFICANT CHANGE UP (ref 10.3–14.5)
SODIUM SERPL-SCNC: 139 MMOL/L — SIGNIFICANT CHANGE UP (ref 135–145)
TROPONIN T, HIGH SENSITIVITY RESULT: 6 NG/L — SIGNIFICANT CHANGE UP (ref 0–51)
WBC # BLD: 4.02 K/UL — SIGNIFICANT CHANGE UP (ref 3.8–10.5)
WBC # FLD AUTO: 4.02 K/UL — SIGNIFICANT CHANGE UP (ref 3.8–10.5)

## 2024-05-08 PROCEDURE — 84484 ASSAY OF TROPONIN QUANT: CPT

## 2024-05-08 PROCEDURE — 93306 TTE W/DOPPLER COMPLETE: CPT | Mod: 26

## 2024-05-08 PROCEDURE — 93970 EXTREMITY STUDY: CPT | Mod: 26

## 2024-05-08 PROCEDURE — 83880 ASSAY OF NATRIURETIC PEPTIDE: CPT

## 2024-05-08 PROCEDURE — 71045 X-RAY EXAM CHEST 1 VIEW: CPT | Mod: 26

## 2024-05-08 PROCEDURE — 93970 EXTREMITY STUDY: CPT

## 2024-05-08 PROCEDURE — 85025 COMPLETE CBC W/AUTO DIFF WBC: CPT

## 2024-05-08 PROCEDURE — 99285 EMERGENCY DEPT VISIT HI MDM: CPT

## 2024-05-08 PROCEDURE — 71045 X-RAY EXAM CHEST 1 VIEW: CPT

## 2024-05-08 PROCEDURE — 83735 ASSAY OF MAGNESIUM: CPT

## 2024-05-08 PROCEDURE — 36415 COLL VENOUS BLD VENIPUNCTURE: CPT

## 2024-05-08 PROCEDURE — 93010 ELECTROCARDIOGRAM REPORT: CPT

## 2024-05-08 PROCEDURE — 80053 COMPREHEN METABOLIC PANEL: CPT

## 2024-05-08 PROCEDURE — 99285 EMERGENCY DEPT VISIT HI MDM: CPT | Mod: 25

## 2024-05-08 PROCEDURE — 93306 TTE W/DOPPLER COMPLETE: CPT

## 2024-05-08 PROCEDURE — T1013: CPT

## 2024-05-08 PROCEDURE — 93005 ELECTROCARDIOGRAM TRACING: CPT

## 2024-05-08 NOTE — ED PROVIDER NOTE - ATTENDING CONTRIBUTION TO CARE
69F with shortness of breath and fluid retention that is worsening in setting of medication change, will w/u for poss new chf though possibly secondary to medication change, obtain echo / cards recs, possible outpt follow up if no acute findings given nonhypoxic o/w well appearing

## 2024-05-08 NOTE — ED PROVIDER NOTE - PATIENT PORTAL LINK FT
You can access the FollowMyHealth Patient Portal offered by [facility] by registering at the following website: [email or web address]. By joining [employer]’s FollowMyHealth portal, you will also be able to view your health information using other applications (apps) compatible with our system.

## 2024-05-08 NOTE — ED ADULT TRIAGE NOTE - CHIEF COMPLAINT QUOTE
pt c/o bilateral swelling to legs & hands & face, finished cancer treatment, stopped taking 2 days ago (Lorlatinib 25 mg QD), gained 22lbs in 1 month, was told to stop meds  A&Ox3, resp wnl

## 2024-05-08 NOTE — ED ADULT TRIAGE NOTE - AS TEMP SITE
Anesthesia Pre Eval Note    Anesthesia ROS/Med Hx    Overall Review:  EKG was reviewed     Anesthetic Complication History:    History of postoperative nausea & vomiting    Cardiovascular Review:     Positive for hypertension    GI/HEPATIC/RENAL Review:     Positive for renal disease - acute renal failure    End/Other Review:  Comments: Right breast cancer with limp precaution, left frozen shoulder,   Positive for cancer  Additional Results:  EKG:  Encounter Date: 05/03/24  -Electrocardiogram 12-Lead:        Result                      Value                           Ventricular Rate EKG/M*     128                             Atrial Rate (BPM)           128                             AL-Interval (MSEC)          130                             QRS-Interval (MSEC)         68                              QT-Interval (MSEC)          280                             QTc                         408                             P Axis (Degrees)            38                              R Axis (Degrees)            23                              T Axis (Degrees)            54                              REPORT TEXT                                             Sinus tachycardia   Otherwise normal ECG   When compared with ECG of   16-DEC-2023 16:51,   Vent. rate   has increased   BY  44 BPM          Relevant Problems   No relevant active problems       Physical Exam     Airway   Mallampati: III  TM Distance: >3 FB  Neck ROM: Full  TMJ Mobility: Good    Cardiovascular    Cardio Rhythm: Regular  Cardio Rate: Normal    Head Assessment  Head assessment: Normocephalic    General Assessment  General Assessment: Alert and oriented and No acute distress    Dental Exam  Dental exam normal    Pulmonary Exam  Pulmonary exam normal    Abdominal Exam  Abdominal exam normal      Anesthesia Plan:    ASA Status: 3Emergent    Anesthesia Type: General    Induction: Intravenous  Preferred Airway Type: ETT  Patient has a difficult airway or is  at risk of aspiration.   Appropriate airway precautions are in place.  Maintenance: TIVA  Premedication: IV    Patient does not have an implantable electronic device requiring post procedure programming.     Post-op Pain Management: Per Surgeon      Checklist  Reviewed: Lab Results, Allergies, Medications, Problem list, NPO Status, Past Med History, Consultations, EKG, Outside Records, DNR Status and Chest X-Rays  Consent/Risks Discussed Statement:  The proposed anesthetic plan, including its risks and benefits, have been discussed with the Patient along with the risks and benefits of alternatives. Questions were encouraged and answered and the patient and/or representative understands and agrees to proceed.        I discussed with the patient (and/or patient's legal representative) the risks and benefits of the proposed anesthesia plan, General, which may include services performed by other anesthesia providers.    Alternative anesthesia plans, if available, were reviewed with the patient (and/or patient's legal representative). Discussion has been held with the patient (and/or patient's legal representative) regarding risks of anesthesia, which include Nausea, Sore Throat, Vomiting, Dental Injury and Hypotension and emergent situations that may require change in anesthesia plan.    The patient (and/or patient's legal representative) has indicated understanding, his/her questions have been answered, and he/she wishes to proceed with the planned anesthetic.    Blood Products: Not Anticipated     oral

## 2024-05-08 NOTE — ED PROVIDER NOTE - CLINICAL SUMMARY MEDICAL DECISION MAKING FREE TEXT BOX
69-year-old female history of non-small cell lung cancer, recently discontinued from Lorlatinib presents with generalized swelling and episodes of orthopnea.  Although symptoms are attributable to dosage increase from Lorlatinib, will evaluate for new onset cardiac disease with blood work, CXR.  If workup is unremarkable, will consider echocardiogram to evaluate for cardiac function

## 2024-05-08 NOTE — ED PROVIDER NOTE - PHYSICAL EXAMINATION
General: Awake, alert, lying in bed in NAD  HEENT: Normocephalic, atraumatic. No scleral icterus or conjunctival injection. EOMI. Moist mucous membranes. Oropharynx clear.   Neck:. Soft and supple.  Cardiac: RRR, Peripheral pulses 2+ and symmetric. Mild pitting LE edema.  Resp: Lungs CTAB. No accessory muscle use  Abd: Soft, non-tender, non-distended. No guarding, rebound, or rigidity.  Back: Spine midline and non-tender.   Skin: No rashes, abrasions, or lacerations.  Neuro: AO x 4. Moves all extremities symmetrically. Motor strength and sensation grossly intact.  Psych: Appropriate mood and affect

## 2024-05-08 NOTE — ED ADULT NURSE NOTE - NSFALLUNIVINTERV_ED_ALL_ED
Bed/Stretcher in lowest position, wheels locked, appropriate side rails in place/Call bell, personal items and telephone in reach/Instruct patient to call for assistance before getting out of bed/chair/stretcher/Non-slip footwear applied when patient is off stretcher/Isabel to call system/Physically safe environment - no spills, clutter or unnecessary equipment/Purposeful proactive rounding/Room/bathroom lighting operational, light cord in reach

## 2024-05-08 NOTE — ED PROVIDER NOTE - NSICDXPASTMEDICALHX_GEN_ALL_CORE_FT
PAST MEDICAL HISTORY:  Acute lateral meniscal injury of right knee, initial encounter     Acute medial meniscus tear of right knee, initial encounter     Anxiety     At risk for sleep apnea Bang score 3    Depression     Diverticulitis     Fatty liver     H/O pneumothorax following lung biopsy right lung    Hyperlipidemia     Hypothyroidism     Malignant neoplasm of lung, unspecified laterality, unspecified part of lung     Malignant neoplasm of unspecified part of unspecified bronchus or lung     Osteoarthritis right    Osteoporosis     Patient is Hindu     PPD positive 2000 -  treated with INH    Pulmonary mass monitoring -no surgical intervention    Small bowel mass incidental finding while appendectomy ( Benign s/p colon mass resection - 3/2018)

## 2024-05-08 NOTE — ED ADULT NURSE NOTE - NSSEPSISSUSPECTED_ED_A_ED
10/09/19 1922   Patient Assessment/Suction   Level of Consciousness (AVPU) alert   Expansion/Accessory Muscles/Retractions no use of accessory muscles;no retractions;expansion symmetric   All Lung Fields Breath Sounds coarse   Rhythm/Pattern, Respiratory assisted mechanically   Secretions Amount small   Secretions Color yellow   Secretions Characteristics thick   PRE-TX-O2   O2 Device (Oxygen Therapy) ventilator   Oxygen Concentration (%) 30   SpO2 96 %   Pulse 70   Resp (!) 22   ETCO2   ETCO2 (mmHg) 29 mmHg   Wound Care   $ Wound Care Tech Time 15 min   Area of Concern Neck under tracheostomy   Skin Color/Characteristics pale   Skin Temperature warm        Surgical Airway Portex Cuffed;Fenestrated   No Placement Date or Time found.   Present Prior to Hospital Arrival?: Yes  Inserted by: Present Prior to Hospital Arrival  Type: Tracheostomy  Brand: Portex  Airway Device Size: 8.0  Style: Cuffed;Fenestrated   Cuff Pressure 36 cm H2O   Cuff Inflation? Inflated   Status Secured   Site Assessment Clean;Dry   Ties Assessment Secure;Clean   Vent Select   $ Ventilator Subsequent 1   Charged w/in last 24h YES   Preset Conventional Ventilator Settings   Vent Type    Ventilation Type VC   Vent Mode A/C   Set Rate 16 bmp   Vt Set 700 mL   PEEP/CPAP 5 cmH20   Pressure Support 0 cmH20   Waveform RAMP   Peak Flow 62 L/min   Set Inspiratory Pressure 0 cmH20   Insp Time 0 Sec(s)   Plateau Set/Insp. Hold (sec) 0   Insp Rise Time  0 %   Trigger Sensitivity Flow/I-Trigger 1.5 L/min   P High 0 cm H2O   P Low 0 cm H2O   T High 0 sec   T Low 0 sec   Patient Ventilator Parameters   Resp Rate Total 21 br/min   Peak Airway Pressure 42 cmH2O   Mean Airway Pressure 20 cmH20   Plateau Pressure 34 cmH20   Exhaled Vt 818 mL   Total Ve 14.9 mL   Spont Ve 0 L   I:E Ratio Measured 1:1.20   Conventional Ventilator Alarms   Ve High Alarm 30 L/min   Resp Rate High Alarm 40 br/min   Press High Alarm 55 cmH2O   Apnea Rate 16   Apnea Volume (mL)  700 mL   Apnea Oxygen Concentration  100   Apnea Flow Rate (L/min) 60   T Apnea 20 sec(s)   Ready to Wean/Extubation Screen   FIO2<=50 (chart decimal) 0.3   MV<16L (chart vol.) 14.9   PEEP <=8 (chart #) 5   Ready to Wean Parameters   F/VT Ratio<105 (RSBI) (!) 26.89      No

## 2024-05-08 NOTE — CONSULT NOTE ADULT - SUBJECTIVE AND OBJECTIVE BOX
Cardiology Consult    CHIEF COMPLAINT: Leg swelling    HISTORY OF PRESENT ILLNESS: SHANON BEE is a 69y old female with a history of hyperlipidemia, DARWIN on CPAP and non-small cell lung cancer on treatment with lorlatinib and had the dose increased a few weeks ago who presents with increasing leg swelling, L>R, over the past 1.5 weeks and has been having difficult walking and SOB and fatigue with walking over the past few days. The lorlatinib was discontinued two days ago with the intention of watching to see if the swelling would go away but her family became concerned about her inability to walk and the SOB so they brought her in for evaluation. Her daughter says that her oncologist does not want her to take diuretics if possible to see if the swelling goes away on its own. She denies chest pain and currently denies SOB. She also denies palpitations, orthopnea, PND or syncope.    PROBLEM LIST:    PAST MEDICAL HISTORY:  Pulmonary mass    PPD positive    Small bowel mass    Acute medial meniscus tear of right knee, initial encounter    Acute lateral meniscal injury of right knee, initial encounter    Patient is Anabaptism    Fatty liver    Osteoarthritis    Depression    Hyperlipidemia    At risk for sleep apnea    Malignant neoplasm of unspecified part of unspecified bronchus or lung    H/O pneumothorax    Osteoporosis    Malignant neoplasm of lung, unspecified laterality, unspecified part of lung    Diverticulitis    Hypothyroidism    Anxiety      PAST SURGICAL HISTORY:  S/P appendectomy    S/P  section    History of breast implant    H/O cosmetic plastic surgery    History of colon surgery    History of appendectomy      MEDICATIONS  (STANDING):    MEDICATIONS  (PRN):    ALLERGIES:  No Known Allergies    SOCIAL HISTORY:  Tobacco: denies  Alcohol: denies  Drugs: denies    FAMILY HISTORY:  No pertinent family history in first degree relatives    REVIEW OF SYSTEMS:  Constitutional: + exertional fatigue, no fevers/chills, no weight change  HEENT: no visual disturbances, no hearing loss  Cardiac: no chest pain, no palpitations, no orthopnea, no PND, + leg swelling  Respiratory: + shortness of breath, no cough  GI: no abdominal pain, no blood in the stool, no N/V, no diarrhea  Urological: no dysuria, no hematuria  Hematological: no easy bruising or bleeding  Musculoskeletal: no joint pain, no back pain  Neurological: no headaches, no syncope  Psychiatric: no anxiety, no depression  Skin: no rashes    PHYSICAL EXAM:    Weight (kg): 83 ( @ 14:39)  T(C): 36.5 (24 @ 14:39), Max: 36.5 (24 @ 14:39)  T(F): 97.7 (24 @ 14:39), Max: 97.7 (24 @ 14:39)  HR: 64 (24 @ 16:24) (64 - 83)  BP: 105/69 (24 @ 14:39) (105/69 - 105/69)  RR: 18 (24 @ 16:24) (18 - 18)  SpO2: 100% (24 @ 16:24) (95% - 100%)  Wt(kg): --  Telemetry: Sinus  General: comfortable, in NAD  HEENT: EOMI, normocephalic, atraumatic  Neck: no JVD, no carotid bruits  Heart: +S1S2, RRR, 1/6 systolic murmur at the base and LLSB, no rubs, no gallops  Lungs: clear to auscultation bilaterally, no wheezes, no rales, no rhonchi  Abdomen: soft, non-tender, non-distended, + bowel sounds  Extremities: no clubbing, no cyanosis, 2+ LLE and 1-2+ RLE edema  Vascular: 2+ dorsalis pedis pulses bilaterally  Neuro: A&O x3    EKG: Not performed    LABS:          CBC:            12.1   4.02  >-----------< 224     @ 16:20            37.2         CHEMISTRY:   139   |  102    |  12.7   ----------------------------< 106      @ 16:20    4.1   |  26.0   |  0.59     eGFR non-  --     eGFR African American  --       Magnesium: 2.3 mg/dL ( @ 16:20)    TPro --    / Alb 4.1   / TBili 0.2   / DBili --    / AST 31    / ALT 30    / AlkPhos 75      @ 16:20    COAGS:    ProBNP 133  TropT negative x1    RADIOLOGY & ADDITIONAL TESTS:  Echo 24:   1. Left ventricular systolic function is normal with an ejection fraction of 61 % by Ott's method of disks with an ejection fraction visually estimated at 60 to 65 %.There are no regional wall motion abnormalities seen.   2. Normal left ventricular diastolic function.   3. Left ventricular global longitudinal strain is -25.6 % which is normal (< -18%). Images were acquired on a [employer] ultrasound system and processed using [employer] strain analysis software with a heart rate of 58 bpm and a blood pressure of 108/69 mmHg.   4. Mildly enlarged right ventricular cavity size and normal systolic function.   5. Estimated pulmonary artery systolic pressure is 34 mmHg.   6. Right ventricular free wall strain is --25 %. Right ventricular four chamber strain is --16.6 %.   7. No pericardial effusion seen.   8. No prior echocardiogram is available for comparison.    CXR:  Persistent infiltrate lateral to the left upper hilum   extending to the lateral chest wall. It is somewhat denser than prior and   etiology is uncertain. Consider further assessment.    ASSESSMENT:  SHANON BEE is a 69y old female with a history of hyperlipidemia, DARWIN on CPAP and non-small cell lung cancer on treatment with lorlatinib and had the dose increased a few weeks ago who presents with increasing leg swelling and SOB.     Please permit me to suggest the followin. Cardiac enzymes were negative x1. This is not ACS. She had a negative Lexiscan nuclear stress test in 2023. ProBNP is normal so this is not CHF and this also goes against right heart strain.  2. Awaiting Echo that was apparently already performed.  3. I ordered a venous duplex to rule out DVT. If this is positive, consider a CTA to rule out PE as well however if it is negative, I suspect that the leg swelling is secondary to the lorlatinib, which is known to cause leg swelling. I offered to give a dose of IV alsix however her oncologist would prefer that she not receive diuretics and see if the edema goes away on its own off treatment. She is otherwise asymptomatic at this time so this is reasonable.  4. Provided that her Echo doesn't show any significant changes compared with the one from 2025 and the venous duplex, she can be discharged home from my perspective, with or without a prescription for torsemide 10 mg daily.  5. If the venous duplex shows a DVT, I would start Eliquis 10 mg 2x daily.

## 2024-05-08 NOTE — ED PROVIDER NOTE - NSFOLLOWUPINSTRUCTIONS_ED_ALL_ED_FT
Edema  Edema  A person's legs and feet. One leg is normal and the other leg is affected by edema.  El edema es angelita acumulación anormal de líquido en los tejidos del cuerpo y debajo de la piel. La hinchazón de las piernas, los pies y los tobillos es un síntoma frecuente que se hace más probable a medida que envejece. La hinchazón también es frecuente en los tejidos más sueltos, edilberto en la augustus que rodea los ojos. La presión sobre la augustus puede dejar angelita fabian temporal en la piel (edema con fóvea). [place] líquido también puede acumularse en los pulmones (edema pulmonar).    Hay muchas causas posibles de edema. Consumir grandes cantidades de sal (sodio) y estar de pie o sentado lion mucho tiempo puede causar edema en las piernas, los pies y los tobillos. Entre las causas frecuentes de edema se incluyen las siguientes:  Ciertas enfermedades, edilberto insuficiencia cardíaca, enfermedad renal o hepática, o cáncer.  Debilidad de los vasos sanguíneos de las piernas.  Angelita lesión.  Embarazo.  Medicamentos.  Obesidad.  Bajos niveles de proteínas en la lanette.  Cuando hace calor, el edema puede empeorar. Generalmente, el edema es indoloro. La piel puede parecer hinchada o tener un aspecto brilloso.    Siga estas instrucciones en brito casa:  Medicamentos    Use los medicamentos de venta jihan y los recetados solamente edilberto se lo haya indicado el médico.  El médico puede recetarle un medicamento para ayudar a que el cuerpo elimine el exceso de agua (diurético). [place] donovan medicamento si se lo indican.  Comida y bebida    Siga angelita dieta con poco contenido de sal (sodio) para reducir el líquido según las indicaciones del médico. A veces, disminuir el consumo de sal puede reducir la hinchazón.  Según la causa de brito hinchazón, es posible que deba limitar la cantidad de líquido que pierce (restricción de líquido).  Instrucciones generales    Cuando esté sentado o acostado, levante (eleve) la augustus lesionada por encima del nivel del corazón.  No permanezca quieto sentado o de pie lion largos períodos.  No use ropa ajustada. No use ligas en la parte superior de las piernas.  Ejercite las piernas para activar la circulación. [place] ayuda a que el líquido pase nuevamente a abilio vasos sanguíneos, y puede ayudar a reducir la hinchazón.  Use medias de compresión edilberto se lo haya indicado brito médico. Estas medias ayudan a evitar la formación de coágulos de lanette y a reducir la hinchazón de las piernas. Es importante que neville del tamaño correcto. Estas medias deben ser prescritas por brito médico para evitar posibles lesiones.  Si le recomiendan vendajes, úselos edilberto se lo haya indicado el médico.  Comuníquese con un médico si:  El edema no mejora con el tratamiento.  Tiene enfermedades cardíacas, hepáticas o renales, y observa síntomas de edema.  Aumenta de peso de manera repentina y sin motivo aparente.  Solicite ayuda de inmediato si:  Siente falta de aire o dolor en el pecho.  No puede respirar cuando se acuesta.  Tiene dolor, enrojecimiento o calor en las zonas hinchadas.  Tiene enfermedades cardíacas, hepáticas o renales y repentinamente tiene edema.  Tiene fiebre y los síntomas empeoran repentinamente.  Estos síntomas pueden indicar angelita emergencia. Solicite ayuda de inmediato. Llame al 911.  No espere a celio si los síntomas desaparecen.  No conduzca por abilio propios medios hasta el hospital.  Resumen  El edema es angelita acumulación anormal de líquido en los tejidos del cuerpo y debajo de la piel.  Consumir grandes cantidades de sal (sodio)y estar de pie o sentado lion mucho tiempo puede causar edema en las piernas, los pies y los tobillos.  Cuando esté sentado o acostado, levante (eleve) la augustus lesionada por encima del nivel del corazón.  Siga las instrucciones del médico con respecto a la dieta y a la cantidad de líquido que puede beber.  Esta información no tiene edilberto fin reemplazar el consejo del médico. Asegúrese de hacerle al médico cualquier pregunta que tenga.

## 2024-05-08 NOTE — ED ADULT NURSE NOTE - NSICDXPASTMEDICALHX_GEN_ALL_CORE_FT
PAST MEDICAL HISTORY:  Acute lateral meniscal injury of right knee, initial encounter     Acute medial meniscus tear of right knee, initial encounter     Anxiety     At risk for sleep apnea Bang score 3    Depression     Diverticulitis     Fatty liver     H/O pneumothorax following lung biopsy right lung    Hyperlipidemia     Hypothyroidism     Malignant neoplasm of lung, unspecified laterality, unspecified part of lung     Malignant neoplasm of unspecified part of unspecified bronchus or lung     Osteoarthritis right    Osteoporosis     Patient is Dru[place]     PPD positive 2000 -  treated with INH    Pulmonary mass monitoring -no surgical intervention    Small bowel mass incidental finding while appendectomy ( Benign s/p colon mass resection - 3/2018)

## 2024-05-08 NOTE — ED PROVIDER NOTE - OBJECTIVE STATEMENT
69-year-old female history of non-small cell lung cancer presents with about a week of swelling in her extremities and fluids, and occasional episodes of SOB throughout the day which are worse when she lies down.  Patient and son at bedside who is translating for patient states that she recently had her Lorlatinib dosage increased.  Because of development of swelling, symptoms were attributed to the dosage increase and that the medication was stopped was stopped 2 days ago.  They present today for persistent swelling.  Denies CP, fevers, chills, rash

## 2024-05-08 NOTE — ED ADULT NURSE NOTE - OBJECTIVE STATEMENT
patient complains of bilateral swelling of hands, legs and face after stop taking cancer meds as claimed.

## 2024-05-08 NOTE — ED ADULT TRIAGE NOTE - HEART RATE (BEATS/MIN)
Patient called and is requesting some estrogen cream for her vaginal area (for the bumps in that area). Patient has already been seen for this issue but reccommeded treatment didn't work. The hydrocortisone cream only calmed it down didn't get rid of it. She wants something to get rid of it.     PHARMACY:  [facility]  [address], [place]  [facility] [address] [phone]   83

## 2024-05-08 NOTE — ED PROVIDER NOTE - PROGRESS NOTE DETAILS
Fabian: Pt received in signout from Dr. Baldwin. Pt in no distress. Results reviewed and discussed with pt/family. Cardiology recs noted. No acute findings on workup. Medically stable for discharge with outpatient f/u.

## 2024-05-28 ENCOUNTER — APPOINTMENT (OUTPATIENT)
Dept: PULMONOLOGY | Facility: CLINIC | Age: 69
End: 2024-05-28
Payer: MEDICARE

## 2024-05-28 VITALS — RESPIRATION RATE: 16 BRPM | HEART RATE: 110 BPM | OXYGEN SATURATION: 98 %

## 2024-05-28 VITALS — DIASTOLIC BLOOD PRESSURE: 70 MMHG | SYSTOLIC BLOOD PRESSURE: 104 MMHG

## 2024-05-28 VITALS — WEIGHT: 179 LBS | BODY MASS INDEX: 30.56 KG/M2 | HEIGHT: 64 IN

## 2024-05-28 DIAGNOSIS — R05.9 COUGH, UNSPECIFIED: ICD-10-CM

## 2024-05-28 DIAGNOSIS — C80.1 MALIGNANT (PRIMARY) NEOPLASM, UNSPECIFIED: ICD-10-CM

## 2024-05-28 DIAGNOSIS — G47.33 OBSTRUCTIVE SLEEP APNEA (ADULT) (PEDIATRIC): ICD-10-CM

## 2024-05-28 PROCEDURE — 99214 OFFICE O/P EST MOD 30 MIN: CPT | Mod: 25

## 2024-05-28 PROCEDURE — 94010 BREATHING CAPACITY TEST: CPT

## 2024-05-28 RX ORDER — GABAPENTIN 300 MG/1
300 CAPSULE ORAL
Qty: 60 | Refills: 1 | Status: DISCONTINUED | COMMUNITY
Start: 2024-01-08 | End: 2024-05-28

## 2024-05-28 RX ORDER — LORLATINIB 25 MG/1
25 TABLET, FILM COATED ORAL
Refills: 0 | Status: ACTIVE | COMMUNITY

## 2024-05-28 NOTE — HISTORY OF PRESENT ILLNESS
[Obstructive Sleep Apnea] : obstructive sleep apnea [Lab] : lab [APAP:] : APAP [Full Face mask] : full face mask [TextBox_4] : 69-year-old female with a history of stage IIIa adenocarcinoma left upper lobe status postchemotherapy and radiation in 2021 with a course complicated by radiation pneumonitis.  She is seen today in follow-up for the above is well as her sleep apnea.  She has subsequently changed her oncology follow-up to [facility].  Patient was started on alectinib on 1/27/2024 which further precipitated pneumonitis.  She was later changed to Lorlatinib on 4/3/2024.  Patient has no significant complaints of dyspnea except for bending over and lifting packages.  She has not been maintained on O2.  Present medications include the above is well as albuterol alprazolam fluoxetine and rosuvastatin [Awakes Unrefreshed] : does not awaken unrefreshed [Awakes with Dry Mouth] : does not awaken with dry mouth [Snoring] : no snoring [Witnessed Apneas] : no witnessed apneas [TextBox_77] : 0033 [TextBox_79] : 8669 [TextBox_81] : 30 [TextBox_89] : 3-4 [TextBox_100] : 12/14/2022 [TextBox_108] : 25 [TextBox_116] : 67 [TextBox_120] : REM index 60 [TextBox_125] : 4-16 [TextBox_127] : 4/21/2024 [TextBox_129] : 5/20/2024 [TextBox_133] : 100 [TextBox_137] : 100 [TextBox_141] : 7 [TextBox_143] : 41 [TextBox_147] : 1.6 [TextBox_158] : Domingo [TextBox_162] : 2/8/2023 [TextBox_165] : P 95%: 10.7 cm H2O [ESS] : 8

## 2024-05-28 NOTE — REASON FOR VISIT
[Follow-Up] : a follow-up visit [Lung Cancer] : lung cancer [Cough] : cough [Sleep Apnea] : sleep apnea [Spouse] : spouse [Other: _____] : [unfilled]

## 2024-05-28 NOTE — END OF VISIT
[Time Spent: ___ minutes] : I have spent [unfilled] minutes of time on the encounter. [FreeTextEntry3] :  chart review, PACS review, case discussed with patient and daughter

## 2024-05-28 NOTE — RESULTS/DATA
[TextEntry] :  EXAM: [id number] - MR BRAIN WAW IC - ORDERED BY: JARRETTCRISS PATELTO PROCEDURE DATE: 12/27/2023 INTERPRETATION: . CLINICAL INFORMATION: Pulmonary adenocarcinoma. Staging. TECHNIQUE: Multiplanar multisequential MRI of the brain was acquired with and without the administration of IV gadolinium. 7.5 cc's of IV Gadavist was administered for the purposes of this examination. 0 cc were discarded.  COMPARISON: Prior contrast enhanced brain MRI study from 1123.  FINDINGS: Multiple small rounded nonspecific foci of T2/FLAIR hyperintensity are noted throughout the deep and periventricular white matter of the cerebral hemispheres. There is no associated mass effect. There is no evidence of acute ischemia on the diffusion-weighted images.  No abnormal brain parenchymal or leptomeningeal enhancement is seen.  There is diffuse cerebral volume loss with prominence of the sulci, fissures, and cisternal spaces which is normal for the patient's age. Ventricular size and configuration is unremarkable. Flow-voids are noted throughout the major intracranial vessels, on the T2 weighted images, consistent with their patency. The sellar region and posterior fossa appear unremarkable.  The paranasal sinuses and tympanomastoid cavities are clear. Calvarial signal is within normal limits. There is evidence of bilateral cataract removal. Platybasia and retroflexion of the dens appears unchanged.  IMPRESSION: No acute intracranial hemorrhage, acute ischemia, or abnormal intracranial enhancement.  Multiple similar appearing nonspecific abnormal white matter foci of T2/FLAIR prolongation statistically favoring microvascular type changes.  No interval change when compared with 8/11/2023.  --- End of Report ---       FLORENCIA CUNNINGHAM MD; Attending Radiologist This document has been electronically signed. Dec 28 2023 10:41PM

## 2024-05-28 NOTE — DISCUSSION/SUMMARY
[FreeTextEntry1] : 69-year-old female seen today for the above.  Patient is recurrent adenocarcinoma appears to be well-controlled on her current drug regiment and now managed by [facility].  No further complaints of cough and the patient is currently off of gabapentin.  She responding well to her present level of CPAP with excellent compliance and benefiting from therapy

## 2024-05-28 NOTE — PROCEDURE
[FreeTextEntry1] : 5/16/2023: Pulmonary function test-mild degree of restriction seen in functional residual capacity with a normal vital capacity and TLC.  Diffusion capacity when corrected for alveolar volume is normal.  Spirometry is normal.  5/28/2024: Spirometry demonstrates a 20% reduction in forced vital capacity and FEV1 compared to Previous values.  Recent CAT scans are unavailable for my review

## 2024-05-28 NOTE — CONSULT LETTER
[Dear  ___] : Dear  [unfilled], [Please see my note below.] : Please see my note below. [Consult Closing:] : Thank you very much for allowing me to participate in the care of this patient.  If you have any questions, please do not hesitate to contact me. [Sincerely,] : Sincerely, [FreeTextEntry3] : Tho Zayas MD FCCP Pulmonary/Critical Care/Sleep Medicine Department of Internal Medicine  [facility]

## 2024-05-29 ENCOUNTER — EMERGENCY (EMERGENCY)
Facility: HOSPITAL | Age: 69
LOS: 1 days | Discharge: HOME | End: 2024-05-29
Attending: STUDENT IN AN ORGANIZED HEALTH CARE EDUCATION/TRAINING PROGRAM
Payer: MEDICARE

## 2024-05-29 VITALS
HEIGHT: 66 IN | SYSTOLIC BLOOD PRESSURE: 134 MMHG | RESPIRATION RATE: 20 BRPM | HEART RATE: 104 BPM | DIASTOLIC BLOOD PRESSURE: 78 MMHG | OXYGEN SATURATION: 96 % | WEIGHT: 187.61 LBS | TEMPERATURE: 99 F

## 2024-05-29 VITALS
SYSTOLIC BLOOD PRESSURE: 124 MMHG | RESPIRATION RATE: 18 BRPM | TEMPERATURE: 100 F | HEART RATE: 104 BPM | DIASTOLIC BLOOD PRESSURE: 73 MMHG | OXYGEN SATURATION: 99 %

## 2024-05-29 DIAGNOSIS — Z90.49 ACQUIRED ABSENCE OF OTHER SPECIFIED PARTS OF DIGESTIVE TRACT: Chronic | ICD-10-CM

## 2024-05-29 DIAGNOSIS — Z98.890 OTHER SPECIFIED POSTPROCEDURAL STATES: Chronic | ICD-10-CM

## 2024-05-29 DIAGNOSIS — Z98.82 BREAST IMPLANT STATUS: Chronic | ICD-10-CM

## 2024-05-29 DIAGNOSIS — Z98.891 HISTORY OF UTERINE SCAR FROM PREVIOUS SURGERY: Chronic | ICD-10-CM

## 2024-05-29 LAB
ALBUMIN SERPL ELPH-MCNC: 3.7 G/DL — SIGNIFICANT CHANGE UP (ref 3.3–5.2)
ALP SERPL-CCNC: 77 U/L — SIGNIFICANT CHANGE UP (ref 40–120)
ALT FLD-CCNC: 15 U/L — SIGNIFICANT CHANGE UP
ANION GAP SERPL CALC-SCNC: 9 MMOL/L — SIGNIFICANT CHANGE UP (ref 5–17)
APTT BLD: 36.8 SEC — HIGH (ref 24.5–35.6)
AST SERPL-CCNC: 18 U/L — SIGNIFICANT CHANGE UP
B PERT DNA SPEC QL NAA+PROBE: SIGNIFICANT CHANGE UP
BASE EXCESS BLDV CALC-SCNC: 3.8 MMOL/L — HIGH (ref -2–3)
BASOPHILS # BLD AUTO: 0.02 K/UL — SIGNIFICANT CHANGE UP (ref 0–0.2)
BASOPHILS NFR BLD AUTO: 0.3 % — SIGNIFICANT CHANGE UP (ref 0–2)
BILIRUB SERPL-MCNC: 0.3 MG/DL — LOW (ref 0.4–2)
BUN SERPL-MCNC: 14.3 MG/DL — SIGNIFICANT CHANGE UP (ref 8–20)
C PNEUM DNA SPEC QL NAA+PROBE: SIGNIFICANT CHANGE UP
CA-I SERPL-SCNC: 1.12 MMOL/L — LOW (ref 1.15–1.33)
CALCIUM SERPL-MCNC: 8.8 MG/DL — SIGNIFICANT CHANGE UP (ref 8.4–10.5)
CHLORIDE BLDV-SCNC: 103 MMOL/L — SIGNIFICANT CHANGE UP (ref 96–108)
CHLORIDE SERPL-SCNC: 99 MMOL/L — SIGNIFICANT CHANGE UP (ref 96–108)
CO2 SERPL-SCNC: 27 MMOL/L — SIGNIFICANT CHANGE UP (ref 22–29)
CREAT SERPL-MCNC: 0.58 MG/DL — SIGNIFICANT CHANGE UP (ref 0.5–1.3)
EGFR: 98 ML/MIN/1.73M2 — SIGNIFICANT CHANGE UP
EOSINOPHIL # BLD AUTO: 0.08 K/UL — SIGNIFICANT CHANGE UP (ref 0–0.5)
EOSINOPHIL NFR BLD AUTO: 1.2 % — SIGNIFICANT CHANGE UP (ref 0–6)
FLUAV H1 2009 PAND RNA SPEC QL NAA+PROBE: SIGNIFICANT CHANGE UP
FLUAV H1 RNA SPEC QL NAA+PROBE: SIGNIFICANT CHANGE UP
FLUAV H3 RNA SPEC QL NAA+PROBE: SIGNIFICANT CHANGE UP
FLUAV SUBTYP SPEC NAA+PROBE: SIGNIFICANT CHANGE UP
FLUBV RNA SPEC QL NAA+PROBE: SIGNIFICANT CHANGE UP
GAS PNL BLDV: 135 MMOL/L — LOW (ref 136–145)
GAS PNL BLDV: SIGNIFICANT CHANGE UP
GLUCOSE BLDV-MCNC: 121 MG/DL — HIGH (ref 70–99)
GLUCOSE SERPL-MCNC: 128 MG/DL — HIGH (ref 70–99)
HADV DNA SPEC QL NAA+PROBE: SIGNIFICANT CHANGE UP
HCO3 BLDV-SCNC: 28 MMOL/L — SIGNIFICANT CHANGE UP (ref 22–29)
HCOV PNL SPEC NAA+PROBE: SIGNIFICANT CHANGE UP
HCT VFR BLD CALC: 30.9 % — LOW (ref 34.5–45)
HCT VFR BLDA CALC: 33 % — SIGNIFICANT CHANGE UP
HGB BLD CALC-MCNC: 10.9 G/DL — LOW (ref 11.7–16.1)
HGB BLD-MCNC: 10.3 G/DL — LOW (ref 11.5–15.5)
HMPV RNA SPEC QL NAA+PROBE: SIGNIFICANT CHANGE UP
HPIV1 RNA SPEC QL NAA+PROBE: SIGNIFICANT CHANGE UP
HPIV2 RNA SPEC QL NAA+PROBE: SIGNIFICANT CHANGE UP
HPIV3 RNA SPEC QL NAA+PROBE: SIGNIFICANT CHANGE UP
HPIV4 RNA SPEC QL NAA+PROBE: SIGNIFICANT CHANGE UP
IMM GRANULOCYTES NFR BLD AUTO: 0.6 % — SIGNIFICANT CHANGE UP (ref 0–0.9)
INR BLD: 1.05 RATIO — SIGNIFICANT CHANGE UP (ref 0.85–1.18)
LACTATE BLDV-MCNC: 1.2 MMOL/L — SIGNIFICANT CHANGE UP (ref 0.5–2)
LACTATE SERPL-SCNC: 1.1 MMOL/L — SIGNIFICANT CHANGE UP (ref 0.5–2)
LYMPHOCYTES # BLD AUTO: 0.93 K/UL — LOW (ref 1–3.3)
LYMPHOCYTES # BLD AUTO: 14 % — SIGNIFICANT CHANGE UP (ref 13–44)
MCHC RBC-ENTMCNC: 29.9 PG — SIGNIFICANT CHANGE UP (ref 27–34)
MCHC RBC-ENTMCNC: 33.3 GM/DL — SIGNIFICANT CHANGE UP (ref 32–36)
MCV RBC AUTO: 89.8 FL — SIGNIFICANT CHANGE UP (ref 80–100)
MONOCYTES # BLD AUTO: 0.99 K/UL — HIGH (ref 0–0.9)
MONOCYTES NFR BLD AUTO: 14.9 % — HIGH (ref 2–14)
NEUTROPHILS # BLD AUTO: 4.58 K/UL — SIGNIFICANT CHANGE UP (ref 1.8–7.4)
NEUTROPHILS NFR BLD AUTO: 69 % — SIGNIFICANT CHANGE UP (ref 43–77)
PCO2 BLDV: 40 MMHG — SIGNIFICANT CHANGE UP (ref 39–42)
PH BLDV: 7.45 — HIGH (ref 7.32–7.43)
PLATELET # BLD AUTO: 209 K/UL — SIGNIFICANT CHANGE UP (ref 150–400)
PO2 BLDV: 138 MMHG — HIGH (ref 25–45)
POTASSIUM BLDV-SCNC: 4 MMOL/L — SIGNIFICANT CHANGE UP (ref 3.5–5.1)
POTASSIUM SERPL-MCNC: 4 MMOL/L — SIGNIFICANT CHANGE UP (ref 3.5–5.3)
POTASSIUM SERPL-SCNC: 4 MMOL/L — SIGNIFICANT CHANGE UP (ref 3.5–5.3)
PROT SERPL-MCNC: 6.5 G/DL — LOW (ref 6.6–8.7)
PROTHROM AB SERPL-ACNC: 11.6 SEC — SIGNIFICANT CHANGE UP (ref 9.5–13)
RAPID RVP RESULT: DETECTED
RBC # BLD: 3.44 M/UL — LOW (ref 3.8–5.2)
RBC # FLD: 14.6 % — HIGH (ref 10.3–14.5)
RV+EV RNA SPEC QL NAA+PROBE: SIGNIFICANT CHANGE UP
SAO2 % BLDV: 98.4 % — SIGNIFICANT CHANGE UP
SARS-COV-2 RNA SPEC QL NAA+PROBE: DETECTED
SODIUM SERPL-SCNC: 135 MMOL/L — SIGNIFICANT CHANGE UP (ref 135–145)
WBC # BLD: 6.64 K/UL — SIGNIFICANT CHANGE UP (ref 3.8–10.5)
WBC # FLD AUTO: 6.64 K/UL — SIGNIFICANT CHANGE UP (ref 3.8–10.5)

## 2024-05-29 PROCEDURE — 93005 ELECTROCARDIOGRAM TRACING: CPT

## 2024-05-29 PROCEDURE — 99285 EMERGENCY DEPT VISIT HI MDM: CPT | Mod: 25

## 2024-05-29 PROCEDURE — 0225U NFCT DS DNA&RNA 21 SARSCOV2: CPT

## 2024-05-29 PROCEDURE — 82330 ASSAY OF CALCIUM: CPT

## 2024-05-29 PROCEDURE — 96374 THER/PROPH/DIAG INJ IV PUSH: CPT

## 2024-05-29 PROCEDURE — 84295 ASSAY OF SERUM SODIUM: CPT

## 2024-05-29 PROCEDURE — 82435 ASSAY OF BLOOD CHLORIDE: CPT

## 2024-05-29 PROCEDURE — 85610 PROTHROMBIN TIME: CPT

## 2024-05-29 PROCEDURE — 85014 HEMATOCRIT: CPT

## 2024-05-29 PROCEDURE — 80053 COMPREHEN METABOLIC PANEL: CPT

## 2024-05-29 PROCEDURE — 82803 BLOOD GASES ANY COMBINATION: CPT

## 2024-05-29 PROCEDURE — 36410 VNPNXR 3YR/> PHY/QHP DX/THER: CPT

## 2024-05-29 PROCEDURE — 36415 COLL VENOUS BLD VENIPUNCTURE: CPT

## 2024-05-29 PROCEDURE — 83605 ASSAY OF LACTIC ACID: CPT

## 2024-05-29 PROCEDURE — 85025 COMPLETE CBC W/AUTO DIFF WBC: CPT

## 2024-05-29 PROCEDURE — 82947 ASSAY GLUCOSE BLOOD QUANT: CPT

## 2024-05-29 PROCEDURE — 85730 THROMBOPLASTIN TIME PARTIAL: CPT

## 2024-05-29 PROCEDURE — 93010 ELECTROCARDIOGRAM REPORT: CPT

## 2024-05-29 PROCEDURE — 84132 ASSAY OF SERUM POTASSIUM: CPT

## 2024-05-29 PROCEDURE — 85018 HEMOGLOBIN: CPT

## 2024-05-29 PROCEDURE — 87040 BLOOD CULTURE FOR BACTERIA: CPT

## 2024-05-29 PROCEDURE — 99284 EMERGENCY DEPT VISIT MOD MDM: CPT | Mod: 25

## 2024-05-29 RX ORDER — AZITHROMYCIN 500 MG/1
1 TABLET, FILM COATED ORAL
Qty: 1 | Refills: 0
Start: 2024-05-29 | End: 2024-05-29

## 2024-05-29 RX ORDER — CEFTRIAXONE 500 MG/1
1000 INJECTION, POWDER, FOR SOLUTION INTRAMUSCULAR; INTRAVENOUS ONCE
Refills: 0 | Status: COMPLETED | OUTPATIENT
Start: 2024-05-29 | End: 2024-05-29

## 2024-05-29 RX ORDER — AZITHROMYCIN 500 MG/1
500 TABLET, FILM COATED ORAL ONCE
Refills: 0 | Status: COMPLETED | OUTPATIENT
Start: 2024-05-29 | End: 2024-05-29

## 2024-05-29 RX ORDER — SODIUM CHLORIDE 9 MG/ML
2600 INJECTION INTRAMUSCULAR; INTRAVENOUS; SUBCUTANEOUS ONCE
Refills: 0 | Status: COMPLETED | OUTPATIENT
Start: 2024-05-29 | End: 2024-05-29

## 2024-05-29 RX ORDER — CEFTRIAXONE 500 MG/1
1000 INJECTION, POWDER, FOR SOLUTION INTRAMUSCULAR; INTRAVENOUS ONCE
Refills: 0 | Status: DISCONTINUED | OUTPATIENT
Start: 2024-05-29 | End: 2024-05-29

## 2024-05-29 RX ADMIN — SODIUM CHLORIDE 2600 MILLILITER(S): 9 INJECTION INTRAMUSCULAR; INTRAVENOUS; SUBCUTANEOUS at 06:11

## 2024-05-29 RX ADMIN — CEFTRIAXONE 1000 MILLIGRAM(S): 500 INJECTION, POWDER, FOR SOLUTION INTRAMUSCULAR; INTRAVENOUS at 06:10

## 2024-05-29 NOTE — ED ADULT NURSE NOTE - OBJECTIVE STATEMENT
Pt is alert and oriented to person, place, time and situation. pt reports to ED with complaints of fever and cough, PT reported to have CT done to establish progress of lung cancer. PT primary called to report that ct showed suspicious for pneumonia. PT denies chest pain though states she occasionally gets short of breath.

## 2024-05-29 NOTE — ED PROVIDER NOTE - ENMT, MLM
Airway patent, Nasal mucosa clear. Mouth with normal mucosa. Throat has no oropharyngeal exudates and uvula is midline.

## 2024-05-29 NOTE — ED PROVIDER NOTE - NSFOLLOWUPINSTRUCTIONS_ED_ALL_ED_FT
Community-Acquired Pneumonia, Adult  La neumonía es angelita infección pulmonar que causa inflamación y la acumulación de mucosidad y líquido en los pulmones. [place] puede causar tos y dificultad para respirar. La neumonía extrahospitalaria es aquella que se desarrolla en personas que no están, ni knott estado recientemente, en un hospital u otro centro de atención médica.    Por lo general, la neumonía se desarrolla edilberto resultado de angelita enfermedad causada por un virus, edilberto el resfrío común y la gripe (influenza). También puede ser causada por bacterias u hongos. Mientras que el resfrío y la gripe pueden transmitirse de angelita persona a otra (son contagiosos), la neumonía en sí no se considera contagiosa.    ¿Cuáles son las causas?  The human body, showing how a virus travels from the air to a person's lungs.   Esta afección puede ser causada por lo siguiente:  Virus.  Bacterias.  Hongos.  ¿Qué incrementa el riesgo?  Los siguientes factores pueden hacer que sea más propenso a desarrollar esta afección:  Tener más de 65 años o tener ciertas afecciones, por ejemplo:  Angelita enfermedad prolongada (crónica), edilberto enfermedad pulmonar obstructiva crónica (EPOC), asma, insuficiencia cardíaca, diabetes o enfermedad renal.  Angelita afección que incrementa el riesgo de respirar (aspirar) mucosidad y otros líquidos por la boca o la nariz.  Sistema de defensa del organismo (sistema inmunitario) debilitado.  Que le hayan extirpado el bazo (esplenectomía). El bazo es el órgano que ayuda a combatir gérmenes e infecciones.  No limpiarse paulo los dientes y las encías (higiene dental deficiente).  Usar productos que contienen tabaco.  Viajar a lugares donde hay gérmenes que causan neumonía o estar cerca de ciertos animales o hábitats animales que podrían tener gérmenes que causan neumonía.  ¿Cuáles son los signos o síntomas?  Los síntomas de esta afección incluyen:  Tos seca o húmeda (productiva).  Fiebre, sudoración o escalofríos.  Dolor en el pecho; en especial, al respirar profundamente o toser.  Respiración rápida, dificultad para respirar o falta de aire.  Cansancio (fatiga) y mika musculares.  ¿Cómo se diagnostica?  An outline of a person's body and an image of an X-ray of the person's chest.   Esta afección se puede diagnosticar mediante abilio antecedentes médicos y un examen físico. También pueden hacerle exámenes, que incluyen los siguientes:  Estudios de diagnóstico por imágenes, edilberto angelita radiografía torácica o angelita ecografía pulmonar.  Pruebas de lo siguiente:  El nivel de oxígeno y otros gases en la lanette.  La mucosidad de los pulmones (esputo).  El líquido alrededor de los pulmones (líquido pleural).  La orina.  ¿Cómo se trata?  El tratamiento de esta afección depende de muchos factores; por ejemplo, la causa de la neumonía, los medicamentos que fredy y otras afecciones que tenga.    En la mayoría de los adultos, la neumonía puede tratarse en casa. En algunos casos, el tratamiento debe realizarse en un hospital y puede incluir lo siguiente:  Medicamentos que se administran por boca (por vía oral) o a través de angelita vía intravenosa (IV), edilberto los siguientes:  Antibióticos, si la neumonía fue causada por angelita bacteria.  Medicamentos que kevin a los virus (medicamentos antivirales), si un virus causó la neumonía.  Oxigenoterapia.  La neumonía grave, aunque es poco frecuente, puede requerir los siguientes tratamientos:  Ventilación mecánica. En mar procedimiento, se utiliza angelita máquina para ayudarlo a respirar si no puede respirar paulo por abilio propios medios o mantener un nivel seguro de oxígeno en la lanette.  Toracocentesis. Mar procedimiento elimina la acumulación de líquido pleural para ayudar a la respiración.  Siga estas instrucciones en brito casa:  A comparison of three sample cups showing dark yellow, yellow, and pale yellow urine.  Medicamentos    Use los medicamentos de venta jihan y los recetados solamente edilberto se lo haya indicado el médico.  Use medicamentos para la tos solamente si tiene dificultad para dormir. Los medicamentos para la tos pueden impedir que el cuerpo elimine la mucosidad de los pulmones.  Si le recetaron antibióticos, tómelos edilberto se lo haya indicado el médico. No deje de mayuri el antibiótico aunque comience a sentirse mejor.  Estilo de truong    A sign showing that a person should not drink alcohol.  A sign showing that a person should not smoke.  No karolina alcohol.  No consuma ningún producto que contenga nicotina o tabaco. Estos productos incluyen cigarrillos, tabaco para mascar y aparatos de vapeo, edilberto los cigarrillos electrónicos. Si necesita ayuda para dejar de fumar, consulte al médico.  Siga angelita dieta saludable. Esta debe incluir muchas verduras, frutas, cereales integrales, productos lácteos bajos en grasa y proteínas magras.  Instrucciones generales    Descanse mucho y duerma edilberto mínimo 8 horas todas las noches.  De noche, duerma en posición parcialmente erguida. Coloque algunas almohadas debajo de la andrew o duerma en angelita silla reclinable.  Retome abilio actividades normales edilberto se lo haya indicado el médico. Pregúntele al médico qué actividades son seguras para usted.  Karolina suficiente líquido edilberto para mantener la orina de color amarillo pálido. [place] ayuda a diluir la mucosidad de los pulmones.  Si tiene dolor de garganta, rand gárgaras con angelita mezcla de agua y sal 3 o 4 veces al día, o cuando sea necesario. Para preparar agua con sal, disuelva totalmente de ½ a 1 cucharadita (de 3 a 6 g) de sal en 1 taza (237 ml) de agua tibia.  Concurra a todas las visitas de seguimiento.  ¿Cómo se previene?  Puede disminuir el riesgo de contraer neumonía extrahospitalaria con las siguientes medidas:  Vacunarse contra la neumonía. Hay diferentes tipos y esquemas de vacunas contra la neumonía. Pregúntele al médico cuál es la opción más adecuada para usted. Considere la posibilidad de aplicarse la vacuna contra la neumonía si usted:  Es mayor de 65 años.  Tiene entre 19 y 65 años y está en tratamiento para el cáncer, tiene angelita enfermedad pulmonar crónica o tiene otras afecciones que afectan el sistema inmunitario. Pregúntele al médico si esto se aplica en brito latasha.  Colocarse la vacuna contra la gripe todos los años. Pregúntele al médico cuál es el tipo de vacuna más adecuado para usted.  Realizarse controles dentales regulares.  Lavarse las wood frecuentemente con agua y jabón lion al menos 20 segundos. Use desinfectante para wood si no dispone de agua y jabón.  Comuníquese con un médico si:  Tiene fiebre.  Tiene dificultad para dormir porque no puede controlar la tos con medicamentos.  Solicite ayuda de inmediato si:  La falta de aire empeora.  El dolor torácico aumenta.  La enfermedad empeora, especialmente si usted es un adulto mayor o brito sistema inmunitario es débil.  Tose y escupe lanette.  Estos síntomas pueden indicar angelita emergencia. Solicite ayuda de inmediato. Llame al 911.  No espere a celio si los síntomas desaparecen.  No conduzca por abilio propios medios hasta el hospital.  Resumen  La neumonía es angelita infección en los pulmones.  La neumonía extrahospitalaria se desarrolla en personas que no knott estado en el hospital. Puede ser causada por bacterias, virus u hongos.  Esta afección puede tratarse con antibióticos o medicamentos antivirales.  La neumonía grave puede requerir hospitalización y tratamiento para ayudar a la respiración. 1) Follow up with your doctor in 1-2 days  2) Return to the ER for worsening or concerning symptoms  3) take medication as prescribed    Community-Acquired Pneumonia, Adult  La neumonía es angelita infección pulmonar que causa inflamación y la acumulación de mucosidad y líquido en los pulmones. [place] puede causar tos y dificultad para respirar. La neumonía extrahospitalaria es aquella que se desarrolla en personas que no están, ni knott estado recientemente, en un hospital u otro centro de atención médica.    Por lo general, la neumonía se desarrolla edilberto resultado de angelita enfermedad causada por un virus, edilberto el resfrío común y la gripe (influenza). También puede ser causada por bacterias u hongos. Mientras que el resfrío y la gripe pueden transmitirse de angelita persona a otra (son contagiosos), la neumonía en sí no se considera contagiosa.    ¿Cuáles son las causas?  The human body, showing how a virus travels from the air to a person's lungs.   Esta afección puede ser causada por lo siguiente:  Virus.  Bacterias.  Hongos.  ¿Qué incrementa el riesgo?  Los siguientes factores pueden hacer que sea más propenso a desarrollar esta afección:  Tener más de 65 años o tener ciertas afecciones, por ejemplo:  Angelita enfermedad prolongada (crónica), edilberto enfermedad pulmonar obstructiva crónica (EPOC), asma, insuficiencia cardíaca, diabetes o enfermedad renal.  Angelita afección que incrementa el riesgo de respirar (aspirar) mucosidad y otros líquidos por la boca o la nariz.  Sistema de defensa del organismo (sistema inmunitario) debilitado.  Que le hayan extirpado el bazo (esplenectomía). El bazo es el órgano que ayuda a combatir gérmenes e infecciones.  No limpiarse paulo los dientes y las encías (higiene dental deficiente).  Usar productos que contienen tabaco.  Viajar a lugares donde hay gérmenes que causan neumonía o estar cerca de ciertos animales o hábitats animales que podrían tener gérmenes que causan neumonía.  ¿Cuáles son los signos o síntomas?  Los síntomas de esta afección incluyen:  Tos seca o húmeda (productiva).  Fiebre, sudoración o escalofríos.  Dolor en el pecho; en especial, al respirar profundamente o toser.  Respiración rápida, dificultad para respirar o falta de aire.  Cansancio (fatiga) y mika musculares.  ¿Cómo se diagnostica?  An outline of a person's body and an image of an X-ray of the person's chest.   Esta afección se puede diagnosticar mediante abilio antecedentes médicos y un examen físico. También pueden hacerle exámenes, que incluyen los siguientes:  Estudios de diagnóstico por imágenes, edilberto angelita radiografía torácica o angelita ecografía pulmonar.  Pruebas de lo siguiente:  El nivel de oxígeno y otros gases en la lanette.  La mucosidad de los pulmones (esputo).  El líquido alrededor de los pulmones (líquido pleural).  La orina.  ¿Cómo se trata?  El tratamiento de esta afección depende de muchos factores; por ejemplo, la causa de la neumonía, los medicamentos que fredy y otras afecciones que tenga.    En la mayoría de los adultos, la neumonía puede tratarse en casa. En algunos casos, el tratamiento debe realizarse en un hospital y puede incluir lo siguiente:  Medicamentos que se administran por boca (por vía oral) o a través de angelita vía intravenosa (IV), edilberto los siguientes:  Antibióticos, si la neumonía fue causada por angelita bacteria.  Medicamentos que kevin a los virus (medicamentos antivirales), si un virus causó la neumonía.  Oxigenoterapia.  La neumonía grave, aunque es poco frecuente, puede requerir los siguientes tratamientos:  Ventilación mecánica. En mar procedimiento, se utiliza angelita máquina para ayudarlo a respirar si no puede respirar paulo por abilio propios medios o mantener un nivel seguro de oxígeno en la lanette.  Toracocentesis. Mar procedimiento elimina la acumulación de líquido pleural para ayudar a la respiración.  Siga estas instrucciones en brito casa:  A comparison of three sample cups showing dark yellow, yellow, and pale yellow urine.  Medicamentos    Use los medicamentos de venta jihan y los recetados solamente edilberto se lo haya indicado el médico.  Use medicamentos para la tos solamente si tiene dificultad para dormir. Los medicamentos para la tos pueden impedir que el cuerpo elimine la mucosidad de los pulmones.  Si le recetaron antibióticos, tómelos edilberto se lo haya indicado el médico. No deje de mayuri el antibiótico aunque comience a sentirse mejor.  Estilo de truong    A sign showing that a person should not drink alcohol.  A sign showing that a person should not smoke.  No karolina alcohol.  No consuma ningún producto que contenga nicotina o tabaco. Estos productos incluyen cigarrillos, tabaco para mascar y aparatos de vapeo, edilberto los cigarrillos electrónicos. Si necesita ayuda para dejar de fumar, consulte al médico.  Siga angelita dieta saludable. Esta debe incluir muchas verduras, frutas, cereales integrales, productos lácteos bajos en grasa y proteínas magras.  Instrucciones generales    Descanse mucho y duerma edilberto mínimo 8 horas todas las noches.  De noche, duerma en posición parcialmente erguida. Coloque algunas almohadas debajo de la andrew o duerma en angelita silla reclinable.  Retome abilio actividades normales edilberto se lo haya indicado el médico. Pregúntele al médico qué actividades son seguras para usted.  Karolina suficiente líquido edilberto para mantener la orina de color amarillo pálido. [place] ayuda a diluir la mucosidad de los pulmones.  Si tiene dolor de garganta, rand gárgaras con angelita mezcla de agua y sal 3 o 4 veces al día, o cuando sea necesario. Para preparar agua con sal, disuelva totalmente de ½ a 1 cucharadita (de 3 a 6 g) de sal en 1 taza (237 ml) de agua tibia.  Concurra a todas las visitas de seguimiento.  ¿Cómo se previene?  Puede disminuir el riesgo de contraer neumonía extrahospitalaria con las siguientes medidas:  Vacunarse contra la neumonía. Hay diferentes tipos y esquemas de vacunas contra la neumonía. Pregúntele al médico cuál es la opción más adecuada para usted. Considere la posibilidad de aplicarse la vacuna contra la neumonía si usted:  Es mayor de 65 años.  Tiene entre 19 y 65 años y está en tratamiento para el cáncer, tiene angelita enfermedad pulmonar crónica o tiene otras afecciones que afectan el sistema inmunitario. Pregúntele al médico si esto se aplica en brito latasha.  Colocarse la vacuna contra la gripe todos los años. Pregúntele al médico cuál es el tipo de vacuna más adecuado para usted.  Realizarse controles dentales regulares.  Lavarse las wood frecuentemente con agua y jabón lion al menos 20 segundos. Use desinfectante para wood si no dispone de agua y jabón.  Comuníquese con un médico si:  Tiene fiebre.  Tiene dificultad para dormir porque no puede controlar la tos con medicamentos.  Solicite ayuda de inmediato si:  La falta de aire empeora.  El dolor torácico aumenta.  La enfermedad empeora, especialmente si usted es un adulto mayor o brito sistema inmunitario es débil.  Tose y escupe lanette.  Estos síntomas pueden indicar angelita emergencia. Solicite ayuda de inmediato. Llame al 911.  No espere a celio si los síntomas desaparecen.  No conduzca por abilio propios medios hasta el hospital.  Resumen  La neumonía es angelita infección en los pulmones.  La neumonía extrahospitalaria se desarrolla en personas que no knott estado en el hospital. Puede ser causada por bacterias, virus u hongos.  Esta afección puede tratarse con antibióticos o medicamentos antivirales.  La neumonía grave puede requerir hospitalización y tratamiento para ayudar a la respiración.

## 2024-05-29 NOTE — ED PROVIDER NOTE - NSICDXPASTMEDICALHX_GEN_ALL_CORE_FT
PAST MEDICAL HISTORY:  Acute lateral meniscal injury of right knee, initial encounter     Acute medial meniscus tear of right knee, initial encounter     Anxiety     At risk for sleep apnea Bang score 3    Depression     Diverticulitis     Fatty liver     H/O pneumothorax following lung biopsy right lung    Hyperlipidemia     Hypothyroidism     Malignant neoplasm of lung, unspecified laterality, unspecified part of lung     Malignant neoplasm of unspecified part of unspecified bronchus or lung     Osteoarthritis right    Osteoporosis     Patient is [employer]     PPD positive 2000 -  treated with INH    Pulmonary mass monitoring -no surgical intervention    Small bowel mass incidental finding while appendectomy ( Benign s/p colon mass resection - 3/2018)

## 2024-05-29 NOTE — ED ADULT TRIAGE NOTE - CHIEF COMPLAINT QUOTE
Patient presents to ED with c/o fever 101.7 at 2200 last night.  Patient had CT scan done yesterday AM for a follow up for lung cancer diagnosis and was scheduled to see oncologist today for CT scan results.  Per daughter, when patient spiked a fever she called oncologist who reviewed CT scan over the phone and stated it was suspicious for pneumonia and advised to go to ED.  Currently on PO chemo.  No meds PTA

## 2024-05-29 NOTE — ED PROVIDER NOTE - CLINICAL SUMMARY MEDICAL DECISION MAKING FREE TEXT BOX
69F with PMHx of non-small cell lung cancer (on lorlatinib), HLD, fatty liver, T2DM sent from oncologist office due to fevers up to 101.7F at home, chills, and CT chest revealing left lower lung opacities and left pleural effusion.     PLAN:  - Ceftriaxone  - Azithromycin  - NS 2600 mL IV  - Supplemental oxygen if needed  - CBC  - CMP  - PT/PTT/INR  - Blood cultures x2  - Lactate  - RVP w/ COVID  - EKG 69F with PMHx of non-small cell lung cancer (on lorlatinib), HLD, fatty liver, T2DM sent from oncologist office due to fevers up to 101.7F at home, chills, and CT chest revealing left lower lung opacities and left pleural effusion.     PLAN:  - Ceftriaxone  - Azithromycin  - NS 2600 mL IV  - Supplemental oxygen if needed  - CBC  - CMP  - PT/PTT/INR  - Blood cultures x2  - Lactate  - RVP w/ COVID  - EKG  - CT chest performed 5/28, results in paper chart with L lower lobe of lung opacities

## 2024-05-29 NOTE — ED PROVIDER NOTE - ENMT NEGATIVE STATEMENT, MLM
Ears: no ear pain and no hearing problems. Nose: no nasal congestion and no nasal drainage. Mouth/Throat: no throat pain.

## 2024-05-29 NOTE — ED PROVIDER NOTE - OBJECTIVE STATEMENT
69F with PMHx of non-small cell lung cancer (on lorlatinib), HLD, fatty liver, T2DM sent from oncologist office due to fevers up to 101.7F at home, chills, and CT chest revealing left lower lung opacities and left pleural effusion. Patient states that she was exposed to a COVID positive individual on 5/26/2024 and home COVID tests have been negative so far. Patient's daughter states that patient's heart rate has been up to 130s over the past 2 days and pulse oximetry has been fluctuating from 80s to 90s. 69F with PMHx of non-small cell lung cancer (on lorlatinib), HLD, fatty liver, T2DM sent from oncologist office due to fevers up to 101.7F at home, chills, and CT chest on 5/28/2024 revealing left lower lung opacities and left pleural effusion (results in paper chart). Patient states that she was exposed to a COVID positive individual on 5/26/2024 and home COVID tests have been negative so far. Patient's daughter states that patient's heart rate has been up to 130s over the past 2 days and pulse oximetry has been fluctuating from 80s to 90s.

## 2024-05-29 NOTE — ED PROVIDER NOTE - LANGUAGE ASSISTANCE NEEDED
[FreeTextEntry1] : Patient given prescription for MRI, follow up after study is completed to discuss results. \par \par Patient will begin physical therapy. \par \par Recommend: - NSAID - Heating pad - Muscle relaxer - Neck stretching exercise - Soft cervical collar - Cervical traction Patient is given neck rehabilitation exercise book. \par \par Recommend: - NSAID - Heating pad - Muscle relaxer - Core strengthening exercise - Hamstring stretching exercise Patient is given back rehabilitation exercise book.  No-Patient/Caregiver offered and refused free interpretation services.

## 2024-05-29 NOTE — ED PROVIDER NOTE - PATIENT PORTAL LINK FT
You can access the FollowMyHealth Patient Portal offered by [facility] [place] by registering at the following website: [email or web address]. By joining [facility]’s FollowMyHealth portal, you will also be able to view your health information using other applications (apps) compatible with our system.

## 2024-05-29 NOTE — ED ADULT NURSE NOTE - NSICDXPASTMEDICALHX_GEN_ALL_CORE_FT
PAST MEDICAL HISTORY:  Acute lateral meniscal injury of right knee, initial encounter     Acute medial meniscus tear of right knee, initial encounter     Anxiety     At risk for sleep apnea Bang score 3    Depression     Diverticulitis     Fatty liver     H/O pneumothorax following lung biopsy right lung    Hyperlipidemia     Hypothyroidism     Malignant neoplasm of lung, unspecified laterality, unspecified part of lung     Malignant neoplasm of unspecified part of unspecified bronchus or lung     Osteoarthritis right    Osteoporosis     Patient is [employer]     PPD positive 2000 -  treated with INH    Pulmonary mass monitoring -no surgical intervention    Small bowel mass incidental finding while appendectomy ( Benign s/p colon mass resection - 3/2018)

## 2024-05-29 NOTE — ED PROVIDER NOTE - ATTENDING CONTRIBUTION TO CARE
69F with PMHx of non-small cell lung cancer (on lorlatinib), HLD, fatty liver, T2DM sent from oncologist office due to fevers up to 101.7F at home, chills, and CT chest on 5/28/2024 revealing left lower lung opacities and left pleural effusion (results in paper chart). Patient's daughter states that patient's heart rate has been up to 130s over the past 2 days and pulse oximetry has been fluctuating from 80s to 90s.    I, Kim Conrad, personally saw the patient with the resident, and completed the key components of the history and physical exam. I then discussed the management plan with the resident.

## 2024-05-29 NOTE — ED ADULT TRIAGE NOTE - INTERPRETATION SERVICES DECLINED
----- Message from Parul Conteh sent at 4/9/2024  9:06 AM CDT -----  Regarding: call  Contact: [phone]  Pt is calling. She received a missed call and is wondering if it is from dr. Rodriguez. Please give her a call back      Patient/Caregiver requests family/friend to interpret.

## 2024-05-30 RX ORDER — AZITHROMYCIN 500 MG/1
4 TABLET, FILM COATED ORAL
Qty: 4 | Refills: 0
Start: 2024-05-30 | End: 2024-06-02

## 2024-06-10 NOTE — ED ADULT TRIAGE NOTE - CHIEF COMPLAINT QUOTE
c/o of L sided upper back pain, weakness and pain upon inspiration x2 weeks. Hx of Lung Ca in remission since 2021
No

## 2024-06-29 NOTE — ASU PATIENT PROFILE, ADULT - TEACHING/LEARNING FACTORS INFLUENCE READINESS TO LEARN
Patient : Piotr Menchaca Age: 68 year old Sex: male   MRN: [id number] Encounter Date: 6/29/2024      History     Chief Complaint   Patient presents with    Shortness of Breath     68M with h/o HTN, HLD, prior PE on Eliquis, CHF, COPD, CKD, polysubstance use disorder, medication noncompliance, homelessness presented to the ED with c/o shortness of breath. The patient is a poor historian. He states that for the past two weeks he has had right sided chest and rib pain. The pain is poorly characterized but exacerbated by coughing. He reports a cough productive of green sputum as well as shortness of breath. He states he is concerned about the possibility of pneumonia which he has had before. He notes subjective fever and nasal congestion but denies any sore throat, abdominal pain, vomiting or diarrhea.    On chart review the patient had been admitted in May for left sided pleuritic chest pain and shortness of breath. At that time he had a CT angiogram which showed no PE and he underwent a nuclear medicine stress test which was reassuring. He was then seen in the ED on 6/8 for right side chest pain and coughing. He had repeat a repeat CT angiogram which showed no PE but patchy RLL opacities concerning for aspiration. He was discharged on Augmentin but states he did not complete the course of antibiotics.          No Known Allergies    Current Discharge Medication List        Prior to Admission Medications    Details   amLODIPine (NORVASC) 5 MG tablet Take 1 tablet by mouth daily.  Qty: 90 tablet, Refills: 0      apixaBAN (ELIQUIS) 5 MG Tab Take 1 tablet by mouth every 12 hours.  Qty: 60 tablet, Refills: 0      carvedilol (COREG) 6.25 MG tablet Take 1 tablet by mouth every 12 hours.  Qty: 60 tablet, Refills: 0      fluticasone-salmeterol (Advair Diskus) 100-50 MCG/ACT inhaler Inhale 1 puff into the lungs in the morning and 1 puff in the evening.  Qty: 60 each, Refills: 0      losartan (COZAAR) 50 MG tablet Take 1 tablet by  mouth daily.  Qty: 90 tablet, Refills: 0      lidocaine (LIDOCARE) 4 % patch Place 2 patches onto the skin daily. Remove patch 12 hours after applying.  Do not start before May 17, 2024.  Qty: 20 patch, Refills: 0      albuterol 108 (90 Base) MCG/ACT inhaler Inhale 2 puffs into the lungs every 4 hours as needed for Wheezing.  Qty: 8.5 g, Refills: 0      fluticasone (FLONASE) 50 MCG/ACT nasal spray Spray 2 sprays in each nostril daily.  Qty: 16 g, Refills: 0      aspirin (ECOTRIN) 81 MG EC tablet Take 1 tablet by mouth daily. Do not start before September 8, 2023.  Qty: 90 tablet, Refills: 0      atorvastatin (LIPITOR) 40 MG tablet Take 1 tablet by mouth daily.  Qty: 90 tablet, Refills: 3             Past Medical History:   Diagnosis Date    Arthritis     Blood clot associated with vein wall inflammation     Chronic pain     Depression     Essential hypertension 2/8/2022    Fracture     Negative History of CA, HTN, DM, CAD, CVA, DVT, Asthma     Pneumonia     Stage 3a chronic kidney disease  (CMD) 2/8/2022       Past Surgical History:   Procedure Laterality Date    EYE SURGERY      orbital fracture right    FRACTURE SURGERY      HERNIA REPAIR      PAST SURGICAL HISTORY      laser surgery right eye, left index finger, orbit fx       Family History   Problem Relation Age of Onset    NEGATIVE FAMILY HX OF Other     Patient is unaware of any medical problems Mother     Patient is unaware of any medical problems Sister     Heart disease Brother     Patient is unaware of any medical problems Son     Patient is unaware of any medical problems Brother     Patient is unaware of any medical problems Brother     Patient is unaware of any medical problems Brother     Patient is unaware of any medical problems Brother     Patient is unaware of any medical problems Sister        Social History     Tobacco Use    Smoking status: Every Day     Current packs/day: 0.50     Average packs/day: 0.5 packs/day for 51.5 years (25.7 ttl  pk-yrs)     Types: Cigarettes     Start date: 1/1/1973    Smokeless tobacco: Never   Vaping Use    Vaping status: Some Days   Substance Use Topics    Alcohol use: Yes     Alcohol/week: 14.0 standard drinks of alcohol     Types: 14 Cans of beer per week     Comment: 1/13/24-drank 2 beers + vodka    Drug use: Yes     Frequency: 1.0 times per week     Types: Cannabinols, Marijuana     Comment: last used cocaine-1/5/24       E-cigarette/Vaping    E-Cigarette/Vaping Use Current Some Day User     Passive Exposure No     Counseling Given No      E-Cigarette/Vaping Substances & Devices    Nicotine No     THC No     CBD No     Flavoring No     Disposable No     Pre-filled or Refillable Cartridge No     Refillable Tank No     Pre-filled Pod No        Review of Systems   Constitutional:  Positive for fatigue and fever.   Respiratory:  Positive for cough and shortness of breath.    Cardiovascular:  Positive for chest pain.   Gastrointestinal:  Negative for abdominal pain, diarrhea and vomiting.       Physical Exam     ED Triage Vitals [06/29/24 0440]   ED Triage Vitals Group      Temp 97.5 °F (36.4 °C)      Heart Rate (!) 102      Resp 18      BP (!) 174/102      SpO2 98 %      EtCO2 mmHg       Height       Weight 152 lb 1.9 oz (69 kg)      Weight Scale Used Standing scale      BMI (Calculated)       IBW/kg (Calculated)        Physical Exam  Vitals and nursing note reviewed.   Constitutional:       Appearance: He is not toxic-appearing.   HENT:      Head: Normocephalic and atraumatic.   Cardiovascular:      Rate and Rhythm: Normal rate and regular rhythm.   Pulmonary:      Effort: Pulmonary effort is normal.      Breath sounds: Examination of the right-lower field reveals decreased breath sounds. Examination of the left-lower field reveals decreased breath sounds. Decreased breath sounds present. No wheezing or rhonchi.   Abdominal:      Palpations: Abdomen is soft.      Tenderness: There is no abdominal tenderness.    Musculoskeletal:      Right lower leg: No edema.      Left lower leg: No edema.   Neurological:      Mental Status: He is alert and oriented to person, place, and time.         ED Course     Procedures    Lab Results     Results for orders placed or performed during the hospital encounter of 06/29/24   CBC with Automated Differential (performable only)   Result Value Ref Range    WBC 4.7 4.2 - 11.0 K/mcL    RBC 5.14 4.50 - 5.90 mil/mcL    HGB 14.4 13.0 - 17.0 g/dL    HCT 41.8 39.0 - 51.0 %    MCV 81.3 78.0 - 100.0 fl    MCH 28.0 26.0 - 34.0 pg    MCHC 34.4 32.0 - 36.5 g/dL    RDW-CV 14.6 11.0 - 15.0 %    RDW-SD 43.5 39.0 - 50.0 fL     140 - 450 K/mcL    NRBC 0 <=0 /100 WBC    Neutrophil, Percent 59 %    Lymphocytes, Percent 24 %    Mono, Percent 14 %    Eosinophils, Percent 2 %    Basophils, Percent 1 %    Immature Granulocytes 0 %    Absolute Neutrophils 2.8 1.8 - 7.7 K/mcL    Absolute Lymphocytes 1.1 1.0 - 4.0 K/mcL    Absolute Monocytes 0.7 0.3 - 0.9 K/mcL    Absolute Eosinophils  0.1 0.0 - 0.5 K/mcL    Absolute Basophils 0.0 0.0 - 0.3 K/mcL    Absolute Immature Granulocytes 0.0 0.0 - 0.2 K/mcL   D Dimer, Quantitative   Result Value Ref Range    D Dimer, Quantitative 1.21 (H) <0.57 mg/L (FEU)       EKG Results     EKG Interpretation  Rate: 98  Rhythm: normal sinus rhythm   Abnormality: , QRS 80, Qtc 485. Subtle ST depressions / T-wave inversions in II, III and aVF appear stable compared to prior. No new reciprocal changes. Compared to EKG from 6/8/24.    EKG tracing interpreted by ED physician    Radiology Results     Imaging Results              XR CHEST PA OR AP 1 VIEW (In process)  Result time 06/29/24 05:45:34                    ED Medication Orders (From admission, onward)      None                Medical Decision Making    68M with h/o HTN, HLD, prior PE on Eliquis, CHF, COPD, CKD, polysubstance use disorder, medication noncompliance, homelessness presented to the ED with c/o several weeks of  right sided chest pain, shortness of breath and cough after being diagnosed with possible pneumonia but not completing the course of antibiotics. Upon arrival his vital signs are generally reassuring without fever or hypoxia. On exam he is non-toxic appearing with slightly diminished breath sounds but no audible wheezing, and he is speaking in full sentences without difficulty. The differential diagnosis for his symptoms is broad but includes bronchitis, pneumonia, a viral URI, CHF exacerbation, PE or another cardiopulmonary process. We will obtain lab work including a CBC, troponin, proBNP, D-dimer, viral panel and CXR. Disposition is pending.    ED Course as of 06/29/24 0613   Sat Jun 29, 2024   0546 BP: 135/77 [AW]   0555 WBC: 4.7 [AW]   0555 HGB: 14.4 [AW]   0555 PLT: 164 [AW]   0605 DDIMER, QUANTITATIVE(!): 1.21  D-dimer elevated, will obtain CTA to r/o PE. [AW]   0612 At the time of sign out, disposition is pending the remainder of his lab work and imaging. The oncoming ED provider Dr. Echevarria was aware of this plan and had no questions. [AW]      ED Course User Index  [AW] Karely Thayer MD       Clinical Impression     ED Diagnosis   1. Shortness of breath        2. Cough, unspecified type        3. Noncompliance with medication regimen        4. Homelessness            Disposition        There is no disposition no dispo time  There is no comment         Karely Thayer MD  06/29/24 0613     none

## 2024-08-06 NOTE — H&P ADULT - GUM GEN PE MLT EXAM PC
Rest the affected area. Continue to wear your brace and use the walker.      You may use ice for 20 minutes at at time 4-6 times a day for the next 1-2 days.    You may use Tylenol 1000mg every 8 hours scheduled.     Ortho will call for follow up.     Return to the urgent care or ER for any significantly worsening symptoms   
not examined

## 2024-09-17 NOTE — ED ADULT NURSE REASSESSMENT NOTE - BOWEL SOUNDS LUQ
present
Discontinue Regimen: Doxycycline
Plan: - Wash face with gentle cleanser twice daily\\n- Wipe face with Clindamycin topical swab twice daily\\n- Apply a pea-sized amount of tretinoin to face once daily\\n- Take one capsule of minoycycline 50mg tablets by mouth once daily
Render In Strict Bullet Format?: No
Detail Level: Zone

## 2024-09-18 ENCOUNTER — EMERGENCY (EMERGENCY)
Facility: HOSPITAL | Age: 69
LOS: 1 days | Discharge: HOME | End: 2024-09-18
Attending: EMERGENCY MEDICINE
Payer: MEDICARE

## 2024-09-18 VITALS
HEART RATE: 84 BPM | DIASTOLIC BLOOD PRESSURE: 78 MMHG | OXYGEN SATURATION: 98 % | SYSTOLIC BLOOD PRESSURE: 141 MMHG | RESPIRATION RATE: 18 BRPM

## 2024-09-18 VITALS
OXYGEN SATURATION: 98 % | DIASTOLIC BLOOD PRESSURE: 84 MMHG | TEMPERATURE: 99 F | WEIGHT: 189.82 LBS | HEIGHT: 66 IN | RESPIRATION RATE: 20 BRPM | HEART RATE: 97 BPM | SYSTOLIC BLOOD PRESSURE: 137 MMHG

## 2024-09-18 DIAGNOSIS — Z90.49 ACQUIRED ABSENCE OF OTHER SPECIFIED PARTS OF DIGESTIVE TRACT: Chronic | ICD-10-CM

## 2024-09-18 DIAGNOSIS — Z98.82 BREAST IMPLANT STATUS: Chronic | ICD-10-CM

## 2024-09-18 DIAGNOSIS — Z98.890 OTHER SPECIFIED POSTPROCEDURAL STATES: Chronic | ICD-10-CM

## 2024-09-18 DIAGNOSIS — Z98.891 HISTORY OF UTERINE SCAR FROM PREVIOUS SURGERY: Chronic | ICD-10-CM

## 2024-09-18 LAB
ALBUMIN SERPL ELPH-MCNC: 4.1 G/DL — SIGNIFICANT CHANGE UP (ref 3.3–5.2)
ALP SERPL-CCNC: 97 U/L — SIGNIFICANT CHANGE UP (ref 40–120)
ALT FLD-CCNC: 30 U/L — SIGNIFICANT CHANGE UP
ANION GAP SERPL CALC-SCNC: 10 MMOL/L — SIGNIFICANT CHANGE UP (ref 5–17)
APTT BLD: 35.9 SEC — HIGH (ref 24.5–35.6)
AST SERPL-CCNC: 38 U/L — HIGH
BASOPHILS # BLD AUTO: 0.02 K/UL — SIGNIFICANT CHANGE UP (ref 0–0.2)
BASOPHILS NFR BLD AUTO: 0.5 % — SIGNIFICANT CHANGE UP (ref 0–2)
BILIRUB SERPL-MCNC: <0.2 MG/DL — LOW (ref 0.4–2)
BUN SERPL-MCNC: 13.5 MG/DL — SIGNIFICANT CHANGE UP (ref 8–20)
CALCIUM SERPL-MCNC: 8.9 MG/DL — SIGNIFICANT CHANGE UP (ref 8.4–10.5)
CHLORIDE SERPL-SCNC: 100 MMOL/L — SIGNIFICANT CHANGE UP (ref 96–108)
CO2 SERPL-SCNC: 27 MMOL/L — SIGNIFICANT CHANGE UP (ref 22–29)
CREAT SERPL-MCNC: 0.65 MG/DL — SIGNIFICANT CHANGE UP (ref 0.5–1.3)
EGFR: 95 ML/MIN/1.73M2 — SIGNIFICANT CHANGE UP
EOSINOPHIL # BLD AUTO: 0.07 K/UL — SIGNIFICANT CHANGE UP (ref 0–0.5)
EOSINOPHIL NFR BLD AUTO: 1.7 % — SIGNIFICANT CHANGE UP (ref 0–6)
GLUCOSE SERPL-MCNC: 103 MG/DL — HIGH (ref 70–99)
HCT VFR BLD CALC: 31.4 % — LOW (ref 34.5–45)
HGB BLD-MCNC: 10.5 G/DL — LOW (ref 11.5–15.5)
IMM GRANULOCYTES NFR BLD AUTO: 0.7 % — SIGNIFICANT CHANGE UP (ref 0–0.9)
INR BLD: 1.03 RATIO — SIGNIFICANT CHANGE UP (ref 0.85–1.18)
LACTATE BLDV-MCNC: 1.1 MMOL/L — SIGNIFICANT CHANGE UP (ref 0.5–2)
LACTATE SERPL-SCNC: 1.2 MMOL/L — SIGNIFICANT CHANGE UP (ref 0.5–2)
LYMPHOCYTES # BLD AUTO: 0.78 K/UL — LOW (ref 1–3.3)
LYMPHOCYTES # BLD AUTO: 19.2 % — SIGNIFICANT CHANGE UP (ref 13–44)
MCHC RBC-ENTMCNC: 29.4 PG — SIGNIFICANT CHANGE UP (ref 27–34)
MCHC RBC-ENTMCNC: 33.4 GM/DL — SIGNIFICANT CHANGE UP (ref 32–36)
MCV RBC AUTO: 88 FL — SIGNIFICANT CHANGE UP (ref 80–100)
MONOCYTES # BLD AUTO: 0.64 K/UL — SIGNIFICANT CHANGE UP (ref 0–0.9)
MONOCYTES NFR BLD AUTO: 15.8 % — HIGH (ref 2–14)
NEUTROPHILS # BLD AUTO: 2.52 K/UL — SIGNIFICANT CHANGE UP (ref 1.8–7.4)
NEUTROPHILS NFR BLD AUTO: 62.1 % — SIGNIFICANT CHANGE UP (ref 43–77)
PLATELET # BLD AUTO: 246 K/UL — SIGNIFICANT CHANGE UP (ref 150–400)
POTASSIUM SERPL-MCNC: 4.2 MMOL/L — SIGNIFICANT CHANGE UP (ref 3.5–5.3)
POTASSIUM SERPL-SCNC: 4.2 MMOL/L — SIGNIFICANT CHANGE UP (ref 3.5–5.3)
PROT SERPL-MCNC: 7.2 G/DL — SIGNIFICANT CHANGE UP (ref 6.6–8.7)
PROTHROM AB SERPL-ACNC: 11.4 SEC — SIGNIFICANT CHANGE UP (ref 9.5–13)
RBC # BLD: 3.57 M/UL — LOW (ref 3.8–5.2)
RBC # FLD: 16.3 % — HIGH (ref 10.3–14.5)
SODIUM SERPL-SCNC: 137 MMOL/L — SIGNIFICANT CHANGE UP (ref 135–145)
TROPONIN T, HIGH SENSITIVITY RESULT: 7 NG/L — SIGNIFICANT CHANGE UP (ref 0–51)
WBC # BLD: 4.06 K/UL — SIGNIFICANT CHANGE UP (ref 3.8–10.5)
WBC # FLD AUTO: 4.06 K/UL — SIGNIFICANT CHANGE UP (ref 3.8–10.5)

## 2024-09-18 PROCEDURE — 36415 COLL VENOUS BLD VENIPUNCTURE: CPT

## 2024-09-18 PROCEDURE — 87040 BLOOD CULTURE FOR BACTERIA: CPT

## 2024-09-18 PROCEDURE — 96374 THER/PROPH/DIAG INJ IV PUSH: CPT

## 2024-09-18 PROCEDURE — 85025 COMPLETE CBC W/AUTO DIFF WBC: CPT

## 2024-09-18 PROCEDURE — 71045 X-RAY EXAM CHEST 1 VIEW: CPT | Mod: 26

## 2024-09-18 PROCEDURE — 93005 ELECTROCARDIOGRAM TRACING: CPT

## 2024-09-18 PROCEDURE — 80053 COMPREHEN METABOLIC PANEL: CPT

## 2024-09-18 PROCEDURE — 85730 THROMBOPLASTIN TIME PARTIAL: CPT

## 2024-09-18 PROCEDURE — 71045 X-RAY EXAM CHEST 1 VIEW: CPT

## 2024-09-18 PROCEDURE — 93010 ELECTROCARDIOGRAM REPORT: CPT

## 2024-09-18 PROCEDURE — 83605 ASSAY OF LACTIC ACID: CPT

## 2024-09-18 PROCEDURE — 99284 EMERGENCY DEPT VISIT MOD MDM: CPT

## 2024-09-18 PROCEDURE — 84484 ASSAY OF TROPONIN QUANT: CPT

## 2024-09-18 PROCEDURE — 96361 HYDRATE IV INFUSION ADD-ON: CPT

## 2024-09-18 PROCEDURE — 87637 SARSCOV2&INF A&B&RSV AMP PRB: CPT

## 2024-09-18 PROCEDURE — 99285 EMERGENCY DEPT VISIT HI MDM: CPT | Mod: 25

## 2024-09-18 PROCEDURE — 85610 PROTHROMBIN TIME: CPT

## 2024-09-18 RX ORDER — CEFPODOXIME PROXETIL 100 MG/5ML
1 GRANULE, FOR SUSPENSION ORAL
Qty: 20 | Refills: 0
Start: 2024-09-18 | End: 2024-09-27

## 2024-09-18 RX ADMIN — Medication 2700 MILLILITER(S): at 21:20

## 2024-09-18 RX ADMIN — Medication 2700 MILLILITER(S): at 22:51

## 2024-09-18 RX ADMIN — Medication 1000 MILLIGRAM(S): at 21:18

## 2024-09-18 NOTE — ED ADULT NURSE NOTE - CHIEF COMPLAINT QUOTE
fever and chills, pt has lung cancer being treated at [facility], pt had xray of lungs today showing pneumonia. pt states she has sob.

## 2024-09-18 NOTE — ED ADULT TRIAGE NOTE - CHIEF COMPLAINT QUOTE
fever and chills, pt has lung cancer being treated at [facility], pt had xray of lungs today showing pneumonia. pt states she has sob.

## 2024-09-18 NOTE — ED PROVIDER NOTE - OBJECTIVE STATEMENT
69-year-old female past medical history of lung cancer comes to the the ED with fever cough congestion x 2 days.  Patient saw her primary care doctor who started patient on Zithromax.  Patient noted was on a recent trip with a positive sick contact.  "Patient had pneumonia".

## 2024-09-18 NOTE — ED PROVIDER NOTE - PATIENT PORTAL LINK FT
You can access the FollowMyHealth Patient Portal offered by [facility] by registering at the following website: [email or web address]. By joining [facility]’s FollowMyHealth portal, you will also be able to view your health information using other applications (apps) compatible with our system.

## 2024-09-18 NOTE — ED PROVIDER NOTE - CLINICAL SUMMARY MEDICAL DECISION MAKING FREE TEXT BOX
69-year-old female past medical history of lung cancer with cough evaluate for pneumonia viral versus bacterial evaluate for CHF ACS.

## 2024-09-18 NOTE — ED PROVIDER NOTE - DISPOSITION TYPE
hours as needed for Pain 90 tablet 1    traMADol (ULTRAM) 50 MG tablet Take 50 mg by mouth every 6 hours as needed for Pain. Chalo Gupta celecoxib (CELEBREX) 200 MG capsule Take 1 capsule by mouth daily as needed for Pain 30 capsule 1    levothyroxine (SYNTHROID) 100 MCG tablet TAKE 1 TABLET (100 MCG TOTAL) BY MOUTH DAILY. 0    omeprazole (PRILOSEC) 20 MG delayed release capsule TAKE 1 CAPSULE (20 MG TOTAL) BY MOUTH DAILY. 3     No current facility-administered medications for this visit. Review of Systems:  Relevant review of systems reviewed and can be found in the Media section of patient's chart. Medical History:  Past Medical History:   Diagnosis Date    Hyperlipidemia     Thyroid disease         Past Surgical History:   Procedure Laterality Date     SECTION      x2    CHOLECYSTECTOMY      CYSTOSCOPY  13    CYSTOSCOPY URETHRAL DILATATION HYDRODISTENTION OF BLADDER    KIDNEY REMOVAL Left     TUBAL LIGATION        Allergies, social and family histories, and medications were reviewed and updated as appropriate. General Exam:  Vital Signs:  Temp 98.2 °F (36.8 °C)   Ht 5' 3\" (1.6 m)   Wt 212 lb (96.2 kg)   BMI 37.55 kg/m²    Constitutional: Patient is adequately groomed with no evidence of malnutrition. Mental Status: The patient is oriented to time, place and person. The patient's mood and affect are appropriate. Lymphatic: The lymphatic examination bilaterally reveals all areas to be without enlargement or induration. Vascular: Examination reveals no swelling or calf tenderness. 2+ palpable pulses distally. Neurological: The patient has good coordination. There is no focal weakness or sensory deficit. Focal examination of right hip is as follows: Inspection:  Normal muscle contours and no significant limb length discrepancy. No gross atrophy in any particular myotome. Palpation: Mild tenderness to the greater trochanter/trochanteric bursa.  No tenderness
DISCHARGE

## 2024-09-18 NOTE — ED PROVIDER NOTE - NSFOLLOWUPINSTRUCTIONS_ED_ALL_ED_FT
continue zithromax  vantin 200mg by mouth 2 times a day for 10 days  follow up with vitaliy villegas 3 -   5 days

## 2024-09-18 NOTE — ED ADULT NURSE NOTE - OBJECTIVE STATEMENT
PT is alert and oriented, with a patent and self maintained airway, with non labored breathing. PT reports having cough, SOB, and fevers x2 days. PT reports recent travel and contact with sick family. PT denies CP, HA, N/V/D,

## 2024-09-18 NOTE — ED ADULT NURSE NOTE - NSFALLUNIVINTERV_ED_ALL_ED
Bed/Stretcher in lowest position, wheels locked, appropriate side rails in place/Call bell, personal items and telephone in reach/Instruct patient to call for assistance before getting out of bed/chair/stretcher/Non-slip footwear applied when patient is off stretcher/Prentiss to call system/Physically safe environment - no spills, clutter or unnecessary equipment/Purposeful proactive rounding/Room/bathroom lighting operational, light cord in reach

## 2024-09-24 LAB
CULTURE RESULTS: SIGNIFICANT CHANGE UP
SPECIMEN SOURCE: SIGNIFICANT CHANGE UP

## 2024-10-17 ENCOUNTER — EMERGENCY (EMERGENCY)
Facility: HOSPITAL | Age: 69
LOS: 1 days | Discharge: HOME | End: 2024-10-17
Attending: STUDENT IN AN ORGANIZED HEALTH CARE EDUCATION/TRAINING PROGRAM
Payer: MEDICARE

## 2024-10-17 VITALS
TEMPERATURE: 98 F | HEART RATE: 68 BPM | SYSTOLIC BLOOD PRESSURE: 126 MMHG | OXYGEN SATURATION: 99 % | RESPIRATION RATE: 15 BRPM | DIASTOLIC BLOOD PRESSURE: 60 MMHG

## 2024-10-17 VITALS
WEIGHT: 194.89 LBS | HEIGHT: 66 IN | TEMPERATURE: 98 F | DIASTOLIC BLOOD PRESSURE: 89 MMHG | HEART RATE: 73 BPM | OXYGEN SATURATION: 100 % | SYSTOLIC BLOOD PRESSURE: 164 MMHG | RESPIRATION RATE: 20 BRPM

## 2024-10-17 DIAGNOSIS — Z90.49 ACQUIRED ABSENCE OF OTHER SPECIFIED PARTS OF DIGESTIVE TRACT: Chronic | ICD-10-CM

## 2024-10-17 DIAGNOSIS — Z98.891 HISTORY OF UTERINE SCAR FROM PREVIOUS SURGERY: Chronic | ICD-10-CM

## 2024-10-17 DIAGNOSIS — Z98.82 BREAST IMPLANT STATUS: Chronic | ICD-10-CM

## 2024-10-17 DIAGNOSIS — Z98.890 OTHER SPECIFIED POSTPROCEDURAL STATES: Chronic | ICD-10-CM

## 2024-10-17 LAB
ALBUMIN SERPL ELPH-MCNC: 3.8 G/DL — SIGNIFICANT CHANGE UP (ref 3.3–5.2)
ALP SERPL-CCNC: 100 U/L — SIGNIFICANT CHANGE UP (ref 40–120)
ALT FLD-CCNC: 19 U/L — SIGNIFICANT CHANGE UP
ANION GAP SERPL CALC-SCNC: 14 MMOL/L — SIGNIFICANT CHANGE UP (ref 5–17)
APTT BLD: 37 SEC — HIGH (ref 24.5–35.6)
AST SERPL-CCNC: 21 U/L — SIGNIFICANT CHANGE UP
BASOPHILS # BLD AUTO: 0.06 K/UL — SIGNIFICANT CHANGE UP (ref 0–0.2)
BASOPHILS NFR BLD AUTO: 1.2 % — SIGNIFICANT CHANGE UP (ref 0–2)
BILIRUB SERPL-MCNC: <0.2 MG/DL — LOW (ref 0.4–2)
BUN SERPL-MCNC: 16.2 MG/DL — SIGNIFICANT CHANGE UP (ref 8–20)
CALCIUM SERPL-MCNC: 9.1 MG/DL — SIGNIFICANT CHANGE UP (ref 8.4–10.5)
CHLORIDE SERPL-SCNC: 103 MMOL/L — SIGNIFICANT CHANGE UP (ref 96–108)
CO2 SERPL-SCNC: 23 MMOL/L — SIGNIFICANT CHANGE UP (ref 22–29)
CREAT SERPL-MCNC: 0.61 MG/DL — SIGNIFICANT CHANGE UP (ref 0.5–1.3)
EGFR: 97 ML/MIN/1.73M2 — SIGNIFICANT CHANGE UP
EOSINOPHIL # BLD AUTO: 0.28 K/UL — SIGNIFICANT CHANGE UP (ref 0–0.5)
EOSINOPHIL NFR BLD AUTO: 5.5 % — SIGNIFICANT CHANGE UP (ref 0–6)
GLUCOSE SERPL-MCNC: 136 MG/DL — HIGH (ref 70–99)
HCT VFR BLD CALC: 33.5 % — LOW (ref 34.5–45)
HGB BLD-MCNC: 11 G/DL — LOW (ref 11.5–15.5)
IMM GRANULOCYTES NFR BLD AUTO: 0.6 % — SIGNIFICANT CHANGE UP (ref 0–0.9)
INR BLD: 0.98 RATIO — SIGNIFICANT CHANGE UP (ref 0.85–1.16)
LYMPHOCYTES # BLD AUTO: 1.55 K/UL — SIGNIFICANT CHANGE UP (ref 1–3.3)
LYMPHOCYTES # BLD AUTO: 30.6 % — SIGNIFICANT CHANGE UP (ref 13–44)
MCHC RBC-ENTMCNC: 29 PG — SIGNIFICANT CHANGE UP (ref 27–34)
MCHC RBC-ENTMCNC: 32.8 GM/DL — SIGNIFICANT CHANGE UP (ref 32–36)
MCV RBC AUTO: 88.4 FL — SIGNIFICANT CHANGE UP (ref 80–100)
MONOCYTES # BLD AUTO: 0.59 K/UL — SIGNIFICANT CHANGE UP (ref 0–0.9)
MONOCYTES NFR BLD AUTO: 11.6 % — SIGNIFICANT CHANGE UP (ref 2–14)
NEUTROPHILS # BLD AUTO: 2.56 K/UL — SIGNIFICANT CHANGE UP (ref 1.8–7.4)
NEUTROPHILS NFR BLD AUTO: 50.5 % — SIGNIFICANT CHANGE UP (ref 43–77)
PLATELET # BLD AUTO: 254 K/UL — SIGNIFICANT CHANGE UP (ref 150–400)
POTASSIUM SERPL-MCNC: 3.8 MMOL/L — SIGNIFICANT CHANGE UP (ref 3.5–5.3)
POTASSIUM SERPL-SCNC: 3.8 MMOL/L — SIGNIFICANT CHANGE UP (ref 3.5–5.3)
PROT SERPL-MCNC: 6.8 G/DL — SIGNIFICANT CHANGE UP (ref 6.6–8.7)
PROTHROM AB SERPL-ACNC: 11.4 SEC — SIGNIFICANT CHANGE UP (ref 9.9–13.4)
RBC # BLD: 3.79 M/UL — LOW (ref 3.8–5.2)
RBC # FLD: 15.8 % — HIGH (ref 10.3–14.5)
SODIUM SERPL-SCNC: 140 MMOL/L — SIGNIFICANT CHANGE UP (ref 135–145)
TROPONIN T, HIGH SENSITIVITY RESULT: 11 NG/L — SIGNIFICANT CHANGE UP (ref 0–51)
TROPONIN T, HIGH SENSITIVITY RESULT: 9 NG/L — SIGNIFICANT CHANGE UP (ref 0–51)
WBC # BLD: 5.07 K/UL — SIGNIFICANT CHANGE UP (ref 3.8–10.5)
WBC # FLD AUTO: 5.07 K/UL — SIGNIFICANT CHANGE UP (ref 3.8–10.5)

## 2024-10-17 PROCEDURE — 71045 X-RAY EXAM CHEST 1 VIEW: CPT

## 2024-10-17 PROCEDURE — 71275 CT ANGIOGRAPHY CHEST: CPT | Mod: 26,MC

## 2024-10-17 PROCEDURE — 99285 EMERGENCY DEPT VISIT HI MDM: CPT | Mod: 25

## 2024-10-17 PROCEDURE — 84484 ASSAY OF TROPONIN QUANT: CPT

## 2024-10-17 PROCEDURE — 71275 CT ANGIOGRAPHY CHEST: CPT | Mod: MC

## 2024-10-17 PROCEDURE — 99285 EMERGENCY DEPT VISIT HI MDM: CPT

## 2024-10-17 PROCEDURE — 71045 X-RAY EXAM CHEST 1 VIEW: CPT | Mod: 26

## 2024-10-17 PROCEDURE — 85025 COMPLETE CBC W/AUTO DIFF WBC: CPT

## 2024-10-17 PROCEDURE — 36415 COLL VENOUS BLD VENIPUNCTURE: CPT

## 2024-10-17 PROCEDURE — 85610 PROTHROMBIN TIME: CPT

## 2024-10-17 PROCEDURE — 80053 COMPREHEN METABOLIC PANEL: CPT

## 2024-10-17 PROCEDURE — 93005 ELECTROCARDIOGRAM TRACING: CPT

## 2024-10-17 PROCEDURE — T1013: CPT

## 2024-10-17 PROCEDURE — 93010 ELECTROCARDIOGRAM REPORT: CPT

## 2024-10-17 PROCEDURE — 85730 THROMBOPLASTIN TIME PARTIAL: CPT

## 2024-10-17 NOTE — ED PROVIDER NOTE - NSFOLLOWUPCLINICS_GEN_ALL_ED_FT
Cardiology at [place] ([facility])  Cardiology  [address], [address]  [place] [address]  Phone: [phone]  Fax:

## 2024-10-17 NOTE — ED PROVIDER NOTE - PATIENT PORTAL LINK FT
You can access the FollowMyHealth Patient Portal offered by [facility] by registering at the following website: [email or web address]. By joining [facility]’s FollowMyHealth portal, you will also be able to view your health information using other applications (apps) compatible with our system.

## 2024-10-17 NOTE — ED PROVIDER NOTE - PROGRESS NOTE DETAILS
Amirah Snider, DO: patient received at sign out from Dr. Yeung pending results. Labs reviewed, nonactionable. hsTrop delta 2. CTA demonstrating "No pulmonary embolism. Stable postradiation changes in the left upper and lower lobes. Stable solid nodular opacity in the right upper lobe." Results discussed with patient and son. Advised f/u with cardiologist. stable for dc home.

## 2024-10-17 NOTE — ED ADULT NURSE NOTE - OBJECTIVE STATEMENT
Pt A & Ox4 c/o chest pain. Respirations even and unlabored. Denies SOB. Pt reports pain began at 1am. Denies SOB, cough, and does not radiate. US IV established. Blood specimens sent to lab. Medications administered as ordered. CM and  ongoing.

## 2024-10-17 NOTE — ED ADULT TRIAGE NOTE - CHIEF COMPLAINT QUOTE
From home c/o High blood pressure & chest pain noted today. On chemo therapy tx for her lung ca at [facility]

## 2024-10-17 NOTE — ED PROVIDER NOTE - CLINICAL SUMMARY MEDICAL DECISION MAKING FREE TEXT BOX
Sudden onset L-sided chest pain that started at 1:30AM this morning- +chills, h/o lung cancer- check labs, cta chest eval for PE/pneumonia, cxr, ekg reassess. Suzy Yeung DO

## 2024-10-17 NOTE — ED ADULT NURSE NOTE - NSICDXPASTMEDICALHX_GEN_ALL_CORE_FT
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

## 2024-10-17 NOTE — ED PROVIDER NOTE - NSDCPRINTRESULTS_ED_ALL_ED
No. STEFANIA screening performed.  STOP BANG Legend: 0-2 = LOW Risk; 3-4 = INTERMEDIATE Risk; 5-8 = HIGH Risk
Patient requests all Lab, Cardiology, and Radiology Results on their Discharge Instructions

## 2024-10-17 NOTE — ED ADULT NURSE NOTE - CHIEF COMPLAINT QUOTE
From home c/o High blood pressure & chest pain noted today. On chemo therapy tx for her lung ca at [facility]

## 2024-10-17 NOTE — ED PROVIDER NOTE - NSICDXPASTMEDICALHX_GEN_ALL_CORE_FT
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

## 2024-10-17 NOTE — ED PROVIDER NOTE - OBJECTIVE STATEMENT
70yo female with PMH L lung cancer on PO lorlatinib, follows with [facility], presenting with L sided chest pressure that started at 1:30 this morning, nonradiating, constant. Not exacerbated or relieved by anything. No shortness of breath, no cough.

## 2024-10-17 NOTE — ED PROVIDER NOTE - NSFOLLOWUPINSTRUCTIONS_ED_ALL_ED_FT
- You were evaluated today for chest pain. Your blood work and CT did not demonstrate a reason for your pain.   - Please follow up with your cardiologist for your chest pain. Bring a copy of all results from today's visit with you.   - You may take Tylenol 1000mg every 6 hours or Ibuprofen 600mg (3 tablets) every 6 hours as needed for aches, pains.       Feel better!

## 2024-10-17 NOTE — ED ADULT NURSE REASSESSMENT NOTE - NS ED NURSE REASSESS COMMENT FT1
Assumed care of pt at this time. Pt A&O x 4 c/o episode of chest pain starting at 0100 this morning. Pt reports pain improved and at tolerable and acceptable level. Airway patent. Respirations even and unlabored. No JVD or diaphoresis. +2 LE edema. Pt on telemonitor with . Bed locked and in lowest position. Call bell within reach. Able to make needs known.
Pt A&O x 4 comfortable, denies complaints at this time. Denies pain. Nonslip footwear. Bed locked and in lowest position. Call bell within reach. Able to make needs known. DC.

## 2024-11-26 ENCOUNTER — APPOINTMENT (OUTPATIENT)
Dept: PULMONOLOGY | Facility: CLINIC | Age: 69
End: 2024-11-26
Payer: MEDICARE

## 2024-11-26 VITALS — HEIGHT: 64.2 IN | WEIGHT: 190 LBS | BODY MASS INDEX: 32.44 KG/M2

## 2024-11-26 VITALS
RESPIRATION RATE: 16 BRPM | DIASTOLIC BLOOD PRESSURE: 74 MMHG | SYSTOLIC BLOOD PRESSURE: 118 MMHG | HEART RATE: 84 BPM | OXYGEN SATURATION: 98 %

## 2024-11-26 DIAGNOSIS — C80.1 MALIGNANT (PRIMARY) NEOPLASM, UNSPECIFIED: ICD-10-CM

## 2024-11-26 DIAGNOSIS — Z71.89 OTHER SPECIFIED COUNSELING: ICD-10-CM

## 2024-11-26 DIAGNOSIS — J84.9 INTERSTITIAL PULMONARY DISEASE, UNSPECIFIED: ICD-10-CM

## 2024-11-26 DIAGNOSIS — G47.33 OBSTRUCTIVE SLEEP APNEA (ADULT) (PEDIATRIC): ICD-10-CM

## 2024-11-26 PROCEDURE — 94729 DIFFUSING CAPACITY: CPT

## 2024-11-26 PROCEDURE — 94010 BREATHING CAPACITY TEST: CPT

## 2024-11-26 PROCEDURE — 85018 HEMOGLOBIN: CPT | Mod: QW

## 2024-11-26 PROCEDURE — 94727 GAS DIL/WSHOT DETER LNG VOL: CPT

## 2024-11-26 PROCEDURE — 99215 OFFICE O/P EST HI 40 MIN: CPT | Mod: 25

## 2024-11-26 RX ORDER — ROSUVASTATIN CALCIUM 40 MG/1
40 TABLET, FILM COATED ORAL
Refills: 0 | Status: ACTIVE | COMMUNITY

## 2024-11-26 RX ORDER — TIRZEPATIDE 2.5 MG/.5ML
2.5 INJECTION, SOLUTION SUBCUTANEOUS
Refills: 0 | Status: ACTIVE | COMMUNITY

## 2025-03-19 NOTE — H&P PST ADULT - NSSUBSTANCEUSE_GEN_ALL_CORE_SD
Medication:     Disp Refills Start End     losartan (COZAAR) 100 MG tablet 90 tablet 1 1/20/2025 --    Sig - Route: Take 1 tablet by mouth daily. - Oral    Sent to pharmacy as: Losartan Potassium 100 MG Oral Tablet (COZAAR)    Class: Eprescribe    E-Prescribing Status: Receipt confirmed by pharmacy (1/20/2025  9:08 AM CST)      Refill requested too soon  Medication refill denied. Refills are on file at pharmacy.   Denies/never used/caffeine

## 2025-04-08 ENCOUNTER — APPOINTMENT (OUTPATIENT)
Dept: INTERNAL MEDICINE | Facility: CLINIC | Age: 70
End: 2025-04-08

## 2025-06-18 ENCOUNTER — APPOINTMENT (OUTPATIENT)
Dept: UROLOGY | Facility: CLINIC | Age: 70
End: 2025-06-18
Payer: MEDICARE

## 2025-06-18 ENCOUNTER — LABORATORY RESULT (OUTPATIENT)
Age: 70
End: 2025-06-18

## 2025-06-18 VITALS
BODY MASS INDEX: 28.93 KG/M2 | WEIGHT: 180 LBS | HEART RATE: 76 BPM | HEIGHT: 66 IN | SYSTOLIC BLOOD PRESSURE: 112 MMHG | DIASTOLIC BLOOD PRESSURE: 72 MMHG

## 2025-06-18 PROBLEM — R31.29 MICROHEMATURIA: Status: ACTIVE | Noted: 2025-06-18

## 2025-06-18 PROCEDURE — 99203 OFFICE O/P NEW LOW 30 MIN: CPT

## 2025-06-19 LAB
APPEARANCE: CLEAR
BILIRUBIN URINE: NEGATIVE
BLOOD URINE: ABNORMAL
COLOR: YELLOW
GLUCOSE QUALITATIVE U: NEGATIVE MG/DL
KETONES URINE: NEGATIVE MG/DL
LEUKOCYTE ESTERASE URINE: NEGATIVE
NITRITE URINE: NEGATIVE
PH URINE: 7.5
PROTEIN URINE: NEGATIVE MG/DL
SPECIFIC GRAVITY URINE: 1.01
UROBILINOGEN URINE: 0.2 MG/DL

## 2025-06-20 ENCOUNTER — APPOINTMENT (OUTPATIENT)
Dept: UROLOGY | Facility: CLINIC | Age: 70
End: 2025-06-20
Payer: MEDICARE

## 2025-06-20 PROCEDURE — 99212 OFFICE O/P EST SF 10 MIN: CPT | Mod: 93

## 2025-06-30 ENCOUNTER — APPOINTMENT (OUTPATIENT)
Dept: OBGYN | Facility: CLINIC | Age: 70
End: 2025-06-30

## 2025-09-04 ENCOUNTER — TRANSCRIPTION ENCOUNTER (OUTPATIENT)
Age: 70
End: 2025-09-04

## (undated) DEVICE — TRAP SPECIMEN SPUTUM 40CC

## (undated) DEVICE — STOPCOCK 3 WAY W SWIVEL MALE LUER LOCK

## (undated) DEVICE — BRUSH CYTO DISP

## (undated) DEVICE — SOL IRR POUR H2O 1000ML

## (undated) DEVICE — TUBING SUCTION CONN 6FT STERILE

## (undated) DEVICE — WARMING BLANKET LOWER ADULT

## (undated) DEVICE — SOL INJ NS 0.9% 100ML

## (undated) DEVICE — SOL IRR POUR NS 0.9% 1000ML

## (undated) DEVICE — GLV 7.5 PROTEXIS (WHITE)

## (undated) DEVICE — VENODYNE/SCD SLEEVE CALF MEDIUM

## (undated) DEVICE — PACK BASIC GOWN MAYO COVER

## (undated) DEVICE — NDL ASPIRATION VIZISHOT2 21G

## (undated) DEVICE — BALLOON SINGLE FOR BF-UC160F

## (undated) DEVICE — VALVE BIOPSY BRONCHOVIDEOSCOPE

## (undated) DEVICE — ADAPTER BIOPSY VALVE

## (undated) DEVICE — VALVE SUCTION EVIS 160/200/240

## (undated) DEVICE — DRAPE 3/4 SHEET 52X76"